# Patient Record
Sex: MALE | Race: ASIAN | NOT HISPANIC OR LATINO | Employment: OTHER | URBAN - METROPOLITAN AREA
[De-identification: names, ages, dates, MRNs, and addresses within clinical notes are randomized per-mention and may not be internally consistent; named-entity substitution may affect disease eponyms.]

---

## 2018-05-07 ENCOUNTER — OFFICE VISIT - HEALTHEAST (OUTPATIENT)
Dept: FAMILY MEDICINE | Facility: CLINIC | Age: 63
End: 2018-05-07

## 2018-05-07 DIAGNOSIS — H53.8 BLURRY VISION: ICD-10-CM

## 2018-05-07 DIAGNOSIS — I10 HYPERTENSION: ICD-10-CM

## 2018-05-07 LAB
ALBUMIN SERPL-MCNC: 4.3 G/DL (ref 3.5–5)
ALP SERPL-CCNC: 106 U/L (ref 45–120)
ALT SERPL W P-5'-P-CCNC: 21 U/L (ref 0–45)
ANION GAP SERPL CALCULATED.3IONS-SCNC: 10 MMOL/L (ref 5–18)
AST SERPL W P-5'-P-CCNC: 26 U/L (ref 0–40)
BILIRUB SERPL-MCNC: 1.3 MG/DL (ref 0–1)
BUN SERPL-MCNC: 19 MG/DL (ref 8–22)
CALCIUM SERPL-MCNC: 9.9 MG/DL (ref 8.5–10.5)
CHLORIDE BLD-SCNC: 105 MMOL/L (ref 98–107)
CO2 SERPL-SCNC: 24 MMOL/L (ref 22–31)
CREAT SERPL-MCNC: 1.6 MG/DL (ref 0.7–1.3)
GFR SERPL CREATININE-BSD FRML MDRD: 44 ML/MIN/1.73M2
GLUCOSE BLD-MCNC: 146 MG/DL (ref 70–125)
LDLC SERPL CALC-MCNC: 176 MG/DL
POTASSIUM BLD-SCNC: 4.7 MMOL/L (ref 3.5–5)
PROT SERPL-MCNC: 7.4 G/DL (ref 6–8)
SODIUM SERPL-SCNC: 139 MMOL/L (ref 136–145)

## 2018-05-07 ASSESSMENT — MIFFLIN-ST. JEOR: SCORE: 1388.61

## 2018-05-14 ENCOUNTER — OFFICE VISIT - HEALTHEAST (OUTPATIENT)
Dept: FAMILY MEDICINE | Facility: CLINIC | Age: 63
End: 2018-05-14

## 2018-05-14 DIAGNOSIS — E78.00 HYPERCHOLESTEREMIA: ICD-10-CM

## 2018-05-14 DIAGNOSIS — I10 HYPERTENSION: ICD-10-CM

## 2018-05-14 DIAGNOSIS — R73.9 HYPERGLYCEMIA: ICD-10-CM

## 2018-05-14 ASSESSMENT — MIFFLIN-ST. JEOR: SCORE: 1388.61

## 2018-06-14 ENCOUNTER — RECORDS - HEALTHEAST (OUTPATIENT)
Dept: ADMINISTRATIVE | Facility: OTHER | Age: 63
End: 2018-06-14

## 2018-06-18 ENCOUNTER — OFFICE VISIT - HEALTHEAST (OUTPATIENT)
Dept: FAMILY MEDICINE | Facility: CLINIC | Age: 63
End: 2018-06-18

## 2018-06-18 DIAGNOSIS — R73.9 HYPERGLYCEMIA: ICD-10-CM

## 2018-06-18 DIAGNOSIS — H26.9 CATARACT: ICD-10-CM

## 2018-06-18 DIAGNOSIS — E78.00 HYPERCHOLESTEREMIA: ICD-10-CM

## 2018-06-18 DIAGNOSIS — I10 HYPERTENSION: ICD-10-CM

## 2018-06-18 ASSESSMENT — MIFFLIN-ST. JEOR: SCORE: 1382.94

## 2018-08-07 ENCOUNTER — OFFICE VISIT - HEALTHEAST (OUTPATIENT)
Dept: FAMILY MEDICINE | Facility: CLINIC | Age: 63
End: 2018-08-07

## 2018-08-07 DIAGNOSIS — H26.9 CATARACT: ICD-10-CM

## 2018-08-07 DIAGNOSIS — R73.9 HYPERGLYCEMIA: ICD-10-CM

## 2018-08-07 DIAGNOSIS — E78.00 HYPERCHOLESTEREMIA: ICD-10-CM

## 2018-08-07 DIAGNOSIS — I10 HYPERTENSION: ICD-10-CM

## 2018-08-07 DIAGNOSIS — Z28.39 IMMUNIZATION DEFICIENCY: ICD-10-CM

## 2018-08-07 LAB
ALT SERPL W P-5'-P-CCNC: 48 U/L (ref 0–45)
HBA1C MFR BLD: 5.8 % (ref 3.5–6)
LDLC SERPL CALC-MCNC: 73 MG/DL

## 2018-08-07 ASSESSMENT — MIFFLIN-ST. JEOR: SCORE: 1382.94

## 2018-08-08 ENCOUNTER — COMMUNICATION - HEALTHEAST (OUTPATIENT)
Dept: FAMILY MEDICINE | Facility: CLINIC | Age: 63
End: 2018-08-08

## 2019-02-11 ENCOUNTER — OFFICE VISIT - HEALTHEAST (OUTPATIENT)
Dept: FAMILY MEDICINE | Facility: CLINIC | Age: 64
End: 2019-02-11

## 2019-02-11 DIAGNOSIS — H25.9 SENILE CATARACT OF LEFT EYE, UNSPECIFIED AGE-RELATED CATARACT TYPE: ICD-10-CM

## 2019-02-11 DIAGNOSIS — I10 ESSENTIAL HYPERTENSION: ICD-10-CM

## 2019-02-11 DIAGNOSIS — G89.29 CHRONIC PAIN OF BOTH KNEES: ICD-10-CM

## 2019-02-11 DIAGNOSIS — M25.562 CHRONIC PAIN OF BOTH KNEES: ICD-10-CM

## 2019-02-11 DIAGNOSIS — Z23 NEED FOR IMMUNIZATION AGAINST INFLUENZA: ICD-10-CM

## 2019-02-11 DIAGNOSIS — M25.561 CHRONIC PAIN OF BOTH KNEES: ICD-10-CM

## 2019-02-11 DIAGNOSIS — E78.00 HYPERCHOLESTEREMIA: ICD-10-CM

## 2019-02-11 ASSESSMENT — MIFFLIN-ST. JEOR: SCORE: 1415.83

## 2019-05-13 ENCOUNTER — OFFICE VISIT - HEALTHEAST (OUTPATIENT)
Dept: FAMILY MEDICINE | Facility: CLINIC | Age: 64
End: 2019-05-13

## 2019-05-13 DIAGNOSIS — N18.30 CKD (CHRONIC KIDNEY DISEASE) STAGE 3, GFR 30-59 ML/MIN (H): ICD-10-CM

## 2019-05-13 DIAGNOSIS — H10.13 ALLERGIC CONJUNCTIVITIS, BILATERAL: ICD-10-CM

## 2019-05-13 DIAGNOSIS — E78.00 HYPERCHOLESTEREMIA: ICD-10-CM

## 2019-05-13 DIAGNOSIS — Z28.39 IMMUNIZATION DEFICIENCY: ICD-10-CM

## 2019-05-13 DIAGNOSIS — I10 ESSENTIAL HYPERTENSION: ICD-10-CM

## 2019-05-13 LAB
ALBUMIN SERPL-MCNC: 4.4 G/DL (ref 3.5–5)
ALP SERPL-CCNC: 90 U/L (ref 45–120)
ALT SERPL W P-5'-P-CCNC: 28 U/L (ref 0–45)
ANION GAP SERPL CALCULATED.3IONS-SCNC: 13 MMOL/L (ref 5–18)
AST SERPL W P-5'-P-CCNC: 27 U/L (ref 0–40)
BILIRUB SERPL-MCNC: 0.9 MG/DL (ref 0–1)
BUN SERPL-MCNC: 20 MG/DL (ref 8–22)
CALCIUM SERPL-MCNC: 9.8 MG/DL (ref 8.5–10.5)
CHLORIDE BLD-SCNC: 102 MMOL/L (ref 98–107)
CO2 SERPL-SCNC: 25 MMOL/L (ref 22–31)
CREAT SERPL-MCNC: 1.59 MG/DL (ref 0.7–1.3)
GFR SERPL CREATININE-BSD FRML MDRD: 44 ML/MIN/1.73M2
GLUCOSE BLD-MCNC: 118 MG/DL (ref 70–125)
POTASSIUM BLD-SCNC: 4.2 MMOL/L (ref 3.5–5)
PROT SERPL-MCNC: 7.2 G/DL (ref 6–8)
SODIUM SERPL-SCNC: 140 MMOL/L (ref 136–145)

## 2019-05-13 ASSESSMENT — MIFFLIN-ST. JEOR: SCORE: 1429.44

## 2019-05-20 ENCOUNTER — COMMUNICATION - HEALTHEAST (OUTPATIENT)
Dept: FAMILY MEDICINE | Facility: CLINIC | Age: 64
End: 2019-05-20

## 2019-06-11 ENCOUNTER — OFFICE VISIT - HEALTHEAST (OUTPATIENT)
Dept: FAMILY MEDICINE | Facility: CLINIC | Age: 64
End: 2019-06-11

## 2019-06-11 DIAGNOSIS — H10.13 ALLERGIC CONJUNCTIVITIS, BILATERAL: ICD-10-CM

## 2019-06-11 DIAGNOSIS — Z12.11 SCREEN FOR COLON CANCER: ICD-10-CM

## 2019-06-11 DIAGNOSIS — N18.30 CKD (CHRONIC KIDNEY DISEASE) STAGE 3, GFR 30-59 ML/MIN (H): ICD-10-CM

## 2019-06-11 ASSESSMENT — MIFFLIN-ST. JEOR: SCORE: 1420.36

## 2019-06-16 ENCOUNTER — RECORDS - HEALTHEAST (OUTPATIENT)
Dept: ADMINISTRATIVE | Facility: OTHER | Age: 64
End: 2019-06-16

## 2019-06-16 LAB — COLOGUARD-ABSTRACT: NEGATIVE

## 2019-07-01 ENCOUNTER — RECORDS - HEALTHEAST (OUTPATIENT)
Dept: HEALTH INFORMATION MANAGEMENT | Facility: CLINIC | Age: 64
End: 2019-07-01

## 2019-07-15 ENCOUNTER — COMMUNICATION - HEALTHEAST (OUTPATIENT)
Dept: FAMILY MEDICINE | Facility: CLINIC | Age: 64
End: 2019-07-15

## 2019-07-15 DIAGNOSIS — I10 ESSENTIAL HYPERTENSION: ICD-10-CM

## 2019-09-19 ENCOUNTER — COMMUNICATION - HEALTHEAST (OUTPATIENT)
Dept: GERIATRIC MEDICINE | Facility: CLINIC | Age: 64
End: 2019-09-19

## 2019-11-13 ENCOUNTER — OFFICE VISIT - HEALTHEAST (OUTPATIENT)
Dept: FAMILY MEDICINE | Facility: CLINIC | Age: 64
End: 2019-11-13

## 2019-11-13 DIAGNOSIS — Z59.71 UNINSURED: ICD-10-CM

## 2019-11-13 DIAGNOSIS — E78.00 HYPERCHOLESTEREMIA: ICD-10-CM

## 2019-11-13 DIAGNOSIS — N18.30 CKD (CHRONIC KIDNEY DISEASE) STAGE 3, GFR 30-59 ML/MIN (H): ICD-10-CM

## 2019-11-13 DIAGNOSIS — Z23 NEED FOR IMMUNIZATION AGAINST INFLUENZA: ICD-10-CM

## 2019-11-13 DIAGNOSIS — I10 ESSENTIAL HYPERTENSION: ICD-10-CM

## 2019-11-13 DIAGNOSIS — I16.0 HYPERTENSIVE URGENCY: ICD-10-CM

## 2019-11-13 ASSESSMENT — MIFFLIN-ST. JEOR: SCORE: 1427.17

## 2019-11-14 ENCOUNTER — COMMUNICATION - HEALTHEAST (OUTPATIENT)
Dept: NURSING | Facility: CLINIC | Age: 64
End: 2019-11-14

## 2019-11-14 ENCOUNTER — COMMUNICATION - HEALTHEAST (OUTPATIENT)
Dept: CARE COORDINATION | Facility: CLINIC | Age: 64
End: 2019-11-14

## 2019-11-14 LAB
ALBUMIN SERPL-MCNC: 4.3 G/DL (ref 3.5–5)
ALP SERPL-CCNC: 78 U/L (ref 45–120)
ALT SERPL W P-5'-P-CCNC: 27 U/L (ref 0–45)
ANION GAP SERPL CALCULATED.3IONS-SCNC: 10 MMOL/L (ref 5–18)
AST SERPL W P-5'-P-CCNC: 27 U/L (ref 0–40)
BILIRUB SERPL-MCNC: 0.9 MG/DL (ref 0–1)
BUN SERPL-MCNC: 21 MG/DL (ref 8–22)
CALCIUM SERPL-MCNC: 9.5 MG/DL (ref 8.5–10.5)
CHLORIDE BLD-SCNC: 102 MMOL/L (ref 98–107)
CO2 SERPL-SCNC: 28 MMOL/L (ref 22–31)
CREAT SERPL-MCNC: 1.46 MG/DL (ref 0.7–1.3)
GFR SERPL CREATININE-BSD FRML MDRD: 48 ML/MIN/1.73M2
GLUCOSE BLD-MCNC: 117 MG/DL (ref 70–125)
POTASSIUM BLD-SCNC: 4.7 MMOL/L (ref 3.5–5)
PROT SERPL-MCNC: 7.3 G/DL (ref 6–8)
SODIUM SERPL-SCNC: 140 MMOL/L (ref 136–145)

## 2019-11-15 ENCOUNTER — COMMUNICATION - HEALTHEAST (OUTPATIENT)
Dept: NURSING | Facility: CLINIC | Age: 64
End: 2019-11-15

## 2019-11-20 ENCOUNTER — AMBULATORY - HEALTHEAST (OUTPATIENT)
Dept: NURSING | Facility: CLINIC | Age: 64
End: 2019-11-20

## 2019-11-20 ENCOUNTER — OFFICE VISIT - HEALTHEAST (OUTPATIENT)
Dept: FAMILY MEDICINE | Facility: CLINIC | Age: 64
End: 2019-11-20

## 2019-11-20 DIAGNOSIS — I10 ESSENTIAL HYPERTENSION: ICD-10-CM

## 2019-11-20 DIAGNOSIS — E78.00 HYPERCHOLESTEREMIA: ICD-10-CM

## 2019-11-20 DIAGNOSIS — N18.30 CKD (CHRONIC KIDNEY DISEASE) STAGE 3, GFR 30-59 ML/MIN (H): ICD-10-CM

## 2019-11-20 ASSESSMENT — MIFFLIN-ST. JEOR: SCORE: 1415.83

## 2019-11-20 ASSESSMENT — ACTIVITIES OF DAILY LIVING (ADL): DEPENDENT_IADLS:: INDEPENDENT

## 2019-12-10 ENCOUNTER — COMMUNICATION - HEALTHEAST (OUTPATIENT)
Dept: NURSING | Facility: CLINIC | Age: 64
End: 2019-12-10

## 2020-01-06 ENCOUNTER — COMMUNICATION - HEALTHEAST (OUTPATIENT)
Dept: GERIATRIC MEDICINE | Facility: CLINIC | Age: 65
End: 2020-01-06

## 2020-01-06 ENCOUNTER — COMMUNICATION - HEALTHEAST (OUTPATIENT)
Dept: NURSING | Facility: CLINIC | Age: 65
End: 2020-01-06

## 2020-01-07 ENCOUNTER — OFFICE VISIT - HEALTHEAST (OUTPATIENT)
Dept: FAMILY MEDICINE | Facility: CLINIC | Age: 65
End: 2020-01-07

## 2020-01-07 ENCOUNTER — AMBULATORY - HEALTHEAST (OUTPATIENT)
Dept: NURSING | Facility: CLINIC | Age: 65
End: 2020-01-07

## 2020-01-07 DIAGNOSIS — K08.89 PAIN, DENTAL: ICD-10-CM

## 2020-01-07 DIAGNOSIS — I10 ESSENTIAL HYPERTENSION: ICD-10-CM

## 2020-01-07 DIAGNOSIS — M25.562 CHRONIC PAIN OF BOTH KNEES: ICD-10-CM

## 2020-01-07 DIAGNOSIS — R41.3 MEMORY CHANGES: ICD-10-CM

## 2020-01-07 DIAGNOSIS — G89.29 CHRONIC PAIN OF BOTH KNEES: ICD-10-CM

## 2020-01-07 DIAGNOSIS — E78.00 HYPERCHOLESTEREMIA: ICD-10-CM

## 2020-01-07 DIAGNOSIS — M25.561 CHRONIC PAIN OF BOTH KNEES: ICD-10-CM

## 2020-01-07 ASSESSMENT — MIFFLIN-ST. JEOR: SCORE: 1431.17

## 2020-01-22 ENCOUNTER — COMMUNICATION - HEALTHEAST (OUTPATIENT)
Dept: GERIATRIC MEDICINE | Facility: CLINIC | Age: 65
End: 2020-01-22

## 2020-01-28 ENCOUNTER — COMMUNICATION - HEALTHEAST (OUTPATIENT)
Dept: NURSING | Facility: CLINIC | Age: 65
End: 2020-01-28

## 2020-02-14 ENCOUNTER — COMMUNICATION - HEALTHEAST (OUTPATIENT)
Dept: NURSING | Facility: CLINIC | Age: 65
End: 2020-02-14

## 2020-02-26 ENCOUNTER — AMBULATORY - HEALTHEAST (OUTPATIENT)
Dept: NURSING | Facility: CLINIC | Age: 65
End: 2020-02-26

## 2020-03-02 ENCOUNTER — AMBULATORY - HEALTHEAST (OUTPATIENT)
Dept: NURSING | Facility: CLINIC | Age: 65
End: 2020-03-02

## 2020-03-26 ENCOUNTER — COMMUNICATION - HEALTHEAST (OUTPATIENT)
Dept: NURSING | Facility: CLINIC | Age: 65
End: 2020-03-26

## 2020-04-07 ENCOUNTER — OFFICE VISIT - HEALTHEAST (OUTPATIENT)
Dept: FAMILY MEDICINE | Facility: CLINIC | Age: 65
End: 2020-04-07

## 2020-04-07 DIAGNOSIS — G89.29 CHRONIC PAIN OF BOTH KNEES: ICD-10-CM

## 2020-04-07 DIAGNOSIS — I10 ESSENTIAL HYPERTENSION: ICD-10-CM

## 2020-04-07 DIAGNOSIS — M25.562 CHRONIC PAIN OF BOTH KNEES: ICD-10-CM

## 2020-04-07 DIAGNOSIS — M25.561 CHRONIC PAIN OF BOTH KNEES: ICD-10-CM

## 2020-04-28 ENCOUNTER — COMMUNICATION - HEALTHEAST (OUTPATIENT)
Dept: NURSING | Facility: CLINIC | Age: 65
End: 2020-04-28

## 2020-04-29 ENCOUNTER — COMMUNICATION - HEALTHEAST (OUTPATIENT)
Dept: EMERGENCY MEDICINE | Facility: CLINIC | Age: 65
End: 2020-04-29

## 2020-04-30 ENCOUNTER — COMMUNICATION - HEALTHEAST (OUTPATIENT)
Dept: EMERGENCY MEDICINE | Facility: CLINIC | Age: 65
End: 2020-04-30

## 2020-04-30 ENCOUNTER — COMMUNICATION - HEALTHEAST (OUTPATIENT)
Dept: NURSING | Facility: CLINIC | Age: 65
End: 2020-04-30

## 2020-05-07 ENCOUNTER — OFFICE VISIT - HEALTHEAST (OUTPATIENT)
Dept: FAMILY MEDICINE | Facility: CLINIC | Age: 65
End: 2020-05-07

## 2020-05-07 DIAGNOSIS — U07.1 COVID-19: ICD-10-CM

## 2020-05-13 ENCOUNTER — COMMUNICATION - HEALTHEAST (OUTPATIENT)
Dept: GERIATRIC MEDICINE | Facility: CLINIC | Age: 65
End: 2020-05-13

## 2020-05-13 ENCOUNTER — COMMUNICATION - HEALTHEAST (OUTPATIENT)
Dept: NURSING | Facility: CLINIC | Age: 65
End: 2020-05-13

## 2020-06-08 ENCOUNTER — COMMUNICATION - HEALTHEAST (OUTPATIENT)
Dept: FAMILY MEDICINE | Facility: CLINIC | Age: 65
End: 2020-06-08

## 2020-06-08 DIAGNOSIS — H10.13 ALLERGIC CONJUNCTIVITIS, BILATERAL: ICD-10-CM

## 2020-07-21 ENCOUNTER — OFFICE VISIT - HEALTHEAST (OUTPATIENT)
Dept: FAMILY MEDICINE | Facility: CLINIC | Age: 65
End: 2020-07-21

## 2020-07-21 DIAGNOSIS — E78.00 HYPERCHOLESTEREMIA: ICD-10-CM

## 2020-07-21 DIAGNOSIS — I10 ESSENTIAL HYPERTENSION: ICD-10-CM

## 2020-07-21 ASSESSMENT — MIFFLIN-ST. JEOR: SCORE: 1409.7

## 2020-08-04 ENCOUNTER — COMMUNICATION - HEALTHEAST (OUTPATIENT)
Dept: GERIATRIC MEDICINE | Facility: CLINIC | Age: 65
End: 2020-08-04

## 2020-10-22 ENCOUNTER — COMMUNICATION - HEALTHEAST (OUTPATIENT)
Dept: GERIATRIC MEDICINE | Facility: CLINIC | Age: 65
End: 2020-10-22

## 2020-10-24 ENCOUNTER — COMMUNICATION - HEALTHEAST (OUTPATIENT)
Dept: SCHEDULING | Facility: CLINIC | Age: 65
End: 2020-10-24

## 2020-10-27 ENCOUNTER — OFFICE VISIT - HEALTHEAST (OUTPATIENT)
Dept: FAMILY MEDICINE | Facility: CLINIC | Age: 65
End: 2020-10-27

## 2020-10-27 DIAGNOSIS — I10 ESSENTIAL HYPERTENSION: ICD-10-CM

## 2020-10-27 DIAGNOSIS — N18.30 STAGE 3 CHRONIC KIDNEY DISEASE, UNSPECIFIED WHETHER STAGE 3A OR 3B CKD (H): ICD-10-CM

## 2020-11-03 ENCOUNTER — OFFICE VISIT - HEALTHEAST (OUTPATIENT)
Dept: FAMILY MEDICINE | Facility: CLINIC | Age: 65
End: 2020-11-03

## 2020-11-03 DIAGNOSIS — E78.00 HYPERCHOLESTEREMIA: ICD-10-CM

## 2020-11-03 DIAGNOSIS — Z23 NEED FOR IMMUNIZATION AGAINST INFLUENZA: ICD-10-CM

## 2020-11-03 DIAGNOSIS — I10 ESSENTIAL HYPERTENSION: ICD-10-CM

## 2020-11-03 DIAGNOSIS — J02.9 PHARYNGITIS, UNSPECIFIED ETIOLOGY: ICD-10-CM

## 2020-11-03 ASSESSMENT — MIFFLIN-ST. JEOR: SCORE: 1388.15

## 2020-12-11 ENCOUNTER — COMMUNICATION - HEALTHEAST (OUTPATIENT)
Dept: GERIATRIC MEDICINE | Facility: CLINIC | Age: 65
End: 2020-12-11

## 2020-12-11 ASSESSMENT — ACTIVITIES OF DAILY LIVING (ADL): DEPENDENT_IADLS:: COOKING;LAUNDRY;SHOPPING;MEAL PREPARATION;TRANSPORTATION

## 2020-12-13 ENCOUNTER — COMMUNICATION - HEALTHEAST (OUTPATIENT)
Dept: GERIATRIC MEDICINE | Facility: CLINIC | Age: 65
End: 2020-12-13

## 2020-12-14 ENCOUNTER — COMMUNICATION - HEALTHEAST (OUTPATIENT)
Dept: SCHEDULING | Facility: CLINIC | Age: 65
End: 2020-12-14

## 2020-12-14 ENCOUNTER — COMMUNICATION - HEALTHEAST (OUTPATIENT)
Dept: GERIATRIC MEDICINE | Facility: CLINIC | Age: 65
End: 2020-12-14

## 2020-12-16 ENCOUNTER — COMMUNICATION - HEALTHEAST (OUTPATIENT)
Dept: GERIATRIC MEDICINE | Facility: CLINIC | Age: 65
End: 2020-12-16

## 2020-12-23 ENCOUNTER — OFFICE VISIT - HEALTHEAST (OUTPATIENT)
Dept: FAMILY MEDICINE | Facility: CLINIC | Age: 65
End: 2020-12-23

## 2020-12-23 DIAGNOSIS — Z11.59 ENCOUNTER FOR HEPATITIS C VIRUS SCREENING TEST FOR HIGH RISK PATIENT: ICD-10-CM

## 2020-12-23 DIAGNOSIS — I10 ESSENTIAL HYPERTENSION: ICD-10-CM

## 2020-12-23 DIAGNOSIS — I10 ACCELERATED HYPERTENSION: ICD-10-CM

## 2020-12-23 DIAGNOSIS — Z91.89 ENCOUNTER FOR HEPATITIS C VIRUS SCREENING TEST FOR HIGH RISK PATIENT: ICD-10-CM

## 2020-12-23 DIAGNOSIS — N13.2 HYDRONEPHROSIS WITH URINARY OBSTRUCTION DUE TO URETERAL CALCULUS: ICD-10-CM

## 2020-12-23 DIAGNOSIS — N18.30 STAGE 3 CHRONIC KIDNEY DISEASE, UNSPECIFIED WHETHER STAGE 3A OR 3B CKD (H): ICD-10-CM

## 2020-12-23 LAB
ANION GAP SERPL CALCULATED.3IONS-SCNC: 10 MMOL/L (ref 5–18)
BUN SERPL-MCNC: 17 MG/DL (ref 8–22)
CALCIUM SERPL-MCNC: 9.6 MG/DL (ref 8.5–10.5)
CHLORIDE BLD-SCNC: 98 MMOL/L (ref 98–107)
CO2 SERPL-SCNC: 30 MMOL/L (ref 22–31)
CREAT SERPL-MCNC: 1.34 MG/DL (ref 0.7–1.3)
GFR SERPL CREATININE-BSD FRML MDRD: 53 ML/MIN/1.73M2
GLUCOSE BLD-MCNC: 267 MG/DL (ref 70–125)
HCV AB SERPL QL IA: NEGATIVE
POTASSIUM BLD-SCNC: 3.2 MMOL/L (ref 3.5–5)
SODIUM SERPL-SCNC: 138 MMOL/L (ref 136–145)

## 2020-12-23 ASSESSMENT — MIFFLIN-ST. JEOR: SCORE: 1371.37

## 2021-01-26 ENCOUNTER — OFFICE VISIT - HEALTHEAST (OUTPATIENT)
Dept: FAMILY MEDICINE | Facility: CLINIC | Age: 66
End: 2021-01-26

## 2021-01-26 DIAGNOSIS — H54.7 DECREASED VISUAL ACUITY: ICD-10-CM

## 2021-01-26 DIAGNOSIS — I10 ACCELERATED HYPERTENSION: ICD-10-CM

## 2021-01-26 DIAGNOSIS — Z00.00 ROUTINE GENERAL MEDICAL EXAMINATION AT A HEALTH CARE FACILITY: ICD-10-CM

## 2021-01-26 DIAGNOSIS — E78.00 HYPERCHOLESTEREMIA: ICD-10-CM

## 2021-01-26 DIAGNOSIS — K08.9 POOR DENTITION: ICD-10-CM

## 2021-01-26 DIAGNOSIS — H17.9 CORNEAL SCARRING: ICD-10-CM

## 2021-01-26 RX ORDER — CARVEDILOL 25 MG/1
25 TABLET ORAL 2 TIMES DAILY
Qty: 180 TABLET | Refills: 11 | Status: SHIPPED | OUTPATIENT
Start: 2021-01-26 | End: 2021-08-17

## 2021-01-26 RX ORDER — ATORVASTATIN CALCIUM 20 MG/1
20 TABLET, FILM COATED ORAL DAILY
Qty: 90 TABLET | Refills: 11 | Status: SHIPPED | OUTPATIENT
Start: 2021-01-26 | End: 2022-07-21

## 2021-01-26 RX ORDER — AMLODIPINE BESYLATE 10 MG/1
10 TABLET ORAL DAILY
Qty: 90 TABLET | Refills: 11 | Status: SHIPPED | OUTPATIENT
Start: 2021-01-26 | End: 2022-07-26

## 2021-01-26 ASSESSMENT — MIFFLIN-ST. JEOR: SCORE: 1388.15

## 2021-01-26 ASSESSMENT — PATIENT HEALTH QUESTIONNAIRE - PHQ9: SUM OF ALL RESPONSES TO PHQ QUESTIONS 1-9: 5

## 2021-02-01 ENCOUNTER — COMMUNICATION - HEALTHEAST (OUTPATIENT)
Dept: FAMILY MEDICINE | Facility: CLINIC | Age: 66
End: 2021-02-01

## 2021-02-01 DIAGNOSIS — I10 ESSENTIAL HYPERTENSION: ICD-10-CM

## 2021-02-04 ENCOUNTER — RECORDS - HEALTHEAST (OUTPATIENT)
Dept: ADMINISTRATIVE | Facility: OTHER | Age: 66
End: 2021-02-04

## 2021-02-16 ENCOUNTER — RECORDS - HEALTHEAST (OUTPATIENT)
Dept: ADMINISTRATIVE | Facility: OTHER | Age: 66
End: 2021-02-16

## 2021-02-23 ENCOUNTER — COMMUNICATION - HEALTHEAST (OUTPATIENT)
Dept: FAMILY MEDICINE | Facility: CLINIC | Age: 66
End: 2021-02-23

## 2021-03-09 ENCOUNTER — OFFICE VISIT - HEALTHEAST (OUTPATIENT)
Dept: FAMILY MEDICINE | Facility: CLINIC | Age: 66
End: 2021-03-09

## 2021-03-09 DIAGNOSIS — I1A.0 RESISTANT HYPERTENSION: ICD-10-CM

## 2021-03-09 DIAGNOSIS — N20.0 NEPHROLITHIASIS: ICD-10-CM

## 2021-03-09 DIAGNOSIS — N13.8 OBSTRUCTIVE NEPHROPATHY: ICD-10-CM

## 2021-03-09 ASSESSMENT — MIFFLIN-ST. JEOR: SCORE: 1410.83

## 2021-03-12 ENCOUNTER — HOSPITAL ENCOUNTER (OUTPATIENT)
Dept: ULTRASOUND IMAGING | Facility: HOSPITAL | Age: 66
Discharge: HOME | End: 2021-03-12
Attending: FAMILY MEDICINE

## 2021-03-12 DIAGNOSIS — I1A.0 RESISTANT HYPERTENSION: ICD-10-CM

## 2021-03-18 ENCOUNTER — COMMUNICATION - HEALTHEAST (OUTPATIENT)
Dept: UROLOGY | Facility: CLINIC | Age: 66
End: 2021-03-18

## 2021-03-25 ENCOUNTER — OFFICE VISIT - HEALTHEAST (OUTPATIENT)
Dept: UROLOGY | Facility: CLINIC | Age: 66
End: 2021-03-25

## 2021-03-25 DIAGNOSIS — N26.1 KIDNEY ATROPHY: ICD-10-CM

## 2021-03-25 DIAGNOSIS — I15.8 OTHER SECONDARY HYPERTENSION: ICD-10-CM

## 2021-03-25 DIAGNOSIS — N20.1 CALCULUS OF URETER: ICD-10-CM

## 2021-03-25 RX ORDER — PREDNISOLONE ACETATE 10 MG/ML
SUSPENSION/ DROPS OPHTHALMIC 2 TIMES DAILY
Status: SHIPPED | COMMUNITY
Start: 2021-03-24 | End: 2022-12-05

## 2021-03-29 ENCOUNTER — AMBULATORY - HEALTHEAST (OUTPATIENT)
Dept: UROLOGY | Facility: CLINIC | Age: 66
End: 2021-03-29

## 2021-03-29 DIAGNOSIS — N13.2 HYDRONEPHROSIS WITH URINARY OBSTRUCTION DUE TO URETERAL CALCULUS: ICD-10-CM

## 2021-03-31 ASSESSMENT — MIFFLIN-ST. JEOR: SCORE: 1392.68

## 2021-03-31 NOTE — TELEPHONE ENCOUNTER
MEDICAL RECORDS REQUEST   [facility]  Urology Clinic  [address] [place] [address]  PHONE: [phone]  Fax: [phone]        FUTURE VISIT INFORMATION                                                   Pwjohn Hinojosa, : 1955 scheduled for future visit at [facility]    APPOINTMENT INFORMATION:    Date: 2021    Provider:  Adriana EDOUARD    Reason for Visit/Diagnosis: Hydronephrosis     REFERRAL INFORMATION:    Referring provider:  N/A    Specialty: N/A    Referring providers clinic:      Clinic contact number:  N/A    RECORDS REQUESTED FOR VISIT                                                     NOTES  STATUS/DETAILS   OFFICE NOTE from referring provider  yes   OFFICE NOTE from other specialist  no   DISCHARGE SUMMARY from hospital  no   DISCHARGE REPORT from the ER  no   OPERATIVE REPORT  no   MEDICATION LIST  yes   KIDNEY STONE     CT STONE  yes - 20   US RENAL  yes- 3/12/21     PRE-VISIT CHECKLIST      Record collection complete Yes   Appointment appropriately scheduled           (right time/right provider) Yes   MyChart activation If no, please explain: In process   Questionnaire complete If no, please explain: In Process      Completed by: Mary Campos

## 2021-04-01 ENCOUNTER — OFFICE VISIT - HEALTHEAST (OUTPATIENT)
Dept: FAMILY MEDICINE | Facility: CLINIC | Age: 66
End: 2021-04-01

## 2021-04-01 ENCOUNTER — COMMUNICATION - HEALTHEAST (OUTPATIENT)
Dept: FAMILY MEDICINE | Facility: CLINIC | Age: 66
End: 2021-04-01

## 2021-04-01 DIAGNOSIS — H25.9 SENILE CATARACT OF LEFT EYE, UNSPECIFIED AGE-RELATED CATARACT TYPE: ICD-10-CM

## 2021-04-01 DIAGNOSIS — N18.30 STAGE 3 CHRONIC KIDNEY DISEASE, UNSPECIFIED WHETHER STAGE 3A OR 3B CKD (H): ICD-10-CM

## 2021-04-01 DIAGNOSIS — I10 HYPERTENSION, UNSPECIFIED TYPE: ICD-10-CM

## 2021-04-01 DIAGNOSIS — Z01.818 PREOPERATIVE EXAMINATION: ICD-10-CM

## 2021-04-19 ENCOUNTER — PRE VISIT (OUTPATIENT)
Dept: UROLOGY | Facility: CLINIC | Age: 66
End: 2021-04-19

## 2021-04-19 NOTE — TELEPHONE ENCOUNTER
Chief Complaint : Consult    Hx/Sx: Hydronephrosis 2/2 ureteral calculus    Records/Orders: ANTOINETTE in PACS    Pt Contacted: N/a    At Rooming: Nadya Emery, EMT

## 2021-04-21 ENCOUNTER — PRE VISIT (OUTPATIENT)
Dept: UROLOGY | Facility: CLINIC | Age: 66
End: 2021-04-21

## 2021-04-27 ENCOUNTER — COMMUNICATION - HEALTHEAST (OUTPATIENT)
Dept: NURSING | Facility: CLINIC | Age: 66
End: 2021-04-27

## 2021-04-27 ENCOUNTER — OFFICE VISIT - HEALTHEAST (OUTPATIENT)
Dept: FAMILY MEDICINE | Facility: CLINIC | Age: 66
End: 2021-04-27

## 2021-04-27 DIAGNOSIS — I1A.0 RESISTANT HYPERTENSION: ICD-10-CM

## 2021-04-27 DIAGNOSIS — H17.9 CORNEAL SCARRING: ICD-10-CM

## 2021-04-27 DIAGNOSIS — N13.2 HYDRONEPHROSIS WITH URINARY OBSTRUCTION DUE TO URETERAL CALCULUS: ICD-10-CM

## 2021-04-27 DIAGNOSIS — H25.9 AGE-RELATED CATARACT OF BOTH EYES, UNSPECIFIED AGE-RELATED CATARACT TYPE: ICD-10-CM

## 2021-04-27 RX ORDER — HYDROCHLOROTHIAZIDE 25 MG/1
25 TABLET ORAL DAILY
Qty: 90 TABLET | Refills: 11 | Status: SHIPPED | OUTPATIENT
Start: 2021-04-27 | End: 2021-08-03

## 2021-04-27 ASSESSMENT — MIFFLIN-ST. JEOR: SCORE: 1410.83

## 2021-04-30 ENCOUNTER — COMMUNICATION - HEALTHEAST (OUTPATIENT)
Dept: NURSING | Facility: CLINIC | Age: 66
End: 2021-04-30

## 2021-05-13 ENCOUNTER — RECORDS - HEALTHEAST (OUTPATIENT)
Dept: ADMINISTRATIVE | Facility: OTHER | Age: 66
End: 2021-05-13

## 2021-05-17 ENCOUNTER — RECORDS - HEALTHEAST (OUTPATIENT)
Dept: ADMINISTRATIVE | Facility: OTHER | Age: 66
End: 2021-05-17

## 2021-05-17 ENCOUNTER — COMMUNICATION - HEALTHEAST (OUTPATIENT)
Dept: GERIATRIC MEDICINE | Facility: CLINIC | Age: 66
End: 2021-05-17

## 2021-05-17 ENCOUNTER — COMMUNICATION - HEALTHEAST (OUTPATIENT)
Dept: NURSING | Facility: CLINIC | Age: 66
End: 2021-05-17

## 2021-05-17 ASSESSMENT — ACTIVITIES OF DAILY LIVING (ADL)
DEPENDENT_IADLS:: CLEANING;COOKING;LAUNDRY;SHOPPING;MEAL PREPARATION;MEDICATION MANAGEMENT;MONEY MANAGEMENT;TRANSPORTATION;INCONTINENCE
DEPENDENT_IADLS:: CLEANING;COOKING;LAUNDRY;SHOPPING;MEAL PREPARATION;MEDICATION MANAGEMENT;MONEY MANAGEMENT;TRANSPORTATION;INCONTINENCE

## 2021-05-28 NOTE — PROGRESS NOTES
ASSESMENT AND PLAN:  Diagnoses and all orders for this visit:    Essential hypertension  -     metoprolol succinate (TOPROL-XL) 100 MG 24 hr tablet; Take 1 tablet (100 mg total) by mouth daily.  Dispense: 30 tablet; Refill: 11  -     amLODIPine (NORVASC) 10 MG tablet; Take 1 tablet (10 mg total) by mouth daily.  Dispense: 30 tablet; Refill: 11  -     Comprehensive Metabolic Panel    Hypercholesteremia  -     atorvastatin (LIPITOR) 20 MG tablet; Take 1 tablet (20 mg total) by mouth daily.  Dispense: 30 tablet; Refill: 11  -     Comprehensive Metabolic Panel    Allergic conjunctivitis, bilateral  Discussed treatment options with the patient, will use drops as prescribed below.  -     naphazoline-pheniramine (NAPHCON-A) 0.025-0.3 % ophthalmic solution; Administer 1 drop to both eyes 4 (four) times a day as needed (itching).  Dispense: 15 mL; Refill: 3    Chronic kidney disease-stage III   Reviewed his GFR and lab results from a year ago with the patient with the help of a professional , will repeat as detailed above.    immunization deficiency  Immunization review and counseling done with the patient with the help of a professional .  -     Tdap vaccine greater than or equal to 6yo IM  -     Pneumococcal polysaccharide vaccine 23-valent 3 yo or older, subq/IM            SUBJECTIVE: 64-year-old male here for follow-up on his high blood pressure and high cholesterol.  Blood pressure has been well controlled.  He is not having chest pain or shortness of breath or edema or headaches.  He is taking his medications regularly.  Last year his lab work did show a elevated creatinine in the range of stage III chronic kidney disease.  Overall, he has been feeling well.  His main symptomatic concern today is bilateral mild redness and itchiness of the eyes that has been going on for the last several weeks.  He has not had any acute visual changes.  No spreading redness around the eyes, no fevers.    No past  "medical history on file.  Patient Active Problem List   Diagnosis     Hypertension     Cataract     Hypercholesteremia     Chronic pain of both knees     Current Outpatient Medications   Medication Sig Dispense Refill     amLODIPine (NORVASC) 10 MG tablet Take 1 tablet (10 mg total) by mouth daily. 30 tablet 11     atorvastatin (LIPITOR) 20 MG tablet Take 1 tablet (20 mg total) by mouth daily. 30 tablet 11     metoprolol succinate (TOPROL-XL) 100 MG 24 hr tablet Take 1 tablet (100 mg total) by mouth daily. 30 tablet 11     naphazoline-pheniramine (NAPHCON-A) 0.025-0.3 % ophthalmic solution Administer 1 drop to both eyes 4 (four) times a day as needed (itching). 15 mL 3     No current facility-administered medications for this visit.      Social History     Tobacco Use   Smoking Status Former Smoker   Smokeless Tobacco Never Used       OBJECTICE: /80 (Patient Site: Left Arm, Patient Position: Sitting, Cuff Size: Adult Regular)   Pulse 79   Temp 97.8  F (36.6  C) (Oral)   Resp 18   Ht 5' 4\" (1.626 m)   Wt 165 lb (74.8 kg)   SpO2 99%   BMI 28.32 kg/m       No results found for this or any previous visit (from the past 24 hour(s)).     GEN-alert, appropriate, in no apparent distress   Eyes-mild puffiness and redness of the conjunctiva bilaterally   CV-regular rate and rhythm with no murmur   RESP-lungs clear to auscultation   ABDOMINAL-soft and nontender to palpation with no mass   EXTREM-warm with no ankle edema   SKIN-no ulcers or vesicles      Yassine Larsen"

## 2021-05-29 NOTE — TELEPHONE ENCOUNTER
Patient scheduled 06/11/2019 at 2:20 PM. Patient notified that appointment has $3 co-pay. Patient also due for fall risk assessment and Zoster (1 of 2). Appointment notes updated.

## 2021-05-29 NOTE — PROGRESS NOTES
ASSESMENT AND PLAN:  Diagnoses and all orders for this visit:    CKD (chronic kidney disease) stage 3, GFR 30-59 ml/min (H)  Reviewed this diagnosis and creatinine trend with the patient with the help of a professional .  We will plan to recheck metabolic panel again in 4 to 6 months.  Medication review and counseling done with the patient to make sure that he is taking both the amlodipine and metoprolol every day.    Allergic conjunctivitis, bilateral  Patient was counseled on using an over-the-counter product such as Visine-A since the prescribed product that had recommended previously is not covered by insurance.    Screen for colon cancer  Patient watched the [employer] language colonoscopy video.  Counseled on options and the patient would like to proceed with stool DNA testing.  -     Cancel: Ambulatory referral for Colonoscopy  -     Cologuard          SUBJECTIVE: 64-year-old male here to follow-up on his lab results from last visit and to discuss colon cancer screening options.  He also reports that he was unable to get the eyedrops that were prescribed for his allergic conjunctivitis.  He continues to have itchiness and redness of both eyes with clear watery drainage.  History of hypertension, elevated reading today, it looks like he has been taking the amlodipine every day but it is unclear that he is been taking the metoprolol daily.    No past medical history on file.  Patient Active Problem List   Diagnosis     Hypertension     Cataract     Hypercholesteremia     Chronic pain of both knees     CKD (chronic kidney disease) stage 3, GFR 30-59 ml/min (H)     Allergic conjunctivitis, bilateral     Current Outpatient Medications   Medication Sig Dispense Refill     amLODIPine (NORVASC) 10 MG tablet Take 1 tablet (10 mg total) by mouth daily. 30 tablet 11     atorvastatin (LIPITOR) 20 MG tablet Take 1 tablet (20 mg total) by mouth daily. 30 tablet 11     metoprolol succinate (TOPROL-XL) 100 MG 24 hr  "tablet Take 1 tablet (100 mg total) by mouth daily. 30 tablet 11     No current facility-administered medications for this visit.      Social History     Tobacco Use   Smoking Status Former Smoker   Smokeless Tobacco Never Used       OBJECTICE: /86 (Patient Site: Left Arm, Patient Position: Sitting, Cuff Size: Adult Large)   Pulse 64   Temp 97.7  F (36.5  C) (Oral)   Resp 12   Ht 5' 4\" (1.626 m)   Wt 163 lb (73.9 kg)   BMI 27.98 kg/m       No results found for this or any previous visit (from the past 24 hour(s)).     GEN-alert, appropriate, in no apparent distress   Eyes-mild redness of conjunctiva bilaterally, no corneal opacities   CV-regular rate and rhythm with no murmur   RESP-lungs clear to auscultation   Skin-no periorbital erythema or induration.    Yassine Larsen          "

## 2021-05-29 NOTE — TELEPHONE ENCOUNTER
Did patient call about this?  Ok to schedule appt if he did and wants to discuss options.  Thanks.

## 2021-05-29 NOTE — TELEPHONE ENCOUNTER
Pt needs a colonoscopy or cologuard.  Nothing noted in chart.  Thanks.     Next apt with PCP is not until 11/19/19

## 2021-05-30 NOTE — TELEPHONE ENCOUNTER
Refill Approved    Rx renewed per Medication Renewal Policy. Medication was last renewed on 5/13/2019.  Last OV 6/11/2019.    [place] [facility], [facility] Triage/Med Refill 7/16/2019     Requested Prescriptions   Pending Prescriptions Disp Refills     amLODIPine (NORVASC) 10 MG tablet [Pharmacy Med Name: AMLODIPINE BESYLATE 10MG TABLETS] 30 tablet 0     Sig: TAKE 1 TABLET(10 MG) BY MOUTH DAILY       Calcium-Channel Blockers Protocol Passed - 7/15/2019  4:03 PM        Passed - PCP or prescribing provider visit in past 12 months or next 3 months     Last office visit with prescriber/PCP: 6/11/2019 Yassine Larsen MD OR same dept: 6/11/2019 Yassine Larsen MD OR same specialty: 6/11/2019 Yassine Larsen MD  Last physical: Visit date not found Last MTM visit: Visit date not found   Next visit within 3 mo: Visit date not found  Next physical within 3 mo: Visit date not found  Prescriber OR PCP: Yassine Larsen MD  Last diagnosis associated with med order: 1. Essential hypertension  - amLODIPine (NORVASC) 10 MG tablet [Pharmacy Med Name: AMLODIPINE BESYLATE 10MG TABLETS]; TAKE 1 TABLET(10 MG) BY MOUTH DAILY  Dispense: 30 tablet; Refill: 0    If protocol passes may refill for 12 months if within 3 months of last provider visit (or a total of 15 months).             Passed - Blood pressure filed in past 12 months     BP Readings from Last 1 Encounters:   06/11/19 152/86

## 2021-06-01 ENCOUNTER — PATIENT OUTREACH (OUTPATIENT)
Dept: GERIATRIC MEDICINE | Facility: CLINIC | Age: 66
End: 2021-06-01
Payer: COMMERCIAL

## 2021-06-01 VITALS — BODY MASS INDEX: 26.42 KG/M2 | WEIGHT: 154.75 LBS | HEIGHT: 64 IN

## 2021-06-01 VITALS — WEIGHT: 156 LBS | BODY MASS INDEX: 26.63 KG/M2 | HEIGHT: 64 IN

## 2021-06-01 VITALS — WEIGHT: 156 LBS | HEIGHT: 64 IN | BODY MASS INDEX: 26.63 KG/M2

## 2021-06-01 VITALS — BODY MASS INDEX: 26.42 KG/M2 | HEIGHT: 64 IN | WEIGHT: 154.75 LBS

## 2021-06-01 NOTE — PROGRESS NOTES
[facility] Care Coordination Contact      [facility] Six-Month Telephone Assessment    6 month telephone assessment completed on 09/19/19.    ER visits: No  Hospitalizations: No  TCU stays: No  Significant health status changes: no  Falls/Injuries: No  ADL/IADL changes: No  Changes in services: No    Caregiver Assessment follow up:  N/A    Goals: See POC in chart for goal progress documentation.  No changes    Will see member in 6 months for an annual health risk assessment.   Encouraged member to call CC with any questions or concerns in the meantime..    Terra Hale RN  [facility]  [phone]

## 2021-06-02 VITALS — HEIGHT: 64 IN | BODY MASS INDEX: 27.66 KG/M2 | WEIGHT: 162 LBS

## 2021-06-03 VITALS
RESPIRATION RATE: 24 BRPM | DIASTOLIC BLOOD PRESSURE: 80 MMHG | OXYGEN SATURATION: 98 % | BODY MASS INDEX: 27.66 KG/M2 | HEART RATE: 67 BPM | SYSTOLIC BLOOD PRESSURE: 162 MMHG | TEMPERATURE: 98.4 F | HEIGHT: 64 IN | WEIGHT: 162 LBS

## 2021-06-03 VITALS
SYSTOLIC BLOOD PRESSURE: 222 MMHG | HEART RATE: 88 BPM | WEIGHT: 164.5 LBS | HEIGHT: 64 IN | DIASTOLIC BLOOD PRESSURE: 90 MMHG | OXYGEN SATURATION: 99 % | TEMPERATURE: 98.4 F | BODY MASS INDEX: 28.09 KG/M2

## 2021-06-03 VITALS — WEIGHT: 165 LBS | HEIGHT: 64 IN | BODY MASS INDEX: 28.17 KG/M2

## 2021-06-03 VITALS — HEIGHT: 64 IN | BODY MASS INDEX: 27.83 KG/M2 | WEIGHT: 163 LBS

## 2021-06-03 NOTE — PROGRESS NOTES
ASSESMENT AND PLAN:  Diagnoses and all orders for this visit:    Hypertensive urgency  -     Ambulatory referral to Care Management (Primary Care) to assist with making sure the insurance is active and stable and can be continued to ensure an sick adherence to the critical medications as detailed below.  Patient is going to go directly to the pharmacy tonight and get the amlodipine and metoprolol.  He will start both of them tonight.  Counseling done today with the patient with the help of a professional  on indications for emergent follow-up, otherwise follow-up with me next week in the clinic for recheck.  -     Comprehensive Metabolic Panel    Essential hypertension  -     metoprolol succinate (TOPROL-XL) 100 MG 24 hr tablet; Take 1 tablet (100 mg total) by mouth daily.  Dispense: 90 tablet; Refill: 3  -     amLODIPine (NORVASC) 10 MG tablet; Take 1 tablet (10 mg total) by mouth daily.  Dispense: 90 tablet; Refill: 3    Hypercholesteremia  -     atorvastatin (LIPITOR) 20 MG tablet; Take 1 tablet (20 mg total) by mouth daily.  Dispense: 90 tablet; Refill: 3  -     Comprehensive Metabolic Panel    CKD (chronic kidney disease) stage 3, GFR 30-59 ml/min (H)  -     Comprehensive Metabolic Panel    Recently uninsured with insurance and prescription drug coverage and stability  -     Ambulatory referral to Care Management (Primary Care)    Need for immunization against influenza  -     Influenza High Dose, Seasonal 65+ yrs          SUBJECTIVE: 64-year-old male comes in for follow-up on his hypertension.  Unfortunately, he is been off of all of his medications since July.  He had insurance lapse and is not even sure now if he has active insurance.  According to our  staff, his insurance is currently active.  However, the patient reports that he got a letter saying his insurance would  at the end of this month.  In the past, he was on amlodipine and metoprolol.  His blood pressure had been well  "controlled.  Today his blood pressure is extremely high as elevated and detailed below.  On review of systems, patient denies any headaches or chest pain or shortness of breath or ankle edema.    No past medical history on file.  Patient Active Problem List   Diagnosis     Hypertension     Cataract     Hypercholesteremia     Chronic pain of both knees     CKD (chronic kidney disease) stage 3, GFR 30-59 ml/min (H)     Allergic conjunctivitis, bilateral     Current Outpatient Medications   Medication Sig Dispense Refill     amLODIPine (NORVASC) 10 MG tablet Take 1 tablet (10 mg total) by mouth daily. 90 tablet 3     atorvastatin (LIPITOR) 20 MG tablet Take 1 tablet (20 mg total) by mouth daily. 90 tablet 3     metoprolol succinate (TOPROL-XL) 100 MG 24 hr tablet Take 1 tablet (100 mg total) by mouth daily. 90 tablet 3     No current facility-administered medications for this visit.      Social History     Tobacco Use   Smoking Status Former Smoker   Smokeless Tobacco Never Used       OBJECTICE: BP (!) 222/90   Pulse 88   Temp 98.4  F (36.9  C) (Oral)   Ht 5' 4\" (1.626 m)   Wt 164 lb 8 oz (74.6 kg)   SpO2 99%   BMI 28.24 kg/m       No results found for this or any previous visit (from the past 24 hour(s)).     GEN-alert, appropriate, in no apparent distress   Neurologic- cranial nerves II through XII are intact, strength and sensation are intact and symmetric.  Gait is normal.   CV-regular rate and rhythm with no murmur   RESP-lungs clear to auscultation   ABDOMINAL-soft and nontender to palpation   EXTREM-warm, no ankle edema   SKIN-no ulcers or vesicles      Yassine Larsen          "

## 2021-06-03 NOTE — PROGRESS NOTES
The Clinic Community Health Worker spoke with the patient today to discuss possible Clinic Care Coordination enrollment.  The service was described to the patient and immediate needs were discussed.  The patient agreed to enrollment and an assessment was scheduled.  The patient was provided with contact information for the clinic CHW.             Assessment date: 11/20/2019.

## 2021-06-03 NOTE — PROGRESS NOTES
ASSESMENT AND PLAN:  Diagnoses and all orders for this visit:    Essential hypertension  Improving but still not controlled.  Discussed options today with the patient with the help of a professional , will add losartan as prescribed below.  Reviewed risks and benefits of the medication, recheck here in the clinic in 6 weeks.  I also discussed the case with the patient's care coordination HELLEN Redmond who is also following the patient and will continue to work with him on this.  -     losartan (COZAAR) 50 MG tablet; Take 1 tablet (50 mg total) by mouth daily.  Dispense: 90 tablet; Refill: 3    CKD (chronic kidney disease) stage 3, GFR 30-59 ml/min (H)  Stable with slight improvement, reviewed creatinine trend and most recent lab results with the patient.    Hypercholesteremia  Stable.  He is now back on atorvastatin daily and tolerating it well.  We will plan to check LDL with next blood draw.        SUBJECTIVE: 64-year-old male comes in for follow-up on his recent hypertensive urgency.  Since last visit, he has been taking amlodipine and metoprolol every day.  He is tolerating the medications well.  No chest pain, no headaches, no shortness of breath.  He does feel some general fatigue and tiredness.  Patient has a known history of mild chronic kidney disease and his last creatinine results showed a slight improvement compared to the time before.  He has a history of some chronic bilateral knee pain, likely secondary to degenerative arthritis, has been more mild recently with some exacerbation with prolonged standing or walking and he is not taking medication for it.  Patient does have some financial and social stressors that he is dealing with that he is working with his care management team on.    No past medical history on file.  Patient Active Problem List   Diagnosis     Hypertension     Cataract     Hypercholesteremia     Chronic pain of both knees     CKD (chronic kidney disease) stage 3, GFR 30-59  "ml/min (H)     Allergic conjunctivitis, bilateral     Current Outpatient Medications   Medication Sig Dispense Refill     amLODIPine (NORVASC) 10 MG tablet Take 1 tablet (10 mg total) by mouth daily. 90 tablet 3     atorvastatin (LIPITOR) 20 MG tablet Take 1 tablet (20 mg total) by mouth daily. 90 tablet 3     metoprolol succinate (TOPROL-XL) 100 MG 24 hr tablet Take 1 tablet (100 mg total) by mouth daily. 90 tablet 3     losartan (COZAAR) 50 MG tablet Take 1 tablet (50 mg total) by mouth daily. 90 tablet 3     No current facility-administered medications for this visit.      Social History     Tobacco Use   Smoking Status Former Smoker   Smokeless Tobacco Never Used       OBJECTICE: /80   Pulse 67   Temp 98.4  F (36.9  C) (Oral)   Resp 24   Ht 5' 4\" (1.626 m)   Wt 162 lb (73.5 kg)   SpO2 98%   BMI 27.81 kg/m       No results found for this or any previous visit (from the past 24 hour(s)).     GEN-alert, appropriate, no apparent distress   Neurologic-cranial nerves II through XII are intact, strength and sensation are intact and symmetric   CV-regular rate and rhythm with no murmur   RESP-lungs clear to auscultation   EXTREM-warm with no edema   SKIN-no ulcers or vesicles      Yassine Larsen          "

## 2021-06-04 ENCOUNTER — COMMUNICATION - HEALTHEAST (OUTPATIENT)
Dept: NURSING | Facility: CLINIC | Age: 66
End: 2021-06-04

## 2021-06-04 NOTE — PROGRESS NOTES
Interp: Jocelyn [id number]    Reminder provided about tomorrow's PCP and CCC RN appointments.     Reports wanting medication delivered and the CHW Informed him that it is best to speak with the CCC RN about this.    Addendum: No CHW goals at this time. CCC RN to speak with Pwo Micaela on 2/14/20.      Next Outreach: STACIE

## 2021-06-05 NOTE — PROGRESS NOTES
Clinic Care Coordination Contact    Follow Up Progress Note      Assessment: f/u on med compliance    Spoke with patient's spouse via phone today. Spouse states to call back later in the day to speak with daughterKaylen because patient's daughter is not home.           Plan:     1) CCC RN will reach out again on 2/14/2020

## 2021-06-05 NOTE — PROGRESS NOTES
Thanks, I saw him today in the clinic and did increase his losartan to 100 mg/day.  I also gave him a written note for his dentist indicating that I think he is cleared to have a dental extraction/procedure given the improvement that has been made in his blood pressures.  He was 152 systolic today and should improve further with the increased losartan.  Please help him coordinate that dental follow-up.  I talked to him about a cane, we decided not to use one.  Thank you.

## 2021-06-05 NOTE — PROGRESS NOTES
ASSESMENT AND PLAN:  Diagnoses and all orders for this visit:    Essential hypertension, improving control but still not at goal.  Discussed options today with the patient and his family with the help of a professional .  We are going to increase the dose of losartan and continue with all of his other medications.  Recheck here in the clinic in 3 months, sooner if problems.  -     losartan (COZAAR) 100 MG tablet; Take 1 tablet (100 mg total) by mouth daily.  Dispense: 90 tablet; Refill: 3    Pain, dental  Patient needs to have a dental extraction done on his lower incisor.  Counseled the patient today that I think now his blood pressure is under adequate control for him to proceed with the procedure and I gave him a written notice of this to pass along to his dentist.    Hypercholesteremia  Stable.  Continue atorvastatin.  He will be back in 3 months and will plan to check ALT and LDL at that time along with any other necessary labs.    Chronic pain of both knees  Intermittent.  Likely arthritis.  Will use acetaminophen as needed.    Memory changes  Patient having some memory changes as detailed below.  Counseling done with the patient and his daughter to observe these things and other indicators more closely over the next 3 months and we will have a more detailed discussion about this at his follow-up and if he does seem to be having significant memory issues will proceed with work-up at that time.        SUBJECTIVE: 65-year-old male here for follow-up.  He said very high blood pressures, see previous notes for details, has been coming down with increased medication therapy.  Since last visit, he has been taking 50 mg losartan every day and tolerating the medication well.  He has not noticed any side effects or problems.  His blood pressure has improved but is still not under ideal control.  Patient is having ongoing issues with oral pain in the lower incisor, had been severe a few months ago but then  "has improved and is now mild.  Patient did see the dentist but the dentist was reluctant to do the required extraction because his blood pressure was very high at that time, apparently over 200 systolic.  Patient gets some intermittent pain in his knees.  He had asked his care coordination nurse about the possibility of getting a cane.  He does not have falls or unsteadiness, just wondered if this would help with the intermittent pain.  Patient is also noticed that he is become more forgetful over the past year.  His daughter has also noticed the same thing.  They are wondering if this is normal for his age.    No past medical history on file.  Patient Active Problem List   Diagnosis     Hypertension     Cataract     Hypercholesteremia     Chronic pain of both knees     CKD (chronic kidney disease) stage 3, GFR 30-59 ml/min (H)     Allergic conjunctivitis, bilateral     Current Outpatient Medications   Medication Sig Dispense Refill     amLODIPine (NORVASC) 10 MG tablet Take 1 tablet (10 mg total) by mouth daily. 90 tablet 3     atorvastatin (LIPITOR) 20 MG tablet Take 1 tablet (20 mg total) by mouth daily. 90 tablet 3     losartan (COZAAR) 100 MG tablet Take 1 tablet (100 mg total) by mouth daily. 90 tablet 3     metoprolol succinate (TOPROL-XL) 100 MG 24 hr tablet Take 1 tablet (100 mg total) by mouth daily. 90 tablet 3     No current facility-administered medications for this visit.      Social History     Tobacco Use   Smoking Status Former Smoker   Smokeless Tobacco Never Used   Tobacco Comment    no passive exposure       OBJECTICE: /82 (Patient Site: Left Arm, Patient Position: Sitting, Cuff Size: Adult Regular)   Pulse 73   Temp 98.4  F (36.9  C) (Oral)   Ht 5' 4.57\" (1.64 m)   Wt 164 lb 8 oz (74.6 kg)   SpO2 98%   BMI 27.74 kg/m       No results found for this or any previous visit (from the past 24 hour(s)).     GEN-alert, appropriate, in no apparent distress   ENT-severe recession of gumline " and loose lower incisor.     Musculoskeletal-no knee effusions.  Some crepitus on flexion and extension bilaterally.   CV-regular rate and rhythm with no murmur   RESP-lungs clear to auscultation   EXTREM-warm, no ankle edema   SKIN-no ulcers or vesicles      Yassine Larsen

## 2021-06-05 NOTE — PROGRESS NOTES
[facility] Care Coordination Contact  [facility] Home Visit Assessment     Home visit for Health Risk Assessment with Pwo Micaela completed on 1/6/2020    Type of residence:: Private home - no stairs  Current living arrangement:: I live in a private home with family     Assessment completed with: Patient, Children, Spouse.    Current Care Plan  Member currently receiving the following home care services:  none  Member currently receiving the following community resources: None      Medication Review  Medication reconciliation completed in Epic: YES  Medication set-up & administration: Family/informal caregiver sets up every two weeks  Self-administers medications  Medication Risk Assessment Medication (1 or more, place referral to MTM):  N/A: No risk factors identified  MTM Referral Placed: No: No risk factors idenified    Mental/Behavioral Health   Depression Screening: See PHQ assessment flowsheet.   Mental health DX:: No   Mental health DX how managed:: None  Mental Health Diagnosis: No  Mental Health Services: None: No further intervention needed at this time.    Falls Assessment:   Fallen 2 or more times in the past year?: No   Any fall with injury in the past year?: No    ADL/IADL Dependencies:   Dependent ADLs:: Independent  Dependent IADLs:: Cleaning, Laundry, Shopping, Meal Preparation, Transportation, Money Management, Medication Management    [facility] sponsored benefits: Shared information re: Silver Sneakers/gym memberships, ASA, Calcium +D.    PCA Assessment completed at visit: N/A    Elderly Waiver Eligibility: No - does not meet criteria    Care Plan & Recommendations:     See LTCC for detailed assessment information.    Follow-Up Plan: Member informed of future contact, plan to f/u with member with a 6 month telephone assessment.  Contact information shared with member and family, encouraged member to call with any questions or concerns at any time.    Terra Hale RN  [place]  [facility]  [phone]

## 2021-06-05 NOTE — PROGRESS NOTES
Clinic Care Coordination Contact    Clinic Care Coordination Medication Education FOLLOW UP Visit    Patient presents for:  Medication education, Pill box teaching, Compliance monitoring and Medication reconciliation    Language: Rochelle    Communication: Literate in other languages    Accompanied by: daughter    Patient Living Situation:  Dependent with family    Primary Care Provider:  Yassine Larsen MD    Barriers:  Financial, Language, Cultural, Poor insight into disease process, Inadequate support at home and Multiple uncontrolled disease states    Medication List (see cited below): Patient presents with all ordered medications    Current Outpatient Medications   Medication Sig     amLODIPine (NORVASC) 10 MG tablet Take 1 tablet (10 mg total) by mouth daily.     atorvastatin (LIPITOR) 20 MG tablet Take 1 tablet (20 mg total) by mouth daily.     losartan (COZAAR) 100 MG tablet Take 1 tablet (100 mg total) by mouth daily.     metoprolol succinate (TOPROL-XL) 100 MG 24 hr tablet Take 1 tablet (100 mg total) by mouth daily.           Future Appointments   Date Time Provider Department Center   4/7/2020  1:40 PM Yassine Larsen MD [facility]       Action Plan  RN Will:      Care Guide Will:  Care Guide Delegation    Nursing Notes:    Patient came to see CCC RN for MEV accompanied by daughter.   States he would like medication to be delivered.   Daughter manages medications  Writer [facility] requesting medications to be delivered and auto refill.  Instructed patient to call pharmacy with any meds issues.     [facility] RN will f/u with patient and daughter on 1/28/2020

## 2021-06-05 NOTE — PROGRESS NOTES
[facility] Care Coordination Contact    Received after visit chart from care coordinator.  Completed following tasks: Mailed copy of care plan to client    Brett Cisneros  Care Management Specialist  [facility]  [phone]

## 2021-06-06 NOTE — PROGRESS NOTES
Clinic Care Coordination Contact    Follow Up Progress Note      Assessment: f/u med compliance and HTN  Chart review completed today  Spoke daughter via phone today.   Daughter, Micaela Micaela would like to learn how to use auto blood pressure machine   Agrees to come in on 2/26/2020 to meet with CCC RN   States patient is taking his meds daily and daughter assist with refills on time.   Not sure how his HTN is doing because they don't know how to check it at home.   Agrees to buy blood pressure cuff at [facility] and will come to see CCC RN to teach them how to use it.     Goals addressed this encounter:   Goals        Patient Stated      RN goal: CCC RN will f/u on HTN the next 3 months.  (pt-stated)      Action steps to achieve this goal  1.  Daughter will speak with CCC RN at outreach calls  2. Daughter will  meds on a timely manner  3. Patient will take HTN meds prescribed by PCP  4. Daughter will call CCC RN with any concerns or questions.  5. Patient will attend his appt with PCP as schedule to f/u on HTN.  6. Daughter will meet with CCC RN on 2/26/2020 to learn how to use a blood pressure cuff    Goal discussed: 2/12/2020  Date goal set:  11/20/19                 Plan:   1) Patient and daughter will attend appt with CCC RN on 2/26/2020       No answer both numbers; LM for pt to call office

## 2021-06-06 NOTE — PROGRESS NOTES
Clinic Care Coordination Contact    Progress Note:     Present for today's visit is pt, CCC RN, and BAILEY PEÑA.    Pwo Micaela is a 64yo male who is here for follow up SW. He is currently enrolled with [facility] and has seen RN for multiple visits regarding management of his hypertension.    He is here today with various fo mack/paperwork pertaining to Medicare enrollment. It appears his Medicare Part D plan will be through [facility] [employer] and becomes effective 4/1/20. His prescription coverage is currently through a low income interim Medicare plan.     MARIANA advised that pt contact CCC RN or return to clinic if he has issues with prescription drug coverage after 4/1/20. No additional questions or concerns at this time.    Plan:   1.) See goal.  2.) Continue scheduled outreach.     Goals        Patient Stated      RN goal: CCC RN will f/u on HTN the next 3 months.  (pt-stated)      Action steps to achieve this goal  1. Daughter will speak with CCC RN at outreach calls.  2. Daughter will  meds from pharmacy on time.  3. Patient will take HTN meds as prescribed by PCP.  4. Patient will attend his appts with PCP as schedule to f/u on HTN.  5. Patient will monitor blood pressure at home with BP cuff.    Date goal set: 11/20/19  Discussed/updated: 2/12/20; 3/2/20

## 2021-06-06 NOTE — PROGRESS NOTES
Clinic Care Coordination Contact    Follow Up Progress Note      Assessment: f/u to teach daughter and patient how to use automatic blood pressure cuff  Patient and daughter came to see CCC RN today to learn how to use automatic blood pressure cuff    Daughter brought in auto blood pressure cuff with them today  Writer set up date, time and demonstrated to patient and daughter how to use blood pressure cuff correctly.     Educated patient to sit down with feet on the floor for at least 15 mins before checking b/p. No talking or moving while checking b/p.     Today b/p - 156/97   Recheck 15 mins later - 156/98  Patient states he likes to eat salty foods. Educated patient on low salt and low fat foods. States he will try.   Educated patient's daughterJody to check b/p daily and keep log for providers to review.   Educated patient and daughter when to call PCP or 911. To keep b/p goal at <150/80.     Goals addressed this encounter:   Goals        Patient Stated      RN goal: CCC RN will f/u on HTN the next 3 months.  (pt-stated)      Action steps to achieve this goal  1.  Daughter will speak with CCC RN at outreach calls  2. Daughter will  meds on a timely manner  3. Patient will take HTN meds prescribed by PCP  4. Daughter will call CCC RN with any concerns or questions.  5. Patient will attend his appt with PCP as schedule to f/u on HTN.  6. Daughter will meet with CCC RN on 2/26/2020 to learn how to use a blood pressure cuff    Goal discussed: 2/12/2020  Date goal set:  11/20/19                    Plan:   1) CCC RN will f/u in 1 month - scheduled on 3/26/2020 via phone.

## 2021-06-07 NOTE — PROGRESS NOTES
Clinic Care Coordination Contact    Follow Up Progress Note      Assessment: f/u post ED visit testing positive for covid-19  Chart review completed today.      Patient went to ED on 4/29/2020 for fever.  Writer received a call from patient's daughter post ED visit yesterday. stating that 4 out of 6 people in the household tested positive for covid-19.   States patient's spouse has been hospitalization for covid-19 since Monday, 4/27/2020.    Called and spoke with patient, daughter and son via phone today.  States 4 people in the household tested positive for covid-19.   7 people total in the household.   People in the household tested positive are patient, spouse- Cherelle Tolbert (being hospitalized), and his daughter's in-laws - Victor Hugo Corcoran and Wyatt Dove ( hospitalized)  2 adults children and son's girlfriend in the house have no symptoms, not being tested yet.     Patient states someone has called him yesterday and educated him on covid-19 and what to do and when go back to the ED.     Patient asked writer to talk to his adult children who live with him about covid-19  Writer educated son on covid-19 such as symptoms, when to go to ED, prevention, 14 days self-isolation, no visitors.     Patient state no more fever and he feels a bit better.     Goals addressed this encounter:   Goals        Patient Stated      Medical (pt-stated)      Goal Statement: CCC RN will do outreach call to patient for covid-19 symptoms mgmt/teaching the next 30 days.   Date Goal set: 4/30/2020  Barriers: language barrier  Strengths: motivate to learn  Date to Achieve By: 5/30/2020  Patient expressed understanding of goal: Yes    Action steps to achieve this goal:  1. I will take tylenol for fever  2. I will do deep breathing exercises 10 times hourly  3. I will go to ED if my symptoms are getting worse such as unable to keep the fever down, and worsening shortness of breath  4. I will call [facility] with any concerns or questions.                      Plan:   1) [facility] RN will f/u on 5/13/2020

## 2021-06-07 NOTE — PROGRESS NOTES
Clinic Care Coordination Contact    Follow Up Progress Note      Assessment: f/u on B/P reading results  Chart review completed today  Unable to reach patient or daughter via phone x2.     Goals addressed this encounter:      n/a       Plan:   1) Rescheduled on 4/28/2020

## 2021-06-07 NOTE — PROGRESS NOTES
Clinic Care Coordination Contact    Assessment: Care Coordinator contacted patient for 2 month follow up.  Patient has continued to follow the plan of care and assessment is negative for any new needs or concerns.    Enrollment status: Graduated.      Plan: No further outreaches at this time.  Patient will continue to follow the plan of care.  If new needs arise a new Care Coordination referral may be placed.  FYI to PCP      Follow Up Progress Note      Assessment: f/u on HTN mgmt/teaching.   Chart review completed today.   Spoke with patient and daughter via phone today.  Daughter states HTN is under control with current meds - Amlodipine 10mg daily  Losartan 20mg daily  Metoprolol 100mg daily  Daughter and patient denies s/s of hypertension. Educated patient and daughter on low salt diet and when to call PCP or 911.   Patient had virtual visit with PCP on 4/7/2020 and no major concerns reported per chart review. Has f/u appt scheduled with PCP on 7/21/2020    No further assist needed from [facility] RN.   Patient will continue to work with  partner care coordinator - Terra Hale RN. Writer gave daughter Terra's number to call with concerns or questions.     Goals addressed this encounter:   Goals Addressed                 This Visit's Progress       Patient Stated      COMPLETED: RN goal: CCC RN will f/u on HTN the next 3 months.  (pt-stated)        Action steps to achieve this goal  1. Daughter will speak with CCC RN at outreach calls.  2. Daughter will  meds from pharmacy on time.  3. Patient will take HTN meds as prescribed by PCP.  4. Patient will attend his appts with PCP as schedule to f/u on HTN.  5. Patient will monitor blood pressure at home with BP cuff.    Daughter states b/p reading WNL the past 2 weeks  Date goal set: 11/20/19  Discussed/updated: 2/12/20; 3/2/20                    Plan:   1) okay to graduate from [facility] - Will continue to enroll with  [facility].

## 2021-06-07 NOTE — TELEPHONE ENCOUNTER
Coronavirus (COVID-19) Notification    Patient  Pwo Micaela    Reason for call  Notify of Positive Coronavirus (COVID-19) lab results, assess symptoms,  review [facility] recommendations    Lab Result    Lab test:  2019-nCoV rRt-PCR or SARS-CoV-2 PCR    Oropharyngeal AND/OR nasopharyngeal swabs is POSITIVE for 2019-nCoV RNA/SARS-COV-2 PCR (COVID-19 virus)    RN Recommendations/Instructions per [facility] Coronavirus COVID-19 recommendations    Brief introduction script  Hi, My name is Tarah and I am calling on behalf of [facility].  We were notified that your Coronavirus test (COVID-19) for was POSITIVE for the virus.  I have some information to relay to you but first I wanted to mention that the [place] Dept of Health will be contacting you shortly [it's possible MD already called Patient] to talk to you more about how you are feeling and other people you have had contact with who might now also have the virus.  Also, [facility] is Partnering with the [facility] for Covid-19 research, you may be contacted directly by research staff.    Assessment (Inquire about Patient's current symptoms)  Dtr (only contact number) notified of result. Dtr does not live with Pwo.   Remains febrile, body aches present.  Is treating with OTC Tylenol.          If at time of call, Patients symptoms hare worsened, the Patient should contact 911 or have someone drive them to Emergency Dept promptly:      If Patient calling 911, inform 911 personal that you have tested positive for the Coronavirus (COVID-19).  Place mask on and await 911 to arrive.    If Emergency Dept, If possible, please have another adult drive you to the Emergency Dept but you need to wear mask when in contact with other people.      Review information with Patient    Since you tested POSITIVE for the COVID-19 virus, it is important that you protect others from being exposed and infected with this virus.    [For safety, it's very important to  follow these rules.]    First, stay home and away from others (self-isolate) until:     You've had no fever--and no medicine that reduces fever--for 3 full days (72 hours). And      Your other symptoms have gotten better. For example, your cough or breathing has improved. And     At least 7 days have passed since your symptoms started.  During this time:    Don't go to work, school or anywhere else.     Stay away from others in your home. No hugging, kissing or shaking hands.   Stay in a specific room away from other people in your home as much as possible.  Use separate bathroom, if possible.  Wear a facemask if you need to be around other people in your home.     Don't let anyone visit.    Cover your mouth and nose with a mask, tissue or wash cloth to avoid spreading germs.       Wash your hands and face with soap and water.    You should restrict contact with pets and other animals while you are sick with COVID-19. Avoid contact with your pet, including petting, snuggling, being kissed or licked, and sharing food. When possible, have another member of your household care for your animals while you are sick.    You should not go back to work until you meet the guidelines above for ending your home isolation. You should meet these along with any other guidelines that your employer has.  We will be sending you a letter that includes much of this information and will serve as formal notice for your employer's medical guidelines for going back to work. You must meet the above guidelines before going back to work in person.  Letter sent (Yes/No): Yes    How can I take care of myself?    Take Tylenol (acetaminophen) for fever or pain. If you have liver or kidney problems, ask your family doctor if it's okay to take Tylenol.   Take either:   - 650 mg (two 325 mg pills) every 4 to 6 hours, or   - 1,000 mg (two 500 mg pills) every 8 hours as needed.   - Note: Don't take more than 3,000 mg in one day.    If you have other  health problems (like cancer, heart failure, an organ transplant or severe kidney disease): Call your specialty clinic if you don't feel better in the next 2 days.      Know when to call 911: If your breathing is so bad that it keeps you from doing normal activities, call 911 or go to the emergency room. Tell them that you've been staying home and may have COVID-19.      Sign up for [employer]. We know it's scary to hear that you have COVID-19. We want to track your symptoms to make sure you're okay over the next 2 weeks. Please look for an email from [employer]--this is a free, online program that we'll use to keep in touch. To sign up, follow the link in the email. Learn more at [email or web address].    Where can I get more information?    To learn the [place]'s guidelines for staying home, please visit the [facility] website at [email or web address].    To learn more about COVID-19 and how to care for yourself at home, please visit the CDC website at https://www.cdc.gov/coronavirus/2019-ncov/about/steps-when-sick.html.    For more options for care at [facility], please visit our website at [email or web address]      [place] Dept of Health ([facility]) COVID-19 Hotline:   [phone]    Tarah Marquez RN

## 2021-06-07 NOTE — PROGRESS NOTES
ASSESMENT AND PLAN:  Diagnoses and all orders for this visit:    Essential hypertension  Well controlled on 3 drug therapy, continue meds, recheck in July - labs due at that time.    Chronic pain of both knees  Likely osteoarthritis, well controlled with acetaminophen.         Reviewed the risks and benefits of the treatment plan with the patient and/or caregiver and we discussed indications for routine and emergent follow-up.    Telephone visit done in lieu of a clinic visit because of ongoing concerns and restrictions with the COVID-19 pandemic.  Total telephone minutes with patient - 10.          SUBJECTIVE:  Called with professional .  Taking all 3 BP meds daily, gets BP checked periodically at [facility], has been 110s to 120s systolic.  No med problems.  Bilateral knee pain improving, good response to acetaminophen.  No new concerns.    No past medical history on file.  Patient Active Problem List   Diagnosis     Hypertension     Cataract     Hypercholesteremia     Chronic pain of both knees     CKD (chronic kidney disease) stage 3, GFR 30-59 ml/min (H)     Allergic conjunctivitis, bilateral     Current Outpatient Medications   Medication Sig Dispense Refill     amLODIPine (NORVASC) 10 MG tablet Take 1 tablet (10 mg total) by mouth daily. 90 tablet 3     atorvastatin (LIPITOR) 20 MG tablet Take 1 tablet (20 mg total) by mouth daily. 90 tablet 3     losartan (COZAAR) 100 MG tablet Take 1 tablet (100 mg total) by mouth daily. 90 tablet 3     metoprolol succinate (TOPROL-XL) 100 MG 24 hr tablet Take 1 tablet (100 mg total) by mouth daily. 90 tablet 3     No current facility-administered medications for this visit.      Social History     Tobacco Use   Smoking Status Former Smoker   Smokeless Tobacco Never Used   Tobacco Comment    no passive exposure       OBJECTICE: There were no vitals taken for this visit.     No results found for this or any previous visit (from the past 24 hour(s)).        Yassine DAY  [place]

## 2021-06-08 NOTE — PROGRESS NOTES
[facility] Care Coordination Contact    Member changed health plan products from [employer] to [employer] effective 5/1/20.  Mailed new Welcome Letter to member.    Brett Cisneros  Care Management Specialist  [facility]  [phone]

## 2021-06-08 NOTE — TELEPHONE ENCOUNTER
naphazoline-pheniramine (NAPHCON-A) 0.025-0.3 % ophthalmic solution [[id number]]     Electronically signed by: Yassine Larsen MD on 05/13/19 1651  Status: Discontinued    Ordering user: Yassine Larsen MD 05/13/19 1651  Authorized by: Yassine Larsen MD    PRN Comment: itching    Frequency: QID PRN 05/13/19 - 06/11/19  Discontinued by: Yassine Larsen MD 06/11/19 1504

## 2021-06-08 NOTE — PROGRESS NOTES
"ASSESMENT AND PLAN:  Diagnoses and all orders for this visit:    COVID-19  Patient remains on quarantine and continues to slowly improve symptomatically.  Reinforced the recommendations around lifting of quarantine and discussed indications for follow-up.      Reviewed the risks and benefits of the treatment plan with the patient and/or caregiver and we discussed indications for routine and emergent follow-up.    Telephone visit done in lieu of a clinic visit because of ongoing concerns and restrictions with the COVID-19 pandemic.  Total telephone minutes with patient/family - 10.      Patient is being evaluated via a billable telephone visit.      The patient has been notified of following:     \"This telephone visit will be conducted via a call between you and your physician/provider. We have found that certain health care needs can be provided without the need for a physical exam.  This service lets us provide the care you need with a short phone conversation.  If a prescription is necessary we can send it directly to your pharmacy.  If lab work is needed we can place an order for that and you can then stop by our lab to have the test done at a later time.    Telephone visits are billed at different rates depending on your insurance coverage. During this emergency period, for some insurers they may be billed the same as an in-person visit.  Please reach out to your insurance provider with any questions.    If during the course of the call the physician/provider feels a telephone visit is not appropriate, you will not be charged for this service.\"    Patient has given verbal consent to a Telephone visit? Yes          SUBJECTIVE: 65-year-old male tested positive at the ER for COVID-19.  Since then he has been on quarantine at home.  His daughter has been checking in on him and his wife is currently hospitalized with COVID-19.  The patient has been slowly improving his strength.  He is drinking fluids well but his " appetite is poor.  No vomiting.  His shortness of breath has been improving.  He is able to get up and move around and do the basic daily tasks he needs to do at home.  No new symptoms or concerns since his evaluation at the hospital.    No past medical history on file.  Patient Active Problem List   Diagnosis     Hypertension     Cataract     Hypercholesteremia     Chronic pain of both knees     CKD (chronic kidney disease) stage 3, GFR 30-59 ml/min (H)     Allergic conjunctivitis, bilateral     COVID-19     Current Outpatient Medications   Medication Sig Dispense Refill     amLODIPine (NORVASC) 10 MG tablet Take 1 tablet (10 mg total) by mouth daily. 90 tablet 3     atorvastatin (LIPITOR) 20 MG tablet Take 1 tablet (20 mg total) by mouth daily. 90 tablet 3     losartan (COZAAR) 100 MG tablet Take 1 tablet (100 mg total) by mouth daily. 90 tablet 3     metoprolol succinate (TOPROL-XL) 100 MG 24 hr tablet Take 1 tablet (100 mg total) by mouth daily. 90 tablet 3     No current facility-administered medications for this visit.      Social History     Tobacco Use   Smoking Status Former Smoker   Smokeless Tobacco Never Used   Tobacco Comment    no passive exposure       OBJECTICE: There were no vitals taken for this visit.     No results found for this or any previous visit (from the past 24 hour(s)).          Yassine Larsen

## 2021-06-08 NOTE — PROGRESS NOTES
Clinic Care Coordination Contact  Clinic Care Coordination Contact    Assessment: Care Coordinator contacted patient for 2 month follow up.  Patient has continued to follow the plan of care and assessment is negative for any new needs or concerns.    Enrollment status: Graduated.      Plan: No further outreaches at this time.  Patient will continue to follow the plan of care.  If new needs arise a new Care Coordination referral may be placed.  FYI to PCP    Follow Up Progress Note      Assessment: f/u post covid-19 +, med compliance  Chart review completed today.  Spoke with patient and daughter via phone.     Patient states he's doing much better the past few days.   Denies fever, cough or shortness of breath.   States other family members are also recovering from covid-19 symptoms  States spouse was discharged from the hospital on 5/8/202 and she's gradually recovering.   Daughter states she was able to  patient's meds and has no further issues.   States insurance is working now.   Daughter and patient denies any new concerns or goals to work on.   Spouse is currently enrolled with [facility].   Agreed with graduation.   Will notify PCP with any concerns by daughter.   Instructed patient and daughter to reach out to Terra Hale RN -  partners care coordinator with concerns. Writer provided lead care coordinator #.     Goals addressed this encounter:   Goals Addressed                 This Visit's Progress       Patient Stated      COMPLETED: Medical (pt-stated)   On track     Goal Statement: CCC RN will do outreach call to patient for covid-19 symptoms mgmt/teaching the next 30 days.   Date Goal set: 4/30/2020  Barriers: language barrier  Strengths: motivate to learn  Date to Achieve By: 5/30/2020  Patient expressed understanding of goal: Yes    Action steps to achieve this goal:  1. I will take tylenol for fever  2. I will do deep breathing exercises 10 times hourly  3. I will go to ED if my symptoms are  getting worse such as unable to keep the fever down, and worsening shortness of breath  4. I will call [facility] with any concerns or questions.     [For safety, it's very important to follow these rules.]     First, stay home and away from others (self-isolate) until:     You've had no fever--and no medicine that reduces fever--for 3 full days (72 hours). And      Your other symptoms have gotten better. For example, your cough or breathing has improved. And     At least 7 days have passed since your symptoms started.  During this time:    Don't go to work, school or anywhere else.     Stay away from others in your home. No hugging, kissing or shaking hands.   Stay in a specific room away from other people in your home as much as possible.  Use separate bathroom, if possible.  Wear a facemask if you need to be around other people in your home.     Don't let anyone visit.    Cover your mouth and nose with a mask, tissue or wash cloth to avoid spreading germs.       Wash your hands and face with soap and water.    You should restrict contact with pets and other animals while you are sick with COVID-19. Avoid contact with your pet, including petting, snuggling, being kissed or licked, and sharing food. When possible, have another member of your household care for your animals while you are sick.                  Plan:   1) okay to graduate today.   2) Enrolled with [employer] - Terra Hale RN - lead care coordinator

## 2021-06-09 ENCOUNTER — OFFICE VISIT - HEALTHEAST (OUTPATIENT)
Dept: FAMILY MEDICINE | Facility: CLINIC | Age: 66
End: 2021-06-09

## 2021-06-09 ENCOUNTER — AMBULATORY - HEALTHEAST (OUTPATIENT)
Dept: NURSING | Facility: CLINIC | Age: 66
End: 2021-06-09

## 2021-06-09 DIAGNOSIS — H26.9 CATARACT OF RIGHT EYE, UNSPECIFIED CATARACT TYPE: ICD-10-CM

## 2021-06-09 DIAGNOSIS — Z01.818 PREOPERATIVE EXAMINATION: ICD-10-CM

## 2021-06-09 DIAGNOSIS — M25.562 CHRONIC PAIN OF BOTH KNEES: ICD-10-CM

## 2021-06-09 DIAGNOSIS — I1A.0 RESISTANT HYPERTENSION: ICD-10-CM

## 2021-06-09 DIAGNOSIS — M25.561 CHRONIC PAIN OF BOTH KNEES: ICD-10-CM

## 2021-06-09 DIAGNOSIS — G89.29 CHRONIC PAIN OF BOTH KNEES: ICD-10-CM

## 2021-06-09 ASSESSMENT — MIFFLIN-ST. JEOR: SCORE: 1415.36

## 2021-06-09 NOTE — PROGRESS NOTES
ASSESMENT AND PLAN:  Diagnoses and all orders for this visit:    Essential hypertension  Today's reading is not under ideal control but his previous reading was good.  Counseling done with the patient on options, he would prefer to continue with his current 3 drug therapy and focusing on medication adherence, regular exercise, healthy eating, and will recheck here in the clinic.  -     amLODIPine (NORVASC) 10 MG tablet; Take 1 tablet (10 mg total) by mouth daily.  Dispense: 90 tablet; Refill: 3  -     losartan (COZAAR) 100 MG tablet; Take 1 tablet (100 mg total) by mouth daily.  Dispense: 90 tablet; Refill: 3  -     metoprolol succinate (TOPROL-XL) 100 MG 24 hr tablet; Take 1 tablet (100 mg total) by mouth daily.  Dispense: 90 tablet; Refill: 3    Hypercholesteremia  -     atorvastatin (LIPITOR) 20 MG tablet; Take 1 tablet (20 mg total) by mouth daily.  Dispense: 90 tablet; Refill: 3        Reviewed the risks and benefits of the treatment plan with the patient and/or caregiver and we discussed indications for routine and emergent follow-up.        SUBJECTIVE: 65-year-old male here for follow-up on his high blood pressure and high cholesterol.  He takes his medications most of the time.  He has not had any side effects or problems with the medications.  On hypertension review of systems he has not been having any chest pain or shortness of breath or out of the ordinary headaches or ankle edema.  Overall, he has been feeling well.  He does not use tobacco or alcohol.    No past medical history on file.  Patient Active Problem List   Diagnosis     Hypertension     Cataract     Hypercholesteremia     Chronic pain of both knees     CKD (chronic kidney disease) stage 3, GFR 30-59 ml/min (H)     Allergic conjunctivitis, bilateral     COVID-19     Current Outpatient Medications   Medication Sig Dispense Refill     amLODIPine (NORVASC) 10 MG tablet Take 1 tablet (10 mg total) by mouth daily. 90 tablet 3     atorvastatin  "(LIPITOR) 20 MG tablet Take 1 tablet (20 mg total) by mouth daily. 90 tablet 3     losartan (COZAAR) 100 MG tablet Take 1 tablet (100 mg total) by mouth daily. 90 tablet 3     metoprolol succinate (TOPROL-XL) 100 MG 24 hr tablet Take 1 tablet (100 mg total) by mouth daily. 90 tablet 3     No current facility-administered medications for this visit.      Social History     Tobacco Use   Smoking Status Former Smoker   Smokeless Tobacco Never Used   Tobacco Comment    no passive exposure       OBJECTICE: /82   Pulse 79   Temp 98.8  F (37.1  C) (Oral)   Resp 20   Ht 5' 4.5\" (1.638 m)   Wt 160 lb (72.6 kg)   SpO2 98%   BMI 27.04 kg/m       No results found for this or any previous visit (from the past 24 hour(s)).     GEN-alert, appropriate, in no apparent distress   Neurologic-cranial nerves II through XII are intact, strength and sensation are symmetric.   CV-regular rate and rhythm   RESP-lungs clear to auscultation   EXTREM-warm with no pitting edema       Yassine Larsen          "

## 2021-06-10 NOTE — PROGRESS NOTES
[facility] Care Coordination Contact      [facility] Six-Month Telephone Assessment    6 month telephone assessment completed on 08/04/2020.    ER visits: yes, due to SOB  Hospitalizations: No  TCU stays: No  Significant health status changes: no  Falls/Injuries: No  ADL/IADL changes: No  Changes in services: No    Caregiver Assessment follow up:  N/A    Goals: See POC in chart for goal progress documentation.  No changes, continues with no falls.    Will see member in 6 months for an annual health risk assessment.   Encouraged member to call CC with any questions or concerns in the meantime..    Terra Hale RN  [facility]  [phone]

## 2021-06-12 NOTE — PROGRESS NOTES
"ASSESMENT AND PLAN:  Diagnoses and all orders for this visit:    Stage 3 chronic kidney disease, unspecified whether stage 3a or 3b CKD  It has been almost a year since his last creatinine, will come into clinic next week for recheck and plan for labs at that time.    Essential hypertension  Poorly controlled at the ER but he was at the hospital in no acute pain so we will plan to recheck the blood pressure at his follow-up visit with me in the clinic next week, bring all medication bottles to the appointment.      Reviewed the risks and benefits of the treatment plan with the patient and/or caregiver and we discussed indications for routine and emergent follow-up.    Telephone visit done in lieu of a clinic visit because of ongoing concerns and restrictions with the COVID-19 pandemic.  Total telephone minutes with patient - 9.      Patient is being evaluated via a billable telephone visit.      The patient has been notified of following:     \"This telephone visit will be conducted via a call between you and your physician/provider. We have found that certain health care needs can be provided without the need for a physical exam.  This service lets us provide the care you need with a short phone conversation.  If a prescription is necessary we can send it directly to your pharmacy.  If lab work is needed we can place an order for that and you can then stop by our lab to have the test done at a later time.    Telephone visits are billed at different rates depending on your insurance coverage. During this emergency period, for some insurers they may be billed the same as an in-person visit.  Please reach out to your insurance provider with any questions.    If during the course of the call the physician/provider feels a telephone visit is not appropriate, you will not be charged for this service.\"    Patient has given verbal consent to a Telephone visit? Yes          SUBJECTIVE: Sitting and was not yet as of what that " was severe.  He got steroid and had negative Covid and negative strep testing.  His sore throat has persisted but been improving slowly.  He is able to drink liquids well but is still having pain with swallowing solid foods.  His blood pressure was elevated at the ER.  His caregiver reports that he has been taking his blood pressure medication regularly.  Since he has been home from the hospital there has been no fever or shortness of breath.    No past medical history on file.  Patient Active Problem List   Diagnosis     Hypertension     Cataract     Hypercholesteremia     Chronic pain of both knees     CKD (chronic kidney disease) stage 3, GFR 30-59 ml/min     Allergic conjunctivitis, bilateral     COVID-19     Current Outpatient Medications   Medication Sig Dispense Refill     amLODIPine (NORVASC) 10 MG tablet Take 1 tablet (10 mg total) by mouth daily. 90 tablet 3     atorvastatin (LIPITOR) 20 MG tablet Take 1 tablet (20 mg total) by mouth daily. 90 tablet 3     losartan (COZAAR) 100 MG tablet Take 1 tablet (100 mg total) by mouth daily. 90 tablet 3     metoprolol succinate (TOPROL-XL) 100 MG 24 hr tablet Take 1 tablet (100 mg total) by mouth daily. 90 tablet 3     No current facility-administered medications for this visit.      Social History     Tobacco Use   Smoking Status Former Smoker   Smokeless Tobacco Never Used   Tobacco Comment    no passive exposure       OBJECTICE: There were no vitals taken for this visit.     No results found for this or any previous visit (from the past 24 hour(s)).          Yassine Larsen

## 2021-06-12 NOTE — PROGRESS NOTES
[facility] Care Coordination Contact    CC received notification of Emergency Room visit.  ER visit occurred on 10/22/20 at [facility] with Dx of sore throat.    CC contacted member and left a message requesting a return call.  Member has a follow-up appointment with PCP: No: Offered Assistance with setting up a follow up appointment  Member has had a change in condition: No  New referrals placed: No  Home Visit Needed: No  Care plan reviewed and updated.  PCP notified of ED visit via EMR.    Terra Hale RN  [facility]  [phone]

## 2021-06-12 NOTE — PROGRESS NOTES
ASSESMENT AND PLAN:  Diagnoses and all orders for this visit:    Essential hypertension  Poor control.  Discussed options with the patient and his family with the help of a professional  and we are going to discontinue plain losartan and change it to losartan hydrochlorothiazide combination as prescribed below.  Follow-up in 1 month for recheck with BMP at that time.  -     amLODIPine (NORVASC) 10 MG tablet; Take 1 tablet (10 mg total) by mouth daily.  Dispense: 90 tablet; Refill: 3  -     metoprolol succinate (TOPROL-XL) 100 MG 24 hr tablet; Take 1 tablet (100 mg total) by mouth daily.  Dispense: 90 tablet; Refill: 3   -     losartan-hydrochlorothiazide (HYZAAR) 100-25 mg per tablet; Take 1 tablet by mouth daily.  Dispense: 90 tablet; Refill: 3      Hypercholesteremia  Stable, refill-     atorvastatin (LIPITOR) 20 MG tablet; Take 1 tablet (20 mg total) by mouth daily.  Dispense: 90 tablet; Refill: 3      Pharyngitis, unspecified etiology  Improving.  Reviewed the ER evaluation.  Observation.    Need for immunization against influenza  -     Influenza,Quad,High Dose,PF 65 YR+          Reviewed the risks and benefits of the treatment plan with the patient and/or caregiver and we discussed indications for routine and emergent follow-up.        SUBJECTIVE: 65-year-old male comes in for follow-up on his ER visit where he had very high blood pressure.  Patient reports that this was despite him taking his blood pressure medications daily and his family confirms this.  Blood pressure today is elevated but improved compared to in the ER.  He was in the ER with acute throat pain.  Please see that note for details of that evaluation, patient reports that the throat pain has now resolved almost completely.  Patient reports that he has been getting some intermittent mild headaches.  They last 2 to 3 hours and are generalized.  These have been going on for a few months.  No chest pain or shortness of breath.    No  "past medical history on file.  Patient Active Problem List   Diagnosis     Hypertension     Cataract     Hypercholesteremia     Chronic pain of both knees     CKD (chronic kidney disease) stage 3, GFR 30-59 ml/min     Allergic conjunctivitis, bilateral     COVID-19     Current Outpatient Medications   Medication Sig Dispense Refill     amLODIPine (NORVASC) 10 MG tablet Take 1 tablet (10 mg total) by mouth daily. 90 tablet 3     atorvastatin (LIPITOR) 20 MG tablet Take 1 tablet (20 mg total) by mouth daily. 90 tablet 3     metoprolol succinate (TOPROL-XL) 100 MG 24 hr tablet Take 1 tablet (100 mg total) by mouth daily. 90 tablet 3     losartan-hydrochlorothiazide (HYZAAR) 100-25 mg per tablet Take 1 tablet by mouth daily. 90 tablet 3     No current facility-administered medications for this visit.      Social History     Tobacco Use   Smoking Status Former Smoker   Smokeless Tobacco Never Used   Tobacco Comment    no passive exposure       OBJECTICE: /88   Pulse 88   Temp 98.2  F (36.8  C) (Oral)   Resp 20   Ht 5' 5\" (1.651 m)   Wt 153 lb 8 oz (69.6 kg)   BMI 25.54 kg/m       No results found for this or any previous visit (from the past 24 hour(s)).     GEN-alert, appropriate, in no apparent distress   Neurologic-cranial nerves II through XII are intact, strength and sensation are symmetric, gait is normal   CV-regular rate and rhythm with no murmur   RESP-lungs clear to auscultation   ENT-oropharynx is clear, neck is supple.   EXTREM-warm with no ankle edema     Yassine Larsen          "

## 2021-06-13 NOTE — PROGRESS NOTES
Thanks.  Scheduling team, please get him in for an appointment here at the clinic for eye pain.  See below.  Terra, I will talk with him at that appointment about whether he needs to have skilled nursing at home.  Thanks.

## 2021-06-13 NOTE — PROGRESS NOTES
[facility] Care Coordination Contact    Received after visit chart from care coordinator.  Completed following tasks: Mailed copy of care plan to client      Mailed POC signature page to member to sign and return asap.    Brett Cisneros  Care Management Specialist  [facility]  [phone]

## 2021-06-13 NOTE — PROGRESS NOTES
[facility] Care Coordination Contact    Ambulatory Care Coordination to Inpatient Care Management   Hand-In Communication    Date:  December 14, 2020  Name: Moriah Hinojosa is enrolled in Ambulatory Care Coordination program and I am the Lead Care Coordinator.  CC Contact Information: Epic InBasket + phone  Payor Source: Payor: MEDICARE / Plan: MEDICARE A AND B / Product Type: Medicare /   Current services in place:     Please see the CC Snaphot and Care Management Flowsheets for specific details of this Moriah Hinojosa care plan.   Additional details/specific concerns r/t this admission:    Discharge planning; no services in place at this time. Lives with family.    I will follow this admission in Epic. Please feel free to contact me with questions or for further collaboration in discharge planning.    Terra Hale RN  [facility]  [phone]     TRANSITIONS OF CARE (ALLEGRA) LOG   ALLEGRA tasks should be completed by the CC within one (1) business day of notification of each transition. Follow up contact with member is required after return to their usual care setting.  Note:  If CC finds out about the transitions fifteen (15) days or more after the member has returned to their usual care setting, no ALLEGRA log is needed. However, the CC should check in with the member to discuss the transition process, any changes needed to the care plan and document it in a case note.    Member Name:  Moriah Hinojosa [facility] Member ID#: [id number]   Product: [facility] Care Coordinator Contact:  Terra Hale RN Agency[facility]   Transition Communication Actions from Care Management Contact   Transition #1   Notification Date: 12/14/20 Transition Date:   12/13/20 Transition From: Home     Is this the member s usual care setting?               yes Transition To: Hospital, [facility]   Transition Type:  Unplanned  Reason for Admission/Comments: shortness of breath and abdominal pain     Shared CC  contact info, care plan/services with receiving setting--Date completed: 12/14/20    Notified PCP of transition--Date completed:  12/14/20    via  EMR   Transition #2   Transition #3  (if applicable)   Notification Date: 12/16/20         Transition To:  Home  Transition Date: 12/16/20     Transition Type:    Planned  Notified PCP -- Date completed: 12/16/20              Shared CC contact info, care plan/services with receiving setting or, if applicable, home care agency--Date completed:  12/16/20  *Complete additional tasks below, if this transition is a return to usual care setting.      Comments:  Family will make f/u appt with PCP      *Complete tasks below when the member is discharging TO their usual care setting within one (1) business day of notification.  For situations where the Care Coordinator is notified of the discharge prior to the date of discharge, the Care Coordinator must follow up with the member or designated representative to confirm that discharge actually occurred and discuss required ALLEGRA tasks as outlined in the ALLEGRA Instructions.  (This includes situations where it may be a  new  usual care setting for the member. (i.e., a community member who decides upon permanent nursing home placement following hospitalization and rehab).    Date completed: 12/16/20  Communicated with member or their designated representative about the following:  care transition process; about changes to the member s health status; plan of care updates; education about transitions and how to prevent unplanned transitions/readmissions  Four Pillars for Optimal Transition:    Check  Yes  - if the member, family member and/or SNF/facility staff manages the following:    If  No  provide explanation in the comments section.          [x]  Yes     []  No     Does the member have a follow-up appointment scheduled with primary care or specialist? (Mental health hospitalizations--the appt. should be w/in 7 days)   [x]  Yes     []   No     Can the member manage their medications or is there a system in place to manage medications (e.g. home care set-up)?         [x]  Yes     []  No     Can the member verbalize warning signs and symptoms to watch for and how to respond?         []  Yes     []  No     Does the member use a Personal Health Care Record?  Check  Yes  if visit summary, discharge summary, and/or healthcare summary are being used as a PHR.                                                                                                                                                                                    [x] Yes      [] No      Have you updated the member s care plan?  If  No  provide explanation in comments.   Comments:  No concerns at this time.  Will f/u with PCP       Terra Hale RN  [facility]  [phone]

## 2021-06-13 NOTE — PROGRESS NOTES
[facility] Care Coordination Contact    CC received notification of Hospital admission.  Hospital admission occurred on 12/13/20 at [facility] with Dx of sinus tachycardia.  CC contacted Hospital /discharge planner via Hand-in Communication.   CC reached out to adult daughter Kaylen Lai regarding transition and offered support as needed.  Reviewed and update care plan as needed.  Notified community service providers and placed services PCA on hold as needed.  Transition log initiated.   PCP notified of hospitalization via EMR.    TRANSITIONS OF CARE (ALLEGRA) LOG   ALLEGRA tasks should be completed by the CC within one (1) business day of notification of each transition. Follow up contact with member is required after return to their usual care setting.  Note:  If CC finds out about the transitions fifteen (15) days or more after the member has returned to their usual care setting, no ALLEGRA log is needed. However, the CC should check in with the member to discuss the transition process, any changes needed to the care plan and document it in a case note.    Member Name:  o Micaela [facility] [id number]   Product: [facility] Care Coordinator Contact:  Terra Hale RN Agency/[facility]/Care System: [facility]   Transition Communication Actions from Care Management Contact   Transition #1   Notification Date: 12/13/20 Transition Date:   12/13/20 Transition From: Home     Is this the member s usual care setting?               yes Transition To: [facility], [facility]   Transition Type:  Unplanned  Reason for Admission/Comments:  Went to ED due to abd. Pain.  Admitted with sinus tachycardia.       Shared CC contact info, care plan/services with receiving setting--Date completed: 12/13/20   Notified PCP of transition--Date completed:  12/13/20     via  EMR   Transition #2   Transition #3  (if applicable)   Notification Date: 12/16/20         Transition To:  Home  Transition  Date: 12/16/20     Transition Type:    Planned  Notified PCP -- Date completed: 12/16/20              Shared CC contact info, care plan/services with receiving setting or, if applicable, home care agency--Date completed:  12/16/20  *Complete additional tasks below, if this transition is a return to usual care setting.      Comments:  Will f/u with PCP on 12/23/20         *Complete tasks below when the member is discharging TO their usual care setting within one (1) business day of notification.  For situations where the Care Coordinator is notified of the discharge prior to the date of discharge, the Care Coordinator must follow up with the member or designated representative to confirm that discharge actually occurred and discuss required ALLEGRA tasks as outlined in the ALLEGRA Instructions.  (This includes situations where it may be a  new  usual care setting for the member. (i.e., a community member who decides upon permanent nursing home placement following hospitalization and rehab).    Date completed: 12/16/20  Communicated with member or their designated representative about the following:  care transition process; about changes to the member s health status; plan of care updates; education about transitions and how to prevent unplanned transitions/readmissions  Four Pillars for Optimal Transition:    Check  Yes  - if the member, family member and/or SNF/facility staff manages the following:    If  No  provide explanation in the comments section.          [x]  Yes     []  No     Does the member have a follow-up appointment scheduled with primary care or specialist? (Mental health hospitalizations--the appt. should be w/in 7 days)   [x]  Yes     []  No     Can the member manage their medications or is there a system in place to manage medications (e.g. home care set-up)?         [x]  Yes     []  No     Can the member verbalize warning signs and symptoms to watch for and how to respond?         [x]  Yes     []  No      Does the member use a Personal Health Care Record?  Check  Yes  if visit summary, discharge summary, and/or healthcare summary are being used as a PHR.                                                                                                                                                                                    [x] Yes      [] No      Have you updated the member s care plan?  If  No  provide explanation in comments.   Comments:  Has f/u appt. With Dr. Larsen on 12/23/20.

## 2021-06-13 NOTE — PROGRESS NOTES
[facility] Care Coordination Contact  [facility] Home Visit Assessment     Health Risk Assessment with Pwo Micaela completed on 12/11/2020 via phone due to covid-19.    Type of residence:: Private home - stairs  Current living arrangement:: I live in a private home with family     Assessment completed with: Patient, Children    Current Care Plan  Member currently receiving the following home care services:     Member currently receiving the following community resources: None    Medication Review  Medication reconciliation completed in Epic: IF NO, PLEASE EXPLAIN due to phone assessment  Medication set-up & administration: Independent-does not set up  Self-administers medications  Medication Risk Assessment Medication (1 or more, place referral to MTM):  N/A: No risk factors identified  MTM Referral Placed: No: No risk factors idenified    Mental/Behavioral Health   Depression Screening:    PHQ-2 Total Score: 0       Mental health DX:: No        Falls Assessment:   Fallen 2 or more times in the past year?: No   Any fall with injury in the past year?: No    ADL/IADL Dependencies:   Dependent ADLs:: Independent  Dependent IADLs:: Cooking, Laundry, Shopping, Meal Preparation, Transportation    [facility] Health Plan sponsored benefits: Shared information re: Silver Sneakers/gym memberships, ASA, Calcium +D.    PCA Assessment completed at visit: Not Applicable     Elderly Waiver Eligibility: No - does not meet criteria    Care Plan & Recommendations: Blood pressure management.    See LTCC for detailed assessment information.    Follow-Up Plan: Member informed of future contact, plan to f/u with member with a 6 month telephone assessment.  Contact information shared with member and family, encouraged member to call with any questions or concerns at any time.    Terra Hale RN  [facility]  [phone]

## 2021-06-14 NOTE — PROGRESS NOTES
ASSESMENT AND PLAN:  Diagnoses and all orders for this visit:    Accelerated hypertension  Excellent response to medication changes made in the hospital.  These will be continued long-term on an outpatient basis as refilled below.  Reviewed hospital data and discharge summary with the patient and family with the help of a professional .  Medication reconciliation and review and counseling done.  Post Discharge Medication Reconciliation Status: discharge medications reconciled, continue medications without change    -     hydroCHLOROthiazide (HYDRODIURIL) 12.5 MG tablet; Take 1 tablet (12.5 mg total) by mouth daily.  Dispense: 90 tablet; Refill: 3  -     carvediloL (COREG) 25 MG tablet; Take 1 tablet (25 mg total) by mouth 2 (two) times a day.  Dispense: 180 tablet; Refill: 3  -     Basic Metabolic Panel    Stage 3 chronic kidney disease, unspecified whether stage 3a or 3b CKD  -     Basic Metabolic Panel    Encounter for hepatitis C virus screening test for high risk patient  Patient does have tattooing and does not have any past record of hep C testing.  -     Hepatitis C Antibody (Anti-HCV)    Hydronephrosis with urinary obstruction due to ureteral calculus  Reviewed hospital notes and consultation from Dr. Reyes.  I am sending him a message to see if any definitive care is required for the long-term plan.      Reviewed the risks and benefits of the treatment plan with the patient and/or caregiver and we discussed indications for routine and emergent follow-up.        SUBJECTIVE: 65-year-old male here for follow-up on his recent hospitalization.  He was admitted with possible sepsis and accelerated hypertension/tachycardia.  Patient saw urology in the hospital but he was found to have a large stone in the left side with severe hydronephrosis that was chronic.  No intervention was done.  Patient got antibiotic therapy in the hospital.  His blood cultures ended up coming back negative.  He had good  "response to changes in his blood pressure medications.  Metoprolol was discontinued and Coreg was started.  Because of elevated creatinine losartan HCTZ was discontinued and replaced with low-dose HCTZ.    Since discharge from the hospital the patient has had some intermittent feelings of fever and chills but no documented fever.  His temperature is normal today in clinic.  He feels like he is slowly improving but his strength is slow to return.  Since discharge she has not had any chest pain or shortness of breath or vomiting.  Appetite is slow to improve.  He is drinking well.    Past Medical History:   Diagnosis Date     Chronic pain of both knees 2/11/2019     COVID-19 5/7/2020     Hypercholesteremia 6/18/2018     Hypertension 5/7/2018     Patient Active Problem List   Diagnosis     Hypertension     Cataract     Hypercholesteremia     Chronic pain of both knees     CKD (chronic kidney disease) stage 3, GFR 30-59 ml/min     Allergic conjunctivitis, bilateral     COVID-19     Tachycardia     Accelerated hypertension     Nephrolithiasis     Hydronephrosis with urinary obstruction due to ureteral calculus     SOB (shortness of breath)     Current Outpatient Medications   Medication Sig Dispense Refill     amLODIPine (NORVASC) 10 MG tablet Take 1 tablet (10 mg total) by mouth daily. 90 tablet 3     atorvastatin (LIPITOR) 20 MG tablet Take 1 tablet (20 mg total) by mouth daily. 90 tablet 3     carvediloL (COREG) 25 MG tablet Take 1 tablet (25 mg total) by mouth 2 (two) times a day. 180 tablet 3     hydroCHLOROthiazide (HYDRODIURIL) 12.5 MG tablet Take 1 tablet (12.5 mg total) by mouth daily. 90 tablet 3     No current facility-administered medications for this visit.      Social History     Tobacco Use   Smoking Status Former Smoker   Smokeless Tobacco Never Used   Tobacco Comment    no passive exposure       OBJECTICE: /74   Pulse 82   Temp 98.3  F (36.8  C) (Oral)   Resp 14   Ht 5' 5\" (1.651 m)   Wt 149 " lb 12.8 oz (67.9 kg)   SpO2 98%   BMI 24.93 kg/m       No results found for this or any previous visit (from the past 24 hour(s)).     GEN-alert, appropriate, in no apparent distress   HEENT-mucous membranes are moist, neck is supple   CV-regular rate and rhythm with no murmur   RESP-lungs clear to auscultation   ABDOMINAL-soft, nontender, he does not have any CVA tenderness to percussion   EXTREM-warm, no pitting edema of the ankles   SKIN-no ulcers or vesicles      Yassine Larsen

## 2021-06-14 NOTE — PROGRESS NOTES
Assessment and Plan:     1. Routine general medical examination at a health care facility  Age-appropriate counseling and anticipatory guidance done with the patient with the help of a professional .  He is up-to-date on colon cancer screening having had negative Cologuard in 2019.    2. Accelerated hypertension  Off medication.  Extensive counseling with the patient on the importance of never stopping medication, contacting us right away if there are problems with pharmacy or insurance.  Restart triple drug therapy as ordered below and recheck with me here in the clinic in 6 weeks.  - amLODIPine (NORVASC) 10 MG tablet; Take 1 tablet (10 mg total) by mouth daily.  Dispense: 90 tablet; Refill: 11  - carvediloL (COREG) 25 MG tablet; Take 1 tablet (25 mg total) by mouth 2 (two) times a day.  Dispense: 180 tablet; Refill: 11  - hydroCHLOROthiazide (HYDRODIURIL) 12.5 MG tablet; Take 1 tablet (12.5 mg total) by mouth daily.  Dispense: 90 tablet; Refill: 11    3. Hypercholesteremia  Restart atorvastatin  - atorvastatin (LIPITOR) 20 MG tablet; Take 1 tablet (20 mg total) by mouth daily.  Dispense: 90 tablet; Refill: 11    4. Decreased visual acuity  - Ambulatory referral to Ophthalmology    5. Corneal scarring  - Ambulatory referral to Ophthalmology    6. Poor dentition  - Ambulatory referral to Dentistry     The patient's current medical problems were reviewed.    I have had an Advance Directives discussion with the patient.  The following health maintenance schedule was reviewed with the patient and provided in printed form in the after visit summary:   Health Maintenance   Topic Date Due     LIPID  01/01/1988     ZOSTER VACCINES (1 of 2) 05/23/2008     MEDICARE ANNUAL WELLNESS VISIT  01/26/2022     FALL RISK ASSESSMENT  01/26/2022     COLORECTAL CANCER SCREENING  06/24/2022     ADVANCE CARE PLANNING  05/07/2023     TD 18+ HE  05/13/2029     HEPATITIS C SCREENING  Completed     Pneumococcal Vaccine: 65+ Years   Completed     INFLUENZA VACCINE RULE BASED  Completed     Pneumococcal Vaccine: Pediatrics (0 to 5 Years) and At-Risk Patients (6 to 64 Years)  Aged Out     HEPATITIS B VACCINES  Aged Out        Subjective:   Chief Complaint: Pwo Micaela is an 66 y.o. male here for a Welcome to Medicare visit.   HPI: 58-year-old male here for his welcome to Medicare exam.  Patient had recently been restarted on his antihypertensive regiment and had good results with normalization of blood pressure.  Unfortunately, since then, he ran out of all of his medication and is now has been off of all of the medications for a few weeks.  He has not noticed any out of the ordinary headaches and the remainder of the review of systems looks good as detailed below.  Patient has a known history of decreased visual acuity and corneal scarring but was lost to follow-up with his ophthalmology clinic.    Review of Systems:  No fever, no chest pain, no shortness of breath, no blood in the urine, no blood in the stool, no skin lesions that have been changing, please see above.  The rest of the review of systems are negative for all systems.    Patient Care Team:  Yassine Larsen MD as PCP - General (Family Medicine)  Terra Hale RN as Lead Care Coordinator (Primary Care - CC)  Yassine Larsen MD as Assigned PCP     Patient Active Problem List   Diagnosis     Hypertension     Cataract     Hypercholesteremia     Chronic pain of both knees     CKD (chronic kidney disease) stage 3, GFR 30-59 ml/min     Allergic conjunctivitis, bilateral     COVID-19     Tachycardia     Accelerated hypertension     Nephrolithiasis     Hydronephrosis with urinary obstruction due to ureteral calculus     SOB (shortness of breath)     Decreased visual acuity     Corneal scarring     Poor dentition     Past Medical History:   Diagnosis Date     Chronic pain of both knees 2/11/2019     COVID-19 5/7/2020     Hypercholesteremia 6/18/2018     Hypertension 5/7/2018      No past  surgical history on file.   No family history on file.   Social History     Socioeconomic History     Marital status:      Spouse name: Not on file     Number of children: Not on file     Years of education: Not on file     Highest education level: Not on file   Occupational History     Not on file   Social Needs     Financial resource strain: Not on file     Food insecurity     Worry: Not on file     Inability: Not on file     Transportation needs     Medical: Not on file     Non-medical: Not on file   Tobacco Use     Smoking status: Former Smoker     Smokeless tobacco: Never Used     Tobacco comment: no passive exposure   Substance and Sexual Activity     Alcohol use: No     Drug use: Not on file     Sexual activity: Not on file   Lifestyle     Physical activity     Days per week: Not on file     Minutes per session: Not on file     Stress: Not on file   Relationships     Social connections     Talks on phone: Not on file     Gets together: Not on file     Attends Jew service: Not on file     Active member of club or organization: Not on file     Attends meetings of clubs or organizations: Not on file     Relationship status: Not on file     Intimate partner violence     Fear of current or ex partner: Not on file     Emotionally abused: Not on file     Physically abused: Not on file     Forced sexual activity: Not on file   Other Topics Concern     Not on file   Social History Narrative     Not on file       Current Outpatient Medications   Medication Sig Dispense Refill     amLODIPine (NORVASC) 10 MG tablet Take 1 tablet (10 mg total) by mouth daily. 90 tablet 11     atorvastatin (LIPITOR) 20 MG tablet Take 1 tablet (20 mg total) by mouth daily. 90 tablet 11     carvediloL (COREG) 25 MG tablet Take 1 tablet (25 mg total) by mouth 2 (two) times a day. 180 tablet 11     hydroCHLOROthiazide (HYDRODIURIL) 12.5 MG tablet Take 1 tablet (12.5 mg total) by mouth daily. 90 tablet 11     No current  "facility-administered medications for this visit.       Objective:   Vital Signs:   Visit Vitals  BP (!) 176/100   Pulse (!) 104   Temp 99.3  F (37.4  C) (Oral)   Resp 16   Ht 5' 4\" (1.626 m)   Wt 157 lb (71.2 kg)   SpO2 99%   BMI 26.95 kg/m         EKG: Not indicated.    VisionScreening:   Hearing Screening    125Hz 250Hz 500Hz 1000Hz 2000Hz 3000Hz 4000Hz 6000Hz 8000Hz   Right ear:            Left ear:               Visual Acuity Screening    Right eye Left eye Both eyes   Without correction: 20/63 20/-- 20/40   With correction:           PHYSICAL EXAM  Gen - alert, orientated, NAD  Eyes - fundascopic exam limited by the undialated pupil but looks symmetric  ENT - oropharynx clear, TMs clear  Neck - supple, no palpable mass or lymphadenopathy  CV - RRR, no murmur  Resp - lungs CTA  Ab - soft, nontender, no palpable mass or organomegaly   - normal appearance to the external genetalia, normal testicular exam bilaterally, no hernia  Extrem - warm, no edema  Neuro - CN II-XII intact, strength, sensation, reflexes intact and symmetric  Skin - no rash, no atypical appearing lesions seen.       Assessment Results 1/26/2021   Activities of Daily Living No help needed   Instrumental Activities of Daily Living 5-6 - Severe functional impairment   Get Up and Go Score Less than 12 seconds   Falls Risk- Not Completed Medical Reasons   Mini Cog Total Score 3   Some recent data might be hidden     A Mini Cog score of 0-2 suggests the possibility of dementia, score of 3-5 suggests no dementia    Identified Health Risks:     The patient reports that he has difficulty with instrumental activities of daily living.  He was provided with a list of local organizations that provide support services and advised to make a follow up appointment in 6 weeks to address this further.   The patient's PHQ-9 score is consistent with mild depression. He was provided with information regarding depression and was advised to schedule a follow up " appointment in 6 weeks to further address this issue.  He is at risk for falling and has been provided with information to reduce the risk of falling at home.  Information regarding advance directives (living kim), including where he can download the appropriate form, was provided to the patient via the AVS.

## 2021-06-14 NOTE — TELEPHONE ENCOUNTER
RN cannot approve Refill Request    RN can NOT refill this medication medication not on med list. Last office visit: 12/23/2020 Yassine Larsen MD Last Physical: 1/26/2021 Last MTM visit: Visit date not found Last visit same specialty: 12/23/2020 Yassine Larsen MD.  Next visit within 3 mo: Visit date not found  Next physical within 3 mo: Visit date not found      Thania Flynn, [facility] Connection Triage/Med Refill 2/1/2021    Requested Prescriptions   Pending Prescriptions Disp Refills     losartan (COZAAR) 100 MG tablet [Pharmacy Med Name: LOSARTAN 100MG TABLETS] 90 tablet 3     Sig: TAKE 1 TABLET(100 MG) BY MOUTH DAILY       Angiotensin Receptor Blocker Protocol Passed - 2/1/2021  6:03 AM        Passed - PCP or prescribing provider visit in past 12 months       Last office visit with prescriber/PCP: 12/23/2020 Yassine Larsen MD OR same dept: 12/23/2020 Yassine Larsen MD OR same specialty: 12/23/2020 Yassine Larsen MD  Last physical: 1/26/2021 Last MTM visit: Visit date not found   Next visit within 3 mo: Visit date not found  Next physical within 3 mo: Visit date not found  Prescriber OR PCP: Yassine Larsen MD  Last diagnosis associated with med order: 1. Essential hypertension  - losartan (COZAAR) 100 MG tablet [Pharmacy Med Name: LOSARTAN 100MG TABLETS]; TAKE 1 TABLET(100 MG) BY MOUTH DAILY  Dispense: 90 tablet; Refill: 3    If protocol passes may refill for 12 months if within 3 months of last provider visit (or a total of 15 months).             Passed - Serum potassium within the past 12 months     Lab Results   Component Value Date    Potassium 3.2 (L) 12/23/2020             Passed - Blood pressure filed in past 12 months     BP Readings from Last 1 Encounters:   01/26/21 (!) 176/100             Passed - Serum creatinine within the past 12 months     Creatinine   Date Value Ref Range Status   12/23/2020 1.34 (H) 0.70 - 1.30 mg/dL Final

## 2021-06-15 NOTE — PROGRESS NOTES
ASSESMENT AND PLAN:  Diagnoses and all orders for this visit:    Resistant hypertension  -     US Renal Artery With Kidney Bilateral; Future; Expected date: 03/10/2021  Currently on 4 drug therapy and per patient report he is compliant with all 4 medications.  Will get imaging of the renal arteries as detailed above and have him return in 1 month for recheck at which time he will come in for a morning appointment and if his blood pressure has not improved significantly then we will plan to do the morning labs recommended for resistant hypertension per up-to-date.    ADDM: Ultrasound results-    [facility]     EXAM: US RENAL ARTERY WITH KIDNEY BILATERAL      DATE: 3/12/2021 5:52 PM     INDICATION: Essential primary hypertension     COMPARISON: CT 12/13/2020      TECHNIQUE: Real-time gray-scale sonographic imaging of the kidneys and bladder was performed including duplex spectral and color Doppler evaluation of the renal arteries.     RENAL ULTRASOUND FINDINGS:    RIGHT KIDNEY: The kidney is normal in size, measuring approximately 11.5 x 7.2 x 5.1 cm. There is no solid border deforming renal mass, shadowing calculus, or collecting system dilatation.     LEFT KIDNEY: The kidney is normal in size, measuring approximately 13.3 x 5.1 x 6.6 cm. There is severe hydronephrosis with near complete absence of renal parenchyma. This was also seen on CT 12/13/2020 an obstructive calculus is noted within the   proximal ureter.     The bladder is moderately distended and has a normal wall thickness.     DUPLEX ARTERIAL ULTRASOUND FINDINGS:  Systolic Velocities (cm/s):     AORTA: 107     RIGHT RENAL ARTERY:  Proximal: 221                Mid: 217                 Distal: 157                    LEFT RENAL ARTERY:  Proximal: Not visualized                Mid: Not visualized                 Distal:  67           Arcuate arterial resistive indices range from 0.65-0.68 on the right and 0.74 on the left.     IMPRESSION:    1.  Marked left-sided hydronephrosis with proximal ureteral obstructing calculus. There is near complete atrophy of the renal parenchyma.  2.  Normal flow velocities within the main right renal artery, without evidence of a hemodynamically significant stenosis.      I discussed the results with the patient over the telephone.  He may need a nephrectomy or other intervention given the findings above and the resistant hypertension.  Our staff is helping him schedule follow-up with Dr. Reyes.          Reviewed the risks and benefits of the treatment plan with the patient and/or caregiver and we discussed indications for routine and emergent follow-up.        SUBJECTIVE: 66-year-old male in for follow-up on his high blood pressure.  He brings his medications with him today and reports that he has been taking all of them every day.  He has not noticed any side effects or problems.  Blood pressure continues to be significantly elevated.  No headaches, no chest pain, no shortness of breath, no ankle edema.    Past Medical History:   Diagnosis Date     Chronic pain of both knees 2/11/2019     COVID-19 5/7/2020     Hypercholesteremia 6/18/2018     Hypertension 5/7/2018     Patient Active Problem List   Diagnosis     Hypertension     Cataract     Hypercholesteremia     Chronic pain of both knees     CKD (chronic kidney disease) stage 3, GFR 30-59 ml/min     Allergic conjunctivitis, bilateral     COVID-19     Tachycardia     Accelerated hypertension     Nephrolithiasis     Hydronephrosis with urinary obstruction due to ureteral calculus     SOB (shortness of breath)     Decreased visual acuity     Corneal scarring     Poor dentition     Current Outpatient Medications   Medication Sig Dispense Refill     amLODIPine (NORVASC) 10 MG tablet Take 1 tablet (10 mg total) by mouth daily. 90 tablet 11     atorvastatin (LIPITOR) 20 MG tablet Take 1 tablet (20 mg total) by mouth daily. 90 tablet 11     carvediloL (COREG) 25 MG tablet  "Take 1 tablet (25 mg total) by mouth 2 (two) times a day. 180 tablet 11     hydroCHLOROthiazide (HYDRODIURIL) 12.5 MG tablet Take 1 tablet (12.5 mg total) by mouth daily. 90 tablet 11     No current facility-administered medications for this visit.      Social History     Tobacco Use   Smoking Status Former Smoker   Smokeless Tobacco Never Used   Tobacco Comment    no passive exposure       OBJECTICE: BP (!) 170/100   Pulse 71   Temp 99  F (37.2  C) (Oral)   Resp 16   Ht 5' 4\" (1.626 m)   Wt 162 lb (73.5 kg)   SpO2 96%   BMI 27.81 kg/m       No results found for this or any previous visit (from the past 24 hour(s)).     CV-regular rate and rhythm with no murmur   RESP-lungs clear to auscultation   Neurologic-cranial nerves II through XII are intact, strength and sensation are intact and symmetric.    Yassine Larsen          "

## 2021-06-16 PROBLEM — M25.561 CHRONIC PAIN OF BOTH KNEES: Status: ACTIVE | Noted: 2019-02-11

## 2021-06-16 PROBLEM — U07.1 COVID-19: Status: ACTIVE | Noted: 2020-05-07

## 2021-06-16 PROBLEM — G89.29 CHRONIC PAIN OF BOTH KNEES: Status: ACTIVE | Noted: 2019-02-11

## 2021-06-16 PROBLEM — N18.30 CKD (CHRONIC KIDNEY DISEASE) STAGE 3, GFR 30-59 ML/MIN (H): Status: ACTIVE | Noted: 2019-05-17

## 2021-06-16 PROBLEM — H26.9 CATARACT: Status: ACTIVE | Noted: 2018-06-18

## 2021-06-16 PROBLEM — R06.02 SOB (SHORTNESS OF BREATH): Status: ACTIVE | Noted: 2020-12-13

## 2021-06-16 PROBLEM — H54.7 DECREASED VISUAL ACUITY: Status: ACTIVE | Noted: 2021-01-26

## 2021-06-16 PROBLEM — M25.562 CHRONIC PAIN OF BOTH KNEES: Status: ACTIVE | Noted: 2019-02-11

## 2021-06-16 PROBLEM — I1A.0 RESISTANT HYPERTENSION: Status: ACTIVE | Noted: 2018-05-07

## 2021-06-16 PROBLEM — N20.0 NEPHROLITHIASIS: Status: ACTIVE | Noted: 2020-12-13

## 2021-06-16 PROBLEM — H17.9 CORNEAL SCARRING: Status: ACTIVE | Noted: 2021-01-26

## 2021-06-16 PROBLEM — K08.9 POOR DENTITION: Status: ACTIVE | Noted: 2021-01-26

## 2021-06-16 PROBLEM — I10 ACCELERATED HYPERTENSION: Status: ACTIVE | Noted: 2020-12-13

## 2021-06-16 PROBLEM — N13.2 HYDRONEPHROSIS WITH URINARY OBSTRUCTION DUE TO URETERAL CALCULUS: Status: ACTIVE | Noted: 2020-12-13

## 2021-06-16 PROBLEM — R00.0 TACHYCARDIA: Status: ACTIVE | Noted: 2020-12-13

## 2021-06-16 PROBLEM — H10.13 ALLERGIC CONJUNCTIVITIS, BILATERAL: Status: ACTIVE | Noted: 2019-06-11

## 2021-06-16 PROBLEM — E78.00 HYPERCHOLESTEREMIA: Status: ACTIVE | Noted: 2018-06-18

## 2021-06-16 NOTE — TELEPHONE ENCOUNTER
Attempted to reach patient for [facility] f/u. No answer and no vm for patients number.  Message left on emergency contact vm with language line.  Yue Nair RN

## 2021-06-16 NOTE — TELEPHONE ENCOUNTER
Per Provider, successful fax of signed pre op exam notes with original given to patient.  Patient's 4/20 appointment rescheduled to 4/27 at 1:20 PM.

## 2021-06-16 NOTE — PROGRESS NOTES
[address]  [facility] [address]  Dept: [phone]  Dept Fax: [phone]  Primary Provider: Yassine Larsen MD        PREOPERATIVE EVALUATION:  Today's date: 4/1/2021    Moriah Hinojosa is a 66 y.o. male who presents for a preoperative evaluation.    Surgical Information:  Surgery/Procedure: Cataract removal    Surgery Location: [facility]   Surgeon:   Surgery Date: 4/5/21, 4/19/21  Time of Surgery: TBD  Where patient plans to recover: At home with family  Fax number for surgical facility: [phone]      Assessment & Plan      The proposed surgical procedure is considered LOW risk.    Preoperative examination-cleared to proceed with both eye cataract surgeries with any appropriate anesthesia.    Senile cataract of both eyes, unspecified age-related cataract type  Plan for surgical intervention as detailed above.    Hypertension, resistant  Continue current 3 drug therapy.  Has upcoming urology follow-up to decide if he is undergoing left nephrectomy because of his nonfunctioning left kidney contribution to his resistant hypertension.  I do not think this is a contraindication to him completing his cataract surgeries.    Stage 3 chronic kidney disease, unspecified whether stage 3a or 3b CKD  Last creatinine improved to 1.34.           Medication Instructions:  Patient is to take all scheduled medications on the day of surgery    RECOMMENDATION:  APPROVAL GIVEN to proceed with proposed procedure, without further diagnostic evaluation.            Subjective     HPI related to upcoming procedure: 66-year-old male with bilateral cataracts.  He reports slowly progressively worsening blurry vision, left eye worse than right.  He has met with his eye surgeon and is now proceeding with cataract surgery.  He has a history of resistant hypertension.  Recent renal ultrasound evaluation showed normal kidney and normal blood flow in the renal artery on the right side but a  likely nonfunctioning kidney on the left side secondary to obstructive nephropathy from a large stone.  He has had initial follow-up with the kidney stone clinic and has been referred to a different urologist for consideration of left nephrectomy.    Preop Questions 3/31/2021   Have you ever had a heart attack or stroke? No   Have you ever had surgery on your heart or blood vessels, such as a stent placement, a coronary artery bypass, or surgery on an artery in your head, neck, heart, or legs? No   Do you have chest pain with activity? No   Do you have a history of  heart failure? No   Do you currently have a cold, bronchitis or symptoms of other infection? No   Do you have a cough, shortness of breath, or wheezing? No   Do you or anyone in your family have previous history of blood clots? No   Do you or does anyone in your family have a serious bleeding problem such as prolonged bleeding following surgeries or cuts? No   Have you ever had problems with anemia or been told to take iron pills? No   Have you had any abnormal blood loss such as black, tarry or bloody stools? No   Have you ever had a blood transfusion? No       Have you or any of your relatives ever had problems with anesthesia? No   Do you have sleep apnea, excessive snoring or daytime drowsiness? No   Do you have any artifical heart valves or other implanted medical devices like a pacemaker, defibrillator, or continuous glucose monitor? No   Do you have artificial joints? No   Are you allergic to latex? No     Health Care Directive:  Patient does not have a Health Care Directive or Living Will:         Review of Systems  No chest pain, no shortness of breath, no blood in the urine, no blood in the stool, no skin lesions that have been changing, remainder of review of systems is as above or negative.    Patient Active Problem List    Diagnosis Date Noted     Decreased visual acuity 01/26/2021     Corneal scarring 01/26/2021     Poor dentition  "01/26/2021     Tachycardia 12/13/2020     Accelerated hypertension 12/13/2020     Nephrolithiasis 12/13/2020     Hydronephrosis with urinary obstruction due to ureteral calculus 12/13/2020     SOB (shortness of breath) 12/13/2020     COVID-19 05/07/2020     Allergic conjunctivitis, bilateral 06/11/2019     CKD (chronic kidney disease) stage 3, GFR 30-59 ml/min 05/17/2019     Chronic pain of both knees 02/11/2019     Cataract 06/18/2018     Hypercholesteremia 06/18/2018     Hypertension 05/07/2018     Past Medical History:   Diagnosis Date     Chronic pain of both knees 2/11/2019     COVID-19 5/7/2020     Hypercholesteremia 6/18/2018     Hypertension 5/7/2018     No past surgical history on file.  Current Outpatient Medications   Medication Sig Dispense Refill     amLODIPine (NORVASC) 10 MG tablet Take 1 tablet (10 mg total) by mouth daily. 90 tablet 11     atorvastatin (LIPITOR) 20 MG tablet Take 1 tablet (20 mg total) by mouth daily. 90 tablet 11     carvediloL (COREG) 25 MG tablet Take 1 tablet (25 mg total) by mouth 2 (two) times a day. 180 tablet 11     hydroCHLOROthiazide (HYDRODIURIL) 12.5 MG tablet Take 1 tablet (12.5 mg total) by mouth daily. 90 tablet 11     metoprolol succinate (TOPROL-XL) 100 MG 24 hr tablet        prednisoLONE acetate (PRED-FORTE) 1 % ophthalmic suspension        No current facility-administered medications for this visit.        No Known Allergies    Social History     Tobacco Use     Smoking status: Former Smoker     Smokeless tobacco: Never Used     Tobacco comment: no passive exposure   Substance Use Topics     Alcohol use: No        Social History     Substance and Sexual Activity   Drug Use Not on file        Objective     Ht 5' 4\" (1.626 m)   BMI 27.81 kg/m    Physical Exam  Vitals:    04/01/21 1103   BP: 158/80, 140/80 on recheck   Pulse: 69   Resp: 24   Temp: 98.2   SpO2: 96% room air     Gen - alert, orientated, NAD  Eyes -bilateral cataracts.  ENT - oropharynx clear, TMs " clear  Neck - supple, no palpable mass or lymphadenopathy  CV - RRR, no murmur  Resp - lungs CTA  Ab - soft, nontender, no palpable mass or organomegaly  Extrem - warm, no edema  Neuro - CN II-XII intact, strength, sensation, reflexes intact and symmetric  Skin - no rash, no atypical appearing lesions seen.             Signed Electronically by: Yassine Larsen MD    Copy of this evaluation report is provided to requesting physician.

## 2021-06-16 NOTE — PROGRESS NOTES
Assessment/Plan:    Assessment & Plan   Pwjohn was seen today for new problem - pcp referred.    Diagnoses and all orders for this visit:    Other secondary hypertension    Kidney atrophy    Calculus of ureter                PLAN    No follow-ups on file.    Phone call duration: 10 minutes  20 minutes spent on the date of the encounter doing chart review, history and exam, documentation and further activities as noted above    Marc Reyes MD  [facility]    Subjective:      HPI  Mr. Moriah Hinojosa is a 66 y.o. [place] male who is being evaluated via a billable telephone visit by [facility] [place] for follow up of his stone disease and obstructed left kidney.    Mr Micaela returns for further evaluation requested by Dr Larsen. He has hypertension refractory to attempts to control with 4 medications. He is asymptomatic from his previously documented end stage hydronephrotic left kidney secondary to very large ureteral stone.    Should have a nephrectomy. I will make arrangements for appropriate referral.     Patient seems to understand our interpreted conversation. Has no questions at this time.        ROS   Review of systems is negative except for HPI.    Past Medical History:   Diagnosis Date     Chronic pain of both knees 2/11/2019     COVID-19 5/7/2020     Hypercholesteremia 6/18/2018     Hypertension 5/7/2018       No past surgical history on file.    Current Outpatient Medications   Medication Sig Dispense Refill     amLODIPine (NORVASC) 10 MG tablet Take 1 tablet (10 mg total) by mouth daily. 90 tablet 11     atorvastatin (LIPITOR) 20 MG tablet Take 1 tablet (20 mg total) by mouth daily. 90 tablet 11     carvediloL (COREG) 25 MG tablet Take 1 tablet (25 mg total) by mouth 2 (two) times a day. 180 tablet 11     hydroCHLOROthiazide (HYDRODIURIL) 12.5 MG tablet Take 1 tablet (12.5 mg total) by mouth daily. 90 tablet 11     metoprolol succinate (TOPROL-XL) 100 MG  24 hr tablet        prednisoLONE acetate (PRED-FORTE) 1 % ophthalmic suspension        No current facility-administered medications for this visit.        No Known Allergies    Social History     Socioeconomic History     Marital status:      Spouse name: Not on file     Number of children: Not on file     Years of education: Not on file     Highest education level: Not on file   Occupational History     Not on file   Social Needs     Financial resource strain: Not on file     Food insecurity     Worry: Not on file     Inability: Not on file     Transportation needs     Medical: Not on file     Non-medical: Not on file   Tobacco Use     Smoking status: Former Smoker     Smokeless tobacco: Never Used     Tobacco comment: no passive exposure   Substance and Sexual Activity     Alcohol use: No     Drug use: Not on file     Sexual activity: Not on file   Lifestyle     Physical activity     Days per week: Not on file     Minutes per session: Not on file     Stress: Not on file   Relationships     Social connections     Talks on phone: Not on file     Gets together: Not on file     Attends [employer] service: Not on file     Active member of club or organization: Not on file     Attends meetings of clubs or organizations: Not on file     Relationship status: Not on file     Intimate partner violence     Fear of current or ex partner: Not on file     Emotionally abused: Not on file     Physically abused: Not on file     Forced sexual activity: Not on file   Other Topics Concern     Not on file   Social History Narrative     Not on file       No family history on file.    Objective:      No vitals or physical exam obtained due to virtual visit  Labs   Urinalysis POC (Office):  Nitrite, UA   Date Value Ref Range Status   12/13/2020 Negative Negative Final       Lab Urinalysis:  Blood, UA   Date Value Ref Range Status   12/13/2020 Negative Negative Final     Nitrite, UA   Date Value Ref Range Status   12/13/2020  Negative Negative Final     Leukocytes, UA   Date Value Ref Range Status   12/13/2020 Negative Negative Final     pH, UA   Date Value Ref Range Status   12/13/2020 6.5 4.5 - 8.0 Final    and Acute Labs   Renal Panel  [facility]  Creatinine   Date Value Ref Range Status   12/23/2020 1.34 (H) 0.70 - 1.30 mg/dL Final   12/16/2020 1.80 (H) 0.70 - 1.30 mg/dL Final   12/15/2020 1.29 0.70 - 1.30 mg/dL Final     Potassium   Date Value Ref Range Status   12/23/2020 3.2 (L) 3.5 - 5.0 mmol/L Final   12/16/2020 3.7 3.5 - 5.0 mmol/L Final   12/15/2020 3.7 3.5 - 5.0 mmol/L Final     Calcium   Date Value Ref Range Status   12/23/2020 9.6 8.5 - 10.5 mg/dL Final   12/16/2020 9.2 8.5 - 10.5 mg/dL Final   12/15/2020 8.9 8.5 - 10.5 mg/dL Final    and Urine Culture  No results found for: CULTURE

## 2021-06-17 NOTE — PROGRESS NOTES
Patient is scheduled to follow up with urologist on 7/22/2021 at 2PM and [facility] will help with transportation.

## 2021-06-17 NOTE — PROGRESS NOTES
ASSESMENT AND PLAN:  Diagnoses and all orders for this visit:    Resistant hypertension  We will increase his hydrochlorothiazide as ordered below.  I would like him to have care management to provide care coordination and ensure follow-up with the following:    He will have ongoing follow-up with urology/kidney surgeon for the possibility of needing a nephrectomy for the nonfunctioning kidney that may be contributing to his resistant hypertension.  He was referred to another urology specialist by Dr. Reyes but I have not seen a report and the patient is unclear as to whether he has had the appointment.  He also will need to have ongoing care management to make sure that he does not stop any of his blood pressure medications and continues to get refills regularly, recent medication change with the increased dose of hydrochlorothiazide as detailed below.  He also needs care management to complete his follow-up with the eye surgeon.  Patient will follow up with me in 6 weeks here in the clinic, sooner if problems.    -     Ambulatory referral to Care Management (Primary Care)  -     hydroCHLOROthiazide (HYDRODIURIL) 25 MG tablet; Take 1 tablet (25 mg total) by mouth daily.  Dispense: 90 tablet; Refill: 11    Hydronephrosis with urinary obstruction due to ureteral calculus  -     Ambulatory referral to Care Management (Primary Care)    Age-related cataract of both eyes, unspecified age-related cataract type  -     Ambulatory referral to Care Management (Primary Care)    Corneal scarring  -     Ambulatory referral to Care Management (Primary Care)        Reviewed the risks and benefits of the treatment plan with the patient and/or caregiver and we discussed indications for routine and emergent follow-up.        SUBJECTIVE: 66-year-old male history of resistant hypertension, blood pressure has improved on his recheck but is still not under ideal control.  Patient reports that he is taking all of his daily medications as  "prescribed.  Patient has completed one of his eye surgeries but the other side is still not been operated on, patient reports this is because the first site has been slow to heal and then apparently he missed follow-up appointment.  He reports that he is seeing better out of the side that the surgery was completed on and has noticed significant improvement in his vision.    Past Medical History:   Diagnosis Date     Chronic pain of both knees 2/11/2019     COVID-19 5/7/2020     Hypercholesteremia 6/18/2018     Hypertension 5/7/2018     Patient Active Problem List   Diagnosis     Resistant hypertension     Cataract     Hypercholesteremia     Chronic pain of both knees     CKD (chronic kidney disease) stage 3, GFR 30-59 ml/min     Allergic conjunctivitis, bilateral     COVID-19     Tachycardia     Accelerated hypertension     Nephrolithiasis     Hydronephrosis with urinary obstruction due to ureteral calculus     SOB (shortness of breath)     Decreased visual acuity     Corneal scarring     Poor dentition     Current Outpatient Medications   Medication Sig Dispense Refill     amLODIPine (NORVASC) 10 MG tablet Take 1 tablet (10 mg total) by mouth daily. 90 tablet 11     atorvastatin (LIPITOR) 20 MG tablet Take 1 tablet (20 mg total) by mouth daily. 90 tablet 11     carvediloL (COREG) 25 MG tablet Take 1 tablet (25 mg total) by mouth 2 (two) times a day. 180 tablet 11     hydroCHLOROthiazide (HYDRODIURIL) 25 MG tablet Take 1 tablet (25 mg total) by mouth daily. 90 tablet 11     prednisoLONE acetate (PRED-FORTE) 1 % ophthalmic suspension        No current facility-administered medications for this visit.      Social History     Tobacco Use   Smoking Status Former Smoker   Smokeless Tobacco Never Used   Tobacco Comment    no passive exposure       OBJECTICE: /80   Pulse (!) 108   Temp 98.1  F (36.7  C) (Oral)   Resp 20   Ht 5' 4\" (1.626 m)   Wt 162 lb (73.5 kg)   SpO2 98%   BMI 27.81 kg/m       No results " found for this or any previous visit (from the past 24 hour(s)).     GEN-alert, appropriate, in no apparent distress   HEENT-eye exam with office equipment does not show any complications of his recent surgery   CV-regular rate and rhythm   RESP-lungs clear to auscultation   EXTREM-no pitting edema of the ankles     Yassine Larsen

## 2021-06-17 NOTE — PROGRESS NOTES
ASSESMENT AND PLAN:  Diagnoses and all orders for this visit:    Hypertension, severely out of control  Discussed treatment options today with the patient, we are going to start with amlodipine as ordered below.  We reviewed the risks and benefits of medication.  Follow-up in clinic with me again next week, if not significantly improve improve blood pressure will add another agent at that time.  Patient was also counseled on indications for emergency room evaluation.  -     Comprehensive Metabolic Panel  -     LDL Cholesterol, Direct  -     amLODIPine (NORVASC) 10 MG tablet; Take 1 tablet (10 mg total) by mouth daily.  Dispense: 30 tablet; Refill: 11    Blurry vision  -     Ambulatory referral to Ophthalmology          SUBJECTIVE: 63-year-old male new to the clinic.  Previously had gone to [facility] but it has been it sounds like over a year since his last appointment there.  In the past, he had been diagnosed with high blood pressure and been on a medication for high blood pressure, he does not remember the name.  However, he reports that he only took this medication for the 1 month supply and did not get refills.  Patient reports that the medication made him feel some mild lightheadedness and dizziness.  He has been off of any medication for the last year or more.  He has been getting some occasional headaches.  He reports mild to moderate generalized headache, on-and-off, usually a couple of headaches per week.  Usually the headaches will last for several hours.  He has not had associated slurring of speech or acute visual changes or sudden weakness or numbness.  Patient has had some blurry vision in his left eye along with some chronic itching and watering of his left eye.  He reports that this is been going on for many years.  The last time he was at the eye doctor was several years ago.    Review of systems: No chest pain, no shortness of breath, no ankle edema, no vomiting, no fever, remainder review of  "systems is as above or negative.    No past medical history on file.  Patient Active Problem List   Diagnosis     Hypertension     Current Outpatient Prescriptions   Medication Sig Dispense Refill     amLODIPine (NORVASC) 10 MG tablet Take 1 tablet (10 mg total) by mouth daily. 30 tablet 11     No current facility-administered medications for this visit.      History   Smoking Status     Former Smoker   Smokeless Tobacco     Never Used       OBJECTICE: BP (!) 196/92  Pulse 100  Temp 98.2  F (36.8  C) (Oral)   Resp 24  Ht 5' 4\" (1.626 m)  Wt 156 lb (70.8 kg)  SpO2 98%  BMI 26.78 kg/m2     No results found for this or any previous visit (from the past 24 hour(s)).     GEN-alert, appropriate, in no apparent distress   ENT-neck is supple with no palpable mass or lymphadenopathy   Eyes- funduscopic exam limited by office equipment and undilated pupils.  Sclera and conjunctiva appear normal.  Possible left worse than right cataracts.   CV-regular rate and rhythm with no murmur   RESP-lungs clear to auscultation   ABDOMINAL-soft and nontender to palpation   EXTREM-warm, no ankle edema   SKIN-no ulcers or vesicles   Neurologic-cranial nerves II through XII are intact, strength and sensation are intact and symmetric.      Yassine Larsen          "

## 2021-06-17 NOTE — PROGRESS NOTES
Clinic Care Coordination Contact  Due Date: Within 2 weeks from today's date, 4/30/2021  Delegation: No Show for RN appointment. Please follow unsuccessful outreach, no show standard work.    - Unable to reach patient or daughter x3 today.   - Appt rescheduled on 5/17/2021

## 2021-06-17 NOTE — PROGRESS NOTES
Clinic Care Coordination Contact  Community Health Worker Initial Outreach    CHW Initial Information Gathering:  Referral Source: PCP  Preferred Hospital: [facility]  [phone]  Preferred Urgent Care: [facility], [phone]  Current living arrangement:: I live in a private home with family  Type of residence:: Private home - stairs  Community Resources: Other (see comment), PCA(Enrolled with [facility] Care Coordination)  Supplies Currently Used at Home: None  Equipment Currently Used at Home: none  Informal Support system:: Family  No PCP office visit in Past Year: No  Transportation means:: Medical transport  CHW Additional Questions  MyChart active?: No  Patient agreeable to assistance with activating MyChart?: No    Patient accepts CC: Yes. Patient scheduled for assessment with CCC RN on Friday 4/30/2021 at 1pm. Patient noted desire to discuss [facility] enrollment goals.      - Patient is enrolled in [facility].      - Possible Nephrectomy - Dr. Reyes referred the patient to Dr. Catracho Wright, and per chart review patient looks to have had pre-visit planning completed on 4/19 and 4/21. No f/u appts are listed as of yet.    [facility]   [phone]  [address] [place] [address], 4th [place] [address]       - Needs to possibly follow-up with [facility] post cataract surgery. Had cataract surgery completed at the [facility] this month.     - PCP also mentions concerns about medication management and adherence.      - Patient seems well supported and following up on appointments without barriers, but he is open to speaking with the CCC RN if there is anything she thinks CCC can assist with.

## 2021-06-17 NOTE — PROGRESS NOTES
[facility] Care Coordination    Writer received request from clinic care coordinator for PCA assessment that is being requested from member and daughter, Jody.  Attempted to contact at [phone] and [phone](number given by family) via [employer].  Messages left at both numbers that writer is trying to contact them.  Awaiting return call.    Terra Hale RN  [facility]  [phone]

## 2021-06-18 NOTE — PROGRESS NOTES
ASSESMENT AND PLAN:  Diagnoses and all orders for this visit:    Hypertension  Improving, but not under ideal control.  Continue metoprolol and amlodipine.  Because we are adding a new medication as detailed below, we are not going to add a third antihypertensive agent at this time but will do so at his follow-up visit in 6 weeks if blood pressure has not improved to goal.    Hypercholesteremia  -     atorvastatin (LIPITOR) 20 MG tablet; Take 1 tablet (20 mg total) by mouth daily.  Dispense: 90 tablet; Refill: 3  Reviewed the risks and benefits of the medication and will follow-up in 6 weeks for ALT and LDL.  Follow-up sooner if problems.    Hyperglycemia  We will plan for A1c at the blood draw in 6 weeks.    Cataract  I gave my medical card to the patient to give to his eye doctor at the follow-up visit later this week so that the consult notes can be sent over here.        SUBJECTIVE: 63-year-old male here for follow-up on his blood pressure.  Since last visit he has been taking both amlodipine and metoprolol every day and is tolerating the medications well.  He has not noticed any side effects or problems.  No chest pain or shortness of breath.  No ankle edema.  He has been having slowly worsening bilateral blurry vision, left side worse than right.  Patient reports that he did see the eye doctor recently and was told he had cataracts and may need cataract surgery.  We do not yet have any consultation notes.  Patient is also here to follow-up on his lab results, his most recent labs showed a mild hyperglycemia as well as a mild elevation in creatinine and a significant elevation in his LDL cholesterol.  Patient reports that he already eats a diet that is high in fiber and vegetable based and low in fat.    No past medical history on file.  Patient Active Problem List   Diagnosis     Hypertension     Cataract     Hypercholesteremia     Current Outpatient Prescriptions   Medication Sig Dispense Refill      "amLODIPine (NORVASC) 10 MG tablet Take 1 tablet (10 mg total) by mouth daily. 30 tablet 11     metoprolol succinate (TOPROL XL) 50 MG 24 hr tablet Take 1 tablet (50 mg total) by mouth daily. 30 tablet 11     atorvastatin (LIPITOR) 20 MG tablet Take 1 tablet (20 mg total) by mouth daily. 90 tablet 3     No current facility-administered medications for this visit.      History   Smoking Status     Former Smoker   Smokeless Tobacco     Never Used       OBJECTICE: /88  Pulse 100  Temp 98.5  F (36.9  C) (Oral)   Resp 20  Ht 5' 4\" (1.626 m)  Wt 154 lb 12 oz (70.2 kg)  SpO2 99%  BMI 26.56 kg/m2     No results found for this or any previous visit (from the past 24 hour(s)).     GEN-alert, appropriate, in no apparent distress   HEENT-bilateral cataracts   CV-regular rate and rhythm with no murmur   RESP-lungs clear to auscultation   ABDOMINAL-soft and nontender with no palpable masses organomegaly   EXTREM-warm with no ankle edema   SKIN-tattoos, no ulcers or vesicles.      Yassine Larsen          "

## 2021-06-18 NOTE — PROGRESS NOTES
ASSESMENT AND PLAN:  Diagnoses and all orders for this visit:    Hypertension  Continue amlodipine.  The risks and benefits of metoprolol as prescribed below.  Close follow-up here in the clinic for blood pressure recheck and labs as detailed below.  And his elevated creatinine, will need to follow his creatinine trend closely.  Counseled on the extreme importance of getting good blood pressure control to prevent any worsening of his likely chronic kidney disease.  -     metoprolol succinate (TOPROL XL) 50 MG 24 hr tablet; Take 1 tablet (50 mg total) by mouth daily.  Dispense: 30 tablet; Refill: 11    Hypercholesteremia  Counseling done on diet and exercise and reviewed lab results.  We will plan to check LDL again at next blood draw as detailed below.    Hyperglycemia  Counseling done on diet and exercise and reviewed lab results.  Will plan to check A1c at next blood draw at his follow-up visit here in the clinic.          SUBJECTIVE: 63-year-old male comes in today for follow-up.  Since last visit, he has been taking amlodipine daily and his blood pressure is improved significantly but still is not at goal.  He is tolerating the amlodipine well, has not noticed any side effects or problems.  He continues to have headaches, about 2 per week, lasting several hours, generalized, he thinks the headaches have been slightly less intense since he started on the blood pressure medication.  Along with the headaches he is not getting any focal numbness or weakness or sudden changes in vision or speech.  Last visit patient had blood work done and would like to review those results today.    No past medical history on file.  Patient Active Problem List   Diagnosis     Hypertension     Current Outpatient Prescriptions   Medication Sig Dispense Refill     amLODIPine (NORVASC) 10 MG tablet Take 1 tablet (10 mg total) by mouth daily. 30 tablet 11     metoprolol succinate (TOPROL XL) 50 MG 24 hr tablet Take 1 tablet (50 mg total)  "by mouth daily. 30 tablet 11     No current facility-administered medications for this visit.      History   Smoking Status     Former Smoker   Smokeless Tobacco     Never Used       OBJECTICE: /90  Pulse 68  Temp 98.2  F (36.8  C) (Oral)   Resp 20  Ht 5' 4\" (1.626 m)  Wt 156 lb (70.8 kg)  BMI 26.78 kg/m2     No results found for this or any previous visit (from the past 24 hour(s)).     GEN-alert, appropriate, in no apparent distress   Neurologic-cranial nerves II through XII are intact, strength and sensation are intact and symmetric.   CV-regular rate and rhythm with no murmur   RESP-lungs clear to auscultation   ABDOMINAL-soft and nontender   EXTREM-warm with no ankle edema     Yassine Larsen          "

## 2021-06-18 NOTE — PATIENT INSTRUCTIONS - HE
Patient Instructions by Yassine Larsen MD at 1/26/2021 12:00 PM     Author: Yassine Larsen MD Service: -- Author Type: Physician    Filed: 1/26/2021  5:13 PM Encounter Date: 1/26/2021 Status: Signed    : Yassine Larsen MD (Physician)         Patient Education   Instrumental Activities of Daily Living  Your Health Risk Assessment indicates you have difficulties with instrumental activities of daily living which include laundry, housekeeping, banking, shopping, using the telephone, food preparation, transportation, or taking your own medications. Please make a follow up appointment for us to address this issue in more detail.    [employer] has resources available on the following website: [email or web address]     Also, here is a local agency that provides help with meals and other assistance:   [facility] Line: [phone]     Patient Education   Depression and Suicide in Older Adults  Nearly 2 million older Americans have some type of depression. Sadly, some of them even take their own lives. Yet depression among older adults is often ignored. Learn the warning signs. You may help spare a loved one needless pain. You may also save a life.       What Is Depression?  Depression is a mood disorder that affects the way you think and feel. The most common symptom is a feeling of deep sadness. People who are depressed also may seem tired and listless. And nothing seems to give them pleasure. Its normal to grieve or be sad sometimes. But sadness lessens or passes with time. Depression rarely goes away or improves on its own. Other symptoms of depression are:    Sleeping more or less than normal    Eating more or less than normal    Having headaches, stomachaches, or other pains that dont go away    Feeling nervous, empty, or worthless    Crying a great deal    Thinking or talking about suicide or death    Feeling confused or forgetful  What Causes It?  The causes of depression arent fully  known. Certain chemicals in the brain play a role. Depression does run in families. And life stresses can also trigger depression in some people. That may be the case with older adults. They often face great burdens, such as the death of friends or a spouse. They may have failing health. And they are more likely to be alone, lonely, or poor.  How You Can Help  Often, depressed people may not want to ask for help. When they do, they may be ignored. Or, they may receive the wrong treatment. You can help by showing parents and older friends love and support. If they seem depressed, help them find the right treatment. Talk to your doctor. Or contact a local mental health center, social service agency, or hospital. With modern treatment, no one has to suffer from depression.  Resources:    National [place] of Mental Health  698.843.9092  www.nimh.nih.gov    National [place] on Mental Illness  799.872.3595  www.marquise.org    Mental Health [place]  619.229.6481  www.[facility].org    National Suicide Hotline  109.856.8818 (800-SUICIDE)      9974-8594 [employer]. [address] [place] [address]. All rights reserved. This information is not intended as a substitute for professional medical care. Always follow your healthcare professional's instructions.         Patient Education   Preventing Falls in the Home  As you get older, falls are more likely. Thats because your reaction time slows. Your muscles and joints may also get stiffer, making them less flexible. Illness, medications, and vision changes can also affect your balance. A fall could leave you unable to live on your own. To make your home safer, follow these tips:    Floors    Put nonskid pads under area rugs.    Remove throw rugs.    Replace worn floor coverings.    Tack carpets firmly to each step on carpeted stairs. Put nonskid strips on the edges of uncarpeted stairs.    Keep floors and stairs free of clutter and cords.    Arrange furniture  so there are clear pathways.    Clean up any spills right away.    Bathrooms    Install grab bars in the tub or shower.    Apply nonskid strips or put a nonskid rubber mat in the tub or shower.    Sit on a bath chair to bathe.    Use bathmats with nonskid backing.    Lighting    Keep a flashlight in each room.    Put a nightlight along the pathway between the bedroom and the bathroom.    1849-6235 [employer]. [address] [place] [address]. All rights reserved. This information is not intended as a substitute for professional medical care. Always follow your healthcare professional's instructions.         Patient Education   Understanding Advance Care Planning  Advance care planning is the process of deciding ones own future medical care. It helps ensure that if you cant speak for yourself, your wishes can still be carried out. The plan is a series of legal documents that note a persons wishes. The documents vary by state. Advance care planning may be done when a person has a serious illness that is expected to get worse. It may be done before major surgery. And it can help you and your family be prepared in case of a major illness or injury. Advance care planning helps with making decisions at these times.       A health care proxy is a person who acts as the voice of a patient when the patient cant speak for himself or herself. The name of this role varies by state. It may be called a Durable Medical Power of  or Durable Power of  for Healthcare. It may be called an agent, surrogate, or advocate. Or it may be called a representative or decision maker. It is an official duty that is identified by a legal document. The document also varies by state.    Why Is Advance Care Planning Important?  If a person communicates their healthcare wishes:    They will be given medical care that matches their values and goals.    Their family members will not be forced to make decisions in  a crisis with no guidance.  Creating a Plan  Making an advance care plan is often done in 3 steps:    Thinking about ones wishes. To create an advance care plan, you should think about what kind of medical treatment you would want if you lose the ability to communicate. Are there any situations in which you would refuse or stop treatment? Are there therapies you would want or not want? And whom do you want to make decisions for you? There are many places to learn more about how to plan for your care. Ask your doctor or  for resources.    Picking a health care proxy. This means choosing a trusted person to speak for you only when you cant speak for yourself. When you cannot make medical decisions, your proxy makes sure the instructions in your advance care plan are followed. A proxy does not make decisions based on his or her own opinions. They must put aside those opinions and values if needed, and carry out your wishes.    Filling out the legal documents. There are several kinds of legal documents for advance care planning. Each one tells health care providers your wishes. The documents may vary by state. They must be signed and may need to be witnessed or notarized. You can cancel or change them whenever you wish. Depending on your state, the documents may include a Healthcare Proxy form, Living Will, Durable Medical Power of , Advance Directive, or others.  The Familys Role  The best help a family can give is to support their loved ones wishes. Open and honest communication is vital. Family should express any concerns they have about the patients choices while the patient can still make decisions.    0996-0754 [employer]. [address] [place] [address], [place] [address]. All rights reserved. This information is not intended as a substitute for professional medical care. Always follow your healthcare professional's instructions.         Also, [employer] [place] offers a free,  downloadable health care directive that allows you to share your treatment choices and personal preferences if you cannot communicate your wishes. It also allows you to appoint another person (called a health care agent) to make health care decisions if you are unable to do so. You can download an advance directive by going here: http://[email or web address]     Patient Education   Personalized Prevention Plan  You are due for the preventive services outlined below.  Your care team is available to assist you in scheduling these services.  If you have already completed any of these items, please share that information with your care team to update in your medical record.  Health Maintenance   Topic Date Due   ? LIPID  01/01/1988   ? ZOSTER VACCINES (1 of 2) 05/23/2008   ? MEDICARE ANNUAL WELLNESS VISIT  01/26/2022   ? FALL RISK ASSESSMENT  01/26/2022   ? COLORECTAL CANCER SCREENING  06/24/2022   ? ADVANCE CARE PLANNING  05/07/2023   ? TD 18+ HE  05/13/2029   ? HEPATITIS C SCREENING  Completed   ? Pneumococcal Vaccine: 65+ Years  Completed   ? INFLUENZA VACCINE RULE BASED  Completed   ? Pneumococcal Vaccine: Pediatrics (0 to 5 Years) and At-Risk Patients (6 to 64 Years)  Aged Out   ? HEPATITIS B VACCINES  Aged Out

## 2021-06-19 NOTE — PROGRESS NOTES
ASSESMENT AND PLAN:  Diagnoses and all orders for this visit:    Hypertension  Well-controlled.  Continue metoprolol and amlodipine.    Hypercholesteremia  He is tolerating the atorvastatin well.  Labs checked today as below.  Routine follow-up in 6 months, sooner if problems.  -     ALT (SGPT)  -     LDL Cholesterol, Direct    Cataract  Reviewed the most recent consultation from [facility].  Counseled the patient and his family that the current recommendation is to recheck the eye again in 1 year and at that time decide whether surgical intervention is needed.  He will continue to wear his eyeglasses in the meantime.    History of hyperglycemia  -     Glycosylated Hemoglobin A1c    Immunization deficiency  -     Pneumococcal conjugate vaccine 13-valent 6wks-17yrs; >50yrs          SUBJECTIVE: 63-year-old male comes in today for follow-up.  Since last visit, he has been taking both blood pressure pills and his cholesterol medication every day.  He reports that he is tolerating the medications well.  No chest pain or shortness of breath.  He did get in with the eye doctor.  He does have glasses and his vision has improved.  He has questions about whether surgery was recommended.  His vision is much better in the right eye than the left eye.  He has had many years of diminished vision and blurry vision in the left eye.    No past medical history on file.  Patient Active Problem List   Diagnosis     Hypertension     Cataract     Hypercholesteremia     Current Outpatient Prescriptions   Medication Sig Dispense Refill     amLODIPine (NORVASC) 10 MG tablet Take 1 tablet (10 mg total) by mouth daily. 30 tablet 11     atorvastatin (LIPITOR) 20 MG tablet Take 1 tablet (20 mg total) by mouth daily. 90 tablet 3     metoprolol succinate (TOPROL XL) 50 MG 24 hr tablet Take 1 tablet (50 mg total) by mouth daily. 30 tablet 11     No current facility-administered medications for this visit.      History   Smoking Status      "Former Smoker   Smokeless Tobacco     Never Used       OBJECTICE: /78  Pulse 87  Temp 98.4  F (36.9  C) (Oral)   Resp 18  Ht 5' 4\" (1.626 m)  Wt 154 lb 12 oz (70.2 kg)  SpO2 98%  BMI 26.56 kg/m2     No results found for this or any previous visit (from the past 24 hour(s)).     GEN-alert, appropriate, no apparent distress   HEENT-left eye cataract   CV-regular rate and rhythm with no murmur   RESP-lungs clear to auscultation   EXTREM-warm with no edema   SKIN-no ulcers or vesicles      Yassine Larsen          "

## 2021-06-20 NOTE — LETTER
Letter by Terra Hale RN at      Author: Terra Hale RN Service: -- Author Type: --    Filed:  Encounter Date: 1/22/2020 Status: (Other)       January 22, 2020    PWO NOAH  [address] [place] [address]        Dear Pwo:    At [facility], we are dedicated to improving your health and well-being. Enclosed is the Comprehensive Care Plan that we developed with you on 1/6/20. Please review the Care Plan carefully.    As a reminder, some of the things we discussed at your visit include:    Your physical and mental health    Ways to reduce falls    Health care needs you may have    Dont forget to contact your care coordinator if you:    Have been hospitalized or plan to be hospitalized     Have had a fall     Have experienced a change in physical health    Are experiencing emotional problems     If you do not agree with your Care Plan, have questions about it, or have experienced a change in your needs, please call me at [phone]. If you are hearing impaired, please call the [place] Relay at 622 or [phone] (wupzua-xg-ckoljc relay service).    Sincerely,          Terra Hale RN    E-mail: [email or web address]  Phone: [phone]    Care Manager  [facility]+[id number] [place] [id number]     [id number] (11/18)

## 2021-06-20 NOTE — LETTER
Letter by Terra Hale RN at      Author: Terra Hale RN Service: -- Author Type: --    Filed:  Encounter Date: 5/13/2020 Status: (Other)       May 13, 2020    PWO NOAH  [address] [place] [address]      Dear Moriah,    Welcome to [facility] ([facility]) health program. My name is Terra Hale RN. I am your [facility] care coordinator.     I will call you soon to see how you are doing and determine what needs you may have. My job is to help connect you to services, complete an assessment, and develop a care plan with you. There is no charge to you for the care coordination and assessment services. Our goal is to keep you as healthy and independent as possible.     [facility] combines the benefits you may already receive from Medical Assistance, Medicare and the Prescription Drug Coverage Program.    Soon you will receive a new [facility] member identification (ID) card from [facility]. When you receive it, please use this card where you get your health services.]    If you have questions, please call me at [phone]. If you reach my voice mail, leave a message and your phone number. If you are hearing impaired, please call the [place] Relay at 525 or [phone] (rvkilz-om-trhjeg relay service).    Sincerely,      Terra Hale RN    E-mail: [email or web address]  Phone: [phone]    Care Manager  [facility]) is a health plan that contracts with both Medicare and the [place] Medical Assistance (Medicaid) program to provide benefits of both programs to enrollees. Enrollment in [facility] depends on contract renewal.    MSC+ [id number] DHS Approved ([id number])  [id number] (11/18)

## 2021-06-20 NOTE — LETTER
Letter by Ghassan Benoit RN at      Author: Ghassan Benoit RN Service: -- Author Type: --    Filed:  Encounter Date: 4/30/2020 Status: (Other)       4/30/2020        Pwo Micaela  [address] [place] [address]    This letter provides a written record that you were tested for COVID-19 on 4/29/20.     Your result was positive.     This means that we found the virus that causes COVID-19 in your sample.    How can I protect others?    For safety, its very important to follow these rules.    First, stay home and away from others (self-isolate) until:      Youve had no fever--and no medicine that reduces fever--for 3 full days (72 hours). And?     Your other symptoms have gotten better. For example, your cough or breathing has improved. And?    At least 7 days have passed since your symptoms started.    During this time:    Dont go to work, school or anywhere else.     Stay away from others in your home. No hugging, kissing or shaking hands.    Dont let anyone visit.    Cover your mouth and nose with a mask, tissue or wash cloth to avoid spreading germs.    Wash your hands and face often with soap and water.    You should not go back to work until you meet the guidelines above for ending your home isolation. You should meet these along with any other guidelines that your employer has.    Employers: This document serves as formal notice of your employees medical guidelines for going back to work. They must meet the above guidelines before going back to work in person.    How can I take care of myself?    1. Take Tylenol (acetaminophen) for fever or pain. If you have liver or kidney problems, ask your family doctor if its okay to take Tylenol.     Take either:     650 mg (two 325 mg pills) every 4 to 6 hours, or?    1,000 mg (two 500 mg pills) every 8 hours as needed.     Note: Dont take more than 3,000 mg in one day.    2. If you have other health problems (like cancer, heart failure, an organ transplant or severe  kidney disease): Call your specialty clinic if you dont feel better in the next 2 days.    3. Know when to call 911: If your breathing is so bad that it keeps you from doing normal activities, call 911 or go to the emergency room. Tell them that youve been staying home and may have COVID-19.    4. Sign up for [employer]. We know its scary to hear that you have COVID-19. We want to track your symptoms to make sure youre okay over the next 2 weeks. Please look for an email from [employer]--this is a free, online program that well use to keep in touch. To sign up, follow the link in the email. Learn more at [email or web address]://www."[employer]"/[email or web address].    5. Interested is participating in research? Visit the link below to view current clinical trials that apply to your situation:  [email or web address]      Where can I get more information?    To learn the [facility] guidelines for staying home, please visit the [facility] website at [email or web address]/[email or web address]/[email or web address].html.    To learn more about COVID-19 and how to care for yourself at home, please visit the CDC website at https://www.cdc.gov/coronavirus/2019-ncov/about/steps-when-sick.html.    For more options for care at [facility], please visit our website at [email or web address]

## 2021-06-20 NOTE — LETTER
Letter by Nyla Bacon RN at      Author: Dah, Nyla, RN Service: -- Author Type: --    Filed:  Encounter Date: 4/28/2020 Status: (Other)       CARE COORDINATION  [facility]  [address]  [place] [address]      April 29, 2020    Pwo Micaela  [address] [place] [address]      Dear Pwo,  Your Care Team congratulates you on your journey to maintain wellness. This document will help guide you on your journey to maintain a healthy lifestyle.  You can use this to help you overcome any barriers you may encounter.  If you should have any questions or concerns, you can contact the members of your Care Team or contact your Primary Care Clinic for assistance.    Health Maintenance  Health Maintenance Reviewed:      My Access Plan  Medical Emergency 911   Primary Clinic Line Yassine Larsen MD - [phone]   24 Hour Appointment Line [phone] or  [phone]) (toll-free)   24 Hour Nurse Line [phone]   Preferred Urgent Care     Preferred Hospital     Preferred Pharmacy [id number] - [place], [place] - [address]     Behavioral Health Crisis Line The National Suicide Prevention Lifeline at 1-720.147.4419 or 911     My Care Team Members  Patient Care Team       Relationship Specialty Notifications Start End    Yassine Larsen MD PCP - General Family Medicine  6/18/18     Phone: [phone] Fax: [phone]         [address] [place] [address]    Terra Hale RN Lead Care Coordinator Primary Care - CC Admissions 12/27/18     Phone: [phone] Fax: [phone]        Yassine Larsen MD Assigned PCP   7/28/19     Phone: [phone] Fax: [phone]         [address] [place] [address] [address] [place] [address]              Goals        Patient Stated    ? COMPLETED: I need assist to reapply for health insurance.  (pt-stated)      Personal Plan  1. I have active health insurance through [place] [employer] ([employer]  delivered through [facility] and my [facility] ID is in my chart. I had no lapse in coverage.    Date goal set:  11/20/19    Updated: 12/9/2019      ? COMPLETED: Medical (pt-stated)      I want to attend my follow up appointment with my PCP and the CCC RN in January 2020.    Personal Plan  1.  I did  attend my 1/7/2020 PCP appointment at 1:40pm as scheduled.  2.  I will speak the CCC RN on the same as my PCP appointment.    Date goal set:  12/9/19       Discussed/Updated: 1/6/20; 2/3/2020      ? COMPLETED: RN goal: CCC RN will f/u on HTN the next 3 months.  (pt-stated)      Action steps to achieve this goal  1. Daughter will speak with CCC RN at outreach calls.  2. Daughter will  meds from pharmacy on time.  3. Patient will take HTN meds as prescribed by PCP.  4. Patient will attend his appts with PCP as schedule to f/u on HTN.  5. Patient will monitor blood pressure at home with BP cuff.    Daughter states b/p reading WNL the past 2 weeks  Date goal set: 11/20/19  Discussed/updated: 2/12/20; 3/2/20             Advance Care Plans/Directives Type:        It has been your Clinic Care Team's pleasure to work with you on your goals.    Regards,  Your Clinic Care Team

## 2021-06-21 NOTE — LETTER
Letter by Nyla Bacon RN at      Author: Dah, Nyla, RN Service: -- Author Type: --    Filed:  Encounter Date: 5/17/2021 Status: (Other)       Care Plan  About Me:    Patient Name:  Moriah Hinojosa    YOB: 1955  Age:         66 y.o.   [facility] MRN:    [id number] Telephone Information:  Home Phone [phone]   Mobile [phone]       Address:  [address] [place] [address] Email address:  No e-mail address on record      Emergency Contact(s)  Extended Emergency Contact Information      Name: Kaylen Lai    Home Phone Number: [phone]  Relation: Child      Name: MILTON NEAL    Home Phone Number: [phone]  Relation: Nephew      Name: Jody Hinojosa    Home Phone Number: [phone]  Relation: Child          Primary language:  Rochelle     needed? Yes   [facility]:  [phone] op. 1  Other communication barriers: Language barrier, Physical impairment, Lack of coping  Preferred Method of Communication:     Current living arrangement: I live in a private home with family  Mobility Status/ Medical Equipment: Independent w/Device    Health Maintenance  Health Maintenance Reviewed:      My Access Plan  Medical Emergency 911   Primary Clinic Line Yassine Larsen MD - [phone]   24 Hour Appointment Line [phone] or  [phone]) (toll-free)   24 Hour Nurse Line [phone] (toll-free)   Preferred Urgent Care [facility], [phone]   [facility]  [phone]   Preferred Pharmacy [facility] - [place], [place] - [address]     Behavioral Health Crisis Line The National Suicide Prevention Lifeline at 1-789.779.5695 or 911             My Care Team Members  Patient Care Team       Relationship Specialty Notifications Start End    Yassine Larsen MD PCP - General Family Medicine  6/18/18     Phone: [phone] Fax: [phone]         [address] [place] [address]     Emerson, Terra A, RN Lead Care Coordinator Primary Care - CC Admissions 12/27/18     Phone: [phone] Fax: [phone]        Yassine Larsen MD Assigned PCP   7/28/19     Phone: [phone] Fax: [phone]         [address] [place] [address]    Marc Reyes MD Assigned Surgical Provider   3/28/21     Phone: [phone] Fax: [phone]         [address] [facility] [address]    Nyla Bacon RN Lead Care Coordinator Primary Care - CC Admissions 5/17/21     Fax: [phone]                 My Care Plans  Self Management and Treatment Plan  Goals and (Comments)         Advance Care Plans/Directives Type:        My Medical and Care Information  Problem List   Patient Active Problem List   Diagnosis   ? Resistant hypertension   ? Cataract   ? Hypercholesteremia   ? Chronic pain of both knees   ? CKD (chronic kidney disease) stage 3, GFR 30-59 ml/min   ? Allergic conjunctivitis, bilateral   ? COVID-19   ? Tachycardia   ? Accelerated hypertension   ? Nephrolithiasis   ? Hydronephrosis with urinary obstruction due to ureteral calculus   ? SOB (shortness of breath)   ? Decreased visual acuity   ? Corneal scarring   ? Poor dentition      Current Medications and Allergies:  See printed Medication Report.    Care Coordination Start Date: 5/17/2021   Frequency of Care Coordination: 6 weeks   Form Last Updated: 05/17/2021

## 2021-06-21 NOTE — LETTER
Letter by Nyla Bacon RN at      Author: Dah, Nyla, RN Service: -- Author Type: --    Filed:  Encounter Date: 5/17/2021 Status: (Other)       CARE COORDINATION  [address], [facility] [address]  [place] [address]      May 17, 2021    Pwo Micaela  [address] [place] [address]      Dear Moriah,    I am a clinic care coordinator who works with Yassine Larsen MD at [address] [place] [address]    . I wanted to thank you for spending the time to talk with me.  Below is a description of clinic care coordination and how I can further assist you.      The clinic care coordination team is made up of a registered nurse,  and community health worker who understand the health care system. The goal of clinic care coordination is to help you manage your health and improve access to the health care system in the most efficient manner. The team can assist you in meeting your health care goals by providing education, coordinating services, strengthening the communication among your providers and supporting you with any resource needs.    Please feel free to contact me and the Community Health Worker at [phone] or [phone] or [phone] with any questions or concerns. We are focused on providing you with the highest-quality healthcare experience possible and that all starts with you.     Sincerely,     Nyla Bacon RN    Enclosed: I have enclosed a copy of the Care Plan. This has helpful information and goals that we have talked about. Please keep this in an easy to access place to use as needed.

## 2021-06-23 NOTE — PROGRESS NOTES
ASSESMENT AND PLAN:  Diagnoses and all orders for this visit:    Essential hypertension  Not under ideal control.  Counseling done today with the patient and his family with the help of a professional , we discussed options and decided to increase the metoprolol to 100 mg daily.  Reviewed the risks and benefits of the medication change and he will follow-up in 3 months for recheck, sooner if problems.  -     metoprolol succinate (TOPROL-XL) 100 MG 24 hr tablet  Dispense: 90 tablet; Refill: 3    Hypercholesteremia  Excellent response to atorvastatin, reviewed his LDL trend with him today, continue atorvastatin is refilled below.    Refill-     atorvastatin (LIPITOR) 20 MG tablet  Dispense: 90 tablet; Refill: 3      Senile cataract of left eye, unspecified age-related cataract type  Reviewed the most recent [place] eye [facility] ophthalmology consultation.  At that time they were recommending glasses only, no surgical intervention.  Recheck is recommended for this coming June.  At his follow-up appointment with me in 3 months were going to help arrange this follow-up at the eye clinic.    Chronic bilateral knee pain, mild, likely osteoarthritis.    Acetaminophen when needed for now, follow-up if worsening.    Need for immunization against influenza  -     Influenza High Dose, Seasonal 65+ yrs          SUBJECTIVE: 64-year-old male here for follow-up on his high cholesterol and high blood pressure.  He is been taking his medications regularly and has not been having any side effects or problems.  On review of systems, no headaches, no chest pain, no shortness of breath, no ankle edema.  His only concern is some bilateral achy knee pain that comes and goes, has been present for years but has been slowly getting worse.  He does not currently take any medication for it.  He also has a history of a left eye cataract that has been getting slowly progressively worse with blurry vision.  He did see the eye clinic for  "this and has 20/100 vision in the left eye.  At that time, as detailed above, surgery was not recommended.  Patient would like to consider cataract surgery if this would be recommended and helpful for increasing the clarity of his vision.    No past medical history on file.  Patient Active Problem List   Diagnosis     Hypertension     Cataract     Hypercholesteremia     Current Outpatient Medications   Medication Sig Dispense Refill     amLODIPine (NORVASC) 10 MG tablet Take 1 tablet (10 mg total) by mouth daily. 30 tablet 11     atorvastatin (LIPITOR) 20 MG tablet Take 1 tablet (20 mg total) by mouth daily. 90 tablet 3     metoprolol succinate (TOPROL-XL) 100 MG 24 hr tablet Take 1 tablet (100 mg total) by mouth daily. 90 tablet 3     No current facility-administered medications for this visit.      Social History     Tobacco Use   Smoking Status Former Smoker   Smokeless Tobacco Never Used       OBJECTICE: /86   Pulse 83   Temp 98.1  F (36.7  C) (Oral)   Resp 16   Ht 5' 4\" (1.626 m)   Wt 162 lb (73.5 kg)   SpO2 99%   BMI 27.81 kg/m       No results found for this or any previous visit (from the past 24 hour(s)).     GEN-alert, appropriate, in no apparent distress   Eyes-asymmetric corneal opacity with left eye cataract   CV-regular rate and rhythm with no murmur   RESP-lungs clear to auscultation   Musculoskeletal-good range of motion of both knees with mild crepitus.  No effusions.  No bony point tenderness.   EXTREM-warm, no ankle edema   SKIN-no ulcers or vesicles      Yassine Larsen          "

## 2021-06-26 NOTE — PROGRESS NOTES
[address]  [facility] [address]  Dept: [phone]  Dept Fax: [phone]  Primary Provider: Darron Larsen MD  Pre-op Performing Provider: DARRON LARSEN    ASSESMENT AND PLAN:  Diagnoses and all orders for this visit:    Preoperative examination-cleared to proceed with any appropriate anesthesia.    Cataract of right eye, unspecified cataract type  Surgical intervention as detailed above.    Resistant hypertension  Continue current 3 drug therapy.  I expect his blood pressure will be high until he has his nonfunctional kidney removed but I do not think this is a contraindication to proceeding with cataract surgery.  Patient counseled on the importance of taking his 3 blood pressure medications with a sip of water the morning of his scheduled surgery.  Patient has his urology consultation scheduled for July 22 and care management team is assisting the patient with coordination of follow-up.  He will see me in clinic thereafter.    Chronic pain of both knees  Likely patellofemoral.  I demonstrated for the patient static knee quadricep strengthening exercises that I would like him to do daily.  Follow-up if not improving.    Immunizations-COVID-19 vaccine dose 1 given today.       Today's date: 6/9/2021    Moriah Hinojosa is a 66 y.o. male who presents for a preoperative evaluation.    Surgical Information:  Surgery/Procedure: Right eye Cataract surgery  Surgery Location: [facility]  Surgeon: Dr. Kerns  Surgery Date: 6/14/2021  Time of Surgery: unknown  Where patient plans to recover: At home with family  Fax number for surgical facility: [phone]    Type of Anesthesia Anticipated: Local        Subjective     HPI related to upcoming procedure: 58-year-old male with slowly worsening blurry vision.  Was seen by ophthalmology and diagnosed with cataract.  Has right eye cataract surgery upcoming.  Patient has a history of resistant hypertension that has  been worked up previously, see previous notes for details.  The current thinking is that his nonfunctioning kidney is a contributor to the resistant hypertension and he has an upcoming appointment with a urologist in July to discuss possible nephrectomy.  He is also enrolled in care management to help him and his family with medication management and adherence to his 3 drug therapy for hypertension.  Despite good adherence, he continues to have significantly elevated blood pressures.    Patient reports some bilateral knee pain that bothers him when going upstairs.  Mild severity, not bothering him at other times.    Preop Questions 6/9/2021   Have you ever had a heart attack or stroke? No   Have you ever had surgery on your heart or blood vessels, such as a stent placement, a coronary artery bypass, or surgery on an artery in your head, neck, heart, or legs? No   Do you have chest pain with activity? No   Do you have a history of  heart failure? No   Do you currently have a cold, bronchitis or symptoms of other infection? No   Do you have a cough, shortness of breath, or wheezing? No   Do you or anyone in your family have previous history of blood clots? No   Do you or does anyone in your family have a serious bleeding problem such as prolonged bleeding following surgeries or cuts? No   Have you ever had problems with anemia or been told to take iron pills? No   Have you had any abnormal blood loss such as black, tarry or bloody stools? No   Have you ever had a blood transfusion? No   Are you willing to have a blood transfusion if it is medically needed before, during, or after your surgery? Unsure, not  relevant for cataract surgery.   Have you or any of your relatives ever had problems with anesthesia? No   Do you have sleep apnea, excessive snoring or daytime drowsiness? No   Do you have any artifical heart valves or other implanted medical devices like a pacemaker, defibrillator, or continuous glucose monitor?  No   Do you have artificial joints? No   Are you allergic to latex? No     Health Care Directive:  Patient does not have a Health Care Directive or Living Will      Review of Systems  No chest pain, no shortness of breath, no blood in the urine, no blood in the stool, remainder of review of systems is as above or negative.    Patient Active Problem List    Diagnosis Date Noted     Decreased visual acuity 01/26/2021     Corneal scarring 01/26/2021     Poor dentition 01/26/2021     Tachycardia 12/13/2020     Accelerated hypertension 12/13/2020     Nephrolithiasis 12/13/2020     Hydronephrosis with urinary obstruction due to ureteral calculus 12/13/2020     SOB (shortness of breath) 12/13/2020     COVID-19 05/07/2020     Allergic conjunctivitis, bilateral 06/11/2019     CKD (chronic kidney disease) stage 3, GFR 30-59 ml/min 05/17/2019     Chronic pain of both knees 02/11/2019     Cataract 06/18/2018     Hypercholesteremia 06/18/2018     Resistant hypertension 05/07/2018     Past Medical History:   Diagnosis Date     Chronic pain of both knees 2/11/2019     COVID-19 5/7/2020     Hypercholesteremia 6/18/2018     Hypertension 5/7/2018     No past surgical history on file.  Current Outpatient Medications   Medication Sig Dispense Refill     amLODIPine (NORVASC) 10 MG tablet Take 1 tablet (10 mg total) by mouth daily. 90 tablet 11     atorvastatin (LIPITOR) 20 MG tablet Take 1 tablet (20 mg total) by mouth daily. 90 tablet 11     carvediloL (COREG) 25 MG tablet Take 1 tablet (25 mg total) by mouth 2 (two) times a day. 180 tablet 11     hydroCHLOROthiazide (HYDRODIURIL) 25 MG tablet Take 1 tablet (25 mg total) by mouth daily. 90 tablet 11     prednisoLONE acetate (PRED-FORTE) 1 % ophthalmic suspension        No current facility-administered medications for this visit.        No Known Allergies    Social History     Tobacco Use     Smoking status: Former Smoker     Smokeless tobacco: Never Used     Tobacco comment: no passive  exposure   Substance Use Topics     Alcohol use: No        Social History     Substance and Sexual Activity   Drug Use Never        Objective     There were no vitals taken for this visit.  Physical Exam  Gen - alert, orientated, NAD  Eyes -cataract present, otherwise negative.  ENT -poor dentition, oropharynx otherwise clear, TMs clear  Neck - supple, no palpable mass or lymphadenopathy  CV - RRR, no murmur  Resp - lungs CTA  Ab - soft, nontender, no palpable mass or organomegaly  Extrem - warm, no edema  Neuro - CN II-XII intact, strength, sensation, reflexes intact and symmetric  Skin - no rash, no atypical appearing lesions seen.        PRE-OP Diagnostics:   None indicated for cataract surgery      Signed Electronically by: Yassine Larsen MD    Copy of this evaluation report is provided to requesting physician.

## 2021-06-26 NOTE — PROGRESS NOTES
Clinic Care Coordination Contact    Follow Up Progress Note      Assessment: follow up on goals  - Chart reviewed completed.  - spoke with daughter - Jody Hinojosa today.    1) Med compliance  - Grand daughter manages meds and speaks English.   - Daughter states no refills concerns and daughter was able to verbalize correctly patient has been taking his meds.   - No additional assist needed.     2) Urology  - scheduled on 7/22/2021 at 2pm.     Goals addressed this encounter:   Goals Addressed                 This Visit's Progress       Patient Stated      Medical (pt-stated)   30%     Goal Statement: I will attend my urology/surgery appt then next 90 days:  Goal: 5/17/2021  Barriers: language barrier, lack of knowledge  Strengths: agrees to work on goal  Date to Achieve By: 8/17/2021  Patient expressed understanding of goal: Yes    Action steps to achieve this goal:  1. I will attend my appt with [facility] clinic as scheduled on 7/22/2021 at 2:00pm.   2. I will call [facility] or CCC RN with concerns or questions.     [facility]   [phone]  [address] [place] [address], [address] [place] [address]    Appointment: Date/Time: 7/22/2021 at 2:00 PM.             Outreach Frequency: 6 weeks    Plan:   1) Patient will attend his urology appt on 7/22/2021 at 2pm.   2) CCC RN will follow up in 6 weeks or sooner if needed.

## 2021-06-28 ENCOUNTER — COMMUNICATION - HEALTHEAST (OUTPATIENT)
Dept: NURSING | Facility: CLINIC | Age: 66
End: 2021-06-28

## 2021-06-28 NOTE — PROGRESS NOTES
Progress Notes by Ceci Irving at 11/15/2019 11:15 AM     Author: Ceci Irving Service: -- Author Type: Financail Resource Worker    Filed: 12/6/2019  2:09 PM Encounter Date: 11/15/2019 Status: Addendum    : Ceci Irving (Financail Resource Worker)    Related Notes: Original Note by Ceci Irving (Financail Resource Worker) filed at 11/26/2019  9:11 AM       Programs Applying For: Health Insurance Renewal   County:  Case #: [id number]  [facility] Worker: Phoebe # [phone]  Mnsure #:   PMI #:   Tracking:   Date Applied:     Outreach:   11/22/2019: FRW called [facility] and spoke with James. Health insurance renewal has been received with AVS form. Case is with [facility] worker, Phoebe. FRW will make outreach to [facility] worker in 3 weeks for processing time.   11/15/2019: FRW called patient on referral. Patient has not received health insurance renewal from [facility]. FRW and patient called [facility] but was on hold for over a half hour. FRW will call patient next week to get renewal sent to him. Patient stated he was not able to go to [facility] to complete health insurance renewal.       Health Insurance Information:       Referral:   Nyla Bacon RN  P Clinic Financial Resource Guide Pool             Need assist with insurance renewal - will end as of 11/30/19   Dougie Manuel CHW Libbesmeier, Kenzie K ; Nyla Bacon RN           Please do a chart review for this patient, PCP requested for FRW ONLY Referral due to insurance will term on 11/30/2019, see MNITS below. FYI: referral is already in the WQ.     [place] Department of Human Services: [facility] Care Programs   Request processed: 11/14/2019 07:15   SUBSCRIBER INFORMATION   Date of Service Subscriber ID Subscriber Name Birthdate Age Gender   11/14/2019 [id number] PWO NOAH 1955 64 MALE   Address   [address] , , [place] [address]   PROVIDER INFORMATION   Provider ID Submitter Transaction ID Provider Name Taxonomy Code Qualifier Taxonomy  Code   [id number] [facility] Programs         This subscriber has eligibility for MA: Medical Assistance.       Elig Type EX: Over age 65       Eligibility Begin Date: 01/01/2018       Eligibility End Date: 11/30/2019       This subscriber is eligible for the following service types: Medical Care ,  Chiropractic ,  Dental Care ,  Hospital ,  Hospital - Inpatient ,  Hospital - Outpatient ,  Emergency Services ,  Pharmacy ,  Professional (Physician) Visit - Office ,  Vision (Optometry) ,  Mental Health ,  Urgent Care     Prepaid Health Plan         This subscriber receives MA30 - [place] Senior Care Plus (MSC+) delivered through [facility]. The phone numbers are: [phone] (metro) or [phone] (toll free).     Other Eligibility Information         No Special Transportation.       This subscriber's eligibility is not determined for Long term Care and waiver services.       No Hospice.       Refer to Health Care Programs and Services Overview of the [facility] Provider Manual for a list of covered services.     Waivers   None     Subscriber Responsibility Information         An office visit copay of 3 dollars may exist for this subscriber.       A copay of 3.50 dollars for Non-Emergency ER visits may exist for this subscriber.     Restricted Recipient Program   None       Medicare   None

## 2021-06-28 NOTE — PROGRESS NOTES
Progress Notes by Nyla Bacon RN at 11/14/2019  2:45 PM     Author: Nyla Bacon RN Service: -- Author Type: Registered Nurse    Filed: 11/14/2019  2:59 PM Encounter Date: 11/14/2019 Status: Signed    : Nyla Bacon RN (Registered Nurse)        Clinic Care Coordination Contact    Situation: Patient chart reviewed by care coordinator.    Background:   Dougie Manuel CHW Libbesmeier, Kenzie K, MARIANA; Nyla Bacon RN             Hi,     Please do a chart review for this patient, PCP requested for FRW ONLY Referral due to insurance will term on 11/30/2019, see MNITS below. FYI: referral is already in the WQ.     [place] Department of Human Services: [facility]   Request processed: 11/14/2019 07:15   SUBSCRIBER INFORMATION   Date of Service Subscriber ID Subscriber Name Birthdate Age Gender   11/14/2019 [id number] PWO NOAH 1955 64 MALE   Address   [address] , , [place] [address]   PROVIDER INFORMATION   Provider ID Submitter Transaction ID Provider Name Taxonomy Code Qualifier Taxonomy Code   [id number] [facility] Programs         This subscriber has eligibility for MA: Medical Assistance.       Elig Type EX: Over age 65       Eligibility Begin Date: 01/01/2018       Eligibility End Date: 11/30/2019       This subscriber is eligible for the following service types: Medical Care ,  Chiropractic ,  Dental Care ,  Hospital ,  Hospital - Inpatient ,  Hospital - Outpatient ,  Emergency Services ,  Pharmacy ,  Professional (Physician) Visit - Office ,  Vision (Optometry) ,  Mental Health ,  Urgent Care     Prepaid Health Plan         This subscriber receives MA30 - [place] Care Plus (MSC+) delivered through [facility]. The phone numbers are: [phone] (metro) or [phone] (toll free).     Other Eligibility Information         No Special Transportation.       This subscriber's eligibility is not determined for Long term Care and waiver services.  "      No Hospice.       Refer to Health Care Programs and Services Overview of the [facility] Provider Manual for a list of covered services.     Waivers   None     Subscriber Responsibility Information         An office visit copay of 3 dollars may exist for this subscriber.       A copay of 3.50 dollars for Non-Emergency ER visits may exist for this subscriber.     Restricted Recipient Program   None       Medicare   None            Assessment:   Chart review completed today.   Was seen by PCP yesterday and b/p was 222/90  Per chart review, patient hasn't been taking his HTN meds since July, 2019.   Writer called and spoke with daughter today.   Per daughter, they haven't  medications from the pharmacy yet.   Instructed daughter to go and  all 3 medications as soon as possible today- Daughter verbalized understanding.   Has f/u appt with PCP on 11/20/19  Instructed daughter to call CCC RN directly if she has concerns with medications.     Plan/Recommendations:   1) FRG referral - message sent to FRW to assist with insurance renewal today via \" remind me\".     2) Daughter will  meds from the [facility] today    3) Patient will attend his appt with PCP on 11/20/19 f/u on HTN    4) Need CCC RN assessment for medication teaching, HTN mgmt and possible danya application if lapse in insurance                                                        "

## 2021-06-28 NOTE — PROGRESS NOTES
Progress Notes by Nyla Bacon RN at 11/20/2019  2:00 PM     Author: Nyla Bacon RN Service: -- Author Type: Registered Nurse    Filed: 11/29/2019  8:46 AM Encounter Date: 11/20/2019 Status: Signed    : Nyla Bacon RN (Registered Nurse)       Clinic Care Coordination Contact    Clinic Care Coordination Contact  OUTREACH    Referral Information:  Referral Source: PCP      Chief Complaint   Patient presents with   ? Clinic Care Coordination - Initial   ? Clinic Care Coodination - Face To Face     Clinic Utilization  Difficulty keeping appointments:: No  Compliance Concerns: No  No-Show Concerns: No  No PCP office visit in Past Year: No  Utilization    Last refreshed: 11/20/2019 12:59 AM:  Hospital Admissions 0           Last refreshed: 11/20/2019 12:59 AM:  ED Visits 0           Last refreshed: 11/20/2019 12:59 AM:  No Show Count (past year) 0              Current as of: 11/20/2019 12:59 AM              Clinical Concerns:  Inactive insurance -   Doesn't work   SSI - $350 - not sure why only $350 - would like to know why not $771  Spouse doesn't work - doesn't received SSI     Current Medical Concerns:    1) HTN - 186/98 - consulted with PCP and was told to continue the  HTN meds  Elevated B/p noted during PCP visit on 11/13/19  - 222/90  Today b/p - 186/92  Has f/u appt with PCP post visit with CCC RN    Pain  Pain (GOAL):: No  Health Maintenance Reviewed: Not assessed  Clinical Pathway: HTN     Medication Management:  Daughter manages meds.  Insurance will stop as of 11/30/19  Patient was out of his meds for a few months because no one refill for her.   Educated patient and daughter the important of taking meds as directed by PCP.     Lapse in insurance   Functional Status:  Dependent ADLs:: Independent  Dependent IADLs:: Independent  Bed or wheelchair confined:: No  Mobility Status: Independent  Fallen 2 or more times in the past year?: No  Any fall with injury in the past year?: No    Living  Situation:  Current living arrangement:: I live in a private home with family  Type of residence:: Private home - stairs    Diet/Exercise/Sleep:  Diet:: Regular  Inadequate nutrition (GOAL):: No  Food Insecurity: No  Tube Feeding: No  Exercise:: Currently not exercising  Inadequate activity/exercise (GOAL):: No  Significant changes in sleep pattern (GOAL): No    Transportation:  Transportation concerns (GOAL):: No  Transportation means:: Medical transport     Psychosocial:  Yazidism or spiritual beliefs that impact treatment:: No  Mental health DX:: No  Mental health management concern (GOAL):: No  Informal Support system:: Children, Estefania based, Family     Financial/Insurance:   Financial/Insurance concerns (GOAL):: No       Resources and Interventions:  Current Resources:    ;   Community Resources: None  Supplies used at home:: None  Equipment Currently Used at Home: none    Advance Care Plan/Directive  Advanced Care Plans/Directives on file:: No    Referrals Placed: None     Goals:      Goals        Patient Stated    ? I need assist to reapply for health insurance.  (pt-stated)      Action steps to achieve this goal  1. I will speak with CHW at outreach calls  2. I will provide necessary documents as needed  3. I will call CHW or FRW with any concerns or questions.    Date goal set:  11/20/19      ? RN goal: CCC RN will f/u on HTN the next 3 months.  (pt-stated)      Action steps to achieve this goal  1.  Daughter will speak with CCC RN at outreach calls  2. Daughter will  meds on a timely manner  3. Patient will take HTN meds prescribed by PCP  4. Daughter will call CCC RN with any concerns or questions.  5. Patient will attend his appt with PCP as schedule to f/u on HTN.    Date goal set:  11/20/19                 Future Appointments              Today Yassine Larsen MD [facility] , [facility]          Plan:   1) FRW referral placed today  2) CCC RN will f/u with patient on  1/7/2020

## 2021-06-28 NOTE — PROGRESS NOTES
Progress Notes by Ceic Irving at 12/10/2019  9:18 AM     Author: Ceci Irving Service: -- Author Type: Financail Resource Worker    Filed: 12/10/2019  9:30 AM Encounter Date: 12/10/2019 Status: Signed    : Ceci Irving (Financail Resource Worker)       Programs Applying For: Health Insurance Renewal   County:  Case #: [id number]  [facility] Worker: Phoebe # [phone]  Mnsure #:   PMI #:   Trackin2020 for 2021  Date Applied:     Outreach:   12/10/2019: CHW sent FRW a staff message that patient has active health insurance. FRW called patient and left VM that insurance is active. FRW sent staff message to RN/CHW for standard of work. FRW will track patient while in [facility] for health insurance renewals.  This subscriber has eligibility for MA: Medical Assistance.  Elig Type EX: Over age 65  Eligibility Begin Date: 2018  Eligibility End Date: --/--/----  This subscriber is eligible for the following service types: Medical Care , Chiropractic , Dental Care , Hospital , Hospital - Inpatient , Hospital - Outpatient , Emergency Services , Pharmacy , Professional (Physician) Visit - Office , Vision (Optometry) , Mental Health , Urgent Care   Prepaid Health Plan   This subscriber receives MA30 - [place] Senior Care Plus (MSC+) delivered through [facility]. The phone numbers are: [phone] (metro) or [phone] (toll free).    CLOSED ENCOUNTER:  2019: FRW called [facility] and spoke with James. Health insurance renewal has been received with AVS form. [facility] worker[facility]hoebe. FRW will make outreach to [facility] worker in 3 weeks for processing time.   11/15/2019: FRW called patient on referral. Patient has not received health insurance renewal from [facility]. FRW and patient called [facility] but was on hold for over a half hour. FRW will call patient next week to get renewal sent to him. Patient stated he was not able to go to [facility] to complete health insurance renewal.        Health Insurance Information:       Referral:   Nyla Bacon, HELLEN  P Clinic Financial Resource Guide Pool             Need assist with insurance renewal - will end as of 11/30/19   Dougie Manuel CHW Libbesmeier, Kenzie K, SW; Nyla Bacon, RN           Please do a chart review for this patient, PCP requested for FRW ONLY Referral due to insurance will term on 11/30/2019, see MNITS below. FYI: referral is already in the WQ.     [place] Department of Human Services: [facility]   Request processed: 11/14/2019 07:15   SUBSCRIBER INFORMATION   Date of Service Subscriber ID Subscriber Name Birthdate Age Gender   11/14/2019 [id number] PWO NOAH 1955 64 MALE   Address   [address] , , [place] [address]   PROVIDER INFORMATION   Provider ID Submitter Transaction ID Provider Name Taxonomy Code Qualifier Taxonomy Code   [id number] xx [facility] Programs         This subscriber has eligibility for MA: Medical Assistance.       Elig Type EX: Over age 65       Eligibility Begin Date: 01/01/2018       Eligibility End Date: 11/30/2019       This subscriber is eligible for the following service types: Medical Care ,  Chiropractic ,  Dental Care ,  Hospital ,  Hospital - Inpatient ,  Hospital - Outpatient ,  Emergency Services ,  Pharmacy ,  Professional (Physician) Visit - Office ,  Vision (Optometry) ,  Mental Health ,  Urgent Care     Prepaid Health Plan         This subscriber receives MA30 - [place] Senior Care Plus (MSC+) delivered through [facility]. The phone numbers are: [phone] (metro) or [phone] (toll free).     Other Eligibility Information         No Special Transportation.       This subscriber's eligibility is not determined for Long term Care and waiver services.       No Hospice.       Refer to Health Care Programs and Services Overview of the [facility] Provider Manual for a list of covered services.     Waivers   None     Subscriber Responsibility  Information         An office visit copay of 3 dollars may exist for this subscriber.       A copay of 3.50 dollars for Non-Emergency ER visits may exist for this subscriber.     Restricted Recipient Program   None       Medicare   None

## 2021-06-30 NOTE — PROGRESS NOTES
Progress Notes by Nyla Bacon RN at 5/17/2021 11:00 AM     Author: Nyla Bacon RN Service: -- Author Type: Registered Nurse    Filed: 5/21/2021 11:25 PM Encounter Date: 5/17/2021 Status: Signed    : Nyla Bacon RN (Registered Nurse)       Clinic Care Coordination Contact    Clinic Care Coordination Contact  OUTREACH    Referral Information:  Referral Source: PCP    Primary Diagnosis: Genitourinary Disorders    Chief Complaint   Patient presents with   ? Clinic Care Coordination - Initial     Clinic Utilization  Difficulty keeping appointments:: No  Compliance Concerns: Yes  No-Show Concerns: No  No PCP office visit in Past Year: No  Utilization    Last refreshed: 5/17/2021  1:01 AM: Hospital Admissions 1           Last refreshed: 5/17/2021  1:01 AM: ED Visits 2           Last refreshed: 5/17/2021  1:01 AM: No Show Count (past year) 1              Current as of: 5/17/2021  1:01 AM              Clinical Concerns:  - CCC RN assessment completed today with patient and daughter - Jody via phone.     - Patient was referral to CCC by PCP to assist with resources and coordinate cares.     - Patient has pertinent medical dx of:   Resistant hypertension  Cataract  Hypercholesteremia  Chronic pain of both knees  CKD (chronic kidney disease) stage 3, GFR 30-59 ml/min  Allergic conjunctivitis, bilateral  COVID-19  Tachycardia  Accelerated hypertension  Nephrolithiasis  Hydronephrosis with urinary obstruction due to ureteral calculus  SOB (shortness of breath)  Decreased visual acuity  Corneal scarring        - SNAP $300  - rent - $1290  - lives in single family home with spouse and 25 yr son. States son recently moved in with them from [facility].   - son doesn't work.    1) Urology/kidney surgeon follow up  - Urology referral was placed by Dr Reyes on 3/29/2021 for   Provider Comments 03/29/2021  8:23 AM Marc Reyes MD Provider Comments -   Note    End stage hydronephrosis and refractory hypertension. Needs  nephrectomy              - Patient was referral to Dr Wright and pre-visit was completed on 4/19 and 4/21.   - Need to follow up visit.     He will have ongoing follow-up with urology/kidney surgeon for the possibility of needing a nephrectomy for the nonfunctioning kidney that may be contributing to his resistant hypertension.  He was referred to another urology specialist by Dr. Reyes but I have not seen a report and the patient is unclear as to whether he has had the appointment.   [facility]   [phone]  [address] [place] [address], [address] [place] [address]       2) PCA service   - applied > 1 yr ago and never heard back  - enrolled with  partners - Terra Hale RN -   - CCC RN sent an in basket message to Sara Hale RN today to follow up on this.     3) SSI concerns  - SSI - spouse - $200 and he gets $400 - not sure why?!  - Will consult with    - Will discuss this during case review on 5/26/2021    4) depression  - daughter reports of feeling irritated, sad, and hopeless  - declines ARM worker and in home     5) Eye appt follow up   - CHW to follow up on post cataract surgery appt    [facility] or [facility].     Pain  Pain (GOAL):: Yes  Type: Chronic (>3mo)  Location of chronic pain:: knees pain, joints pain  Radiating: No  Progression: Unchanged  Description of pain: Aching, Nagging  Chronic pain severity:: 4  Limitation of routine activities due to chronic pain:: Yes  Description: Unable to perform most daily activities (chores, hobbies, social activities, driving)  Alleviating Factors: Rest, Pain Medication  Aggravating Factors: Activity  Health Maintenance Reviewed:    Clinical Pathway: None     Medication Management:      1) Amlodipine 10mg (1) tab daily    2) Atorvastatin 20mg (1) tab HS    3) Carvedilol 25mg (1) tab two times a day     4) Hydrochlorothiazide 25mg (1) tab daily - reports not able to pick it up.     5) Prednisolone 1%     Functional Status:  Dependent  ADLs:: Bathing, Dressing, Grooming, Transfers, Toileting, Incontinence  Dependent IADLs:: Cleaning, Cooking, Laundry, Shopping, Meal Preparation, Medication Management, Money Management, Transportation, Incontinence  Bed or wheelchair confined:: No  Mobility Status: Independent w/Device  Fallen 2 or more times in the past year?: Yes  Any fall with injury in the past year?: No    Living Situation:  Current living arrangement:: I live in a private home with family  Type of residence:: Private home - stairs    Lifestyle & Psychosocial Needs:  Lifestyle   ? Physical activity     Days per week: 0 days     Minutes per session: 0 min   ? Stress: Not on file     Social Needs   ? Financial resource strain: Not on file   ? Food insecurity     Worry: Not on file     Inability: Not on file   ? Transportation needs     Medical: Yes     Non-medical: Yes     Diet:: Regular  Inadequate nutrition (GOAL):: No  Tube Feeding: No  Inadequate activity/exercise (GOAL):: No  Significant changes in sleep pattern (GOAL): No  Transportation means:: Medical transport  Financial/Insurance concerns (GOAL):: No  Protestant or spiritual beliefs that impact treatment:: No  Mental health DX:: No  Mental health management concern (GOAL):: Yes  Chemical Dependency Status: Not Applicable  Informal Support system:: Children, Estefania based, Family, Spouse   Socioeconomic History   ? Marital status:      Spouse name: Not on file   ? Number of children: Not on file   ? Years of education: Not on file   ? Highest education level: Not on file   Relationships   ? Social connections     Talks on phone: Not on file     Gets together: Not on file     Attends Scientologist service: Not on file     Active member of club or organization: Not on file     Attends meetings of clubs or organizations: Not on file     Relationship status: Not on file   ? Intimate partner violence     Fear of current or ex partner: No     Emotionally abused: No     Physically abused: No      Forced sexual activity: No     Tobacco Use   ? Smoking status: Former Smoker   ? Smokeless tobacco: Never Used   ? Tobacco comment: no passive exposure   Substance and Sexual Activity   ? Alcohol use: No   ? Drug use: Never   ? Sexual activity: Not Currently     Partners: Female       Resources and Interventions:  Current Resources:      Community Resources: County Worker, Transportation Services  Supplies Currently Used at Home: None  Equipment Currently Used at Home: cane, straight  Type of Employment: Disability       Advance Care Plan/Directive  Advanced Care Plans/Directives on file:: No  Advanced Care Plan/Directive Status: Considering Options    Referrals Placed: None     Goals:      Goals        Patient Stated    ? Medical (pt-stated)      Goal Statement: I will attend my urology/surgery appt then next 90 days:  Goal: 5/17/2021  Barriers: language barrier, lack of knowledge  Strengths: agrees to work on goal  Date to Achieve By: 8/17/2021  Patient expressed understanding of goal: Yes    Action steps to achieve this goal:  1. I will attend my appt with [facility] as scheduled. TBD  2. I will call CHW or CCC RN with concerns or questions.       [facility]   [phone]  [address] [place] [address], 4th [place] [address]                   Outreach Frequency: 6 weeks  Future Appointments              Today CRISTÓBAL AVALOS RN [facility] [place]    In 3 weeks [facility]    In 3 weeks CRISTÓBAL AVALOS RN [facility]          Plan:  1) CHW to assist patient schedule urology appt as soon as possible.   2) CCC RN will follow up in 6 weeks.

## 2021-06-30 NOTE — PROGRESS NOTES
Progress Notes by Nyla Bacon RN at 5/17/2021 11:00 AM     Author: Nyla Bacon RN Service: -- Author Type: Registered Nurse    Filed: 5/21/2021 11:25 PM Encounter Date: 5/17/2021 Status: Signed    : Nyla Bacon RN (Registered Nurse)       Terra Hale RN Dah, Nadia, RN             I will call them to discuss.  His last assessment on 12/11/20, he reported being independent in all ADL's.  He reported needing some assistance with IADL's.  He could possibly benefit from homemaking services vs PCA if he is still independent in his ADL's.  Thank you,   Terra Hale RN   [facility]   [phone]    Previous Messages    ----- Message -----   From: Nyla Bacon RN   Sent: 5/17/2021  10:06 AM CDT   To: Terra Hale RN, Nyla aBcon RN   Subject: PCA service                                       Ashkan Ellison,   I spoke with patient and daughter today to follow up on urology appt status per PCP request. Patient and daughter would like pca assessment.   Could you please follow up on this.     Thank you,   Nyla

## 2021-07-04 NOTE — ADDENDUM NOTE
Addendum Note by Darron Larsen MD at 3/9/2021  3:40 PM     Author: Darron Larsen MD Service: -- Author Type: Physician    Filed: 3/17/2021  3:44 PM Encounter Date: 3/9/2021 Status: Signed    : Darron Larsen MD (Physician)    Addended by: DARRON LARSEN on: 3/17/2021 03:44 PM        Modules accepted: Orders

## 2021-07-07 ENCOUNTER — AMBULATORY - HEALTHEAST (OUTPATIENT)
Dept: NURSING | Facility: CLINIC | Age: 66
End: 2021-07-07

## 2021-07-07 NOTE — PROGRESS NOTES
Clinic Care Coordination Contact    Community Health Worker Follow Up    Goals:   Goals Addressed                 This Visit's Progress      Medical (pt-stated)   40%     Goal Statement: I will attend my urology/surgery appt then next 90 days:  Goal: 5/17/2021  Barriers: language barrier, lack of knowledge  Strengths: agrees to work on goal  Date to Achieve By: 8/17/2021  Patient expressed understanding of goal: Yes    Action steps to achieve this goal:  1. I will attend my appointment with [facility] clinic as scheduled on 7/22/2021 at 2:00pm.   2. I will call CHW or CCC RN with concerns or questions.   3. My grand daughter will provide transportation for the upcoming appointment.     [facility]   [phone]  [address] [place] [address], [address]th [place] [address]    Appointment: Date/Time: 7/22/2021 at 2:00 PM.    Goal update: 06/28/2021        CHW conversation with patient:   -Patient will follow up with urologist as scheduled on 7/22/2021 and grand-daughter will provide medical transportation.   -Patient denied immediate need for coordination assistance and no additional resource needed.  -Patient's daughter and grand-daughter will contact PCA agency again to check on PCA assessment status.  -Patient was informed to call with questions or concerns.         CHW Next Follow-up: In one month.

## 2021-07-09 ENCOUNTER — PATIENT OUTREACH (OUTPATIENT)
Dept: GERIATRIC MEDICINE | Facility: CLINIC | Age: 66
End: 2021-07-09

## 2021-07-15 ENCOUNTER — PRE VISIT (OUTPATIENT)
Dept: UROLOGY | Facility: CLINIC | Age: 66
End: 2021-07-15

## 2021-07-15 ENCOUNTER — PATIENT OUTREACH (OUTPATIENT)
Dept: NURSING | Facility: CLINIC | Age: 66
End: 2021-07-15

## 2021-07-15 NOTE — LETTER
[facility]  Complex Care Plan  About Me:    Patient Name:  Moriah Zamora    YOB: 1955  Age:         66 year old   [place] MRN:    [id number] Telephone Information:  Home Phone [phone]   Mobile [phone]       Address:  [address] [place] [address] Email address:  No e-mail address on record      Emergency Contact(s)    Name Relationship Lgl Grd Work Phone Home Phone Mobile Phone   1. OTILIO BLAIR Other   [phone]   2. MILTON NEAL Nephew   [phone]    3. LAURA ZAMORA Other   [phone]    4. LAURA ZAMORA Other   [phone]            Primary language:  Rochelle     needed? Yes   [place] [facility]:  [phone] op. 1  Other communication barriers:    Preferred Method of Communication:     Current living arrangement:    Mobility Status/ Medical Equipment:      Health Maintenance  Health Maintenance Reviewed:      My Access Plan  Medical Emergency 911   Primary Clinic Line [facility] - [phone]   24 Hour Appointment Line [phone] or  [phone]) (toll-free)   24 Hour Nurse Line [phone] (toll-free)   Preferred Urgent Care     Preferred Hospital     Preferred Pharmacy No Pharmacies Listed   Behavioral Health Crisis Line The National Suicide Prevention Lifeline at 1-631.706.2309 or 911             My Care Team Members  Patient Care Team       Relationship Specialty Notifications Start End    Yassine Larsen MD PCP - General   6/18/18     Phone: [phone] Fax: [phone]         [address] [place] [address]    Terra Hale RN Lead Care Coordinator Primary Care - CC Admissions 12/27/18     Catracho Wright MD MD Urology  3/31/21     Phone: [phone] Pager: [phone] Fax: [phone]        [address] [place] [id number]    Elza Mcknight, RN Registered Nurse Oncology  3/31/21     Phone: [phone] Pager:  [phone]        Nyla Bacon, RN Lead Care Coordinator Primary Care - CC Admissions 5/17/21     Dougie Manuel Community Health Worker Primary Care - CC Admissions 5/17/21             My Care Plans  Self Management and Treatment Plan  Goals and (Comments)       Action Plans on File:                       Advance Care Plans/Directives Type:        My Medical and Care Information  Problem List   Patient Active Problem List   Diagnosis     Resistant hypertension     Cataract     Hypercholesteremia     Chronic pain of both knees     CKD (chronic kidney disease) stage 3, GFR 30-59 ml/min     Allergic conjunctivitis, bilateral     COVID-19     Tachycardia     Accelerated hypertension     Nephrolithiasis     Hydronephrosis with urinary obstruction due to ureteral calculus     SOB (shortness of breath)     Decreased visual acuity     Corneal scarring     Poor dentition      Current Medications and Allergies:  See printed Medication Report.    Care Coordination Start Date: No linked episodes   Frequency of Care Coordination:     Form Last Updated: 07/15/2021

## 2021-07-15 NOTE — PROGRESS NOTES
Clinic Care Coordination Contact    Follow Up Progress Note      Assessment: follow up on goal  - chart review completed today.   - Spoke with patient and daughter today.     - reminded daughter today of upcoming on 7/22/2021 with urologist at 2pm.   - Provided daughter with clinic address and phone number today.       Goals addressed this encounter:   Goals Addressed                    This Visit's Progress       Medical (pt-stated)   50%      Goal Statement: I will attend my urology/surgery appt then next 90 days:   Goal: 5/17/2021   Barriers: language barrier, lack of knowledge   Strengths: agrees to work on goal   Date to Achieve By: 8/17/2021   Patient expressed understanding of goal: Yes     Action steps to achieve this goal:   1. I will attend my appointment with [facility] Urology clinic as scheduled on 7/22/2021 at 2:00pm.   2. I will call CHW or CCC RN with concerns or questions.   3. My grand daughter will provide transportation for the upcoming appointment.     [facility]   [phone]   [address] [place] [address], [address]th [place] [address]     Appointment: Date/Time: 7/22/2021 at 2:00 PM.     Goal update: 06/28/2021            Outreach Frequency: 6 weeks    Plan:   1) Patient will attend his appt with his urologist on 7/22/2021 at 2pm. Own transportation.   2) CCC RN will follow up in 6 weeks or sooner if needed.

## 2021-07-15 NOTE — TELEPHONE ENCOUNTER
Reason for visit: Follow up     Relevant information: Hydronephrosis w/ urinary obstruction due to ureteral calculus    Records/imaging/labs/orders: All records available    Pt called: N/A    At Rooming: Standard

## 2021-07-22 ENCOUNTER — OFFICE VISIT (OUTPATIENT)
Dept: UROLOGY | Facility: CLINIC | Age: 66
End: 2021-07-22
Payer: COMMERCIAL

## 2021-07-22 ENCOUNTER — DOCUMENTATION ONLY (OUTPATIENT)
Dept: UROLOGY | Facility: CLINIC | Age: 66
End: 2021-07-22

## 2021-07-22 ENCOUNTER — TELEPHONE (OUTPATIENT)
Dept: ONCOLOGY | Facility: CLINIC | Age: 66
End: 2021-07-22

## 2021-07-22 VITALS — HEART RATE: 101 BPM | DIASTOLIC BLOOD PRESSURE: 112 MMHG | SYSTOLIC BLOOD PRESSURE: 220 MMHG

## 2021-07-22 DIAGNOSIS — Z11.59 ENCOUNTER FOR SCREENING FOR OTHER VIRAL DISEASES: ICD-10-CM

## 2021-07-22 DIAGNOSIS — I10 ACCELERATED HYPERTENSION: ICD-10-CM

## 2021-07-22 DIAGNOSIS — N20.0 NEPHROLITHIASIS: ICD-10-CM

## 2021-07-22 DIAGNOSIS — I1A.0 RESISTANT HYPERTENSION: ICD-10-CM

## 2021-07-22 DIAGNOSIS — N26.1 ATROPHIC KIDNEY: Primary | ICD-10-CM

## 2021-07-22 PROCEDURE — 99205 OFFICE O/P NEW HI 60 MIN: CPT | Performed by: UROLOGY

## 2021-07-22 RX ORDER — CEFAZOLIN SODIUM 2 G/50ML
2 SOLUTION INTRAVENOUS
Status: CANCELLED | OUTPATIENT
Start: 2021-07-22

## 2021-07-22 RX ORDER — CEFAZOLIN SODIUM 2 G/50ML
2 SOLUTION INTRAVENOUS SEE ADMIN INSTRUCTIONS
Status: CANCELLED | OUTPATIENT
Start: 2021-07-22

## 2021-07-22 ASSESSMENT — PAIN SCALES - GENERAL: PAINLEVEL: NO PAIN (0)

## 2021-07-22 NOTE — PROGRESS NOTES
"Surgery teaching discontinued due to patients elevated blood pressure 232/121 and pulse 99.  Patient in clinic with Granddaughter (assisting with interpretation due to disconnection of tablet patient consented) blood pressure elevated due to kidney disease when asked patient about symptoms, he complained of prolonged pressure bilateral scapula pain in am,resolved now. patient denies indigestion, however audible belching noted, patient also complains of bilateral elbow/arm pain.  When asked patient if he would be willing to be seen by our team (Rapid response) to just be check out he consented and stated \"no go to emergency\"  His granddaughter also stated he will not go to ED.  Rapid reponse team performed EKG stated normal sinus rhythm, MD concurred, patient denies pain at this time. Surgery date and time will be sent to patient and surgery teaching will be provided to patient a a later date.  Ceci Briggs RN           "

## 2021-07-22 NOTE — LETTER
7/22/2021       RE: Moriah Hinojosa  [address] [place] [address]     Dear Colleague,    Thank you for referring your patient, Moriah Hinojosa, to the [facility] UROLOG[facility]IC [place] at [facility]. Please see a copy of my visit note below.          Chief Complaint:    Atrophic L kidney         Consult or Referral:     Mr. Moriah Hinojosa is a 66 year old male seen at the request of Dr. Reyes.         History of Present Illness:   Moriah Hinojosa is a very pleasant 66 year old male w/ PMH of HLD, CKD stage 3, resistance hypertension, and atrophic L kidney with obstruction proximal L ureteral stone for which he is referred.     Mr. Hinojosa is accompanied by his granddaughter. He requires a  for the language Rochelle. Mr. Hinojosa has been sent for evaluation for removal of an atrophic L kidney. Mr. Hinojosa has been so far asymptomatic from a pain perspective, but has had refractory hypertension.. He denies problems with L flank pain or UTIs. He notes passing renal stones in the past, but he has had no problems to his knowledge on the left side. He has a history of hypertension refractory to medical therapy with suspicions that it is due to his atrophic L kidney. His blood pressure today is 220 systolic. He denies new HAs, vision changes, chest pain, SOB, abdominal pain, LUDIN. He has never had abdominal surgery, and he is not on blood thinners.          Past Medical History:     Past Medical History:   Diagnosis Date     Chronic pain of both knees 2/11/2019     COVID-19 5/7/2020     Hypercholesteremia 6/18/2018     Hypertension 5/7/2018          Past Surgical History:   None         Medications     Current Outpatient Medications   Medication     amLODIPine (NORVASC) 10 MG tablet     atorvastatin (LIPITOR) 20 MG tablet     carvediloL (COREG) 25 MG tablet     hydroCHLOROthiazide (HYDRODIURIL) 25 MG tablet     prednisoLONE acetate (PRED-FORTE) 1 % ophthalmic suspension     No current  facility-administered medications for this visit.          Family History:   No known family history of  disease.         Social History:     Social History     Socioeconomic History     Marital status:      Spouse name: Not on file     Number of children: Not on file     Years of education: Not on file     Highest education level: Not on file   Occupational History     Not on file   Tobacco Use     Smoking status: Former Smoker     Smokeless tobacco: Never Used     Tobacco comment: no passive exposure   Substance and Sexual Activity     Alcohol use: No     Drug use: Never     Sexual activity: Not Currently     Partners: Female   Other Topics Concern     Not on file   Social History Narrative     Not on file     Social Determinants of Health     Financial Resource Strain:      Difficulty of Paying Living Expenses:    Food Insecurity:      Worried About Running Out of Food in the Last Year:      Ran Out of Food in the Last Year:    Transportation Needs:      Lack of Transportation (Medical):      Lack of Transportation (Non-Medical):    Physical Activity:      Days of Exercise per Week:      Minutes of Exercise per Session:    Stress:      Feeling of Stress :    Social Connections:      Frequency of Communication with Friends and Family:      Frequency of Social Gatherings with Friends and Family:      Attends Anabaptist Services:      Active Member of Clubs or Organizations:      Attends Club or Organization Meetings:      Marital Status:    Intimate Partner Violence:      Fear of Current or Ex-Partner:      Emotionally Abused:      Physically Abused:      Sexually Abused:           Allergies:   Patient has no known allergies.         Review of Systems:  From intake questionnaire     Skin: negative  Eyes: negative  Ears/Nose/Throat: negative  Respiratory: No shortness of breath, dyspnea on exertion, cough, or hemoptysis  Cardiovascular: No chest pain or palpitations  Gastrointestinal: negative; no  nausea/vomiting, constipation or diarrhea  Genitourinary: as per HPI  Musculoskeletal: negative  Neurologic: negative  Psychiatric: negative  Hematologic/Lymphatic/Immunologic: negative  Endocrine: negative         Physical Exam:     Patient is a 66 year old  male   Vitals: There were no vitals taken for this visit.  Constitutional: There is no height or weight on file to calculate BMI.  Alert, no acute distress, oriented, conversant  Eyes: no scleral icterus; extraocular muscles intact, moist conjunctivae  Neck: trachea midline, no thyromegaly  Ears/nose/mouth: throat/mouth:normal, good dentition  Respiratory: no respiratory distress, or pursed lip breathing  Skin: no suspicious lesions or rashes  Neuro: Alert, oriented, speech and mentation normal  Psych: affect and mood normal, alert and oriented to person, place and time  Gait: Normal      Labs and Pathology:    I reviewed all applicable laboratory and pathology data and went over findings with patient  Significant for   Lab Results   Component Value Date    CR 1.34 12/23/2020    CR 1.80 12/16/2020    CR 1.29 12/15/2020    CR 1.29 12/14/2020    CR 1.50 12/13/2020    CR 1.46 11/13/2019    CR 1.59 05/13/2019    CR 1.60 05/07/2018       Imaging:    The following imaging exams were independently viewed and interpreted by me and discussed with patient:    CT abd/pelvis 12/13/2020  IMPRESSION:  1.  No pulmonary embolus.  2.  Small bilateral pulmonary nodules with the largest measuring up to 4 mm in greatest dimension. These are likely benign though technically indeterminate.  3.  Chronic severe left-sided hydronephrosis and proximal left hydroureter secondary to a proximal left ureteral stone measuring up to 2.2 cm in greatest dimension.  4.  Left nephrolithiasis.  5.  No acute intra-abdominal pathology identified. No obvious findings identified to explain patient's right abdominal pain.  6.  Mild mediastinal lymphadenopathy, nonspecific though likely reactive.    US  03/2021     IMPRESSION:  1.  Marked left-sided hydronephrosis with proximal ureteral obstructing calculus. There is near complete atrophy of the renal parenchyma.  2.  Normal flow velocities within the main right renal artery, without evidence of a hemodynamically significant stenosis.      Outside and Past Medical records:    Review of prior notes with in [facility]  [facility] from [facility] reviewed  Review of the result(s) of each unique test - CT, US, BMP, CBC, UA         Assessment and Plan:     Assessment: Moriah Hinojosa is a very pleasant 66 year old male w/ PMH of HLD, CKD stage 3, resistance hypertension, and atrophic left kidney with obstruction proximal L ureteral stone.    We discussed that his atrophic left kidney may be the etiology of his hypertension. Although he is asymptomatic otherwise, he could develop pain and/or recurrent infections in the future if this kidney is not removed. I discussed his options including nephrectomy now vs waiting to see if he develops more symptoms in the future. He would like to proceed with nephrectomy per recommendation of his primary care provider managing his hypertension.     We discussed the advantages and disadvantages and roles of open surgery vs. laparoscopic (and Da Mckenzie assisted) surgery. Risks and benefits of robotic nephrectomy were discussed in detail. Risks of surgery including bleeding, infection, MI, stroke, DVT/PE, bowel injury and injury to other surrounding structures were discussed. In addition, we discussed potential for conversion to an open procedure.  Prior to surgery I explained he will need improved blood pressure control. We will have him complete a pre-operative evaluation.     We discussed that he may be rather high risk for this surgery given his baseline hypertension and possibly labile blood pressure cleo-operatively.    He had noted elevation of his blood pressure in clinic today. He was  evaluated via rapid response with no acute symptoms noted. He was counseled to seek evaluation in the ED given his marked hypertension, but he declined.    Plan:  -preoperative evaluation   -patient to see PCP to work on optimizing BP control  -schedule for L robotic nephrectomy     Orders  Orders Placed This Encounter   Procedures     PAC Visit Referral (For [facility] Only)     Luis Daniel Jorgensen  PGY-2  [phone]    Attestation:  This patient was seen and evaluated by me, with the resident taking notes and acting as scribe.  I have reviewed and edited the note above to reflect my findings.  The physical exam and or any procedures were performed by me and the pertinant details are outlined above.    Catracho Wright MD  Urology  [facility]

## 2021-07-22 NOTE — TELEPHONE ENCOUNTER
Patient is scheduled for surgery with Dr. Wright     Spoke with: Patient was in clinic patient that needed to leave for medical emergency. RN Elza will call patient with surgery details.     Date of Surgery: Tuesday 08/24/21    Location: [place]     Informed patient they will need an adult  Yes    Pre op with Provider cheryl    H&P: Scheduled with PAC in person visit Tuesday 08/03/21 @ 12:30pm    Pre-procedure COVID-19 Test: Friday 08/20/21 @ 1:00pm [facility]     Additional imaging/appointments: 2 week post op in person visit with Dr. Wright Tuesday 09/07/21 @ 1:00pm     Surgery packet: mailed to patient 07/23/21 unable to verify if address on file is current and correct.     Additional comments: na

## 2021-07-22 NOTE — NURSING NOTE
Chief Complaint   Patient presents with     Consult     Kidney problems       Blood pressure (!) 220/112, pulse 101. There is no height or weight on file to calculate BMI.    Patient Active Problem List   Diagnosis     Resistant hypertension     Cataract     Hypercholesteremia     Chronic pain of both knees     CKD (chronic kidney disease) stage 3, GFR 30-59 ml/min     Allergic conjunctivitis, bilateral     COVID-19     Tachycardia     Accelerated hypertension     Nephrolithiasis     Hydronephrosis with urinary obstruction due to ureteral calculus     SOB (shortness of breath)     Decreased visual acuity     Corneal scarring     Poor dentition       No Known Allergies    Current Outpatient Medications   Medication Sig Dispense Refill     amLODIPine (NORVASC) 10 MG tablet [AMLODIPINE (NORVASC) 10 MG TABLET] Take 1 tablet (10 mg total) by mouth daily. 90 tablet 11     atorvastatin (LIPITOR) 20 MG tablet [ATORVASTATIN (LIPITOR) 20 MG TABLET] Take 1 tablet (20 mg total) by mouth daily. 90 tablet 11     carvediloL (COREG) 25 MG tablet [CARVEDILOL (COREG) 25 MG TABLET] Take 1 tablet (25 mg total) by mouth 2 (two) times a day. 180 tablet 11     hydroCHLOROthiazide (HYDRODIURIL) 25 MG tablet [HYDROCHLOROTHIAZIDE (HYDRODIURIL) 25 MG TABLET] Take 1 tablet (25 mg total) by mouth daily. 90 tablet 11     prednisoLONE acetate (PRED-FORTE) 1 % ophthalmic suspension [PREDNISOLONE ACETATE (PRED-FORTE) 1 % OPHTHALMIC SUSPENSION]          Social History     Tobacco Use     Smoking status: Former Smoker     Smokeless tobacco: Never Used     Tobacco comment: no passive exposure   Substance Use Topics     Alcohol use: No     Drug use: Never       Diana Salvador CMA  7/22/2021  2:16 PM

## 2021-07-22 NOTE — PROGRESS NOTES
Chief Complaint:    Atrophic L kidney         Consult or Referral:     Mr. Moriah Hinojosa is a 66 year old male seen at the request of Dr. Reyes.         History of Present Illness:   Moriah Hinojosa is a very pleasant 66 year old male w/ PMH of HLD, CKD stage 3, resistance hypertension, and atrophic L kidney with obstruction proximal L ureteral stone for which he is referred.     Mr. Hinojosa is accompanied by his granddaughter. He requires a  for the language [place]. Mr. Hinojosa has been sent for evaluation for removal of an atrophic L kidney. Mr. Hinojosa has been so far asymptomatic from a pain perspective, but has had refractory hypertension.. He denies problems with L flank pain or UTIs. He notes passing renal stones in the past, but he has had no problems to his knowledge on the left side. He has a history of hypertension refractory to medical therapy with suspicions that it is due to his atrophic L kidney. His blood pressure today is 220 systolic. He denies new HAs, vision changes, chest pain, SOB, abdominal pain, LUDIN. He has never had abdominal surgery, and he is not on blood thinners.          Past Medical History:     Past Medical History:   Diagnosis Date     Chronic pain of both knees 2/11/2019     COVID-19 5/7/2020     Hypercholesteremia 6/18/2018     Hypertension 5/7/2018          Past Surgical History:   None         Medications     Current Outpatient Medications   Medication     amLODIPine (NORVASC) 10 MG tablet     atorvastatin (LIPITOR) 20 MG tablet     carvediloL (COREG) 25 MG tablet     hydroCHLOROthiazide (HYDRODIURIL) 25 MG tablet     prednisoLONE acetate (PRED-FORTE) 1 % ophthalmic suspension     No current facility-administered medications for this visit.          Family History:   No known family history of  disease.         Social History:     Social History     Socioeconomic History     Marital status:      Spouse name: Not on file     Number of children: Not on file     Years of  education: Not on file     Highest education level: Not on file   Occupational History     Not on file   Tobacco Use     Smoking status: Former Smoker     Smokeless tobacco: Never Used     Tobacco comment: no passive exposure   Substance and Sexual Activity     Alcohol use: No     Drug use: Never     Sexual activity: Not Currently     Partners: Female   Other Topics Concern     Not on file   Social History Narrative     Not on file     Social Determinants of Health     Financial Resource Strain:      Difficulty of Paying Living Expenses:    Food Insecurity:      Worried About Running Out of Food in the Last Year:      Ran Out of Food in the Last Year:    Transportation Needs:      Lack of Transportation (Medical):      Lack of Transportation (Non-Medical):    Physical Activity:      Days of Exercise per Week:      Minutes of Exercise per Session:    Stress:      Feeling of Stress :    Social Connections:      Frequency of Communication with Friends and Family:      Frequency of Social Gatherings with Friends and Family:      Attends Jewish Services:      Active Member of Clubs or Organizations:      Attends Club or Organization Meetings:      Marital Status:    Intimate Partner Violence:      Fear of Current or Ex-Partner:      Emotionally Abused:      Physically Abused:      Sexually Abused:           Allergies:   Patient has no known allergies.         Review of Systems:  From intake questionnaire     Skin: negative  Eyes: negative  Ears/Nose/Throat: negative  Respiratory: No shortness of breath, dyspnea on exertion, cough, or hemoptysis  Cardiovascular: No chest pain or palpitations  Gastrointestinal: negative; no nausea/vomiting, constipation or diarrhea  Genitourinary: as per HPI  Musculoskeletal: negative  Neurologic: negative  Psychiatric: negative  Hematologic/Lymphatic/Immunologic: negative  Endocrine: negative         Physical Exam:     Patient is a 66 year old  male   Vitals: There were no vitals taken  for this visit.  Constitutional: There is no height or weight on file to calculate BMI.  Alert, no acute distress, oriented, conversant  Eyes: no scleral icterus; extraocular muscles intact, moist conjunctivae  Neck: trachea midline, no thyromegaly  Ears/nose/mouth: throat/mouth:normal, good dentition  Respiratory: no respiratory distress, or pursed lip breathing  Skin: no suspicious lesions or rashes  Neuro: Alert, oriented, speech and mentation normal  Psych: affect and mood normal, alert and oriented to person, place and time  Gait: Normal      Labs and Pathology:    I reviewed all applicable laboratory and pathology data and went over findings with patient  Significant for   Lab Results   Component Value Date    CR 1.34 12/23/2020    CR 1.80 12/16/2020    CR 1.29 12/15/2020    CR 1.29 12/14/2020    CR 1.50 12/13/2020    CR 1.46 11/13/2019    CR 1.59 05/13/2019    CR 1.60 05/07/2018       Imaging:    The following imaging exams were independently viewed and interpreted by me and discussed with patient:    CT abd/pelvis 12/13/2020  IMPRESSION:  1.  No pulmonary embolus.  2.  Small bilateral pulmonary nodules with the largest measuring up to 4 mm in greatest dimension. These are likely benign though technically indeterminate.  3.  Chronic severe left-sided hydronephrosis and proximal left hydroureter secondary to a proximal left ureteral stone measuring up to 2.2 cm in greatest dimension.  4.  Left nephrolithiasis.  5.  No acute intra-abdominal pathology identified. No obvious findings identified to explain patient's right abdominal pain.  6.  Mild mediastinal lymphadenopathy, nonspecific though likely reactive.    US 03/2021     IMPRESSION:  1.  Marked left-sided hydronephrosis with proximal ureteral obstructing calculus. There is near complete atrophy of the renal parenchyma.  2.  Normal flow velocities within the main right renal artery, without evidence of a hemodynamically significant stenosis.      Outside  and Past Medical records:    Review of prior notes with in [facility]  [facility] from [facility] reviewed  Review of the result(s) of each unique test - CT, US, BMP, CBC, UA         Assessment and Plan:     Assessment: Moriah Hinojosa is a very pleasant 66 year old male w/ PMH of HLD, CKD stage 3, resistance hypertension, and atrophic left kidney with obstruction proximal L ureteral stone.    We discussed that his atrophic left kidney may be the etiology of his hypertension. Although he is asymptomatic otherwise, he could develop pain and/or recurrent infections in the future if this kidney is not removed. I discussed his options including nephrectomy now vs waiting to see if he develops more symptoms in the future. He would like to proceed with nephrectomy per recommendation of his primary care provider managing his hypertension.     We discussed the advantages and disadvantages and roles of open surgery vs. laparoscopic (and Da Mckenzie assisted) surgery. Risks and benefits of robotic nephrectomy were discussed in detail. Risks of surgery including bleeding, infection, MI, stroke, DVT/PE, bowel injury and injury to other surrounding structures were discussed. In addition, we discussed potential for conversion to an open procedure.  Prior to surgery I explained he will need improved blood pressure control. We will have him complete a pre-operative evaluation.     We discussed that he may be rather high risk for this surgery given his baseline hypertension and possibly labile blood pressure cleo-operatively.    He had noted elevation of his blood pressure in clinic today. He was evaluated via rapid response with no acute symptoms noted. He was counseled to seek evaluation in the ED given his marked hypertension, but he declined.    Plan:  -preoperative evaluation   -patient to see PCP to work on optimizing BP control  -schedule for L robotic nephrectomy     Orders  Orders  Placed This Encounter   Procedures     PAC Visit Referral (For [facility] Only)     Luis Daniel Macklinda  PGY-2  [phone]    Attestation:  This patient was seen and evaluated by me, with the resident taking notes and acting as scribe.  I have reviewed and edited the note above to reflect my findings.  The physical exam and or any procedures were performed by me and the pertinant details are outlined above.    Catracho Wright MD  Urology  [facility]

## 2021-07-22 NOTE — PROGRESS NOTES
Rapid Response Epic Documentation     Situation:   Male Pt in for pre-op Dr. Visit for a Kidney removal was found to be Hypertensive but Asymptomatic.      Objective:  Con and Alert Ox3, Skin is PWD, Lungs clear, Denies c/p and SOB. Refusing to be transported to the Hospital      Assessment:            BP:  232/135  Pulse:101  Respiration: 16  SPO2: 99%  Glucose: mg/dl  Mental Status: Alert  CMS: Intact  Stroke Scale: Negative  EKG: Performed, NSR      Treatment:          Location:     4th floor     Disposition:      Stable (D/C home)    Protocol Used:     Other hypertension

## 2021-07-23 NOTE — TELEPHONE ENCOUNTER
FUTURE VISIT INFORMATION      SURGERY INFORMATION:    Date: 21    Location: [facility] OR    Surgeon:  Catracho Wright MD    Anesthesia Type:  general    Procedure: NEPHRECTOMY, TOTAL, LAPAROSCOPIC    Consult: ov 21    RECORDS REQUESTED FROM:       Primary Care Provider: Yassine Larsen MD- [facility]    Pertinent Medical History: hypertension, tachycardia    Most recent EKG+ Tracin20

## 2021-08-03 ENCOUNTER — ANESTHESIA EVENT (OUTPATIENT)
Dept: SURGERY | Facility: CLINIC | Age: 66
End: 2021-08-03

## 2021-08-03 ENCOUNTER — TELEPHONE (OUTPATIENT)
Dept: FAMILY MEDICINE | Facility: CLINIC | Age: 66
End: 2021-08-03

## 2021-08-03 ENCOUNTER — OFFICE VISIT (OUTPATIENT)
Dept: SURGERY | Facility: CLINIC | Age: 66
End: 2021-08-03
Payer: COMMERCIAL

## 2021-08-03 ENCOUNTER — LAB (OUTPATIENT)
Dept: LAB | Facility: CLINIC | Age: 66
End: 2021-08-03
Payer: COMMERCIAL

## 2021-08-03 ENCOUNTER — PRE VISIT (OUTPATIENT)
Dept: SURGERY | Facility: CLINIC | Age: 66
End: 2021-08-03

## 2021-08-03 ENCOUNTER — PATIENT OUTREACH (OUTPATIENT)
Dept: CARE COORDINATION | Facility: CLINIC | Age: 66
End: 2021-08-03

## 2021-08-03 VITALS
BODY MASS INDEX: 27.32 KG/M2 | DIASTOLIC BLOOD PRESSURE: 132 MMHG | OXYGEN SATURATION: 98 % | HEIGHT: 65 IN | TEMPERATURE: 98 F | RESPIRATION RATE: 16 BRPM | SYSTOLIC BLOOD PRESSURE: 200 MMHG | WEIGHT: 164 LBS | HEART RATE: 101 BPM

## 2021-08-03 DIAGNOSIS — Z01.818 PRE-OP EVALUATION: Primary | ICD-10-CM

## 2021-08-03 DIAGNOSIS — N20.0 NEPHROLITHIASIS: Primary | ICD-10-CM

## 2021-08-03 DIAGNOSIS — Z01.818 PRE-OP EVALUATION: ICD-10-CM

## 2021-08-03 DIAGNOSIS — I1A.0 RESISTANT HYPERTENSION: ICD-10-CM

## 2021-08-03 LAB
ANION GAP SERPL CALCULATED.3IONS-SCNC: <1 MMOL/L (ref 3–14)
BUN SERPL-MCNC: 17 MG/DL (ref 7–30)
CALCIUM SERPL-MCNC: 9.2 MG/DL (ref 8.5–10.1)
CHLORIDE BLD-SCNC: 108 MMOL/L (ref 94–109)
CO2 SERPL-SCNC: 30 MMOL/L (ref 20–32)
CREAT SERPL-MCNC: 1.34 MG/DL (ref 0.66–1.25)
ERYTHROCYTE [DISTWIDTH] IN BLOOD BY AUTOMATED COUNT: 12.6 % (ref 10–15)
GFR SERPL CREATININE-BSD FRML MDRD: 54 ML/MIN/1.73M2
GLUCOSE BLD-MCNC: 172 MG/DL (ref 70–99)
HBA1C MFR BLD: 6.2 % (ref 0–5.6)
HCT VFR BLD AUTO: 47.1 % (ref 40–53)
HGB BLD-MCNC: 16 G/DL (ref 13.3–17.7)
MCH RBC QN AUTO: 29.5 PG (ref 26.5–33)
MCHC RBC AUTO-ENTMCNC: 34 G/DL (ref 31.5–36.5)
MCV RBC AUTO: 87 FL (ref 78–100)
PLATELET # BLD AUTO: 204 10E3/UL (ref 150–450)
POTASSIUM BLD-SCNC: 4.6 MMOL/L (ref 3.4–5.3)
RBC # BLD AUTO: 5.42 10E6/UL (ref 4.4–5.9)
SODIUM SERPL-SCNC: 138 MMOL/L (ref 133–144)
WBC # BLD AUTO: 7.7 10E3/UL (ref 4–11)

## 2021-08-03 PROCEDURE — 99205 OFFICE O/P NEW HI 60 MIN: CPT | Performed by: PHYSICIAN ASSISTANT

## 2021-08-03 PROCEDURE — 83036 HEMOGLOBIN GLYCOSYLATED A1C: CPT | Performed by: PATHOLOGY

## 2021-08-03 PROCEDURE — 85027 COMPLETE CBC AUTOMATED: CPT | Performed by: PATHOLOGY

## 2021-08-03 PROCEDURE — 80048 BASIC METABOLIC PNL TOTAL CA: CPT | Performed by: PATHOLOGY

## 2021-08-03 PROCEDURE — 36415 COLL VENOUS BLD VENIPUNCTURE: CPT | Performed by: PATHOLOGY

## 2021-08-03 RX ORDER — HYDROCHLOROTHIAZIDE 50 MG/1
50 TABLET ORAL DAILY
Qty: 30 TABLET | Refills: 11 | Status: ON HOLD | OUTPATIENT
Start: 2021-08-03 | End: 2021-08-26

## 2021-08-03 RX ORDER — ACETAMINOPHEN 325 MG/1
325-650 TABLET ORAL EVERY 6 HOURS PRN
Status: ON HOLD | COMMUNITY
End: 2021-08-26

## 2021-08-03 ASSESSMENT — PAIN SCALES - GENERAL: PAINLEVEL: NO PAIN (0)

## 2021-08-03 ASSESSMENT — MIFFLIN-ST. JEOR: SCORE: 1440.78

## 2021-08-03 ASSESSMENT — LIFESTYLE VARIABLES: TOBACCO_USE: 0

## 2021-08-03 NOTE — TELEPHONE ENCOUNTER
Reason for Call: Provider Communication    Return Phone Number: [phone]    Who is calling:  WALDO Jiang    Facility provider is associated with:  [facility]    Reason for call:  Deidre saw pt for pre-op today. Pt is having NEPHRECTOMY, TOTAL, LAPAROSCOPIC surgery on 8/24. Pt's blood pressure today was 212/123 and a few weeks ago 220/110. Pt states he is taking all 3 HTN meds as schedule. No symptoms. Pt's next appt with Dr. Larsen is 8/17 but PA suggest pt seen sooner to get his HTN down prior to surgery. Can Dr. Larsen squeeze pt in for an appt or recommend a care plan? Please advise.    Urgency for return call:  As available    Okay to leave detailed message?:  No    Call taken on 8/3/2021 at 3:28 PM by Chidi Huffman

## 2021-08-03 NOTE — PROGRESS NOTES
Clinic Care Coordination Contact  Community Health Worker Follow Up    Goals:   Goals Addressed as of 8/3/2021 at 12:40 PM                    7/15/21       Medical (pt-stated)   50%    Added 7/15/21 by Nyla Bacon, RN      Goal Statement: I will attend my urology/surgery appt then next 90 days:     Goal: 5/17/2021   Barriers: language barrier, lack of knowledge   Strengths: agrees to work on goal   Date to Achieve By: 8/17/2021   Patient expressed understanding of goal: Yes     Action steps to achieve this goal:   1. I am seeing urologist for surgery consult regarding stone in my kidney.   2. I will call CHW or CCC RN for coordination assistance.   3. I will agree with recommendations and proceed forward with surgery if recommended.    [facility]    [address] [place] [address], 4th [place] [address]   [phone]       Goal update: 08/03/2021        Intervention and Education during outreach:  -Patient is seeing urologist today for surgery consult regarding kidney stone.  -Patient's grand-daughter is attending appointment with patient.   -Patient's PCA assessment is pending, CHW called PCA referral department and left a voice message.  -CHW informed patient and grand-daughter to call with questions or concerns and If additional resources needed.         CHW Next Outreach: In one month.

## 2021-08-03 NOTE — PATIENT INSTRUCTIONS
Preparing for Your Surgery      Name:  Moriah Hinojosa   MRN:  [id number]   :  1955   Today's Date:  8/3/2021       Arriving for surgery:  Surgery date:  21  Arrival time:  10:00 am    Restrictions due to COVID 19:       One visitor is allowed in the Pre Op area. When you go into surgery, one visitor is allowed to wait in the Surgery Waiting Room       (provided there is enough space to social distance).   After surgery- Two visitors are allowed at a time if you have a private room and one visitor is allowed for those in a semi-private room.   Every 4 days the visitor(s) can rotate. During the 4 day period, the visitor(s) must be consistent. No visitors under the age of 18 years old.   Visiting Hours: 8 am - 8:30 pm   No ill visitors.   All visitors must wear face mask.    [facility] parking is available for anyone with mobility limitations or disabilities.  ([place]  24 hours/ 7 days a week; [facility] [place] 3:30 pm, Mon[place] Fri[place]    Please come to:     [facility] [address]  [address] [place] [address]    - ? parking is available in front of the hospital      -    Please proceed to [address] on the 3rd floor. [phone]?     - ?If you are in need of directions, wheelchair or escort please stop at the Information Desk in the lobby.  Inform the information person that you are here for surgery; a wheelchair and escort to [address] will be provided.?     What can I eat or drink?  -  You may eat and drink normally for up to 8 hours before your surgery. (Until 4:00 am)  -  You may have clear liquids until 2 hours before surgery. (Until 10:00 am)    Examples of clear liquids:  Water  Clear broth  Juices (apple, white grape, white cranberry  and cider) without pulp  Noncarbonated, powder based beverages  (lemonade and Todd-Aid)  Sodas (Sprite, 7-Up, ginger ale and seltzer)  Coffee or tea (without milk or cream)  Gatorade    -  No Alcohol for at least  24 hours before surgery     Which medicines can I take?    Hold Aspirin for 7 days before surgery.   Hold Multivitamins for 7 days before surgery.  Hold Supplements for 7 days before surgery.  Hold Ibuprofen (Advil, Motrin) for 1 day before surgery--unless otherwise directed by surgeon.  Hold Naproxen (Aleve) for 4 days before surgery.    -  DO NOT take these medications the day of surgery:  Hydrochlorothiazide (Hydrodiuril)    -  PLEASE TAKE these medications the day of surgery:  Acetaminophen (Tylenol)  Amlodipine (Norvasc)  Carvedilol (Coreg)    How do I prepare myself?  - Please take 2 showers before surgery using Scrubcare or Hibiclens soap.    Use this soap only from the neck to your toes.     Leave the soap on your skin for one minute--then rinse thoroughly.      You may use your own shampoo and conditioner; no other hair products.   - Please remove all jewelry and body piercings.  - No lotions, deodorants or fragrance.  - Bring your ID and insurance card.    -If you have a Deep Brain Stimulator, Spinal Cord Stimulator or any neuro stimulator device---you must bring the remote control to the hospital     - All patients are required to have a Covid-19 test within 4 days of surgery/procedure.      -Patients will be contacted by the [facility] scheduling team within 1 week of surgery to make an appointment.      - Patients may call the Scheduling team at [phone] if they have not been scheduled within 4 days of  surgery.      ALL PATIENTS GOING HOME THE SAME DAY OF SURGERY ARE REQUIRED TO HAVE A RESPONSIBLE ADULT TO DRIVE AND BE IN ATTENDANCE WITH THEM FOR 24 HOURS FOLLOWING SURGERY.      Questions or Concerns:    - For any questions regarding the day of surgery or your hospital stay, please contact the Pre Admission Nursing Office at [phone].       - If you have health changes between today and your surgery please call your surgeon.       For questions after surgery please call your surgeons  office.

## 2021-08-03 NOTE — H&P
Pre-Operative H & P     CC:  Preoperative exam to assess for increased cardiopulmonary risk while undergoing surgery and anesthesia.    Date of Encounter: 8/3/2021  Primary Care Physician:  Yassine Larsen  associated diagnosis: atrophic kidney    HPI  Moriah Hinojosa is a 66 year old male who presents for pre-operative H & P in preparation for NEPHRECTOMY, TOTAL, LAPAROSCOPIC with Dr. Wright on 8/24/21 at [facility]. Patient is being evaluated for comorbid conditions of hypertension, dyslipidemia, CRI, bilateral knee pain      Mr. Hinojosa has a history of an atrophic kidney. He has a history of refractory hypertension and it is thought to possibly be related to his atrophic kidney. He was referred to Dr. Wright for further evaluation. He is now scheduled for the above procedure.      History is obtained from the patient and chart review.      Past Medical History  Past Medical History:   Diagnosis Date     Chronic pain of both knees 2/11/2019     COVID-19 5/7/2020     Hypercholesteremia 6/18/2018     Hypertension 5/7/2018       Past Surgical History  History reviewed. No pertinent surgical history.    Hx of Blood transfusions/reactions: denies     Hx of abnormal bleeding or anti-platelet use: denies    Steroid use in the last year: denies    Personal or FH with difficulty with Anesthesia:  denies previous anesthesia    Prior to Admission Medications  Current Outpatient Medications   Medication Sig Dispense Refill     acetaminophen (TYLENOL) 325 MG tablet Take 325-650 mg by mouth every 6 hours as needed for mild pain       amLODIPine (NORVASC) 10 MG tablet [AMLODIPINE (NORVASC) 10 MG TABLET] Take 1 tablet (10 mg total) by mouth daily. (Patient taking differently: Take 10 mg by mouth every morning ) 90 tablet 11     atorvastatin (LIPITOR) 20 MG tablet [ATORVASTATIN (LIPITOR) 20 MG TABLET] Take 1 tablet (20 mg total) by mouth daily. (Patient taking differently: Take 20 mg by mouth  every evening ) 90 tablet 11     carvediloL (COREG) 25 MG tablet [CARVEDILOL (COREG) 25 MG TABLET] Take 1 tablet (25 mg total) by mouth 2 (two) times a day. 180 tablet 11     hydroCHLOROthiazide (HYDRODIURIL) 25 MG tablet [HYDROCHLOROTHIAZIDE (HYDRODIURIL) 25 MG TABLET] Take 1 tablet (25 mg total) by mouth daily. (Patient taking differently: Take 25 mg by mouth every morning ) 90 tablet 11     prednisoLONE acetate (PRED-FORTE) 1 % ophthalmic suspension 2 times daily          Allergies  No Known Allergies    Social History  Social History     Socioeconomic History     Marital status:      Spouse name: Not on file     Number of children: Not on file     Years of education: Not on file     Highest education level: Not on file   Occupational History     Not on file   Tobacco Use     Smoking status: Former Smoker     Smokeless tobacco: Never Used     Tobacco comment: no passive exposure   Substance and Sexual Activity     Alcohol use: No     Drug use: Never     Sexual activity: Not Currently     Partners: Female   Other Topics Concern     Not on file   Social History Narrative     Not on file     Social Determinants of Health     Financial Resource Strain:      Difficulty of Paying Living Expenses:    Food Insecurity:      Worried About Running Out of Food in the Last Year:      Ran Out of Food in the Last Year:    Transportation Needs:      Lack of Transportation (Medical):      Lack of Transportation (Non-Medical):    Physical Activity:      Days of Exercise per Week:      Minutes of Exercise per Session:    Stress:      Feeling of Stress :    Social Connections:      Frequency of Communication with Friends and Family:      Frequency of Social Gatherings with Friends and Family:      Attends [employer] Services:      Active Member of Clubs or Organizations:      Attends Club or Organization Meetings:      Marital Status:    Intimate Partner Violence:      Fear of Current or Ex-Partner:      Emotionally Abused:   "    Physically Abused:      Sexually Abused:        Family History  Family History   Problem Relation Age of Onset     Anesthesia Reaction No family hx of      Deep Vein Thrombosis (DVT) No family hx of        ROS/MED HX  ENT/Pulmonary:  - neg pulmonary ROS  (-) tobacco use   Neurologic:  - neg neurologic ROS     Cardiovascular:     (+) hypertension-----Previous cardiac testing   Echo: Date: Results:    Stress Test: Date: Results:    ECG Reviewed: Date: 7/22/21 Results:  NSR  Cath: Date: Results:   (-) taking anticoagulants/antiplatelets   METS/Exercise Tolerance:     Hematologic:  - neg hematologic  ROS  (-) history of blood transfusion   Musculoskeletal: Comment: Bilateral knee pain      GI/Hepatic:  - neg GI/hepatic ROS     Renal/Genitourinary: Comment: Atrophic kidney      Endo:  - neg endo ROS     Psychiatric/Substance Use:  - neg psychiatric ROS     Infectious Disease:  - neg infectious disease ROS     Malignancy:  - neg malignancy ROS     Other:            The complete review of systems is negative other than noted in the HPI or here.   Temp: 98  F (36.7  C) Temp src: Oral BP: (!) 200/132 Pulse: 101   Resp: 16 SpO2: 98 %         164 lbs 0 oz  5' 5\"   Body mass index is 27.29 kg/m .       Physical Exam  Constitutional: Awake, alert, cooperative, no apparent distress, and appears stated age.  Eyes: Pupils equal, round and reactive to light, extra ocular muscles intact, sclera clear, conjunctiva normal.  HENT: Normocephalic, oral pharynx with moist mucus membranes, good dentition.   Respiratory: Clear to auscultation bilaterally, no crackles or wheezing.  Cardiovascular: Regular rate and rhythm, normal S1 and S2, and no murmur noted.  Carotids no bruits. No edema. Palpable pulses to radial arteries.   GI: Normal bowel sounds, soft, non-distended, non-tender  Genitourinary:  deferred  Skin: Warm and dry.  No rashes at anticipated surgical site.   Musculoskeletal: Full ROM of neck. There is no redness, warmth, or " swelling of the exposed joints. Gross motor strength is normal.    Neurologic: Awake, alert, oriented to name, place and time. Cranial nerves II-XII are grossly intact. Gait is normal.   Neuropsychiatric: Calm, cooperative. Normal affect.     Labs: (personally reviewed)  Component      Latest Ref Rng & Units 8/3/2021   Sodium      133 - 144 mmol/L 138   Potassium      3.4 - 5.3 mmol/L 4.6   Chloride      94 - 109 mmol/L 108   Carbon Dioxide      20 - 32 mmol/L 30   Anion Gap      3 - 14 mmol/L <1 (L)   Urea Nitrogen      7 - 30 mg/dL 17   Creatinine      0.66 - 1.25 mg/dL 1.34 (H)   Calcium      8.5 - 10.1 mg/dL 9.2   Glucose      70 - 99 mg/dL 172 (H)   GFR Estimate      >60 mL/min/1.73m2 54 (L)   WBC      4.0 - 11.0 10e3/uL 7.7   RBC Count      4.40 - 5.90 10e6/uL 5.42   Hemoglobin      13.3 - 17.7 g/dL 16.0   Hematocrit      40.0 - 53.0 % 47.1   MCV      78 - 100 fL 87   MCH      26.5 - 33.0 pg 29.5   MCHC      31.5 - 36.5 g/dL 34.0   RDW      10.0 - 15.0 % 12.6   Platelet Count      150 - 450 10e3/uL 204       EKG 7/22/21   NSR        ASSESSMENT and PLAN  Pwo Micaela is a 66 year old male scheduled for NEPHRECTOMY, TOTAL, LAPAROSCOPIC on 8/24/21 by Dr. Wright in treatment of atrophic kidney.  PAC referral for risk assessment and optimization for anesthesia with comorbid conditions of hypertension, dyslipidemia, CRI, bilateral knee pain:        Pre-operative considerations:     1. Cardiac:  Functional status- METS 4.  intermediate risk surgery with 0.9% (RCRI #) risk of major adverse cardiac event.     ~hypertension using hctz, Norvasc, coreg. Not well controlled- 212/123 in clinic today, was 220's/110's when seen by urology 7/22/21. Patient denies any CP, dizziness, headaches, MARQUEZ. He has follow up scheduled with his PCP for BP management 8/17/21.  I called his PCP clinic and recommended he be seen sooner than 8/17/21 and discussed with nurse. She will discuss with his PCP provider.   ~dyslipidemia using Lipitor    ~Ekg 7/22/21 showed NSR      2.  Pulm:  Airway feasible.  ARTURO risk: intermediate  ~non smoker        3.  GI:  Risk of PONV score = 2.  If > 2, anti-emetic intervention recommended.        4. : atrophic kidney with the above procedure planned      5. Endo: A1c today 6.2     VTE risk: 1.8%        Patient is optimized and is acceptable candidate for the proposed procedure, pending blood pressure management with PCP     Patient discussed with Dr. Casillas     **For further details of assessment, testing, and physical exam please see H and P completed on same date.        Final plan per anesthesiologist DOS       60 minutes were spent completing chart review, seeing the patient, reviewing labs and test results, discussing patient care with anesthesia and completing documentation       Deidre Marcial PA-C  Preoperative Assessment Center  [facility]  Phone: [phone]  Fax: [phone]

## 2021-08-03 NOTE — TELEPHONE ENCOUNTER
Please inform the collar and the patient that I would like him to increase his hydrochlorothiazide to 50 mg/day-I sent the new prescription to [facility].  He can then follow-up with me on 8/17 for a recheck of the blood pressure and basic metabolic panel prior to his surgery.  I cannot get him in sooner because this week is full and I am off next week on vacation.  Thanks.

## 2021-08-03 NOTE — ANESTHESIA PREPROCEDURE EVALUATION
Anesthesia Pre-Procedure Evaluation    Patient: Moriah Hinojosa   MRN: [id number] : 1955        Preoperative Diagnosis: * No surgery found *   Procedure :      Past Medical History:   Diagnosis Date     Chronic pain of both knees 2019     COVID-19 2020     Hypercholesteremia 2018     Hypertension 2018      No past surgical history on file.   No Known Allergies   Social History     Tobacco Use     Smoking status: Former Smoker     Smokeless tobacco: Never Used     Tobacco comment: no passive exposure   Substance Use Topics     Alcohol use: No      Wt Readings from Last 1 Encounters:   21 73.9 kg (163 lb)        Anesthesia Evaluation   Pt has not had prior anesthetic         ROS/MED HX  ENT/Pulmonary:  - neg pulmonary ROS  (-) tobacco use   Neurologic:  - neg neurologic ROS     Cardiovascular:     (+) hypertension-----Previous cardiac testing   Echo: Date: Results:    Stress Test: Date: Results:    ECG Reviewed: Date: 21 Results:  NSR  Cath: Date: Results:   (-) taking anticoagulants/antiplatelets and murmur   METS/Exercise Tolerance: 4 - Raking leaves, gardening Comment: Gardens and pulls the weeds, can ascend a flight of stairs without MARQUEZ   Hematologic:  - neg hematologic  ROS  (-) history of blood transfusion   Musculoskeletal: Comment: Bilateral knee pain      GI/Hepatic:  - neg GI/hepatic ROS  (-) GERD   Renal/Genitourinary: Comment: Atrophic kidney      Endo:  - neg endo ROS     Psychiatric/Substance Use:  - neg psychiatric ROS     Infectious Disease:  - neg infectious disease ROS     Malignancy:  - neg malignancy ROS     Other:            Physical Exam    Airway        Mallampati: I   TM distance: > 3 FB   Neck ROM: full   Mouth opening: > 3 cm    Respiratory Devices and Support         Dental     Comment: Many missing, poor dentition         Cardiovascular          Rhythm and rate: regular and normal (-) no peripheral edema and no murmur    Pulmonary   pulmonary exam normal         breath sounds clear to auscultation           OUTSIDE LABS:  CBC:   Lab Results   Component Value Date    WBC 9.5 12/16/2020    WBC 13.5 (H) 12/15/2020    HGB 15.4 12/16/2020    HGB 16.3 12/15/2020    HCT 47.4 12/16/2020    HCT 49.2 12/15/2020     12/16/2020     12/15/2020     BMP:   Lab Results   Component Value Date     12/23/2020     12/16/2020    POTASSIUM 3.2 (L) 12/23/2020    POTASSIUM 3.7 12/16/2020    CHLORIDE 98 12/23/2020    CHLORIDE 105 12/16/2020    CO2 30 12/23/2020    CO2 26 12/16/2020    BUN 17 12/23/2020    BUN 38 (H) 12/16/2020    CR 1.34 (H) 12/23/2020    CR 1.80 (H) 12/16/2020     (H) 12/23/2020     12/16/2020     COAGS: No results found for: PTT, INR, FIBR  POC: No results found for: BGM, HCG, HCGS  HEPATIC:   Lab Results   Component Value Date    ALBUMIN 3.7 12/15/2020    PROTTOTAL 7.9 12/13/2020    ALT 16 12/13/2020    AST 19 12/13/2020    ALKPHOS 99 12/13/2020    BILITOTAL 1.0 12/13/2020     OTHER:   Lab Results   Component Value Date    LACT 0.8 12/15/2020    A1C 5.8 08/07/2018    LENNOX 9.6 12/23/2020    PHOS 2.4 (L) 12/15/2020    MAG 2.0 12/16/2020    LIPASE <9 12/13/2020             PAC Discussion and Assessment    ASA Classification: 3  Case is suitable for: [place]  Anesthetic techniques and relevant risks discussed: GA                  PAC Resident/NP Anesthesia Assessment: Pwo Micaela is a 66 year old male scheduled for NEPHRECTOMY, TOTAL, LAPAROSCOPIC on 8/24/21 by Dr. Wright in treatment of atrophic kidney.  PAC referral for risk assessment and optimization for anesthesia with comorbid conditions of hypertension, dyslipidemia, CRI, bilateral knee pain:        Pre-operative considerations:     1. Cardiac:  Functional status- METS 4.  intermediate risk surgery with 0.9% (RCRI #) risk of major adverse cardiac event.     ~hypertension using hctz, Norvasc, coreg. Not well controlled- 212/123 in clinic today, was 220's/110's when seen by urology 7/22/21.  Patient denies any CP, dizziness, headaches, MARQUEZ. He has follow up scheduled with his PCP for BP management 8/17/21.  I called his PCP clinic and recommended he be seen sooner than 8/17/21 and discussed with nurse. She will discuss with his PCP provider.   ~dyslipidemia using Lipitor   ~Ekg 7/22/21 showed NSR      2.  Pulm:  Airway feasible.  ARTURO risk: intermediate  ~non smoker        3.  GI:  Risk of PONV score = 2.  If > 2, anti-emetic intervention recommended.        4. : atrophic kidney with the above procedure planned      5. Endo: A1c today 6.2     VTE risk: 1.8%        Patient is optimized and is acceptable candidate for the proposed procedure, pending blood pressure management with PCP     Patient discussed with Dr. Casillas     **For further details of assessment, testing, and physical exam please see H and P completed on same date.        Final plan per anesthesiologist GARY Lainez PA-C

## 2021-08-05 NOTE — TELEPHONE ENCOUNTER
Granddaughter stopped by the front this afternoon.  I relayed message below to her.  She verbalized understanding.      Yasmeen Shepard  08/05/2021  Done

## 2021-08-17 ENCOUNTER — OFFICE VISIT (OUTPATIENT)
Dept: FAMILY MEDICINE | Facility: CLINIC | Age: 66
End: 2021-08-17
Payer: COMMERCIAL

## 2021-08-17 ENCOUNTER — PATIENT OUTREACH (OUTPATIENT)
Dept: NURSING | Facility: CLINIC | Age: 66
End: 2021-08-17

## 2021-08-17 ENCOUNTER — TELEPHONE (OUTPATIENT)
Dept: CARE COORDINATION | Facility: CLINIC | Age: 66
End: 2021-08-17

## 2021-08-17 VITALS
SYSTOLIC BLOOD PRESSURE: 160 MMHG | HEIGHT: 65 IN | OXYGEN SATURATION: 98 % | WEIGHT: 162 LBS | BODY MASS INDEX: 26.99 KG/M2 | RESPIRATION RATE: 16 BRPM | DIASTOLIC BLOOD PRESSURE: 88 MMHG | TEMPERATURE: 98.7 F | HEART RATE: 101 BPM

## 2021-08-17 DIAGNOSIS — N26.1 ATROPHIC KIDNEY: ICD-10-CM

## 2021-08-17 DIAGNOSIS — I1A.0 RESISTANT HYPERTENSION: Primary | ICD-10-CM

## 2021-08-17 LAB
ANION GAP SERPL CALCULATED.3IONS-SCNC: 12 MMOL/L (ref 5–18)
BUN SERPL-MCNC: 17 MG/DL (ref 8–22)
CALCIUM SERPL-MCNC: 10.3 MG/DL (ref 8.5–10.5)
CHLORIDE BLD-SCNC: 102 MMOL/L (ref 98–107)
CO2 SERPL-SCNC: 29 MMOL/L (ref 22–31)
CREAT SERPL-MCNC: 1.26 MG/DL (ref 0.7–1.3)
GFR SERPL CREATININE-BSD FRML MDRD: 58 ML/MIN/1.73M2
GLUCOSE BLD-MCNC: 107 MG/DL (ref 70–125)
HOLD SPECIMEN: NORMAL
POTASSIUM BLD-SCNC: 3.9 MMOL/L (ref 3.5–5)
SODIUM SERPL-SCNC: 143 MMOL/L (ref 136–145)

## 2021-08-17 PROCEDURE — 36415 COLL VENOUS BLD VENIPUNCTURE: CPT | Performed by: FAMILY MEDICINE

## 2021-08-17 PROCEDURE — 99214 OFFICE O/P EST MOD 30 MIN: CPT | Performed by: FAMILY MEDICINE

## 2021-08-17 PROCEDURE — 80048 BASIC METABOLIC PNL TOTAL CA: CPT | Performed by: FAMILY MEDICINE

## 2021-08-17 RX ORDER — CARVEDILOL 25 MG/1
25 TABLET ORAL 2 TIMES DAILY
Qty: 180 TABLET | Refills: 11 | Status: SHIPPED | OUTPATIENT
Start: 2021-08-17 | End: 2022-07-26

## 2021-08-17 ASSESSMENT — MIFFLIN-ST. JEOR: SCORE: 1431.71

## 2021-08-17 NOTE — TELEPHONE ENCOUNTER
"Hi Dr Larsen,   Please place \"external\" home care referral for skilled nursing for uncontrolled HTN, HTN mgmt/reaching, check B/P weekly and report to PCP if > ( )  due to non-compliance and language barrier.   Please add accelerated hypertension dx on the referral as one of the diagnosis.   Request only skilled nursing please.     CMT:   Please fax over below to [employer] [facility] at [phone].   1) home care referral/order  2) Face sheet  3) recent face to face PCP visit notes completed today.   4) med list.         "

## 2021-08-17 NOTE — LETTER
[facility]  Patient Centered Plan of Care  About Me:        Patient Name:  Moriah Zamora    YOB: 1955  Age:         66 year old   Jeni MRN:    [id number] Telephone Information:  Home Phone [phone]   Mobile [phone]       Address:  [address] [place] [address] Email address:  No e-mail address on record      Emergency Contact(s)    Name Relationship Lgl Grd Work Phone Home Phone Mobile Phone   1. OTILIO BLAIR Other   [phone]   2. MILTON NEAL Nephew   [phone]    3. LAURA ZAMORA Other   [phone]    4. LAURA ZAMORA Other   [phone]            Primary language:  Rochelle     needed? Yes   [facility]:  [phone] op. 1  Other communication barriers: Physical impairment, Language barrier, Lack of coping  Preferred Method of Communication:     Current living arrangement:    Mobility Status/ Medical Equipment:      Health Maintenance  Health Maintenance Reviewed:      My Access Plan  Medical Emergency 911   Primary Clinic Line [facility] - [phone]   24 Hour Appointment Line [phone] or  [phone]) (toll-free)   24 Hour Nurse Line [phone] (toll-free)   Preferred Urgent Care     Preferred Hospital     Preferred Pharmacy [facility] - [place], [place] - [address]     Behavioral Health Crisis Line The National Suicide Prevention Lifeline at 1-109.287.8348 or 911             My Care Team Members  Patient Care Team       Relationship Specialty Notifications Start End    Yassine Larsen MD PCP - General   6/18/18     Phone: [phone] Fax: [phone]         [address] [facility] [address] [place] [address]    Terra Hale RN Lead Care Coordinator Primary Care - CC Admissions 12/27/18     Catracho Wright MD MD Urology  3/31/21     Phone: [phone] Pager: [phone] Fax: [phone]        [address] [place] [address]     Elza Mcknight, RN Registered Nurse Oncology  3/31/21     Phone: [phone] Pager: [phone]        Nyla Bacon, RN Lead Care Coordinator Primary Care - CC Admissions 5/17/21     Kaleb Dougie Community Health Worker Primary Care - CC Admissions 5/17/21     Phone: [phone] Fax: [phone]         [address] [facility] [place] [address] [facility] [address]    Yassine Larsen MD Assigned PCP   6/16/21     Phone: [phone] Fax: [phone]         [address] [place] [address] [address] [address] [place] [address]    Marc Reyes MD Assigned Surgical Provider   7/16/21     Phone: [phone] Fax: [phone]         [address] [place] [address]            My Care Plans  Self Management and Treatment Plan  Goals and (Comments)  Goals        General     Medical (pt-stated)      Notes - Note edited  8/17/2021 11:27 AM by Nyla Bacon, HELLEN     Goal Statement: I will attend my urology/surgery appt then next 90 days:     Goal: 8/17/2021  Barriers: language barrier, lack of knowledge   Strengths: agrees to work on goal   Date to Achieve By: 11/17/2021  Patient expressed understanding of goal: Yes     Action steps to achieve this goal:   1. I already attended my pre-op on 8/3/2021.   2. I will attend my appt with PCP on 8/17/2021 at 1:40pm to follow up on elevated B/P prior to surgery. Appt reminder provided todday.   3. I will attend covid test on 8/20/2021 at 1pm.   4. I will attend my surgery - NEPHRECTOMY, TOTAL, LAPAROSCOPIC as scheduled. TBD - 8/24/2021 ?  5. I will call [facility] or CCC RN for coordination assistance.       [facility]    [address] [facility], [address]th [place] [address]   [phone]       Goal update: 08/03/2021             Action Plans on File:                       Advance Care Plans/Directives Type:        My Medical and Care Information  Problem List   Patient Active Problem List   Diagnosis     Resistant hypertension     Cataract     Hypercholesteremia     Chronic pain of both knees     CKD (chronic kidney  disease) stage 3, GFR 30-59 ml/min     Allergic conjunctivitis, bilateral     COVID-19     Tachycardia     Accelerated hypertension     Nephrolithiasis     Hydronephrosis with urinary obstruction due to ureteral calculus     SOB (shortness of breath)     Decreased visual acuity     Corneal scarring     Poor dentition     Atrophic kidney      Current Medications and Allergies:  See printed Medication Report.    Care Coordination Start Date: 5/17/2021   Frequency of Care Coordination: 6 weeks   Form Last Updated: 08/17/2021

## 2021-08-17 NOTE — PROGRESS NOTES
ASSESMENT AND PLAN:  Diagnoses and all orders for this visit:  Resistant hypertension  -     Basic metabolic panel  (Ca, Cl, CO2, Creat, Gluc, K, Na, BUN); Future  -     Extra Tube; Future  refill-     carvedilol (COREG) 25 MG tablet; Take 1 tablet (25 mg) by mouth 2 times daily  Improving.  Confirmed today with the patient on medication review that he did increase the hydrochlorothiazide to 50 mg/day, which she will continue to take along with the amlodipine 10 mg/day and Coreg as refilled above.  Extensive medication review and counseling with the patient and his family with the help of a professional .  We are setting up home nursing to assist with medication adherence in the home setting, especially given the importance of blood pressure control to the best of our ability with his upcoming surgery.  Discussed the case with his care coordination team.    Atrophic kidney  Likely a contributor to his resistant hypertension.  Reviewed to the surgical consultation and I have recommended that the patient go ahead with removal of the atrophic kidney.  Improved blood pressure control compared to his blood pressure at his consultation with the medication change detailed above.  We will continue on the 3 drug therapy as detailed above.      I reviewed his preoperative consultation from [facility], based on the improved blood pressure today as long as his labs look good he will be cleared to proceed with the surgery for next week.  Extensive counseling with the patient and his family with the help of a professional .        Reviewed the risks and benefits of the treatment plan with the patient and/or caregiver and we discussed indications for routine and emergent follow-up.    32 minutes spent on the date of the encounter doing chart review, patient visit and documentation and face to face counseling on above.     SUBJECTIVE: 66-year-old male here for follow-up, he has a history of very  resistant hypertension, see previous notes for details.  He has a atrophic kidney and is scheduled for nephrectomy next week.  He has not had any new issues or problems.  He brings in his medications today and has been taking the increased dose of hydrochlorothiazide-50 mg/day.  He just ran out of his Coreg and needs a refill of that medication, he has been taking the amlodipine every day.    Past Medical History:   Diagnosis Date     Chronic pain of both knees 2/11/2019     COVID-19 5/7/2020     Hypercholesteremia 6/18/2018     Hypertension 5/7/2018     Patient Active Problem List   Diagnosis     Resistant hypertension     Cataract     Hypercholesteremia     Chronic pain of both knees     CKD (chronic kidney disease) stage 3, GFR 30-59 ml/min     Allergic conjunctivitis, bilateral     COVID-19     Tachycardia     Accelerated hypertension     Nephrolithiasis     Hydronephrosis with urinary obstruction due to ureteral calculus     SOB (shortness of breath)     Decreased visual acuity     Corneal scarring     Poor dentition     Atrophic kidney     Current Outpatient Medications   Medication Sig Dispense Refill     amLODIPine (NORVASC) 10 MG tablet [AMLODIPINE (NORVASC) 10 MG TABLET] Take 1 tablet (10 mg total) by mouth daily. (Patient taking differently: Take 10 mg by mouth every morning ) 90 tablet 11     atorvastatin (LIPITOR) 20 MG tablet [ATORVASTATIN (LIPITOR) 20 MG TABLET] Take 1 tablet (20 mg total) by mouth daily. (Patient taking differently: Take 20 mg by mouth every evening ) 90 tablet 11     carvedilol (COREG) 25 MG tablet Take 1 tablet (25 mg) by mouth 2 times daily 180 tablet 11     hydrochlorothiazide (HYDRODIURIL) 50 MG tablet Take 1 tablet (50 mg) by mouth daily 30 tablet 11     acetaminophen (TYLENOL) 325 MG tablet Take 325-650 mg by mouth every 6 hours as needed for mild pain       prednisoLONE acetate (PRED-FORTE) 1 % ophthalmic suspension 2 times daily        History   Smoking Status     Former  "Smoker   Smokeless Tobacco     Never Used     Comment: no passive exposure       OBJECTICE: BP (!) 160/88   Pulse 101   Temp 98.7  F (37.1  C) (Oral)   Resp 16   Ht 1.651 m (5' 5\")   Wt 73.5 kg (162 lb)   SpO2 98%   BMI 26.96 kg/m       Recent Results (from the past 24 hour(s))   Extra Purple Top Tube    Collection Time: 08/17/21  2:37 PM   Result Value Ref Range    Hold Specimen JIC         GEN-alert, appropriate, in no apparent distress   CV-regular rate and rhythm with no murmur   RESP-lungs clear to auscultation   EXTREM-no pitting edema of the ankles     Yassine Larsen MD   "

## 2021-08-17 NOTE — PROGRESS NOTES
Clinic Care Coordination Contact    Follow Up Progress Note      Assessment: follow up on goals and upcoming surgery   - Chart review completed today. CC RN spoke with patient and daughter - Jody Hinojosa via phone today.   - Per chart review, patient was found to have elevated blood pressure during pre-op. Surgery team team would like patient to follow up with PCP on HTN. Per daughter, now she's not sure if patient can proceed with the surgery. Patient is scheduled with PCP today at 1:40pm to follow up on HTN.   - CC RN plans to reach out to patient and daughter again tomorrow post PCP appt if he can proceed with the surgery which is scheduled on 8/24/2021 ( may already be cancelled). Covid test scheduled on 8/20/2021. Patient needs to get her is B/P under control before he could proceed with the surgery per patient.     - Patient and daughter agree with home care nurse for HTN mgmt/teaching, and med mgmt/teaching due to uncontrolled HTN. Message sent to PCP requesting home care referral today. New goal created. CC RN consulted with [facility] care and they agreed to accept patient.       Care Gaps:    Health Maintenance Due   Topic Date Due     LIPID  Never done     ZOSTER IMMUNIZATION (2 of 2) 03/25/2021       Scheduled 9/17/2021      Goals addressed this encounter:   Goals        Medical (pt-stated)      Goals        Medical (pt-stated)       Goal Statement: I will attend my urology/surgery appt then next 90 days:     Goal: 8/17/2021  Barriers: language barrier, lack of knowledge   Strengths: agrees to work on goal   Date to Achieve By: 11/17/2021  Patient expressed understanding of goal: Yes     Action steps to achieve this goal:   1. I already attended my pre-op on 8/3/2021.   2. I will attend my appt with PCP on 8/17/2021 at 1:40pm to follow up on elevated B/P prior to surgery. Appt reminder provided todday.   3. I will attend covid test on 8/20/2021 at 1pm.   4. I will attend my surgery - NEPHRECTOMY, TOTAL,  LAPAROSCOPIC as scheduled. TBD - 8/24/2021 ?  5. I will call CHW or CCC RN for coordination assistance.       [facility]    [address], [address]th [place] [address]   [phone]       Goal update: 08/03/2021         Medical (pt-stated)       Goal statement: I will establish home care services in the next 60 days.    Date goal set: 8/17/2021  Barriers: language barrier, lack of support, lack of knowledge  Strengths: Willing to accept support and agrees to work on goal  Date to achieve by: 10/17/2021  Patient expressed understanding of goal: Yes    Action steps to achieve this goal:  1.  I will answer my phone when I am contacted by [facility] home care agency.   2.  I will be home when my home care RN comes to visit me to establish services.   3.  I will follow up with CCC in the next month regarding this goal.     Note: Referred to [facility] care agency on 8/17/2021                    Outreach Frequency: 6 weeks    Plan:   1) Patient will attend his appt with PCP today at 1:40pm to follow up on HTN  2) CC RN plans to review chart and reach out to patient tomorrow re: if patient can proceed with the surgery.

## 2021-08-18 ENCOUNTER — PATIENT OUTREACH (OUTPATIENT)
Dept: NURSING | Facility: CLINIC | Age: 66
End: 2021-08-18

## 2021-08-18 NOTE — PROGRESS NOTES
Clinic Care Coordination Contact    Follow Up Progress Note      Assessment: follow up on surgery status  - Chart review completed today. Patient did attend his appt with PCP yesterday to follow up on HTN.   - Per chart review, PCP gave okay to proceed with the surgery and PCP notified patient's surgery team. CC RN spoke with daughter yesterday and she would like CC RN to follow up on surgery status post appt with PCP.     - Patient is scheduled for covid test on 8/20/2021 at 1pm. Reminder provided yesterday to daughter. Unable to reach patient or daughter x2 today.     - PCP placed home care referral yesterday.       Goals addressed this encounter:   Goals Addressed                    This Visit's Progress       Medical (pt-stated)         Goal Statement: I will attend my urology/surgery appt then next 90 days:     Goal: 8/17/2021  Barriers: language barrier, lack of knowledge   Strengths: agrees to work on goal   Date to Achieve By: 11/17/2021  Patient expressed understanding of goal: Yes     Action steps to achieve this goal:   1. I already attended my pre-op on 8/3/2021. COMPLETED  2. I will attend my appt with PCP on 8/17/2021 at 1:40pm to follow up on elevated B/P prior to surgery. Appt reminder provided todday. COMPLETED  3. I will attend covid test on 8/20/2021 at 1pm.   4. I will attend my surgery - NEPHRECTOMY, TOTAL, LAPAROSCOPIC as scheduled on 8/24/2021 at 12:50pm with Dr Wright. Surgery team will call 1-2 days before with arrical time.   5. I will call CHW or CCC RN for coordination assistance.       [facility]    [address] [place] [address], [address]th [place] [address]   [phone]                    Outreach Frequency: 6 weeks    Plan:   1) Patient will attend his covid test appt on 8/20/2021 at 1pm.   2) CC RN will follow up again the day before of the surgery to make sure covid test was completed due to patient wasn't reachable today.

## 2021-08-18 NOTE — TELEPHONE ENCOUNTER
Hi Dr Larsen,   Please addendum home care referral. Please remove PT and HHA from the comment section per home care.   Comment: RN, PT, HHA to begin 24 - 48 hours after discharge.     CMT: Please fax over below documents including addendum home care referral to [facility] care at [phone].     Thank you,  Nyla

## 2021-08-19 ENCOUNTER — TELEPHONE (OUTPATIENT)
Dept: FAMILY MEDICINE | Facility: CLINIC | Age: 66
End: 2021-08-19

## 2021-08-19 ENCOUNTER — PATIENT OUTREACH (OUTPATIENT)
Dept: UROLOGY | Facility: CLINIC | Age: 66
End: 2021-08-19

## 2021-08-19 NOTE — TELEPHONE ENCOUNTER
Pre Op Teaching Flowsheet       Pre and Post op Patient Education  Relevant Diagnosis:  Atrophic Kidney, nephrolithiasis  Surgical procedure:  NEPHRECTOMY, TOTAL, LAPAROSCOPIC  Person Involved in teaching: Pwo Micaela      Patient demonstrates understanding of the following:  Date of surgery:  8/24/21  Location of surgery:  [address] [place] [address]  History and Physical and any other testing necessary prior to surgery: Yes  Required time line for completion of History and Physical and any pre-op testing: Yes      Patient demonstrates understanding of the following:  Pre-op bowel prep:  None  Pre-op showering/scrub information with PCMX Soap: Yes  Blood thinner medications discussed and when to stop (if applicable):  N/A  Diabetic Management teaching:  N/A      Infection Prevention:   Patient demonstrates understanding of the following:  Surgical procedure site care taught: at time of discharge  Signs and symptoms of infection taught:  Yes      Post-op follow-up:  Discussed how to contact the hospital, nurse, and clinic scheduling staff if necessary.    Instructional materials used/given/mailed:  [place] [facility] Booklet, Soap..    Surgical instructions packet mailed to patient.      referral: No     home:  Yes    Care Giver:  Yes    PCP:  Yassine Larsen

## 2021-08-20 ENCOUNTER — LAB (OUTPATIENT)
Dept: FAMILY MEDICINE | Facility: CLINIC | Age: 66
End: 2021-08-20
Attending: UROLOGY
Payer: COMMERCIAL

## 2021-08-20 ENCOUNTER — TRANSFERRED RECORDS (OUTPATIENT)
Dept: HEALTH INFORMATION MANAGEMENT | Facility: CLINIC | Age: 66
End: 2021-08-20

## 2021-08-20 DIAGNOSIS — Z11.59 ENCOUNTER FOR SCREENING FOR OTHER VIRAL DISEASES: ICD-10-CM

## 2021-08-20 PROCEDURE — U0005 INFEC AGEN DETEC AMPLI PROBE: HCPCS

## 2021-08-20 PROCEDURE — U0003 INFECTIOUS AGENT DETECTION BY NUCLEIC ACID (DNA OR RNA); SEVERE ACUTE RESPIRATORY SYNDROME CORONAVIRUS 2 (SARS-COV-2) (CORONAVIRUS DISEASE [COVID-19]), AMPLIFIED PROBE TECHNIQUE, MAKING USE OF HIGH THROUGHPUT TECHNOLOGIES AS DESCRIBED BY CMS-2020-01-R: HCPCS

## 2021-08-21 LAB — SARS-COV-2 RNA RESP QL NAA+PROBE: NEGATIVE

## 2021-08-23 ENCOUNTER — MEDICAL CORRESPONDENCE (OUTPATIENT)
Dept: HEALTH INFORMATION MANAGEMENT | Facility: CLINIC | Age: 66
End: 2021-08-23

## 2021-08-23 ENCOUNTER — PATIENT OUTREACH (OUTPATIENT)
Dept: NURSING | Facility: CLINIC | Age: 66
End: 2021-08-23

## 2021-08-23 NOTE — PROGRESS NOTES
Clinic Care Coordination Contact    Community Health Worker Follow Up    Care Gaps:   Health Maintenance Due   Topic Date Due     LIPID  Never done     ZOSTER IMMUNIZATION (2 of 2) 03/25/2021     Postponed to after surgery.   Goals:   Goals Addressed as of 8/23/2021 at 1:31 PM                    Today    8/17/21       Medical (pt-stated)   30%  20%    Added 8/17/21 by Nyla Bacon, RN      Goal Statement: I will attend my urology/surgery appt then next 90 days:     Goal: 8/17/2021  Barriers: language barrier, lack of knowledge   Strengths: agrees to work on goal   Date to Achieve By: 11/17/2021  Patient expressed understanding of goal: Yes     Action steps to achieve this goal:   1. I already attended my pre-op on 8/3/2021. COMPLETED  2. I will attend my appt with PCP on 8/17/2021 at 1:40pm to follow up on elevated B/P prior to surgery. Appt reminder provided todday. COMPLETED  3. I will attend covid test on 8/20/2021 at 1pm.   4. I will attend my surgery - NEPHRECTOMY, TOTAL, LAPAROSCOPIC as scheduled on 8/24/2021 at 12:50pm with Dr Wright. Surgery team will call 1-2 days before with arrical time.   5. I will call CHW or CCC RN for coordination assistance.       [facility]    [address] [place] [address], [address]th [place] [address]   [phone]     Note: Daughter/caregiver will provide medical transportation for upcoming appointments.      Goal update: 8/23/2021.            Medical (pt-stated)   40%  20%    Added 8/17/21 by Nyla Bacon, RN      Goal statement: I will establish home care services in the next 60 days.    Date goal set: 8/17/2021  Barriers: language barrier, lack of support, lack of knowledge  Strengths: Willing to accept support and agrees to work on goal  Date to achieve by: 10/17/2021  Patient expressed understanding of goal: Yes    Action steps to achieve this goal:  1.  I will ask my daughter to help me answering the phone when [facility] calls for initial visit.   2.  I will  be home when my home care RN comes to visit me to establish services.   3.  I will follow up with CCC in the next month regarding this goal.     Note: [facility] received referral and CHW left a voice message for director of [email or web address] to call back for any paperwork/documents need.      Goal update: 8/23/2021.             Intervention and Education during outreach:  -Patient will have surgery as scheduled on 8/24/2021 and daughter/caregiver will provide medical transportation.  -[facility] received referral and CHW left a voice message for director of [email or web address] for any paperwork/documents need.  -Daughter to call for PCA assessment with contact provided and CHW informed daughter to call if helps needed.  -Patient denied for immediate coordination assistance and if additional resources need.  -CHW informed daughter/caregiver to call CHW sooner if there are questions or concerns.         CHW Next Outreach:

## 2021-08-24 ENCOUNTER — ANESTHESIA EVENT (OUTPATIENT)
Dept: SURGERY | Facility: CLINIC | Age: 66
DRG: 661 | End: 2021-08-24
Payer: COMMERCIAL

## 2021-08-24 ENCOUNTER — HOSPITAL ENCOUNTER (INPATIENT)
Facility: CLINIC | Age: 66
LOS: 2 days | Discharge: HOME | DRG: 661 | End: 2021-08-26
Attending: UROLOGY | Admitting: UROLOGY
Payer: COMMERCIAL

## 2021-08-24 ENCOUNTER — PRE VISIT (OUTPATIENT)
Dept: UROLOGY | Facility: CLINIC | Age: 66
End: 2021-08-24

## 2021-08-24 ENCOUNTER — PATIENT OUTREACH (OUTPATIENT)
Dept: GERIATRIC MEDICINE | Facility: CLINIC | Age: 66
End: 2021-08-24

## 2021-08-24 ENCOUNTER — ANESTHESIA (OUTPATIENT)
Dept: SURGERY | Facility: CLINIC | Age: 66
DRG: 661 | End: 2021-08-24
Payer: COMMERCIAL

## 2021-08-24 DIAGNOSIS — N26.1 ATROPHIC KIDNEY: ICD-10-CM

## 2021-08-24 DIAGNOSIS — N20.0 NEPHROLITHIASIS: ICD-10-CM

## 2021-08-24 DIAGNOSIS — G89.18 POSTOPERATIVE PAIN: Primary | ICD-10-CM

## 2021-08-24 LAB
ABO/RH(D): NORMAL
ANION GAP SERPL CALCULATED.3IONS-SCNC: 8 MMOL/L (ref 3–14)
ANTIBODY SCREEN: NEGATIVE
BUN SERPL-MCNC: 22 MG/DL (ref 7–30)
CALCIUM SERPL-MCNC: 8.8 MG/DL (ref 8.5–10.1)
CHLORIDE BLD-SCNC: 101 MMOL/L (ref 94–109)
CO2 SERPL-SCNC: 29 MMOL/L (ref 20–32)
CREAT SERPL-MCNC: 1.05 MG/DL (ref 0.66–1.25)
GFR SERPL CREATININE-BSD FRML MDRD: 73 ML/MIN/1.73M2
GLUCOSE BLD-MCNC: 192 MG/DL (ref 70–99)
GLUCOSE BLDC GLUCOMTR-MCNC: 166 MG/DL (ref 70–99)
GLUCOSE BLDC GLUCOMTR-MCNC: 185 MG/DL (ref 70–99)
GLUCOSE BLDC GLUCOMTR-MCNC: 192 MG/DL (ref 70–99)
POTASSIUM BLD-SCNC: 3 MMOL/L (ref 3.4–5.3)
POTASSIUM BLD-SCNC: 3.1 MMOL/L (ref 3.4–5.3)
SODIUM SERPL-SCNC: 138 MMOL/L (ref 133–144)
SPECIMEN EXPIRATION DATE: NORMAL

## 2021-08-24 PROCEDURE — 36415 COLL VENOUS BLD VENIPUNCTURE: CPT | Performed by: UROLOGY

## 2021-08-24 PROCEDURE — 258N000003 HC RX IP 258 OP 636: Performed by: STUDENT IN AN ORGANIZED HEALTH CARE EDUCATION/TRAINING PROGRAM

## 2021-08-24 PROCEDURE — 250N000013 HC RX MED GY IP 250 OP 250 PS 637: Performed by: STUDENT IN AN ORGANIZED HEALTH CARE EDUCATION/TRAINING PROGRAM

## 2021-08-24 PROCEDURE — 250N000011 HC RX IP 250 OP 636: Performed by: ANESTHESIOLOGY

## 2021-08-24 PROCEDURE — 0TT14ZZ RESECTION OF LEFT KIDNEY, PERCUTANEOUS ENDOSCOPIC APPROACH: ICD-10-PCS | Performed by: UROLOGY

## 2021-08-24 PROCEDURE — 250N000024 HC ISOFLURANE, PER MIN: Performed by: UROLOGY

## 2021-08-24 PROCEDURE — 272N000001 HC OR GENERAL SUPPLY STERILE: Performed by: UROLOGY

## 2021-08-24 PROCEDURE — 710N000010 HC RECOVERY PHASE 1, LEVEL 2, PER MIN: Performed by: UROLOGY

## 2021-08-24 PROCEDURE — 258N000003 HC RX IP 258 OP 636

## 2021-08-24 PROCEDURE — 50545 LAPARO RADICAL NEPHRECTOMY: CPT | Mod: LT | Performed by: UROLOGY

## 2021-08-24 PROCEDURE — 80048 BASIC METABOLIC PNL TOTAL CA: CPT | Performed by: ANESTHESIOLOGY

## 2021-08-24 PROCEDURE — 250N000011 HC RX IP 250 OP 636: Performed by: STUDENT IN AN ORGANIZED HEALTH CARE EDUCATION/TRAINING PROGRAM

## 2021-08-24 PROCEDURE — 999N000141 HC STATISTIC PRE-PROCEDURE NURSING ASSESSMENT: Performed by: UROLOGY

## 2021-08-24 PROCEDURE — 250N000011 HC RX IP 250 OP 636

## 2021-08-24 PROCEDURE — 360N000077 HC SURGERY LEVEL 4, PER MIN: Performed by: UROLOGY

## 2021-08-24 PROCEDURE — 99207 PR CONSULT E&M CHANGED TO SUBSEQUENT LEVEL: CPT | Performed by: PHYSICIAN ASSISTANT

## 2021-08-24 PROCEDURE — 88307 TISSUE EXAM BY PATHOLOGIST: CPT | Mod: TC | Performed by: UROLOGY

## 2021-08-24 PROCEDURE — 250N000009 HC RX 250: Performed by: NURSE ANESTHETIST, CERTIFIED REGISTERED

## 2021-08-24 PROCEDURE — 250N000009 HC RX 250

## 2021-08-24 PROCEDURE — 250N000011 HC RX IP 250 OP 636: Performed by: UROLOGY

## 2021-08-24 PROCEDURE — 120N000002 HC R&B MED SURG/OB UMMC

## 2021-08-24 PROCEDURE — 370N000017 HC ANESTHESIA TECHNICAL FEE, PER MIN: Performed by: UROLOGY

## 2021-08-24 PROCEDURE — 99232 SBSQ HOSP IP/OBS MODERATE 35: CPT | Performed by: PHYSICIAN ASSISTANT

## 2021-08-24 PROCEDURE — 36415 COLL VENOUS BLD VENIPUNCTURE: CPT | Performed by: PHYSICIAN ASSISTANT

## 2021-08-24 PROCEDURE — 84132 ASSAY OF SERUM POTASSIUM: CPT | Performed by: PHYSICIAN ASSISTANT

## 2021-08-24 PROCEDURE — 86901 BLOOD TYPING SEROLOGIC RH(D): CPT | Performed by: UROLOGY

## 2021-08-24 RX ORDER — OXYCODONE HYDROCHLORIDE 5 MG/1
5 TABLET ORAL EVERY 4 HOURS PRN
Status: DISCONTINUED | OUTPATIENT
Start: 2021-08-24 | End: 2021-08-24 | Stop reason: HOSPADM

## 2021-08-24 RX ORDER — OXYCODONE HYDROCHLORIDE 5 MG/1
5 TABLET ORAL EVERY 4 HOURS PRN
Status: DISCONTINUED | OUTPATIENT
Start: 2021-08-24 | End: 2021-08-26 | Stop reason: HOSPADM

## 2021-08-24 RX ORDER — HYDROCHLOROTHIAZIDE 50 MG/1
50 TABLET ORAL DAILY
Status: CANCELLED | OUTPATIENT
Start: 2021-08-25

## 2021-08-24 RX ORDER — AMOXICILLIN 250 MG
1 CAPSULE ORAL 2 TIMES DAILY
Status: DISCONTINUED | OUTPATIENT
Start: 2021-08-24 | End: 2021-08-26 | Stop reason: HOSPADM

## 2021-08-24 RX ORDER — PROPOFOL 10 MG/ML
INJECTION, EMULSION INTRAVENOUS PRN
Status: DISCONTINUED | OUTPATIENT
Start: 2021-08-24 | End: 2021-08-24

## 2021-08-24 RX ORDER — FENTANYL CITRATE 50 UG/ML
INJECTION, SOLUTION INTRAMUSCULAR; INTRAVENOUS PRN
Status: DISCONTINUED | OUTPATIENT
Start: 2021-08-24 | End: 2021-08-24

## 2021-08-24 RX ORDER — HYDRALAZINE HYDROCHLORIDE 20 MG/ML
10 INJECTION INTRAMUSCULAR; INTRAVENOUS EVERY 6 HOURS PRN
Status: DISCONTINUED | OUTPATIENT
Start: 2021-08-24 | End: 2021-08-25

## 2021-08-24 RX ORDER — SODIUM CHLORIDE, SODIUM LACTATE, POTASSIUM CHLORIDE, CALCIUM CHLORIDE 600; 310; 30; 20 MG/100ML; MG/100ML; MG/100ML; MG/100ML
INJECTION, SOLUTION INTRAVENOUS CONTINUOUS PRN
Status: DISCONTINUED | OUTPATIENT
Start: 2021-08-24 | End: 2021-08-24

## 2021-08-24 RX ORDER — ATORVASTATIN CALCIUM 20 MG/1
20 TABLET, FILM COATED ORAL DAILY
Status: DISCONTINUED | OUTPATIENT
Start: 2021-08-25 | End: 2021-08-26 | Stop reason: HOSPADM

## 2021-08-24 RX ORDER — HYDRALAZINE HYDROCHLORIDE 20 MG/ML
10 INJECTION INTRAMUSCULAR; INTRAVENOUS EVERY 6 HOURS PRN
Status: DISCONTINUED | OUTPATIENT
Start: 2021-08-24 | End: 2021-08-24

## 2021-08-24 RX ORDER — LABETALOL HYDROCHLORIDE 5 MG/ML
10 INJECTION, SOLUTION INTRAVENOUS ONCE
Status: COMPLETED | OUTPATIENT
Start: 2021-08-24 | End: 2021-08-24

## 2021-08-24 RX ORDER — LABETALOL HYDROCHLORIDE 5 MG/ML
10 INJECTION, SOLUTION INTRAVENOUS EVERY 6 HOURS PRN
Status: DISCONTINUED | OUTPATIENT
Start: 2021-08-24 | End: 2021-08-24

## 2021-08-24 RX ORDER — ONDANSETRON 4 MG/1
4 TABLET, ORALLY DISINTEGRATING ORAL EVERY 30 MIN PRN
Status: DISCONTINUED | OUTPATIENT
Start: 2021-08-24 | End: 2021-08-24 | Stop reason: HOSPADM

## 2021-08-24 RX ORDER — LIDOCAINE 40 MG/G
CREAM TOPICAL
Status: DISCONTINUED | OUTPATIENT
Start: 2021-08-24 | End: 2021-08-26 | Stop reason: HOSPADM

## 2021-08-24 RX ORDER — PROCHLORPERAZINE MALEATE 5 MG
5 TABLET ORAL EVERY 6 HOURS PRN
Status: DISCONTINUED | OUTPATIENT
Start: 2021-08-24 | End: 2021-08-26 | Stop reason: HOSPADM

## 2021-08-24 RX ORDER — HYDRALAZINE HYDROCHLORIDE 20 MG/ML
2.5-5 INJECTION INTRAMUSCULAR; INTRAVENOUS EVERY 10 MIN PRN
Status: DISCONTINUED | OUTPATIENT
Start: 2021-08-24 | End: 2021-08-24 | Stop reason: HOSPADM

## 2021-08-24 RX ORDER — NALOXONE HYDROCHLORIDE 0.4 MG/ML
0.2 INJECTION, SOLUTION INTRAMUSCULAR; INTRAVENOUS; SUBCUTANEOUS
Status: DISCONTINUED | OUTPATIENT
Start: 2021-08-24 | End: 2021-08-26 | Stop reason: HOSPADM

## 2021-08-24 RX ORDER — NALOXONE HYDROCHLORIDE 0.4 MG/ML
0.4 INJECTION, SOLUTION INTRAMUSCULAR; INTRAVENOUS; SUBCUTANEOUS
Status: DISCONTINUED | OUTPATIENT
Start: 2021-08-24 | End: 2021-08-26 | Stop reason: HOSPADM

## 2021-08-24 RX ORDER — POLYETHYLENE GLYCOL 3350 17 G/17G
17 POWDER, FOR SOLUTION ORAL DAILY
Status: DISCONTINUED | OUTPATIENT
Start: 2021-08-25 | End: 2021-08-26 | Stop reason: HOSPADM

## 2021-08-24 RX ORDER — NICOTINE POLACRILEX 4 MG
15-30 LOZENGE BUCCAL
Status: DISCONTINUED | OUTPATIENT
Start: 2021-08-24 | End: 2021-08-26 | Stop reason: HOSPADM

## 2021-08-24 RX ORDER — CEFAZOLIN SODIUM 2 G/100ML
2 INJECTION, SOLUTION INTRAVENOUS SEE ADMIN INSTRUCTIONS
Status: DISCONTINUED | OUTPATIENT
Start: 2021-08-24 | End: 2021-08-24 | Stop reason: HOSPADM

## 2021-08-24 RX ORDER — CEFAZOLIN SODIUM 2 G/100ML
2 INJECTION, SOLUTION INTRAVENOUS
Status: COMPLETED | OUTPATIENT
Start: 2021-08-24 | End: 2021-08-24

## 2021-08-24 RX ORDER — HYDROMORPHONE HCL IN WATER/PF 6 MG/30 ML
0.2 PATIENT CONTROLLED ANALGESIA SYRINGE INTRAVENOUS
Status: DISCONTINUED | OUTPATIENT
Start: 2021-08-24 | End: 2021-08-25

## 2021-08-24 RX ORDER — HYDROMORPHONE HCL IN WATER/PF 6 MG/30 ML
0.4 PATIENT CONTROLLED ANALGESIA SYRINGE INTRAVENOUS EVERY 5 MIN PRN
Status: DISCONTINUED | OUTPATIENT
Start: 2021-08-24 | End: 2021-08-24 | Stop reason: HOSPADM

## 2021-08-24 RX ORDER — LABETALOL HYDROCHLORIDE 5 MG/ML
10 INJECTION, SOLUTION INTRAVENOUS EVERY 6 HOURS PRN
Status: DISCONTINUED | OUTPATIENT
Start: 2021-08-24 | End: 2021-08-26

## 2021-08-24 RX ORDER — ONDANSETRON 2 MG/ML
INJECTION INTRAMUSCULAR; INTRAVENOUS PRN
Status: DISCONTINUED | OUTPATIENT
Start: 2021-08-24 | End: 2021-08-24

## 2021-08-24 RX ORDER — HYDROMORPHONE HCL IN WATER/PF 6 MG/30 ML
0.4 PATIENT CONTROLLED ANALGESIA SYRINGE INTRAVENOUS
Status: DISCONTINUED | OUTPATIENT
Start: 2021-08-24 | End: 2021-08-25

## 2021-08-24 RX ORDER — DEXTROSE MONOHYDRATE 25 G/50ML
25-50 INJECTION, SOLUTION INTRAVENOUS
Status: DISCONTINUED | OUTPATIENT
Start: 2021-08-24 | End: 2021-08-26 | Stop reason: HOSPADM

## 2021-08-24 RX ORDER — ONDANSETRON 2 MG/ML
4 INJECTION INTRAMUSCULAR; INTRAVENOUS EVERY 30 MIN PRN
Status: DISCONTINUED | OUTPATIENT
Start: 2021-08-24 | End: 2021-08-24 | Stop reason: HOSPADM

## 2021-08-24 RX ORDER — MEPERIDINE HYDROCHLORIDE 25 MG/ML
12.5 INJECTION INTRAMUSCULAR; INTRAVENOUS; SUBCUTANEOUS
Status: DISCONTINUED | OUTPATIENT
Start: 2021-08-24 | End: 2021-08-24 | Stop reason: HOSPADM

## 2021-08-24 RX ORDER — ALBUTEROL SULFATE 0.83 MG/ML
2.5 SOLUTION RESPIRATORY (INHALATION) EVERY 4 HOURS PRN
Status: DISCONTINUED | OUTPATIENT
Start: 2021-08-24 | End: 2021-08-24 | Stop reason: HOSPADM

## 2021-08-24 RX ORDER — LABETALOL HYDROCHLORIDE 5 MG/ML
5 INJECTION, SOLUTION INTRAVENOUS EVERY 5 MIN PRN
Status: DISCONTINUED | OUTPATIENT
Start: 2021-08-24 | End: 2021-08-24 | Stop reason: HOSPADM

## 2021-08-24 RX ORDER — ACETAMINOPHEN 325 MG/1
650 TABLET ORAL EVERY 6 HOURS
Status: DISCONTINUED | OUTPATIENT
Start: 2021-08-27 | End: 2021-08-25

## 2021-08-24 RX ORDER — LABETALOL HYDROCHLORIDE 5 MG/ML
20 INJECTION, SOLUTION INTRAVENOUS EVERY 6 HOURS
Status: DISCONTINUED | OUTPATIENT
Start: 2021-08-24 | End: 2021-08-24

## 2021-08-24 RX ORDER — ONDANSETRON 2 MG/ML
4 INJECTION INTRAMUSCULAR; INTRAVENOUS EVERY 6 HOURS PRN
Status: DISCONTINUED | OUTPATIENT
Start: 2021-08-24 | End: 2021-08-26 | Stop reason: HOSPADM

## 2021-08-24 RX ORDER — CARVEDILOL 25 MG/1
25 TABLET ORAL 2 TIMES DAILY
Status: DISCONTINUED | OUTPATIENT
Start: 2021-08-24 | End: 2021-08-26 | Stop reason: HOSPADM

## 2021-08-24 RX ORDER — LIDOCAINE HYDROCHLORIDE 20 MG/ML
INJECTION, SOLUTION INFILTRATION; PERINEURAL PRN
Status: DISCONTINUED | OUTPATIENT
Start: 2021-08-24 | End: 2021-08-24

## 2021-08-24 RX ORDER — BUPIVACAINE HYDROCHLORIDE 2.5 MG/ML
INJECTION, SOLUTION EPIDURAL; INFILTRATION; INTRACAUDAL PRN
Status: DISCONTINUED | OUTPATIENT
Start: 2021-08-24 | End: 2021-08-24

## 2021-08-24 RX ORDER — OXYCODONE HYDROCHLORIDE 10 MG/1
10 TABLET ORAL EVERY 4 HOURS PRN
Status: DISCONTINUED | OUTPATIENT
Start: 2021-08-24 | End: 2021-08-25 | Stop reason: DRUGHIGH

## 2021-08-24 RX ORDER — SODIUM CHLORIDE, SODIUM LACTATE, POTASSIUM CHLORIDE, CALCIUM CHLORIDE 600; 310; 30; 20 MG/100ML; MG/100ML; MG/100ML; MG/100ML
INJECTION, SOLUTION INTRAVENOUS CONTINUOUS
Status: DISCONTINUED | OUTPATIENT
Start: 2021-08-24 | End: 2021-08-24 | Stop reason: HOSPADM

## 2021-08-24 RX ORDER — AMLODIPINE BESYLATE 10 MG/1
10 TABLET ORAL EVERY MORNING
Status: DISCONTINUED | OUTPATIENT
Start: 2021-08-25 | End: 2021-08-26 | Stop reason: HOSPADM

## 2021-08-24 RX ORDER — SODIUM CHLORIDE 9 MG/ML
INJECTION, SOLUTION INTRAVENOUS CONTINUOUS
Status: DISCONTINUED | OUTPATIENT
Start: 2021-08-24 | End: 2021-08-26 | Stop reason: HOSPADM

## 2021-08-24 RX ORDER — ONDANSETRON 4 MG/1
4 TABLET, ORALLY DISINTEGRATING ORAL EVERY 6 HOURS PRN
Status: DISCONTINUED | OUTPATIENT
Start: 2021-08-24 | End: 2021-08-26 | Stop reason: HOSPADM

## 2021-08-24 RX ORDER — FENTANYL CITRATE 50 UG/ML
50 INJECTION, SOLUTION INTRAMUSCULAR; INTRAVENOUS EVERY 5 MIN PRN
Status: DISCONTINUED | OUTPATIENT
Start: 2021-08-24 | End: 2021-08-24 | Stop reason: HOSPADM

## 2021-08-24 RX ADMIN — SODIUM CHLORIDE: 9 INJECTION, SOLUTION INTRAVENOUS at 17:38

## 2021-08-24 RX ADMIN — FENTANYL CITRATE 50 MCG: 50 INJECTION, SOLUTION INTRAMUSCULAR; INTRAVENOUS at 16:13

## 2021-08-24 RX ADMIN — HYDRALAZINE HYDROCHLORIDE 5 MG: 20 INJECTION INTRAMUSCULAR; INTRAVENOUS at 15:45

## 2021-08-24 RX ADMIN — FENTANYL CITRATE 100 MCG: 50 INJECTION, SOLUTION INTRAMUSCULAR; INTRAVENOUS at 12:55

## 2021-08-24 RX ADMIN — LIDOCAINE HYDROCHLORIDE 100 MG: 20 INJECTION, SOLUTION INFILTRATION; PERINEURAL at 12:58

## 2021-08-24 RX ADMIN — OXYCODONE HYDROCHLORIDE 10 MG: 10 TABLET ORAL at 21:17

## 2021-08-24 RX ADMIN — SODIUM CHLORIDE, POTASSIUM CHLORIDE, SODIUM LACTATE AND CALCIUM CHLORIDE: 600; 310; 30; 20 INJECTION, SOLUTION INTRAVENOUS at 12:49

## 2021-08-24 RX ADMIN — HYDROMORPHONE HYDROCHLORIDE 0.2 MG: 0.2 INJECTION, SOLUTION INTRAMUSCULAR; INTRAVENOUS; SUBCUTANEOUS at 15:44

## 2021-08-24 RX ADMIN — FENTANYL CITRATE 50 MCG: 50 INJECTION, SOLUTION INTRAMUSCULAR; INTRAVENOUS at 15:42

## 2021-08-24 RX ADMIN — ROCURONIUM BROMIDE 10 MG: 10 INJECTION INTRAVENOUS at 14:54

## 2021-08-24 RX ADMIN — FENTANYL CITRATE 50 MCG: 50 INJECTION, SOLUTION INTRAMUSCULAR; INTRAVENOUS at 15:14

## 2021-08-24 RX ADMIN — DOCUSATE SODIUM 50 MG AND SENNOSIDES 8.6 MG 1 TABLET: 8.6; 5 TABLET, FILM COATED ORAL at 19:16

## 2021-08-24 RX ADMIN — HYDROMORPHONE HYDROCHLORIDE 0.2 MG: 0.2 INJECTION, SOLUTION INTRAMUSCULAR; INTRAVENOUS; SUBCUTANEOUS at 19:12

## 2021-08-24 RX ADMIN — SUGAMMADEX 200 MG: 100 INJECTION, SOLUTION INTRAVENOUS at 15:14

## 2021-08-24 RX ADMIN — LABETALOL HYDROCHLORIDE 10 MG: 5 INJECTION, SOLUTION INTRAVENOUS at 12:13

## 2021-08-24 RX ADMIN — HYDROMORPHONE HYDROCHLORIDE 0.4 MG: 0.2 INJECTION, SOLUTION INTRAMUSCULAR; INTRAVENOUS; SUBCUTANEOUS at 17:16

## 2021-08-24 RX ADMIN — ROCURONIUM BROMIDE 50 MG: 10 INJECTION INTRAVENOUS at 12:58

## 2021-08-24 RX ADMIN — ONDANSETRON 4 MG: 2 INJECTION INTRAMUSCULAR; INTRAVENOUS at 14:50

## 2021-08-24 RX ADMIN — CARVEDILOL 25 MG: 25 TABLET, FILM COATED ORAL at 19:16

## 2021-08-24 RX ADMIN — LABETALOL HYDROCHLORIDE 10 MG: 5 INJECTION, SOLUTION INTRAVENOUS at 12:33

## 2021-08-24 RX ADMIN — ROCURONIUM BROMIDE 20 MG: 10 INJECTION INTRAVENOUS at 14:11

## 2021-08-24 RX ADMIN — HYDRALAZINE HYDROCHLORIDE 5 MG: 20 INJECTION INTRAMUSCULAR; INTRAVENOUS at 16:11

## 2021-08-24 RX ADMIN — Medication 2 G: at 13:18

## 2021-08-24 RX ADMIN — PROPOFOL 150 MG: 10 INJECTION, EMULSION INTRAVENOUS at 12:58

## 2021-08-24 RX ADMIN — HYDROMORPHONE HYDROCHLORIDE 0.4 MG: 0.2 INJECTION, SOLUTION INTRAMUSCULAR; INTRAVENOUS; SUBCUTANEOUS at 22:40

## 2021-08-24 RX ADMIN — FENTANYL CITRATE 100 MCG: 50 INJECTION, SOLUTION INTRAMUSCULAR; INTRAVENOUS at 13:38

## 2021-08-24 ASSESSMENT — MIFFLIN-ST. JEOR: SCORE: 1416.88

## 2021-08-24 ASSESSMENT — ACTIVITIES OF DAILY LIVING (ADL): ADLS_ACUITY_SCORE: 16

## 2021-08-24 NOTE — PROGRESS NOTES
SPIRITUAL HEALTH SERVICES  [facility] 3C   PRE-SURGERY VISIT    Had pre-surgery visit with pt and family, with assistance of Rochelle .  Provided spiritual support, prayer.   John Krishnan) Abdi Ford M.Div., [facility]  Staff   Pager [phone]

## 2021-08-24 NOTE — ANESTHESIA PROCEDURE NOTES
Airway       Patient location during procedure: OR       Procedure Start/Stop Times: 8/24/2021 1:00 PM  Staff -        Anesthesiologist:  Leonardo Melgar MD       Resident/Fellow: Leonardo Campo       Performed By: resident  Consent for Airway        Urgency: elective  Indications and Patient Condition       Indications for airway management: cleo-procedural       Induction type:intravenous       Mask difficulty assessment: 1 - vent by mask    Final Airway Details       Final airway type: endotracheal airway       Successful airway: ETT - single  Endotracheal Airway Details        ETT size (mm): 7.5       Cuffed: yes       Successful intubation technique: direct laryngoscopy       DL Blade Type: MAC 4       Grade View of Cords: 2 (2b)       Adjucts: stylet       Position: Right       Measured from: gums/teeth       Secured at (cm): 23       Bite block used: None    Post intubation assessment        Placement verified by: capnometry, equal breath sounds and chest rise        Number of attempts at approach: 1       Secured with: pink tape       Ease of procedure: easy       Dentition: Intact and Unchanged

## 2021-08-24 NOTE — ANESTHESIA CARE TRANSFER NOTE
Patient: Pwo Micaela    Procedure(s):  NEPHRECTOMY, TOTAL, LAPAROSCOPIC    Diagnosis: Atrophic kidney [N26.1]  Nephrolithiasis [N20.0]  Diagnosis Additional Information: No value filed.    Anesthesia Type:   General     Note:    Oropharynx: oropharynx clear of all foreign objects and spontaneously breathing  Level of Consciousness: awake and drowsy  Oxygen Supplementation: face mask  Level of Supplemental Oxygen (L/min / FiO2): 5L  Independent Airway: airway patency satisfactory and stable  Dentition: dentition unchanged  Vital Signs Stable: post-procedure vital signs reviewed and stable  Report to RN Given: handoff report given  Patient transferred to: PACU    Handoff Report: Identifed the Patient, Identified the Reponsible Provider, Reviewed the pertinent medical history, Discussed the surgical course, Reviewed Intra-OP anesthesia mangement and issues during anesthesia, Set expectations for post-procedure period and Allowed opportunity for questions and acknowledgement of understanding      Vitals:  Vitals Value Taken Time   /105 08/24/21 1525   Temp     Pulse 76 08/24/21 1528   Resp     SpO2 100 % 08/24/21 1528   Vitals shown include unvalidated device data.    Electronically Signed By: DON De Luna CRNA  August 24, 2021  3:29 PM

## 2021-08-24 NOTE — ANESTHESIA PREPROCEDURE EVALUATION
"Anesthesia Pre-Procedure Evaluation    Patient: Moriah Hinojosa   MRN: [id number] : 1955        Preoperative Diagnosis: Atrophic kidney [N26.1]  Nephrolithiasis [N20.0]   Procedure : Procedure(s):  NEPHRECTOMY, TOTAL, LAPAROSCOPIC     Past Medical History:   Diagnosis Date     Chronic pain of both knees 2019     COVID-19 2020     Hypercholesteremia 2018     Hypertension 2018      No past surgical history on file.   No Known Allergies   Social History     Tobacco Use     Smoking status: Former Smoker     Smokeless tobacco: Never Used     Tobacco comment: no passive exposure   Substance Use Topics     Alcohol use: No      Wt Readings from Last 1 Encounters:   21 73.5 kg (162 lb)        Anesthesia Evaluation   Pt has not had prior anesthetic         ROS/MED HX  ENT/Pulmonary:  - neg pulmonary ROS     Neurologic:  - neg neurologic ROS     Cardiovascular:     (+) Dyslipidemia hypertension-range: 180's, doesn\"t check at home/ ----    METS/Exercise Tolerance: 3 - Able to walk 1-2 blocks without stopping    Hematologic:  - neg hematologic  ROS     Musculoskeletal:  - neg musculoskeletal ROS     GI/Hepatic: Comment: Denies nausea - neg GI/hepatic ROS  (-) GERD   Renal/Genitourinary:     (+) renal disease, type: CRI, Pt does not require dialysis, Nephrolithiasis ,     Endo:  - neg endo ROS   (+) type II DM, Last HgA1c: 6.2, Not using insulin, not previously admitted for DM/DKA.     Psychiatric/Substance Use:  - neg psychiatric ROS     Infectious Disease:  - neg infectious disease ROS     Malignancy:  - neg malignancy ROS     Other:            Physical Exam    Airway        Mallampati: II   TM distance: > 3 FB   Neck ROM: full   Mouth opening: > 3 cm    Respiratory Devices and Support         Dental  no notable dental history         Cardiovascular   cardiovascular exam normal       Rhythm and rate: regular and normal     Pulmonary   pulmonary exam normal        breath sounds clear to auscultation "           OUTSIDE LABS:  CBC:   Lab Results   Component Value Date    WBC 7.7 08/03/2021    WBC 9.5 12/16/2020    HGB 16.0 08/03/2021    HGB 15.4 12/16/2020    HCT 47.1 08/03/2021    HCT 47.4 12/16/2020     08/03/2021     12/16/2020     BMP:   Lab Results   Component Value Date     08/17/2021     08/03/2021    POTASSIUM 3.9 08/17/2021    POTASSIUM 4.6 08/03/2021    CHLORIDE 102 08/17/2021    CHLORIDE 108 08/03/2021    CO2 29 08/17/2021    CO2 30 08/03/2021    BUN 17 08/17/2021    BUN 17 08/03/2021    CR 1.26 08/17/2021    CR 1.34 (H) 08/03/2021     08/17/2021     (H) 08/03/2021     COAGS: No results found for: PTT, INR, FIBR  POC: No results found for: BGM, HCG, HCGS  HEPATIC:   Lab Results   Component Value Date    ALBUMIN 3.7 12/15/2020    PROTTOTAL 7.9 12/13/2020    ALT 16 12/13/2020    AST 19 12/13/2020    ALKPHOS 99 12/13/2020    BILITOTAL 1.0 12/13/2020     OTHER:   Lab Results   Component Value Date    LACT 0.8 12/15/2020    A1C 6.2 (H) 08/03/2021    LENNOX 10.3 08/17/2021    PHOS 2.4 (L) 12/15/2020    MAG 2.0 12/16/2020    LIPASE <9 12/13/2020       Anesthesia Plan    ASA Status:  3   NPO Status:  NPO Appropriate    Anesthesia Type: General.     - Airway: ETT   Induction: Intravenous.   Maintenance: Inhalation.   Techniques and Equipment:     - Lines/Monitors: 2nd IV     Consents    Anesthesia Plan(s) and associated risks, benefits, and realistic alternatives discussed. Questions answered and patient/representative(s) expressed understanding.     - Discussed with:  Patient      - Extended Intubation/Ventilatory Support Discussed: No.      - Patient is DNR/DNI Status: No    Use of blood products discussed: No .     Postoperative Care    Pain management: Oral pain medications, IV analgesics.   PONV prophylaxis: Ondansetron (or other 5HT-3)     Comments:    Patient seen and examined.  Risks, benefits and alternatives to GETA discussed.  Questions answered and patient wishes  to proceed              Maximiliano Valencia, DO

## 2021-08-24 NOTE — OP NOTE
OPERATIVE REPORT  8/24/2021     PREOPERATIVE DIAGNOSIS:  Atrophic left kidney; Nephrolithiasis; Refractory Hypertension    POSTOPERATIVE DIAGNOSIS:  Atrophic left kidney; Nephrolithiasis; Refractory Hypertension    PROCEDURES PERFORMED:   1. Left laparoscopic total nephrectomy    STAFF SURGEON: Dr. Catracho Wright MD   ASSISTANT(S): Zayra Rai MD  ANESTHESIA: General    ESTIMATED BLOOD LOSS: 25  cc  DRAINS/TUBES: 16Fr staton catheter    IV FLUIDS: Please see anesthesia record  COMPLICATIONS: None.   SPECIMEN: *  ID Type Source Tests Collected by Time Destination   1 : left nephrectomy Tissue Kidney, Left SURGICAL PATHOLOGY EXAM Catracho Wright MD 8/24/2021  3:04 PM      SIGNIFICANT FINDINGS:   1.  Gross examination of the kidney showed renal mass without significant involvement of surrounding organs.    BRIEF OPERATIVE INDICATIONS: Moriah Hinojosa is a(n) 66 year old male with a history of left obstructing nephrolithiasis leading to atrophic left kidney. More recently, he has developed refractory hypertension, resistant to medical management. As such, he presents for nephrectomy.  After a discussion of all risks, benefits, and alternatives, the patient elected to proceed with the above stated procedure.     OPERATIVE DETAILS: Informed consent was obtained and the patient was brought to the operating room where general anesthesia was induced. He was given appropriate preoperative antibiotics. He was initially positioned modified flank position where she was prepped and draped in the standard sterile fashion.  We were careful to pad all pressure points.  We then performed a timeout, verifying the correct patient's site and procedure to be performed.     The Veress needle was inserted into the abdomen. After confirmatory drop test, the abdomen was insufflated. We then proceeded to place three 12 mm dilating ports to triangulate the kidney. We mobilized the colon from  the lateral aspect of the abdominal wall  and reflect it medially. Gerota's fascia was then opened and the lower pole of the kidney mobilized. The gonadal vein was identified, clipped, and divided. The ureter was also identified and divided with Hem-o-lock clips. This was done distal to the known ureteral stone, which was palpable in the proximal ureter. Dissection was carried cephalad to the renal hilum. The following vessels were identified:  Renal Vein: 1 and Renal Artery: 1. The vessels were notable for being quite atretic, secondary to long-standing renal atrophy. These were carefully dissected and freed from surrounding strctures. The renal hilum was divided with a 45 mm Endo-RONALD stapler. Dissection was then carried towards the upper pole where the remaining attachments were divided using cautery. The adrenal gland was spared. The lateral attachments until the kidney were divided and kidney was completely mobilized was completely free. At this point it was placed into a 15 mm EndoCatch bag. Hemostasis was obtained. The specimen was extracted through a modified, muscle-splitting, Sutton incision and sent to pathology.   The extraction incision was closed with #0 Vicryl for the fascia. The skin incisions were closed with 4-0 Monocryl. The wounds were dressed with Dermabond. The patient was then awakened and taken to the PACU in stable condition.    The patient tolerated the procedure well and there were no apparent complications. The patient  was transported to the postanesthesia care unit in stable condition.      Catracho Wright MD  Urology  [facility]

## 2021-08-24 NOTE — TELEPHONE ENCOUNTER
Reason for visit: Post op follow up     Relevant information: Nephrectomy on 8/24    Records/imaging/labs/orders: All records available    Pt called: N/A    At Rooming: Standard

## 2021-08-24 NOTE — PROGRESS NOTES
[facility] Care Coordination Contact    CC contacted Hospital /discharge planner via the [facility] Hand-In Process, with community care plan included.  CC reached out to adult daughter Kaylen Lai regarding transition and left a message requesting a return call.  Reviewed and update care plan as needed.  Notified community service providers and placed services PCA on hold as needed.  Transition log initiated.   PCP notified of hospitalization via EMR.    TRANSITIONS OF CARE (ALLEGRA) LOG   ALLEGRA tasks should be completed by the CC within one (1) business day of notification of each transition. Follow up contact with member is required after return to their usual care setting.  Note:  If CC finds out about the transitions fifteen (15) days or more after the member has returned to their usual care setting, no ALLEGRA log is needed. However, the CC should check in with the member to discuss the transition process, any changes needed to the care plan and document it in a case note.    Member Name:  Pwo Micaela MCO Name:  [facility] MCO/Health Plan Member ID#:  [id number]   Product: [facility] Care Coordinator Contact:  Terra Hale RN Agency/[facility]/Care System: [facility]   Transition Communication Actions from Care Management Contact   Transition #1   Notification Date: 08/24/21 Transition Date:   08/24/21 Transition From: Home     Is this the member s usual care setting?               yes Transition To: Hospital, [facility]   Transition Type:  Planned  Reason for Admission/Comments: Scheduled surgery for nephrectomy     Shared CC contact info, care plan/services with receiving setting--Date completed: 08/24/21   Notified PCP of transition--Date completed:  08/24/21     via  EMR   Transition #2   Transition #3  (if applicable)   Notification Date: 8/26/21         Transition To:  Home  Transition Date: 08/26/21     Transition Type:    Planned  Notified PCP -- Date completed: 08/26/21               Shared CC contact info, care plan/services with receiving setting or, if applicable, home care agency--Date completed:  08/26/21  *Complete additional tasks below, if this transition is a return to usual care setting.      Comments:  Home with family;       *Complete tasks below when the member is discharging TO their usual care setting within one (1) business day of notification.  For situations where the Care Coordinator is notified of the discharge prior to the date of discharge, the Care Coordinator must follow up with the member or designated representative to confirm that discharge actually occurred and discuss required ALLEGRA tasks as outlined in the ALLEGRA Instructions.  (This includes situations where it may be a  new  usual care setting for the member. (i.e., a community member who decides upon permanent nursing home placement following hospitalization and rehab).    Date completed: 08/26/21  Communicated with member or their designated representative about the following:  care transition process; about changes to the member s health status; plan of care updates; education about transitions and how to prevent unplanned transitions/readmissions  Four Pillars for Optimal Transition:    Check  Yes  - if the member, family member and/or SNF/facility staff manages the following:    If  No  provide explanation in the comments section.          [x]  Yes     []  No     Does the member have a follow-up appointment scheduled with primary care or specialist? (Mental health hospitalizations--the appt. should be w/in 7 days)   [x]  Yes     []  No     Can the member manage their medications or is there a system in place to manage medications (e.g. home care set-up)?         [x]  Yes     []  No     Can the member verbalize warning signs and symptoms to watch for and how to respond?         [x]  Yes     []  No     Does the member use a Personal Health Care Record?  Check  Yes  if visit summary, discharge summary, and/or  healthcare summary are being used as a PHR.                                                                                                                                                                                    [x] Yes      [] No      Have you updated the member s care plan?  If  No  provide explanation in comments.   Comments:  No concerns expressed by member or family at this time.     Terra Hale RN  [facility]  [phone]

## 2021-08-24 NOTE — CONSULTS
[facility]  Consult Note - Hospitalist Service     Date of Admission:  8/24/2021  Consult Requested by: Dr. Arias  Reason for Consult: Hypertension post-operatively    Assessment & Plan   Moriah Hinojosa is a 66 year old male admitted on 8/24/2021. He has a past medical history significant for hypertension, dyslipidemia, CKD stage 3, and history of left obstructing nephrolithiasis leading to left atrophic kidney who underwent laparoscopic left total nephrectomy with Dr. Wright on 8/24/2021. Internal Medicine is consulted due to hypertension post-operatively.    Refractory HTN  Hx of atrophic kidney s/p L laparoscopic total nephrectomy  CKD stage 3  Has history of resistant hypertension on 3 medication regimen including hydrochlorothiazide 50 mg daily, coreg 25 mg BID, and amlodipine 10 mg daily. Suspicion that atrophic kidney contributing to persistent HTN. BP as outpatient has been elevated 220/110-120s and significant hypertensive cleo-operatively. Received labetalol 10 mg x 2, hydralazine 5 mg x 2. BP now improved to 138/79. Denies any symptoms with high BP or decreased BP.  - PRN hydralazine or labetalol with goal SBP <160   - Resume PTA amlodipine and coreg in AM  - Holding PTA hydrochlorothiazide cleo-operatively    Hypokalemia  Potassium 3.0 this AM prior to procedure.   - Repeat BMP now and will order replacement pending results    Elevated Hgb A1c  Hgb A1c 6.2 on 8/3/21. No history of DM. Fasting  this AM prior to procedure.  - BG checks QID and at bedtime  - Low intensity sliding scale insulin  - Hypoglycemia protocol    Dyslipidemia - PTA lipitor 20 mg daily     The patient's care was discussed with the Patient.    Venus Madden PA-C  [facility]  Securely message with the Vocera Web Console (learn more here)  Text page via [employer] Paging/Directory      Clinically Significant Risk Factors Present on Admission                    ______________________________________________________________________    Chief Complaint   High blood pressure    History is obtained from the patient    History of Present Illness   Moriah Hinojosa is a 66 year old male who is admitted after undergoing total left nephrectomy due to atrophic kidney and history of nephrolithiasis. He has a history of refractory hypertension and BP was elevated cleo-operatively. He denies any symptoms with elevated BP such as changes in his vision, chest pain or headache. Did not notice any changes with decrease in his BP either. Currently on 3 medication regimen which he reports compliance with.    Post-operatively reporting some pain at incisional site but otherwise doing. Well.    Review of Systems   The 10 point Review of Systems is negative other than noted in the HPI or here.     Past Medical History    I have reviewed this patient's medical history and updated it with pertinent information if needed.   Past Medical History:   Diagnosis Date     Chronic pain of both knees 2/11/2019     COVID-19 5/7/2020     Hypercholesteremia 6/18/2018     Hypertension 5/7/2018       Past Surgical History   I have reviewed this patient's surgical history and updated it with pertinent information if needed.  History reviewed. No pertinent surgical history.    Social History   I have reviewed this patient's social history and updated it with pertinent information if needed.  Social History     Tobacco Use     Smoking status: Former Smoker     Smokeless tobacco: Never Used     Tobacco comment: no passive exposure   Substance Use Topics     Alcohol use: No     Drug use: Never       Family History     No significant family history.    Medications   I have reviewed this patient's current medications    Allergies   No Known Allergies    Physical Exam   Vital Signs: Temp: 98.8  F (37.1  C) Temp src: Core BP: 134/78 Pulse: 96   Resp: 12 SpO2: 100 % O2 Device: Nasal cannula Oxygen Delivery: 1  LPM  Weight: 158 lbs 11.7 oz    General Appearance: Awake. Alert and oriented x 3. No acute distress.  Eyes: Normal lids. Anicteric sclera. PERRL. EOMs intact. No nystagmus.  HEENT: Normocephalic, atraumatic. Nares patent. Oropharynx clear. Mucous membranes moist.  Respiratory: Normal work of breathing on room air. Lungs CTAB. No wheezes, rhonchi or rales.  Cardiovascular: RRR. S1, S2. No murmurs, rubs or gallops. Pedal pulses 2+ No lower extremity edema.  GI: Abdomen non-distended. Hypoactive bowel sounds. Soft. No guarding.  Lymph/Hematologic: No abnormal or excessive bruising.  Skin: Warm, dry.  Musculoskeletal: No visible joint deformities.  Neurologic: No focal deficits.    Data   Results for orders placed or performed during the hospital encounter of 08/24/21 (from the past 24 hour(s))   Glucose by meter   Result Value Ref Range    GLUCOSE BY METER POCT 185 (H) 70 - 99 mg/dL   ABO/Rh type and screen    Narrative    The following orders were created for panel order ABO/Rh type and screen.  Procedure                               Abnormality         Status                     ---------                               -----------         ------                     Adult Type and Screen[[id number]]                            Final result                 Please view results for these tests on the individual orders.   Potassium   Result Value Ref Range    Potassium 3.0 (L) 3.4 - 5.3 mmol/L   Adult Type and Screen   Result Value Ref Range    ABO/RH(D) O POS     Antibody Screen Negative Negative    SPECIMEN EXPIRATION DATE [id number]

## 2021-08-24 NOTE — PROVIDER NOTIFICATION
Patient /112. [facility] notified and came to bedside. Labetalol ordered and given.

## 2021-08-24 NOTE — PROGRESS NOTES
[facility] Care Coordination Contact    [facility]  Ambulatory Care Coordination to Inpatient Care Management   Hand-In Communication    Date:  August 24, 2021  Name: Moriah Hinojosa is enrolled in [facility] Care Coordination program and I am the Lead Care Coordinator.  CC Contact Information:.   Payor Source: Payor: BLUE PLUS / Plan: BLUE PLUS ADVANTAGE DUAL MSHO / Product Type: Medicare /   Current services in place:     Please see the CC Snaphot and Care Management Flowsheets for specific  details of this Moriah Hinojosa care plan.   Additional details/specific concerns r/t this admission:    Discharge Disposition if going to TCU    I will follow this admission in Epic. Please feel free to contact me with questions or for further collaboration in discharge planning.    Terra Hale RN  [facility]  [phone]

## 2021-08-24 NOTE — BRIEF OP NOTE
[facility]    Brief Operative Note    Pre-operative diagnosis: Atrophic kidney [N26.1]  Nephrolithiasis [N20.0]  Post-operative diagnosis Same as pre-operative diagnosis    Procedure: Procedure(s):  NEPHRECTOMY, TOTAL, LAPAROSCOPIC  Surgeon: Surgeon(s) and Role:     * Catracho Wright MD - Primary     * Zayra Rai MD - Resident - Assisting  Anesthesia: General   Estimated blood loss: 25cc  Drains: None  Specimens:   ID Type Source Tests Collected by Time Destination   1 : left nephrectomy Tissue Kidney, Left SURGICAL PATHOLOGY EXAM Catracho Wright MD 8/24/2021  3:04 PM      Findings:   2cm proximal ureteral stone removed with kidney.  Complications: None.  Implants: * No implants in log *

## 2021-08-25 LAB
ANION GAP SERPL CALCULATED.3IONS-SCNC: 10 MMOL/L (ref 3–14)
BUN SERPL-MCNC: 22 MG/DL (ref 7–30)
CALCIUM SERPL-MCNC: 7.9 MG/DL (ref 8.5–10.1)
CHLORIDE BLD-SCNC: 105 MMOL/L (ref 94–109)
CO2 SERPL-SCNC: 24 MMOL/L (ref 20–32)
CREAT SERPL-MCNC: 1.1 MG/DL (ref 0.66–1.25)
ERYTHROCYTE [DISTWIDTH] IN BLOOD BY AUTOMATED COUNT: 12.6 % (ref 10–15)
GFR SERPL CREATININE-BSD FRML MDRD: 69 ML/MIN/1.73M2
GLUCOSE BLD-MCNC: 173 MG/DL (ref 70–99)
GLUCOSE BLDC GLUCOMTR-MCNC: 148 MG/DL (ref 70–99)
GLUCOSE BLDC GLUCOMTR-MCNC: 153 MG/DL (ref 70–99)
GLUCOSE BLDC GLUCOMTR-MCNC: 164 MG/DL (ref 70–99)
GLUCOSE BLDC GLUCOMTR-MCNC: 170 MG/DL (ref 70–99)
GLUCOSE BLDC GLUCOMTR-MCNC: 171 MG/DL (ref 70–99)
HCT VFR BLD AUTO: 42 % (ref 40–53)
HGB BLD-MCNC: 14.2 G/DL (ref 13.3–17.7)
MCH RBC QN AUTO: 29.8 PG (ref 26.5–33)
MCHC RBC AUTO-ENTMCNC: 33.8 G/DL (ref 31.5–36.5)
MCV RBC AUTO: 88 FL (ref 78–100)
PLATELET # BLD AUTO: 200 10E3/UL (ref 150–450)
POTASSIUM BLD-SCNC: 3.2 MMOL/L (ref 3.4–5.3)
POTASSIUM BLD-SCNC: 3.2 MMOL/L (ref 3.4–5.3)
POTASSIUM BLD-SCNC: 3.6 MMOL/L (ref 3.4–5.3)
RBC # BLD AUTO: 4.77 10E6/UL (ref 4.4–5.9)
SODIUM SERPL-SCNC: 139 MMOL/L (ref 133–144)
WBC # BLD AUTO: 12.5 10E3/UL (ref 4–11)

## 2021-08-25 PROCEDURE — 84132 ASSAY OF SERUM POTASSIUM: CPT | Performed by: UROLOGY

## 2021-08-25 PROCEDURE — 36415 COLL VENOUS BLD VENIPUNCTURE: CPT | Performed by: UROLOGY

## 2021-08-25 PROCEDURE — 250N000011 HC RX IP 250 OP 636: Performed by: STUDENT IN AN ORGANIZED HEALTH CARE EDUCATION/TRAINING PROGRAM

## 2021-08-25 PROCEDURE — 250N000013 HC RX MED GY IP 250 OP 250 PS 637: Performed by: PHYSICIAN ASSISTANT

## 2021-08-25 PROCEDURE — 36415 COLL VENOUS BLD VENIPUNCTURE: CPT | Performed by: STUDENT IN AN ORGANIZED HEALTH CARE EDUCATION/TRAINING PROGRAM

## 2021-08-25 PROCEDURE — 258N000003 HC RX IP 258 OP 636: Performed by: STUDENT IN AN ORGANIZED HEALTH CARE EDUCATION/TRAINING PROGRAM

## 2021-08-25 PROCEDURE — 250N000013 HC RX MED GY IP 250 OP 250 PS 637: Performed by: STUDENT IN AN ORGANIZED HEALTH CARE EDUCATION/TRAINING PROGRAM

## 2021-08-25 PROCEDURE — 99207 PR CDG-MDM COMPONENT: MEETS MODERATE - DOWN CODED: CPT | Performed by: PHYSICIAN ASSISTANT

## 2021-08-25 PROCEDURE — 120N000002 HC R&B MED SURG/OB UMMC

## 2021-08-25 PROCEDURE — 250N000012 HC RX MED GY IP 250 OP 636 PS 637: Performed by: PHYSICIAN ASSISTANT

## 2021-08-25 PROCEDURE — 85027 COMPLETE CBC AUTOMATED: CPT | Performed by: STUDENT IN AN ORGANIZED HEALTH CARE EDUCATION/TRAINING PROGRAM

## 2021-08-25 PROCEDURE — 99232 SBSQ HOSP IP/OBS MODERATE 35: CPT | Performed by: PHYSICIAN ASSISTANT

## 2021-08-25 PROCEDURE — 80048 BASIC METABOLIC PNL TOTAL CA: CPT | Performed by: STUDENT IN AN ORGANIZED HEALTH CARE EDUCATION/TRAINING PROGRAM

## 2021-08-25 RX ORDER — HYDRALAZINE HYDROCHLORIDE 20 MG/ML
10 INJECTION INTRAMUSCULAR; INTRAVENOUS EVERY 6 HOURS PRN
Status: DISCONTINUED | OUTPATIENT
Start: 2021-08-25 | End: 2021-08-26 | Stop reason: HOSPADM

## 2021-08-25 RX ORDER — ACETAMINOPHEN 325 MG/1
975 TABLET ORAL EVERY 8 HOURS
Status: DISCONTINUED | OUTPATIENT
Start: 2021-08-25 | End: 2021-08-25

## 2021-08-25 RX ORDER — POTASSIUM CHLORIDE 750 MG/1
40 TABLET, EXTENDED RELEASE ORAL ONCE
Status: COMPLETED | OUTPATIENT
Start: 2021-08-25 | End: 2021-08-25

## 2021-08-25 RX ORDER — POTASSIUM CHLORIDE 1.5 G/1.58G
40 POWDER, FOR SOLUTION ORAL ONCE
Status: COMPLETED | OUTPATIENT
Start: 2021-08-25 | End: 2021-08-25

## 2021-08-25 RX ORDER — ACETAMINOPHEN 325 MG/1
975 TABLET ORAL EVERY 8 HOURS
Status: DISCONTINUED | OUTPATIENT
Start: 2021-08-25 | End: 2021-08-26 | Stop reason: HOSPADM

## 2021-08-25 RX ORDER — METHOCARBAMOL 500 MG/1
500 TABLET, FILM COATED ORAL 4 TIMES DAILY
Status: DISCONTINUED | OUTPATIENT
Start: 2021-08-25 | End: 2021-08-26 | Stop reason: HOSPADM

## 2021-08-25 RX ADMIN — DOCUSATE SODIUM 50 MG AND SENNOSIDES 8.6 MG 1 TABLET: 8.6; 5 TABLET, FILM COATED ORAL at 19:51

## 2021-08-25 RX ADMIN — AMLODIPINE BESYLATE 10 MG: 10 TABLET ORAL at 08:13

## 2021-08-25 RX ADMIN — CARVEDILOL 25 MG: 25 TABLET, FILM COATED ORAL at 19:51

## 2021-08-25 RX ADMIN — INSULIN ASPART 1 UNITS: 100 INJECTION, SOLUTION INTRAVENOUS; SUBCUTANEOUS at 17:32

## 2021-08-25 RX ADMIN — ACETAMINOPHEN 975 MG: 325 TABLET, FILM COATED ORAL at 11:34

## 2021-08-25 RX ADMIN — SODIUM CHLORIDE: 9 INJECTION, SOLUTION INTRAVENOUS at 12:32

## 2021-08-25 RX ADMIN — POLYETHYLENE GLYCOL 3350 17 G: 17 POWDER, FOR SOLUTION ORAL at 08:15

## 2021-08-25 RX ADMIN — POTASSIUM CHLORIDE 40 MEQ: 1.5 POWDER, FOR SOLUTION ORAL at 02:37

## 2021-08-25 RX ADMIN — CARVEDILOL 25 MG: 25 TABLET, FILM COATED ORAL at 08:13

## 2021-08-25 RX ADMIN — SODIUM CHLORIDE: 9 INJECTION, SOLUTION INTRAVENOUS at 02:37

## 2021-08-25 RX ADMIN — OXYCODONE HYDROCHLORIDE 10 MG: 10 TABLET ORAL at 04:57

## 2021-08-25 RX ADMIN — ATORVASTATIN CALCIUM 20 MG: 20 TABLET, FILM COATED ORAL at 08:13

## 2021-08-25 RX ADMIN — METHOCARBAMOL 500 MG: 500 TABLET ORAL at 19:51

## 2021-08-25 RX ADMIN — ACETAMINOPHEN 975 MG: 325 TABLET, FILM COATED ORAL at 19:51

## 2021-08-25 RX ADMIN — INSULIN ASPART 1 UNITS: 100 INJECTION, SOLUTION INTRAVENOUS; SUBCUTANEOUS at 11:35

## 2021-08-25 RX ADMIN — METHOCARBAMOL 500 MG: 500 TABLET ORAL at 17:34

## 2021-08-25 RX ADMIN — METHOCARBAMOL 500 MG: 500 TABLET ORAL at 11:35

## 2021-08-25 RX ADMIN — POTASSIUM CHLORIDE 40 MEQ: 750 TABLET, EXTENDED RELEASE ORAL at 13:51

## 2021-08-25 RX ADMIN — OXYCODONE HYDROCHLORIDE 5 MG: 5 TABLET ORAL at 19:52

## 2021-08-25 RX ADMIN — HYDROMORPHONE HYDROCHLORIDE 0.4 MG: 0.2 INJECTION, SOLUTION INTRAMUSCULAR; INTRAVENOUS; SUBCUTANEOUS at 01:21

## 2021-08-25 ASSESSMENT — ACTIVITIES OF DAILY LIVING (ADL)
ADLS_ACUITY_SCORE: 18
ADLS_ACUITY_SCORE: 16
ADLS_ACUITY_SCORE: 18
ADLS_ACUITY_SCORE: 18

## 2021-08-25 ASSESSMENT — MIFFLIN-ST. JEOR: SCORE: 1431.71

## 2021-08-25 NOTE — PLAN OF CARE
"Vitals: Temp: 99.1  F (37.3  C) Temp src: Oral BP: (!) 144/73 Pulse: 78   Resp: 20 SpO2: 97 % O2 Device: None (Room air) Oxygen Delivery: 1 LPM      Time: 5728-3532  Reason for admission: POD#1 lap nephrectomy.   Activity: Ax1.  Pain: Minimal pain at lap sites. Controlled with scheduled Tylenol.   Neuro: A&O x4. Rochelle speaking. Calls appropriately. Pt reports some blurred vision and dizziness when trying to get oob. MD aware. Denies numbness and tingling.   Cardiac: WDL.   Respiratory:  LS clear. Sating >95% on RA. Pt reported that his \"breathing could be better\". IS use reinforced and at bedside.   GI/: Voided x1 w/ bedside urinal. +BS. Passing gas. LBM before surgery.   Diet: Regular. BG checks before meals.   Lines: R PIV infusing NS @100 mL.hr. L PIV SL.   Incisions/Drains/Skin: Lap sites x3 MANGO.    Lab: K+ 3.2. BGs 148, 170.   Electrolyte Replacements: K+ replaced at 1400. Re-check scheduled for 1800.       New changes this shift: Pt was experiencing some dizziness when standing. Oxy 10mg is discontinued and scheduled Tylenol added. Pt due to ambulate. Continue plan of care.     "

## 2021-08-25 NOTE — PROGRESS NOTES
"Urology  Progress Note    NAEO  Pain controlled  Tolerating CLD  Has not yet ambulated    Exam  /71 (BP Location: Left arm)   Pulse 82   Temp 99  F (37.2  C) (Oral)   Resp 16   Ht 1.651 m (5' 5\")   Wt 72 kg (158 lb 11.7 oz)   SpO2 99%   BMI 26.41 kg/m    No acute distress  Unlabored breathing  Abdomen soft, nontender, nondistended. Incisions c/d/i with exofin  Staton with clear yellow urine in tubing    /300    Labs  Cr (1.05)    AM labs pending    Assessment/Plan  66 year old y/o male POD#1 s/p left laparoscopic total nephrectomy.     Neuro: acetaminophen, oxycodone, dilaudid for pain control  CV: VSS, PTA carvedilol, atorvastatin, prn labetalol/hydralazine, medicine consulted for hypertension, goal BP <160  Pulm: incentive spirometry while awake  FEN/GI: regular diet, MIVF @ 100/hr  Endo: low dose SSI  : staton catheter to be removed today, K replaced overnight, will replete electrolytes as needed  Heme/ID: WBC and Hg pending, no signs of infection  Activity: OOB AT  PPx: SCDs  Dispo: floor, likely home today pending BP control, toleration of regular diet    Seen and examined with the chief resident. Will discuss with Dr. Wright.    Luis Daniel Jorgensen MD, PGY-2  Urology Resident     Contacting the Urology Team     Please use the following job codes to reach the Urology Team. Note that you must use an in house phone and that job codes cannot receive text pages.     On weekdays, dial [phone] (or star-star-star [phone] on the new i[employer] telephones) then [phone] to reach the Adult Urology resident or PA on call    On weekdays, dial [phone] (or star-star-star 777 on the new [employer] telephones) then [phone] to reach the Pediatric Urology resident    On weeknights and weekends, dial [phone] (or star-star-star 777 on the new iCh[employer] telephones) then [phone] to reach the Urology resident on call (for both Adult and Pediatrics)            "

## 2021-08-25 NOTE — PLAN OF CARE
"/74   Pulse 97   Temp 98.3  F (36.8  C) (Axillary)   Resp 15   Ht 1.651 m (5' 5\")   Wt 72 kg (158 lb 11.7 oz)   SpO2 100%   BMI 26.41 kg/m      19:00-23:00   VSS on 2L NC, on capno. Pt is Rochelle speaking and  required, non english speaking. No c/o of SOB, nausea, or vomiting. Pt reports constant pain in L abdomen r/t surgery today. Oxycodone given x1 and dilaudid 0.4 mg with minor relief, non-verbal indicators of pain decreased. LS clear and dim, IS encouraged with . Briones in place with adequate UOP. BS hypo x4, LBM today prior to surgery per patient.  3 lap sites with dermabond and MANGO, no drainage noted. PCDs  in place, and fluids promoted. L. PIV SL and R. PIV infusing NS at 100 mL/hr.  Pt on clear liquid diet. Not OOB d/t pain this evening.  Oxycodone 5-10 mg q4h and dilaudid 0.2-0.4 mg q2h available for pain.     Skin is CDI.  Admission/Transfer from: PACU  2 RN skin assessment completed. Yes  Significant findings include: None, Skin CDI  WO Nurse Consult Ordered? No      Urology paged at 22:00 regarding labetalol order. \"Question about PRN labetalol order.  BP post op has been less than 160, pulse has been above 90. Order reads as give if BP > 160 and/or if pulse > 90?\"    Orders updated and modified for labetalol and hydralazine PRN.    K lab draw results still pending. Will continue to monitor.  "

## 2021-08-25 NOTE — PROGRESS NOTES
[facility]    Medicine Progress Note - Hospitalist Service, Gold 6       HD #1        ASSESSMENT & PLAN:         Pwo Micaela is a 66 year old male with PMH of HTN, HLD, CKD III, and left obstructing nephrolithiasis with resulting atrophic left kidney who was admitted 8/24/2021 by urology for laparoscopic left total nephrectomy by Dr. Wright. Medicine consulted post-op for hypertension.     # Refractory HTN:  Felt 2/2 renovascular disease in setting of atrophic L kidney. BP elevated cleo-operatively but now well controlled. /60 during my visit.   - Coreg 25mg BID  - Amlodipine 10mg daily  - Continue to hold HCTZ, consider restarting in AM if Cr stable  - Hydralazine IV prn for SBP >180    # Dizziness:  Postural dizziness with symptoms suggestive of pre-syncope, possibly d/t relative hypotension following nephrectomy. No vertiginous symptoms. Other possibilities include orthostatic hypotension and medication side effects. No evidence of volume depletion.   - Check orthostatic vitals  - Monitor closely    # Hx atrophic L kidney s/p laparoscopic total nephrectomy:  No surgical complications noted.   - Management per urology    # CKD stage III:  Baseline Cr 1.2-1.8. Repeat Cr 1.10 today.   - Avoid nephrotoxins  - Continue MIVF for now (stop if hypertensive)  - Repeat BMP in AM    # Hypokalemia:  Replacement protocol ordered.    # Pre-diabetes:  A1c 6.2. Glucose <180 consistently.   - Monitor  - OP follow up with PCP for ongoing management    # HLD:  Continue PTA statin.     # Vision changes, POA:  Follows with ophthalmology. Patient notes chronic blurred vision. Noted vision went dark this morning, suspect pre-syncope symptoms. Now back to baseline.   - Monitor  - Consider ophthalmology consult for worsening/persistent changes             The patient's care was discussed with the Attending Physician, Dr. Carbajal and Primary team.    Santana Deleon PA-C  Hospitalist  Service, [address]  Securely message with the [employer] Web Console (learn more here)  Text page via [facility] Paging/Directory  Please see sign in/sign out for up to date coverage information    Clinically Significant Risk Factors Present on Admission               ______________________________________________________________________    Interval History   No acute events overnight. History obtained via  ipad.     Pt reports feeling dizzy and lightheaded upon sitting. He first noted symptoms this morning on waking. He also reported that his vision went dark and he felt sweaty. He did not pass out. He denies any room spinning. No associated chest pain, palpitations, or dyspnea. He notes chronic blurred vision, which is currently at baseline. He notes mild LLQ abdominal pain, worse with ambulation. Denies n/v/d. No other complaints.    ROS:  Pulm, CV, GI and  negative unless otherwise noted above.     Data reviewed today: I reviewed all medications, new labs and imaging results over the last 24 hours.    Physical Exam   Vital Signs: Temp: 98.8  F (37.1  C) Temp src: Oral BP: 120/60 Pulse: 79   Resp: 18 SpO2: 96 % O2 Device: None (Room air) Oxygen Delivery: 1 LPM  Weight: 162 lbs 0 oz  GENERAL:  Awake. Alert. NAD.    HEENT:  Moist mucous membranes.   CV:  RRR. S1, S2. No murmur. Pedal pulses 2+ bilaterally.  PULM:  Normal effort. CTAB.   GI:  Abdomen soft, non-distended. Active bowel sounds. Mildly tender in LLQ.  NEURO:  Grossly non-focal. Moves all extremities.    EXTREMITIES:  No peripheral edema. No calf tenderness.   SKIN:  Dry. No visible rash.     Data   Recent Labs   Lab 08/25/21  0712 08/24/21  2051 08/24/21  1852 08/24/21  1109     --  138  --    POTASSIUM 3.2* 3.2* 3.1* 3.0*   CHLORIDE 105  --  101  --    CO2 24  --  29  --    ANIONGAP 10  --  8  --    BUN 22  --  22  --    CR 1.10  --  1.05  --    LENNOX 7.9*  --  8.8  --      Recent Labs    Lab 08/25/21  0712   WBC 12.5*   RBC 4.77   HGB 14.2   HCT 42.0   MCV 88   MCH 29.8   MCHC 33.8   RDW 12.6        No lab results found in last 7 days.     Glucose Values Latest Ref Rng & Units 8/3/2021 8/17/2021 8/24/2021 8/25/2021   Bedside Glucose (mg/dl )  - -- -- -- --   GLUCOSE 70 - 99 mg/dL 172(H) 107 192(H) 173(H)   Some recent data might be hidden        All labs personally reviewed in [employer].  See A&P for additional results.     Unresulted Labs Ordered in the Past 30 Days of this Admission     Date and Time Order Name Status Description    8/24/2021  3:13 PM Surgical Pathology Exam In process

## 2021-08-25 NOTE — PLAN OF CARE
"/71 (BP Location: Left arm)   Pulse 82   Temp 99  F (37.2  C) (Oral)   Resp 16   Ht 1.651 m (5' 5\")   Wt 72 kg (158 lb 11.7 oz)   SpO2 99%   BMI 26.41 kg/m      HX: hypertension, dyslipidemia, CKD stage 3, and history of left obstructing nephrolithiasis leading to left atrophic kidney     [id number]  Reason for admission: POD#1- lap total nephrectomy  Neuro: A&Ox4, calls appropriately, Rochelle speaking- needs  (at bedside), glasses on  Cardiac: WDL, no chest pain  Respiratory: weaned to RA, no SOB, capno on  GI/: voiding via catheter for most of the shift- order to discontinue sttaon this am, LBM: 8/24  Pain: pt stated pain was 8/10, later upon reassessment pt stated pain level was 1/10 but that his pain did not move- difficult w/  at times, given IV dilaudid x1, Oxycodone x1 and applied heating pad, no n/v  Lines: R PIV-NS @ 100/hr, L PIV-SL  Drains: Catheter removed this am  Incisions: L side 3 lap sites- MANGO/dermabonded  Activity: not OOB, got pt to edge of bed only- felt dizzy and nauseous  Diet: Changed to regular diet this am- ordered food  Labs: K- 3.2, paged MD to add replacement protocol- added one-time dose of KCl, bg- 164  Changes/Plan: removed staton, ordered breakfast, continue POC    "

## 2021-08-26 ENCOUNTER — APPOINTMENT (OUTPATIENT)
Dept: PHYSICAL THERAPY | Facility: CLINIC | Age: 66
DRG: 661 | End: 2021-08-26
Attending: PHYSICIAN ASSISTANT
Payer: COMMERCIAL

## 2021-08-26 VITALS
TEMPERATURE: 98.2 F | OXYGEN SATURATION: 95 % | HEIGHT: 65 IN | HEART RATE: 82 BPM | SYSTOLIC BLOOD PRESSURE: 161 MMHG | BODY MASS INDEX: 26.86 KG/M2 | WEIGHT: 161.2 LBS | DIASTOLIC BLOOD PRESSURE: 83 MMHG | RESPIRATION RATE: 16 BRPM

## 2021-08-26 LAB
ANION GAP SERPL CALCULATED.3IONS-SCNC: 3 MMOL/L (ref 3–14)
BUN SERPL-MCNC: 16 MG/DL (ref 7–30)
CALCIUM SERPL-MCNC: 8 MG/DL (ref 8.5–10.1)
CHLORIDE BLD-SCNC: 108 MMOL/L (ref 94–109)
CO2 SERPL-SCNC: 28 MMOL/L (ref 20–32)
CREAT SERPL-MCNC: 1.06 MG/DL (ref 0.66–1.25)
ERYTHROCYTE [DISTWIDTH] IN BLOOD BY AUTOMATED COUNT: 12.5 % (ref 10–15)
GFR SERPL CREATININE-BSD FRML MDRD: 72 ML/MIN/1.73M2
GLUCOSE BLD-MCNC: 215 MG/DL (ref 70–99)
GLUCOSE BLDC GLUCOMTR-MCNC: 184 MG/DL (ref 70–99)
GLUCOSE BLDC GLUCOMTR-MCNC: 218 MG/DL (ref 70–99)
HCT VFR BLD AUTO: 40.1 % (ref 40–53)
HGB BLD-MCNC: 13.4 G/DL (ref 13.3–17.7)
MCH RBC QN AUTO: 29.8 PG (ref 26.5–33)
MCHC RBC AUTO-ENTMCNC: 33.4 G/DL (ref 31.5–36.5)
MCV RBC AUTO: 89 FL (ref 78–100)
PATH REPORT.COMMENTS IMP SPEC: NORMAL
PATH REPORT.COMMENTS IMP SPEC: NORMAL
PATH REPORT.FINAL DX SPEC: NORMAL
PATH REPORT.GROSS SPEC: NORMAL
PHOTO IMAGE: NORMAL
PLATELET # BLD AUTO: 173 10E3/UL (ref 150–450)
POTASSIUM BLD-SCNC: 3.2 MMOL/L (ref 3.4–5.3)
POTASSIUM BLD-SCNC: 3.9 MMOL/L (ref 3.4–5.3)
RBC # BLD AUTO: 4.49 10E6/UL (ref 4.4–5.9)
SODIUM SERPL-SCNC: 139 MMOL/L (ref 133–144)
WBC # BLD AUTO: 10 10E3/UL (ref 4–11)

## 2021-08-26 PROCEDURE — 250N000011 HC RX IP 250 OP 636: Performed by: STUDENT IN AN ORGANIZED HEALTH CARE EDUCATION/TRAINING PROGRAM

## 2021-08-26 PROCEDURE — 36415 COLL VENOUS BLD VENIPUNCTURE: CPT | Performed by: STUDENT IN AN ORGANIZED HEALTH CARE EDUCATION/TRAINING PROGRAM

## 2021-08-26 PROCEDURE — 99232 SBSQ HOSP IP/OBS MODERATE 35: CPT | Performed by: PHYSICIAN ASSISTANT

## 2021-08-26 PROCEDURE — 97116 GAIT TRAINING THERAPY: CPT | Mod: GP | Performed by: PHYSICAL THERAPIST

## 2021-08-26 PROCEDURE — 36415 COLL VENOUS BLD VENIPUNCTURE: CPT | Performed by: UROLOGY

## 2021-08-26 PROCEDURE — 97530 THERAPEUTIC ACTIVITIES: CPT | Mod: GP | Performed by: PHYSICAL THERAPIST

## 2021-08-26 PROCEDURE — 97161 PT EVAL LOW COMPLEX 20 MIN: CPT | Mod: GP | Performed by: PHYSICAL THERAPIST

## 2021-08-26 PROCEDURE — 84132 ASSAY OF SERUM POTASSIUM: CPT | Performed by: UROLOGY

## 2021-08-26 PROCEDURE — 82374 ASSAY BLOOD CARBON DIOXIDE: CPT | Performed by: STUDENT IN AN ORGANIZED HEALTH CARE EDUCATION/TRAINING PROGRAM

## 2021-08-26 PROCEDURE — 250N000013 HC RX MED GY IP 250 OP 250 PS 637: Performed by: STUDENT IN AN ORGANIZED HEALTH CARE EDUCATION/TRAINING PROGRAM

## 2021-08-26 PROCEDURE — 99207 PR CDG-MDM COMPONENT: MEETS MODERATE - DOWN CODED: CPT | Performed by: PHYSICIAN ASSISTANT

## 2021-08-26 PROCEDURE — 258N000003 HC RX IP 258 OP 636: Performed by: STUDENT IN AN ORGANIZED HEALTH CARE EDUCATION/TRAINING PROGRAM

## 2021-08-26 PROCEDURE — 250N000013 HC RX MED GY IP 250 OP 250 PS 637: Performed by: PHYSICIAN ASSISTANT

## 2021-08-26 PROCEDURE — 85027 COMPLETE CBC AUTOMATED: CPT | Performed by: STUDENT IN AN ORGANIZED HEALTH CARE EDUCATION/TRAINING PROGRAM

## 2021-08-26 PROCEDURE — 88307 TISSUE EXAM BY PATHOLOGIST: CPT | Mod: 26 | Performed by: PATHOLOGY

## 2021-08-26 PROCEDURE — 250N000013 HC RX MED GY IP 250 OP 250 PS 637: Performed by: UROLOGY

## 2021-08-26 RX ORDER — OXYCODONE HYDROCHLORIDE 5 MG/1
2.5-5 TABLET ORAL EVERY 4 HOURS PRN
Qty: 8 TABLET | Refills: 0 | Status: SHIPPED | OUTPATIENT
Start: 2021-08-26 | End: 2022-02-15

## 2021-08-26 RX ORDER — LABETALOL HYDROCHLORIDE 5 MG/ML
10 INJECTION, SOLUTION INTRAVENOUS EVERY 6 HOURS PRN
Status: DISCONTINUED | OUTPATIENT
Start: 2021-08-26 | End: 2021-08-26 | Stop reason: HOSPADM

## 2021-08-26 RX ORDER — METHOCARBAMOL 500 MG/1
500 TABLET, FILM COATED ORAL 4 TIMES DAILY PRN
Qty: 40 TABLET | Refills: 0 | Status: SHIPPED | OUTPATIENT
Start: 2021-08-26 | End: 2022-02-15

## 2021-08-26 RX ORDER — AMOXICILLIN 250 MG
1 CAPSULE ORAL 2 TIMES DAILY
Qty: 45 TABLET | Refills: 0 | Status: SHIPPED | OUTPATIENT
Start: 2021-08-26 | End: 2022-12-05

## 2021-08-26 RX ORDER — POLYETHYLENE GLYCOL 3350 17 G/17G
17 POWDER, FOR SOLUTION ORAL DAILY PRN
Qty: 510 G | Refills: 0 | Status: SHIPPED | OUTPATIENT
Start: 2021-08-26 | End: 2022-12-05

## 2021-08-26 RX ORDER — POTASSIUM CHLORIDE 750 MG/1
40 TABLET, EXTENDED RELEASE ORAL ONCE
Status: COMPLETED | OUTPATIENT
Start: 2021-08-26 | End: 2021-08-26

## 2021-08-26 RX ORDER — ACETAMINOPHEN 325 MG/1
650 TABLET ORAL EVERY 6 HOURS PRN
Qty: 120 TABLET | Refills: 0 | Status: SHIPPED | OUTPATIENT
Start: 2021-08-26 | End: 2024-07-09

## 2021-08-26 RX ADMIN — SODIUM CHLORIDE: 9 INJECTION, SOLUTION INTRAVENOUS at 08:23

## 2021-08-26 RX ADMIN — AMLODIPINE BESYLATE 10 MG: 10 TABLET ORAL at 07:44

## 2021-08-26 RX ADMIN — CARVEDILOL 25 MG: 25 TABLET, FILM COATED ORAL at 07:45

## 2021-08-26 RX ADMIN — ACETAMINOPHEN 975 MG: 325 TABLET, FILM COATED ORAL at 11:02

## 2021-08-26 RX ADMIN — POTASSIUM CHLORIDE 40 MEQ: 750 TABLET, EXTENDED RELEASE ORAL at 09:10

## 2021-08-26 RX ADMIN — METHOCARBAMOL 500 MG: 500 TABLET ORAL at 11:02

## 2021-08-26 RX ADMIN — POLYETHYLENE GLYCOL 3350 17 G: 17 POWDER, FOR SOLUTION ORAL at 07:45

## 2021-08-26 RX ADMIN — OXYCODONE HYDROCHLORIDE 5 MG: 5 TABLET ORAL at 00:23

## 2021-08-26 RX ADMIN — INSULIN ASPART 1 UNITS: 100 INJECTION, SOLUTION INTRAVENOUS; SUBCUTANEOUS at 11:02

## 2021-08-26 RX ADMIN — INSULIN ASPART 1 UNITS: 100 INJECTION, SOLUTION INTRAVENOUS; SUBCUTANEOUS at 07:56

## 2021-08-26 RX ADMIN — LABETALOL HYDROCHLORIDE 10 MG: 5 INJECTION, SOLUTION INTRAVENOUS at 04:17

## 2021-08-26 RX ADMIN — ACETAMINOPHEN 975 MG: 325 TABLET, FILM COATED ORAL at 04:07

## 2021-08-26 RX ADMIN — DOCUSATE SODIUM 50 MG AND SENNOSIDES 8.6 MG 1 TABLET: 8.6; 5 TABLET, FILM COATED ORAL at 07:45

## 2021-08-26 RX ADMIN — ATORVASTATIN CALCIUM 20 MG: 20 TABLET, FILM COATED ORAL at 07:44

## 2021-08-26 RX ADMIN — METHOCARBAMOL 500 MG: 500 TABLET ORAL at 15:37

## 2021-08-26 RX ADMIN — METHOCARBAMOL 500 MG: 500 TABLET ORAL at 07:45

## 2021-08-26 ASSESSMENT — ACTIVITIES OF DAILY LIVING (ADL)
ADLS_ACUITY_SCORE: 18

## 2021-08-26 ASSESSMENT — MIFFLIN-ST. JEOR: SCORE: 1428.08

## 2021-08-26 NOTE — PROGRESS NOTES
[facility]    Medicine Progress Note - Hospitalist Service, Gold 6       HD #2        ASSESSMENT & PLAN:         Pwo Micaela is a 66 year old male with PMH of HTN, HLD, CKD III, and left obstructing nephrolithiasis with resulting atrophic left kidney who was admitted 8/24/2021 by urology for laparoscopic left total nephrectomy by Dr. Wright. Medicine consulted post-op for hypertension.     # Refractory HTN:  Felt 2/2 renovascular disease in setting of atrophic L kidney. BP elevated cleo-operatively but now adequately controlled.   - Coreg 25mg BID  - Amlodipine 10mg daily  - Continue to hold HCTZ, consider restarting in AM if Cr stable  - Hydralazine IV prn for SBP >180    # Dizziness:  Postural dizziness with symptoms suggestive of pre-syncope, possibly d/t relative hypotension and visceral response to recent surgery/pain/opiates. No vertiginous symptoms. Orthostatic vitals negative, which is reassuring.   - No further evaluation or treatment  - Monitor closely    # Hx atrophic L kidney s/p laparoscopic total nephrectomy:  No surgical complications noted.   - Management per urology    # CKD stage III:  Baseline Cr 1.2-1.8. Repeat Cr 1.06 today.   - Avoid nephrotoxins  - Continue MIVF for now (stop if hypertensive)  - Repeat BMP in AM    # Hypokalemia:  Replacement protocol ordered.    # Pre-diabetes:  A1c 6.2. Occasional BG >200. Discussed with patient today and instructed to decrease amount of carbohydrates in diet. Encouraged exercise (walking)  - Monitor BID  - OP follow up with PCP for ongoing management  - Repeat A1c in 3 months    # HLD:  Continue PTA statin.     # Vision changes, POA:  Follows with ophthalmology. Patient notes chronic blurred vision. Noted vision went dark this morning, suspect pre-syncope symptoms. Now back to baseline. No further episodes.   - Monitor             The patient's care was discussed with the Attending Physician, Dr. Carbajal and Primary  team.    Santana Deleon PA-C  Hospitalist Service, [address]  Securely message with the [employer] Web Console (learn more here)  Text page via [facility] Paging/Directory  Please see sign in/sign out for up to date coverage information    Clinically Significant Risk Factors Present on Admission               ______________________________________________________________________    Interval History   No acute events overnight. History obtained via  ipad.     Continued dizziness with sitting and standing. Orthostatics negative. Able to ambulate with therapy today. Remained dizzy throughout, however symptoms did not worsening. No near-syncope. Notes similar symptoms when dealing with post-op pain.     ROS:  Pulm, CV, GI and  negative unless otherwise noted above.     Data reviewed today: I reviewed all medications, new labs and imaging results over the last 24 hours.    Physical Exam   Vital Signs: Temp: 98.5  F (36.9  C) Temp src: Oral BP: (Abnormal) 141/80 Pulse: 85   Resp: 16 SpO2: 93 % O2 Device: None (Room air)    Weight: 161 lbs 3.2 oz  GENERAL:  Awake. Alert. NAD.    HEENT:  Moist mucous membranes.   CV:  RRR. S1, S2. No murmur. Pedal pulses 2+ bilaterally.  PULM:  Normal effort. CTAB.   GI:  Abdomen soft, non-distended. Active bowel sounds. Mildly tender in LLQ.  NEURO:  Grossly non-focal. Moves all extremities.    EXTREMITIES:  No peripheral edema. No calf tenderness.   SKIN:  Dry. No visible rash.     Data   Recent Labs   Lab 08/26/21  1256 08/26/21  0801 08/25/21  1730 08/25/21  0712 08/24/21  1852   NA  --  139  --  139 138   POTASSIUM 3.9 3.2* 3.6 3.2* 3.1*   CHLORIDE  --  108  --  105 101   CO2  --  28  --  24 29   ANIONGAP  --  3  --  10 8   BUN  --  16  --  22 22   CR  --  1.06  --  1.10 1.05   LENNOX  --  8.0*  --  7.9* 8.8     Recent Labs   Lab 08/26/21  0801 08/25/21  0712   WBC 10.0 12.5*   RBC 4.49 4.77   HGB 13.4 14.2   HCT 40.1 42.0    MCV 89 88   MCH 29.8 29.8   MCHC 33.4 33.8   RDW 12.5 12.6    200     No lab results found in last 7 days.     Glucose Values Latest Ref Rng & Units 8/3/2021 8/17/2021 8/24/2021 8/25/2021 8/26/2021   Bedside Glucose (mg/dl )  - -- -- -- -- --   GLUCOSE 70 - 99 mg/dL 172(H) 107 192(H) 173(H) 215(H)   Some recent data might be hidden        All labs personally reviewed in [employer].  See A&P for additional results.     Unresulted Labs Ordered in the Past 30 Days of this Admission     Date and Time Order Name Status Description    8/24/2021  3:13 PM Surgical Pathology Exam In process

## 2021-08-26 NOTE — PROGRESS NOTES
"Urology  Progress Note    NAEO  x1 dose of labetalol overnight for 160 BP although medicine last evening were only for BP >180  Still feels light headed/dizzy when standing up/sitting up  Pain is controlled  Denies CP, SOB, N/V    Exam  BP (!) 163/86 (BP Location: Right arm)   Pulse 89   Temp 99.6  F (37.6  C) (Oral)   Resp 20   Ht 1.651 m (5' 5\")   Wt 73.5 kg (162 lb)   SpO2 92%   BMI 26.96 kg/m    No acute distress  Unlabored breathing  Abdomen soft, nontender, nondistended. Incisions c/d/i with exofin    UOP 1650/250    Labs  Cr (1.10)    AM labs pending    Assessment/Plan  66 year old y/o male POD#2 s/p left laparoscopic total nephrectomy.     Neuro: acetaminophen, oxycodone, dilaudid for pain control  CV: VSS, PTA carvedilol, atorvastatin, prn labetalol/hydralazine, medicine consulted, will work with them to determine plan for continued orthostasis.    Pulm: incentive spirometry while awake  FEN/GI: regular diet, MIVF @ 100/hr  Endo: low dose SSI  : staton catheter removed will replete electrolytes as needed  Heme/ID: WBC and Hg pending, no signs of infection  Activity: OOB AT  PPx: SCDs  Dispo: floor, likely home today pending BP control, toleration of regular diet    Seen and examined with the chief resident. Will discuss with Dr. Wright.    Luis Daniel Jorgensen MD, PGY-2  Urology Resident     Contacting the Urology Team     Please use the following job codes to reach the Urology Team. Note that you must use an in house phone and that job codes cannot receive text pages.     On weekdays, dial [phone] (or star-star-star [phone] on the new [employer] telephones) then [phone] to reach the Adult Urology resident or PA on call    On weekdays, dial [phone] (or star-star-star [phone] on the new [employer] telephones) then [phone] to reach the Pediatric Urology resident    On weeknights and weekends, dial [phone] (or star-star-star [phone] on the new [employer] telephones) then [phone] to reach the Urology resident on call (for both Adult and Pediatrics)    "

## 2021-08-26 NOTE — PLAN OF CARE
Discharge Summary    Reason for therapy discharge:    Discharged to home.    Progress towards therapy goal(s). See goals on Care Plan in [facility] electronic health record for goal details.  Goals partially met.  Barriers to achieving goals:   discharge from facility.    Therapy recommendation(s):    Continue home exercise program.

## 2021-08-26 NOTE — ANESTHESIA POSTPROCEDURE EVALUATION
Patient: Pwo Micaela    Procedure(s):  NEPHRECTOMY, TOTAL, LAPAROSCOPIC    Diagnosis:Atrophic kidney [N26.1]  Nephrolithiasis [N20.0]  Diagnosis Additional Information: No value filed.    Anesthesia Type:  General    Note:  Disposition: Admission   Postop Pain Control: Uneventful            Sign Out: Well controlled pain   PONV: No   Neuro/Psych: Uneventful            Sign Out: Acceptable/Baseline neuro status   Airway/Respiratory: Uneventful            Sign Out: Acceptable/Baseline resp. status   CV/Hemodynamics: Uneventful            Sign Out: Acceptable CV status; No obvious hypovolemia; No obvious fluid overload   Other NRE: NONE   DID A NON-ROUTINE EVENT OCCUR? No           Last vitals:  Vitals Value Taken Time   /78 08/24/21 1750   Temp 37.1  C (98.8  F) 08/24/21 1645   Pulse 101 08/24/21 1807   Resp 12 08/24/21 1806   SpO2 100 % 08/24/21 1806   Vitals shown include unvalidated device data.    Electronically Signed By: Leonardo Melgar MD  August 26, 2021  10:14 AM

## 2021-08-26 NOTE — PLAN OF CARE
Vitals: Temp: 98.5  F (36.9  C) Temp src: Oral BP: (!) 141/80 Pulse: 85   Resp: 16 SpO2: 93 % O2 Device: None (Room air)        Time: 8485-5471  Reason for admission: POD#2 left lap nephrectomy.   Activity: Ax1 with gait belt and IV pole. Ambulated in shields with physical therapy today.   Pain: Minimal pain at lap sites. Controlled with scheduled Tylenol.   Neuro: A&O x4. Rochelle speaking. Calls appropriately. Pt reports some dizziness w/ movement. Orthostatics taken - see flowsheets.   Cardiac: WDL.   Respiratory:  LS clear. Sating >90% on RA. IS at bedside.   GI/: Voiding adequately w/ bedside urinal. +BS. Passing gas. LBM before surgery.   Diet: Regular. BG checks before meals.   Lines: R PIV infusing NS @100 mL.hr. L PIV SL.   Incisions/Drains/Skin: Lap sites x3 MANGO.    Lab: K+ 3.2. BGs 218, 184.  Electrolyte Replacements: K+ replaced at 0900. Re-check @1300 = 3.9.     Plan: discharge today.

## 2021-08-26 NOTE — DISCHARGE SUMMARY
[facility] UroDischarge Summary    Patient: Moriah Hinojosa    MRN: [id number]   : 1955         Date of Admission:  2021   Date of Discharge::  2021  Admitting Physician:  Catracho Wright MD  Discharge Physician:  Dianna Colon PA-C             Admission Diagnoses:   Atrophic left kidney with hydronephrosis  Nephrolithiasis [N20.0]  Refractory hypertension    Past Medical History:   Diagnosis Date     Chronic pain of both knees 2019     COVID-19 2020     Hypercholesteremia 2018     Hypertension 2018             Discharge Diagnosis:     Atrophic left kidney with hydronephrosis   Nephrolithiasis [N20.0]  Refractory hypertension  Postural dizziness  Prediabetes (A1C 6), glucose <180mg/dL consistently     Past Medical History:   Diagnosis Date     Chronic pain of both knees 2019     COVID-19 2020     Hypercholesteremia 2018     Hypertension 2018            Procedures:     Procedure(s): 21 -   Left laparoscopic total nephrectomy  Dr. Catracho Wright (Urology)            Medications Prior to Admission:     Medications Prior to Admission   Medication Sig Dispense Refill Last Dose     amLODIPine (NORVASC) 10 MG tablet [AMLODIPINE (NORVASC) 10 MG TABLET] Take 1 tablet (10 mg total) by mouth daily. (Patient taking differently: Take 10 mg by mouth every morning ) 90 tablet 11 2021 at 0600     atorvastatin (LIPITOR) 20 MG tablet [ATORVASTATIN (LIPITOR) 20 MG TABLET] Take 1 tablet (20 mg total) by mouth daily. (Patient taking differently: Take 20 mg by mouth every evening ) 90 tablet 11 2021 at Unknown time     carvedilol (COREG) 25 MG tablet Take 1 tablet (25 mg) by mouth 2 times daily 180 tablet 11 2021 at 0600     prednisoLONE acetate (PRED-FORTE) 1 % ophthalmic suspension 2 times daily    2021 at 0600     [DISCONTINUED] acetaminophen (TYLENOL) 325 MG tablet Take 325-650 mg by mouth every 6 hours as needed for mild pain   Past Month  at Unknown time     [DISCONTINUED] hydrochlorothiazide (HYDRODIURIL) 50 MG tablet Take 1 tablet (50 mg) by mouth daily 30 tablet 11 8/23/2021 at Unknown time             Discharge Medications:     Current Discharge Medication List      START taking these medications    Details   methocarbamol (ROBAXIN) 500 MG tablet Take 1 tablet (500 mg) by mouth 4 times daily as needed for muscle spasms (abdominal pain)  Qty: 40 tablet, Refills: 0    Associated Diagnoses: Postoperative pain      oxyCODONE (ROXICODONE) 5 MG tablet Take 0.5-1 tablets (2.5-5 mg) by mouth every 4 hours as needed for moderate to severe pain  Qty: 8 tablet, Refills: 0    Associated Diagnoses: Postoperative pain      polyethylene glycol (MIRALAX) 17 GM/Dose powder Take 17 g by mouth daily as needed for constipation  Qty: 510 g, Refills: 0    Associated Diagnoses: Postoperative pain      senna-docusate (SENOKOT-S/PERICOLACE) 8.6-50 MG tablet Take 1 tablet by mouth 2 times daily To prevent constipation  Qty: 45 tablet, Refills: 0    Associated Diagnoses: Postoperative pain         CONTINUE these medications which have CHANGED    Details   acetaminophen (TYLENOL) 325 MG tablet Take 2 tablets (650 mg) by mouth every 6 hours as needed for pain  Qty: 120 tablet, Refills: 0    Associated Diagnoses: Postoperative pain         CONTINUE these medications which have NOT CHANGED    Details   amLODIPine (NORVASC) 10 MG tablet [AMLODIPINE (NORVASC) 10 MG TABLET] Take 1 tablet (10 mg total) by mouth daily.  Qty: 90 tablet, Refills: 11    Associated Diagnoses: Accelerated hypertension      atorvastatin (LIPITOR) 20 MG tablet [ATORVASTATIN (LIPITOR) 20 MG TABLET] Take 1 tablet (20 mg total) by mouth daily.  Qty: 90 tablet, Refills: 11    Associated Diagnoses: Hypercholesteremia      carvedilol (COREG) 25 MG tablet Take 1 tablet (25 mg) by mouth 2 times daily  Qty: 180 tablet, Refills: 11      prednisoLONE acetate (PRED-FORTE) 1 % ophthalmic suspension 2 times daily           STOP taking these medications       hydrochlorothiazide (HYDRODIURIL) 50 MG tablet Comments:   Reason for Stopping:                     Consultations:   Consultation during this admission received:   INTERNAL MEDICINE ADULT IP CONSULT FOR [place]  PHYSICAL THERAPY ADULT IP CONSULT          Brief History of Illness:   Reason for admission requiring a surgical or invasive procedure:   Atrophic kidney [N26.1]  Nephrolithiasis [N20.0]   The patient underwent the following procedure(s):   See above     Pwo Micaela is a(n) 66 year old male with a history of left obstructing nephrolithiasis leading to atrophic left kidney. More recently, he has developed refractory hypertension, resistant to medical management. As such, he presented for nephrectomy on 8/24/21.  There were no immediate complications during this procedure but please refer to the full operative summary for details.           Hospital Course:   The patient's hospital course was remarkable for uncontrolled HTN on POD#0, prompting an Internal Medicine consultation.  Their team followed throughout the hospital stay, and see their notes for more detailed assessment.  Issues addressed included:    # refractory HTN - which was felt to be secondary to renovascular Dx.  Prior to discharge BP was adequately controlled on coreg 25mg bid and amlodipine 10mg daily.  HCTZ was held.    In the 24 hours prior to discharge blood pressures ranged 109/54 to 163/86.  HR was 85bpm.      # dizziness - which was postural and potentially due to relative hypotension (given preop BPs of ~200/100mmHg) vs visceral response to recent surgery/ pain/opiates.  No vertiginous symptoms.  He was able to walk without worsening dizziness.   Orthostatics were reassuring and showed:  - Lying 149/85, 78bpm  - Sitting 124/73, 91bpm  - Standing 146/82, 89bpm    # Pre-diabetes - A1C 6.2%.  Glucose <180mg/dL consistently.  For this he was counseled on dietary modifications and will need to follow up  with PCP for further monitoring.      Pwo Micaela otherwise recovered as anticipated and experienced no post-operative complications.    On POD #2 he was ambulating without assitance, tolerating the discharge diet, had pain controlled with PO medications to go home with, and was requiring no IV medications or fluids. His Briones had been removed and he was voiding without difficulty. He was discharged to home with family support.  He was given appropriate contact information, follow-up and instructions as seen below in the discharge paperwork.         Discharge Labs:     No results found for: PSA  Recent Labs   Lab 08/26/21  0801 08/25/21  0712   WBC 10.0 12.5*   HGB 13.4 14.2    200     Recent Labs   Lab 08/26/21  1052 08/26/21  0801 08/25/21  1730 08/25/21  0712   NA  --  139  --  139   POTASSIUM  --  3.2* 3.6 3.2*   CHLORIDE  --  108  --  105   CO2  --  28  --  24   BUN  --  16  --  22   CR  --  1.06  --  1.10   * 215*  --  173*   LENNOX  --  8.0*  --  7.9*     No lab results found in last 7 days.    Invalid input(s): URINEBLOOD  No results found for this or any previous visit.         Discharge Instructions and Follow-Up:    Diet:  - Regular/ home diet    Activity:   - Standing slowly if you are feeling dizzy.  Consider having your family walk with you when you first get home.    - No strenuous exercise for 6 weeks.   - No lifting, pushing, pulling more than 10 pounds for 6 weeks. Take care when pushing with your arms to stand up.  - Do not strain your belly area.  When you bend, sit up or twice, you could strain the area around your incision.    - Do not strain with bowel movements.    - Do not drive until you can press the brake pedal quickly and fully without pain.   - Do not operate a motor vehicle while taking narcotic pain medications.     Medications:   1) PAIN: Oxycodone is a narcotic medication that has been prescribed for pain.  Narcotics will cause sleepiness and constipation and can become  addictive, therefore it is best to stop or reduce them as soon as you can.  Any left over narcotics should be disposed of with an Authorized  for unneeded medications.  Contact your [facility]'s or [facility] government's household trash and recycling service to learn about medication disposal options and guidelines for your area.  If you decide to store this medication at home it should be kept in a locked cabinet to prevent access to children or visitors. Never drive, operate machinery or drink alcoholic beverages while you are taking narcotic pain medications.  To reduce your narcotic use, take Tylenol (acetaminophen 625mg) as directed.  This has been prescribed for you.  Do not take more than 4,000mg of Tylenol (acetaminophen/ APAP) from all sources in any 24 hour period since this can cause liver damage.    - Methocarbamol (robaxen) is a muscle spasm medication that also can be taken four times daily as needed, IF you are having abdominal pain    2) CONSTIPATION: Pericolace (senna/docusate sodium) can be taken twice daily for prevention of constipation since surgery, pain medications and bladder spasm medications can all make you constipated.  Please reduce or stop pericolace if you develop loose stools. Other over the counter solutions such as prune juice, miralax, fiber products, senna, and dulcolax can also be used. If you are taking the pericolace but still have not had a bowel movement in 3 days, start over-the-counter Milk of Magnesia taken twice daily until you have a good result.  Call the office with any concerns.     3) BLOOD PRESSURE MEDICATIONS:  - STOP your hydrochlorothiazide  - CONTINUE your amlodipine and carvedilol medications    Incisions:   - Daily showering is important, and you may get incisions wet.  Take care in the shower so you do not become dizzy.   - Do not scrub incisions or soak in a bath, pool, hot tub, etc. until incisions have fully healed, tubes have been removed, and authorized  "by your surgeon, typically 4 weeks after surgery.   - The wound dressing should be removed 48 hours after surgery.   - The purple skin glue on your incisions will flake off with time and should not be scrubbed.  Also avoid applying lotions or ointments to these areas.  - It is preferable for the incision to be left uncovered, but you may cover with gauze if needed for comfort or to protect clothing from drainage.     Follow-Up:   - Schedule an appointment to be seen by your primary care provider within 7 days of discharge for a postoperative checkup. Please check LABS (BMP and CBC) at this appointment.  Please discuss your blood pressure management and any dizziness you are having (if you are still dizzy).  Also discuss your blood sugars, which have been mildly elevated but do not meet criteria for diabetes.    - Follow up with Dr. Wright as scheduled on 9/7/21  - Call or return sooner than your regularly scheduled visit if you develop any of the following:  Fever (greater than 101.3F), uncontrolled pain, uncontrolled nausea or vomiting, concerns about bowel function, as well as increased redness, swelling, drainage from your wound or any concerns about urinating or urinary catheter drainage.      Here are some phone numbers where you can reach us:  - Monday through Friday, 8am to 4:30pm - as long as it is not a holiday, IT IS BEST to call the Urology Clinic Triage Line at: [phone] with any non-emergent urology concerns.    - If it is a night, weekend, or holiday call the [facility] number at [phone] and ask the  to page the \"urology resident on call\".  Typically, the on-call provider should return your call within 30 minutes.  Please page the on-call provider again if you haven't been contacted as expected.  Rarely, the on-call provider will be unable to promptly return a call due to a hospital emergency.  If you have paged twice and are still not contacted, ask the hospital  to page " "the \"urology CHIEF-RESIDENT on call\".  - FOR EMERGENCIES, always call 911 or go to the Emergency Department         Discharge Disposition:     Discharged to home      Attestation: I have reviewed today's vital signs, notes, medications, labs and imaging.    Hafsa Colon PA-C  Urology Physician Assistant  Personal Pager: [phone]    Please call Job Code:   [phone] to reach the Urology resident or PA on call - Weekdays  [phone] to reach the Urology resident or PA on call - Weeknights and weekends    August 26, 2021         "

## 2021-08-26 NOTE — PLAN OF CARE
"Neuro: A/O x4 able to make need known with .  Respiratory: on room air LS clear, denies SOB.  Cardiac: WDL denies chest pain  Diet: regular diet with poor appetite.  GI/: Voiding with AUOP. Denies flatus and BM last reported BM 8/22/21.  Incision/Drains: lap site x2 to abdomen dermabonded.  IV Access: L/R PIV. R infusing NS at 100ml/hr and L SL.  Lab: K recheck 3.6, no replacement need AM recheck  Pain: reports pain to incision site with movement rated pain at 4/10 without movement pain is rated at 2/10. Patient's pain is managed with PRN oxycodone and scheduled robaxin and tylenol  VS: /54 (BP Location: Right arm)   Pulse 76   Temp 98.7  F (37.1  C) (Oral)   Resp 16   Ht 1.651 m (5' 5\")   Wt 73.5 kg (162 lb)   SpO2 95%   BMI 26.96 kg/m     Activity: up with assist x1 at bedside to use urinal.    New Changes this shift: patient denies dizziness this shift.  Plan: encourage activity out of bed, manage pain. Continue POC.       "

## 2021-08-26 NOTE — PLAN OF CARE
Vitals:    08/26/21 0408 08/26/21 0738 08/26/21 0900 08/26/21 1513   BP: (!) 163/86 (!) 141/80  (!) 161/83   BP Location: Right arm Right arm  Right arm   Pulse: 89 85  82   Resp: 20 16  16   Temp: 99.6  F (37.6  C) 98.5  F (36.9  C)  98.2  F (36.8  C)   TempSrc: Oral Oral  Oral   SpO2: 92% 93%  95%   Weight:   73.1 kg (161 lb 3.2 oz)    Height:       AVSS.  Discussed discharge instructions with pt via .  Print out of discharge instructions given to pt.   Rx to be picked up from discharge pharmacy.   PIV x 2 removed.   Pt assisted off unit via WC.   Discharged home @ 1600.

## 2021-08-26 NOTE — PLAN OF CARE
2694-5116 shift:     Pt A/O x 4.  Rochelle speaking; utilizes  via IPAD in room.  Able to make needs known.  Remains on RA with sats > 90%.  Afebrile.  Hemodynamically stable.  SBP > 160 once this shift.  PRN labetalol 10 mg IV given x 1; per order to given if > .  Voiding spontaneously via urinal.   R PIV infusing NS at 100 ml/hr.  Pt on scheduled tylenol and PRN oxycodone for pain control.  PRN oxycodone given x 1 for this shift for pain to incision site; rates 5/10.   Pt did not get OOB this shift; able to shift weight independently.      POC:  Pain manage pain and encourage mobility today as pt has refused PT/OT thus far.  Notify primary with any changes.

## 2021-08-26 NOTE — PROGRESS NOTES
08/26/21 0855   Quick Adds   Type of Visit Initial PT Evaluation       Present yes   Language Rochelle- via ipad   Living Environment   People in home child(jena), adult;spouse   Current Living Arrangements house   Home Accessibility stairs to enter home   Number of Stairs, Main Entrance 3   Number of Stairs, Within Home, Primary greater than 10 stairs   Living Environment Comments Lives with family members, not working currently.    Self-Care   Usual Activity Tolerance moderate   Current Activity Tolerance fair   Equipment Currently Used at Home none   Disability/Function   Patient's preferred means of communication    Difficulty Communicating no   Difficulty Eating/Swallowing no   Walking or Climbing Stairs Difficulty no   Dressing/Bathing Difficulty no   Toileting issues no   Doing Errands Independently Difficulty (such as shopping) no   Fall history within last six months no   Change in Functional Status Since Onset of Current Illness/Injury yes   General Information   Onset of Illness/Injury or Date of Surgery 08/24/21   Referring Physician Dianna Colon, PT   Patient/Family Therapy Goals Statement (PT) Return home, improve dizziness   Pertinent History of Current Problem (include personal factors and/or comorbidities that impact the POC) Pwo Micaela is a 66 year old male with PMH of HTN, HLD, CKD III, and left obstructing nephrolithiasis with resulting atrophic left kidney who was admitted 8/24/2021 by urology for laparoscopic left total nephrectomy by Dr. Wright. Medicine consulted post-op for hypertension.    Existing Precautions/Restrictions abdominal   General Observations RA for session   Cognition   Orientation Status (Cognition) oriented x 3   Strength   Strength Comments Not formally assessed, demos antigravity strength in UE/LE's.    Bed Mobility   Comment (Bed Mobility) SBA but does require verbal cueing for log roll, is lightheaded upon sitting   Transfers   Transfer  Safety Comments CGA to SBA sit<>Stands   Gait/Stairs (Locomotion)   Comment (Gait/Stairs) Ambulates 20 ft with no AD, CGA x1, slow and shuffled/cautious gait, no LOB   Balance   Balance Comments Fair, no LOB with static/dynamic balance.    Clinical Impression   Criteria for Skilled Therapeutic Intervention yes, treatment indicated   PT Diagnosis (PT) Impaired functional mobility   Influenced by the following impairments pain, generalized weakness, dizziness   Functional limitations due to impairments gait, home mobility   Clinical Presentation Stable/Uncomplicated   Clinical Presentation Rationale clinical judgement   Clinical Decision Making (Complexity) low complexity   Therapy Frequency (PT) 5x/week   Predicted Duration of Therapy Intervention (days/wks) 1 week   Planned Therapy Interventions (PT) balance training;bed mobility training;gait training;home exercise program;transfer training;strengthening   Risk & Benefits of therapy have been explained evaluation/treatment results reviewed;care plan/treatment goals reviewed;risks/benefits reviewed;current/potential barriers reviewed;participants voiced agreement with care plan;participants included;patient   PT Discharge Planning    PT Discharge Recommendation (DC Rec) home with assist   PT Rationale for DC Rec Patient is below baseline mobility but is moving slow and steady, has support from family at home. Should be ok to DC home with family assist once medically stable.    PT Brief overview of current status  SBA for transfers/gait   Total Evaluation Time   Total Evaluation Time (Minutes) 5

## 2021-08-30 ENCOUNTER — PATIENT OUTREACH (OUTPATIENT)
Dept: CARE COORDINATION | Facility: CLINIC | Age: 66
End: 2021-08-30

## 2021-08-30 NOTE — PROGRESS NOTES
Clinic Care Coordination Contact    Follow Up Progress Note      Assessment: follow up post hospitalization.   CC RN tried calling patient but not able to reach patient via phone. Patient has FVP as his lead care coordinator. CC RN plans to do a warm hand-off with next outreach call.    Goals addressed this encounter:   Goals Addressed                    This Visit's Progress       COMPLETED: Medical (pt-stated)   80%      Goal Statement: I will attend my urology/surgery appt then next 90 days:     Goal: 8/17/2021  Barriers: language barrier, lack of knowledge   Strengths: agrees to work on goal   Date to Achieve By: 11/17/2021  Patient expressed understanding of goal: Yes     Action steps to achieve this goal:   1. I already attended my pre-op on 8/3/2021. COMPLETED  2. I will attend my appt with PCP on 8/17/2021 at 1:40pm to follow up on elevated B/P prior to surgery. Appt reminder provided todday. COMPLETED  3. I will attend covid test on 8/20/2021 at 1pm.   4. I will attend my surgery - NEPHRECTOMY, TOTAL, LAPAROSCOPIC as scheduled on 8/24/2021 at 12:50pm with Dr Wright. Surgery team will call 1-2 days before with arrical time. ( COMPLETED)  5. I will attend my post-op appt with Dr Wright on 9/7/2021 at 1:15pm.   6. I will call CHW or CCC RN for coordination assistance.       [facility]    [address] [place] [address], [address] [place] [address]   [phone]     Note: Patient to follow up as needed.       Goal completed: 9/22/2021                  Outreach Frequency: 6 weeks    Plan:   CC RN plans to do a warm hand-off to FVP with next outreach call.

## 2021-09-07 ENCOUNTER — OFFICE VISIT (OUTPATIENT)
Dept: UROLOGY | Facility: CLINIC | Age: 66
End: 2021-09-07
Payer: COMMERCIAL

## 2021-09-07 VITALS
SYSTOLIC BLOOD PRESSURE: 164 MMHG | HEIGHT: 65 IN | WEIGHT: 160 LBS | HEART RATE: 106 BPM | DIASTOLIC BLOOD PRESSURE: 105 MMHG | BODY MASS INDEX: 26.66 KG/M2

## 2021-09-07 DIAGNOSIS — N13.2 HYDRONEPHROSIS WITH URINARY OBSTRUCTION DUE TO URETERAL CALCULUS: ICD-10-CM

## 2021-09-07 DIAGNOSIS — N20.0 NEPHROLITHIASIS: ICD-10-CM

## 2021-09-07 DIAGNOSIS — N26.1 ATROPHIC KIDNEY: Primary | ICD-10-CM

## 2021-09-07 PROCEDURE — 99024 POSTOP FOLLOW-UP VISIT: CPT | Performed by: UROLOGY

## 2021-09-07 ASSESSMENT — MIFFLIN-ST. JEOR: SCORE: 1422.64

## 2021-09-07 ASSESSMENT — PAIN SCALES - GENERAL: PAINLEVEL: MILD PAIN (2)

## 2021-09-07 NOTE — NURSING NOTE
"Chief Complaint   Patient presents with     Follow Up       Blood pressure (!) 164/105, pulse 106, height 1.651 m (5' 5\"), weight 72.6 kg (160 lb). Body mass index is 26.63 kg/m .    Patient Active Problem List   Diagnosis     Resistant hypertension     Cataract     Hypercholesteremia     Chronic pain of both knees     CKD (chronic kidney disease) stage 3, GFR 30-59 ml/min     Allergic conjunctivitis, bilateral     COVID-19     Tachycardia     Accelerated hypertension     Nephrolithiasis     Hydronephrosis with urinary obstruction due to ureteral calculus     SOB (shortness of breath)     Decreased visual acuity     Corneal scarring     Poor dentition     Atrophic kidney       No Known Allergies    Current Outpatient Medications   Medication Sig Dispense Refill     acetaminophen (TYLENOL) 325 MG tablet Take 2 tablets (650 mg) by mouth every 6 hours as needed for pain 120 tablet 0     amLODIPine (NORVASC) 10 MG tablet [AMLODIPINE (NORVASC) 10 MG TABLET] Take 1 tablet (10 mg total) by mouth daily. (Patient taking differently: Take 10 mg by mouth every morning ) 90 tablet 11     atorvastatin (LIPITOR) 20 MG tablet [ATORVASTATIN (LIPITOR) 20 MG TABLET] Take 1 tablet (20 mg total) by mouth daily. (Patient taking differently: Take 20 mg by mouth every evening ) 90 tablet 11     carvedilol (COREG) 25 MG tablet Take 1 tablet (25 mg) by mouth 2 times daily 180 tablet 11     methocarbamol (ROBAXIN) 500 MG tablet Take 1 tablet (500 mg) by mouth 4 times daily as needed for muscle spasms (abdominal pain) 40 tablet 0     oxyCODONE (ROXICODONE) 5 MG tablet Take 0.5-1 tablets (2.5-5 mg) by mouth every 4 hours as needed for moderate to severe pain 8 tablet 0     polyethylene glycol (MIRALAX) 17 GM/Dose powder Take 17 g by mouth daily as needed for constipation 510 g 0     prednisoLONE acetate (PRED-FORTE) 1 % ophthalmic suspension 2 times daily        senna-docusate (SENOKOT-S/PERICOLACE) 8.6-50 MG tablet Take 1 tablet by mouth 2 " times daily To prevent constipation 45 tablet 0       Social History     Tobacco Use     Smoking status: Former Smoker     Smokeless tobacco: Never Used     Tobacco comment: no passive exposure   Substance Use Topics     Alcohol use: No     Drug use: Never       Nathan Clayton  9/7/2021  1:30 PM

## 2021-09-07 NOTE — LETTER
"9/7/2021      RE: Moriah Hinojosa  [address] [place] [address]         CHIEF COMPLAINT   It was my pleasure to see Moriah Hinojosa who is a 66 year old male for follow-up of atrophic kidney.      HPI  Moriah Hinojosa is a very pleasant 66 year old male w/ PMH of HLD, CKD stage 3, resistance hypertension, and atrophic L kidney with obstruction proximal L ureteral stone. He had atrophic kidney and has now undergone nephrectomy. He has baseline severe hypertension, and hope was nephrectomy would assist with this.  No complications since surgery. Mild pain with ambulation, but improving.    Left Nephrectomy 8/24/2021  Final Diagnosis   Left kidney, nephrectomy:  - Atrophic kidney with end-stage renal disease and severe hydronephrosis  -Negative for malignancy     PHYSICAL EXAM  Patient is a 66 year old  male   Vitals: Blood pressure (!) 164/105, pulse 106, height 1.651 m (5' 5\"), weight 72.6 kg (160 lb).  General Appearance Adult: Body mass index is 26.63 kg/m .  Alert, no acute distress, oriented  Lungs: no respiratory distress, or pursed lip breathing  Abdomen: soft, nontender, no organomegaly or masses  Incisions healing well  Back: no CVAT  Neuro: Alert, oriented, speech and mentation normal  Psych: affect and mood normal    ASSESSMENT and PLAN  66 year old male with history of atrophic left kidney secondary to previous stone disease. Now s/p nephrectomy done for refractory hypertension. Doing well today. Still with hypertension, but improved from previous. Discussed ongoing perioperative activity restrictions. Will plan follow-up with PCP in next week or two for ongoing BP management. Will follow-up with me as needed.    Catracho Wright MD  Urology  [facility]      "

## 2021-09-07 NOTE — PROGRESS NOTES
"  CHIEF COMPLAINT   It was my pleasure to see Pwjohn Hinojosa who is a 66 year old male for follow-up of atrophic kidney.      HPI  Pwo Micaela is a very pleasant 66 year old male w/ PMH of HLD, CKD stage 3, resistance hypertension, and atrophic L kidney with obstruction proximal L ureteral stone. He had atrophic kidney and has now undergone nephrectomy. He has baseline severe hypertension, and hope was nephrectomy would assist with this.  No complications since surgery. Mild pain with ambulation, but improving.    Left Nephrectomy 8/24/2021  Final Diagnosis   Left kidney, nephrectomy:  - Atrophic kidney with end-stage renal disease and severe hydronephrosis  -Negative for malignancy     PHYSICAL EXAM  Patient is a 66 year old  male   Vitals: Blood pressure (!) 164/105, pulse 106, height 1.651 m (5' 5\"), weight 72.6 kg (160 lb).  General Appearance Adult: Body mass index is 26.63 kg/m .  Alert, no acute distress, oriented  Lungs: no respiratory distress, or pursed lip breathing  Abdomen: soft, nontender, no organomegaly or masses  Incisions healing well  Back: no CVAT  Neuro: Alert, oriented, speech and mentation normal  Psych: affect and mood normal    ASSESSMENT and PLAN  66 year old male with history of atrophic left kidney secondary to previous stone disease. Now s/p nephrectomy done for refractory hypertension. Doing well today. Still with hypertension, but improved from previous. Discussed ongoing perioperative activity restrictions. Will plan follow-up with PCP in next week or two for ongoing BP management. Will follow-up with me as needed.    Catracho Wright MD  Urology  [facility]    "

## 2021-09-22 ENCOUNTER — PATIENT OUTREACH (OUTPATIENT)
Dept: NURSING | Facility: CLINIC | Age: 66
End: 2021-09-22

## 2021-09-22 NOTE — PROGRESS NOTES
Clinic Care Coordination Contact    Community Health Worker Follow Up    Care Gaps:   Health Maintenance Due   Topic Date Due     URINE DRUG SCREEN  Never done     LIPID  Never done     INFLUENZA VACCINE (1) 09/01/2021     ZOSTER IMMUNIZATION (2 of 2) 03/25/2021     Patient to address care gaps with PCP at the next follow up visit.    Goals:   Goals Addressed as of 9/22/2021 at 4:38 PM                    Today    8/23/21       Medical (pt-stated)   50%  40%    Added 8/17/21 by Nyla Bacon RN      Goal statement: I will establish home care services in the next 60 days.    Date goal set: 8/17/2021  Barriers: language barrier, lack of support, lack of knowledge  Strengths: Willing to accept support and agrees to work on goal  Date to achieve by: 10/17/2021  Patient expressed understanding of goal: Yes    Action steps to achieve this goal:  1.  I will ask my daughter to help me answering the phone when [facility] calls for initial visit.   2.  I will be home when my home care RN comes to visit me to establish services.   3.  I will follow up with CCC in the next month regarding this goal.     Note: [facility] received referral and CHW left a voice message for director of [facility] to call back for any paperwork/documents need.      Goal update: 8/23/2021; 9/22/2021            Intervention and Education during outreach:  -Patient's home care referral still pending and waiting for insurance approval.  -CHW sent urgent message to [facility] care coordinator to contact patient and daughter regarding PCA status.  -Patient will follow up with urologist as needed and all surgery completed.  -CHW called Tushar at [phone] and left a voice message regarding PCA services status.  -Patient does not need additional coordination assistance at this time and patient and daughter were informed to call with questions or concerns.       CHW Next Outreach: In one month.

## 2021-09-26 ENCOUNTER — PATIENT OUTREACH (OUTPATIENT)
Dept: GERIATRIC MEDICINE | Facility: CLINIC | Age: 66
End: 2021-09-26

## 2021-09-26 NOTE — PROGRESS NOTES
[facility] Care Coordination Contact    Call placed to members contact numbers; [phone] and [phone] regarding question about PCA services.  No PCA services are in process.  His last assessment on 12/11/21, he was independent in not in need of services.  Will follow.    Terra Hale RN  [facility]  [phone]

## 2021-10-08 ENCOUNTER — PATIENT OUTREACH (OUTPATIENT)
Dept: NURSING | Facility: CLINIC | Age: 66
End: 2021-10-08

## 2021-10-08 NOTE — LETTER
[facility]  Patient Centered Plan of Care  About Me:        Patient Name:  Moriah Zamora    YOB: 1955  Age:         66 year old   Jeni MRN:    [id number] Telephone Information:  Home Phone [phone]   Mobile [phone]       Address:  [address] [place] [address] Email address:  No e-mail address on record      Emergency Contact(s)    Name Relationship Lgl Grd Work Phone Home Phone Mobile Phone   1. ESTEPHANIA ZAMORA Grandchild   [phone]   2. OTILIO BLAIR Other   [phone]   3. MILTON NEAL Nephew   [phone]    4. LAURA ZAMORA Other   [phone]    5. LAURA ZAMORA Other   [phone]            Primary language:  Rochelle     needed? Yes   [facility]:  [phone] op. 1  Other communication barriers:Physical impairment;Language barrier;Lack of coping    Preferred Method of Communication:     Current living arrangement: I live in a private home with family    Mobility Status/ Medical Equipment: Independent w/Device        Health Maintenance      My Access Plan  Medical Emergency 911   Primary Clinic Line [facility] - [phone]   24 Hour Appointment Line [phone] or  [phone]) (toll-free)   24 Hour Nurse Line [phone] (toll-free)   Preferred Urgent Care [facility] [phone]     [facility]  [phone]     Preferred Pharmacy [facility] - [place], [place] - [address]     Behavioral Health Crisis Line The National Suicide Prevention Lifeline at 1-273.968.7080 or 911             My Care Team Members  Patient Care Team       Relationship Specialty Notifications Start End    Yassine Larsen MD PCP - General   6/18/18     Phone: [phone] Fax: [phone]         [address] [place] [address]    Terra Hale RN Lead Care Coordinator Primary Care - CC  Admissions 12/27/18     Phone: [phone]         Catracho Wright MD MD Urology  3/31/21     Phone: [phone] Pager: [phone] Fax: [phone]        [address] [place] [address]    Elza Mcknight, RN Registered Nurse Oncology  3/31/21     Phone: [phone] Pager: [phone]        Yassine Larsen MD Assigned PCP   6/16/21     Phone: [phone] Fax: [phone]         [address] [place] [address]    Marc Reyes MD Assigned Surgical Provider   9/26/21     Phone: [phone] Fax: [phone]         [address] [place] [address]    Catracho Wright MD Assigned Cancer Care Provider   9/26/21     Phone: [phone] Pager: [phone] Fax: [phone]        [address] [place] [address]            My Care Plans  Self Management and Treatment Plan  Goals and (Comments)  Goals        General     Medical (pt-stated)      Notes - Note edited  10/8/2021  1:17 PM by Nyla Bacon RN     Goal statement: I will establish home care services in the next 60 days.    Date goal set: 8/17/2021  Barriers: language barrier, lack of support, lack of knowledge  Strengths: Willing to accept support and agrees to work on goal  Date to achieve by: 10/17/2021  Patient expressed understanding of goal: Yes    Action steps to achieve this goal:  1.  I will ask my daughter to help me answering the phone when [facility] calls for initial visit.   2.  I will be home when my home care RN comes to visit me to establish services.   3.  I will follow up with CCC in the next month regarding this goal.     Note: [facility] received referral and CHW left a voice message for director of [facility] to call back for any paperwork/documents need.    No longer in      Goal update: 8/23/2021; 9/22/2021                 Action Plans on File:                       Advance Care Plans/Directives Type:   No data recorded    My  Medical and Care Information  Problem List   Patient Active Problem List   Diagnosis     Resistant hypertension     Cataract     Hypercholesteremia     Chronic pain of both knees     CKD (chronic kidney disease) stage 3, GFR 30-59 ml/min     Allergic conjunctivitis, bilateral     COVID-19     Tachycardia     Accelerated hypertension     Nephrolithiasis     SOB (shortness of breath)     Decreased visual acuity     Corneal scarring     Poor dentition      Current Medications and Allergies:  See printed Medication Report.    Care Coordination Start Date: No linked episodes   Frequency of Care Coordination: monthly     Form Last Updated: 10/08/2021

## 2021-10-08 NOTE — PROGRESS NOTES
Clinic Care Coordination Contact    Follow Up Progress Note      Assessment: warm hand-off to [facility]  CC RN spoke with patient today via phone. Patient states he's doing fine and he has no concerns. States he's taking his meds as directed and he no longer interested in home care if insurance won't pay for the visits. CC RN notified patient today that he will be graduated from [facility] because he's enrolled with [facility] and to reach out to Terra Hale RN at [phone] with any concerns or questions. Patient verbalized understanding.     Goals addressed this encounter:   Goals Addressed                    This Visit's Progress       Medical (pt-stated)         Goal statement: I will establish home care services in the next 60 days.    Date goal set: 8/17/2021  Barriers: language barrier, lack of support, lack of knowledge  Strengths: Willing to accept support and agrees to work on goal  Date to achieve by: 10/17/2021  Patient expressed understanding of goal: Yes    Action steps to achieve this goal:  1.  I will ask my daughter to help me answering the phone when [facility] calls for initial visit.   2.  I will be home when my home care RN comes to visit me to establish services.   3.  I will follow up with [facility] in the next month regarding this goal.     Note: [facility] received referral and CHW left a voice message for director of [facility] to call back for any paperwork/documents need.    No longer in      Goal update: 8/23/2021; 9/22/2021                Plan:   Okay to graduate from [facility] [facility]

## 2021-12-15 ENCOUNTER — OFFICE VISIT (OUTPATIENT)
Dept: FAMILY MEDICINE | Facility: CLINIC | Age: 66
End: 2021-12-15
Payer: COMMERCIAL

## 2021-12-15 VITALS
TEMPERATURE: 97.8 F | SYSTOLIC BLOOD PRESSURE: 152 MMHG | WEIGHT: 160.5 LBS | DIASTOLIC BLOOD PRESSURE: 82 MMHG | OXYGEN SATURATION: 96 % | BODY MASS INDEX: 26.71 KG/M2 | HEART RATE: 65 BPM

## 2021-12-15 DIAGNOSIS — R73.02 GLUCOSE INTOLERANCE (IMPAIRED GLUCOSE TOLERANCE): ICD-10-CM

## 2021-12-15 DIAGNOSIS — I1A.0 RESISTANT HYPERTENSION: ICD-10-CM

## 2021-12-15 DIAGNOSIS — Z23 NEED FOR IMMUNIZATION AGAINST INFLUENZA: Primary | ICD-10-CM

## 2021-12-15 DIAGNOSIS — N18.30 STAGE 3 CHRONIC KIDNEY DISEASE, UNSPECIFIED WHETHER STAGE 3A OR 3B CKD (H): ICD-10-CM

## 2021-12-15 DIAGNOSIS — Z23 NEED FOR VACCINATION: ICD-10-CM

## 2021-12-15 LAB
ANION GAP SERPL CALCULATED.3IONS-SCNC: 12 MMOL/L (ref 5–18)
BUN SERPL-MCNC: 19 MG/DL (ref 8–22)
CALCIUM SERPL-MCNC: 9.2 MG/DL (ref 8.5–10.5)
CHLORIDE BLD-SCNC: 105 MMOL/L (ref 98–107)
CO2 SERPL-SCNC: 21 MMOL/L (ref 22–31)
CREAT SERPL-MCNC: 1.18 MG/DL (ref 0.7–1.3)
GFR SERPL CREATININE-BSD FRML MDRD: 63 ML/MIN/1.73M2
GLUCOSE BLD-MCNC: 181 MG/DL (ref 70–125)
HBA1C MFR BLD: 7.8 % (ref 0–5.6)
POTASSIUM BLD-SCNC: 3.6 MMOL/L (ref 3.5–5)
SODIUM SERPL-SCNC: 138 MMOL/L (ref 136–145)

## 2021-12-15 PROCEDURE — 83036 HEMOGLOBIN GLYCOSYLATED A1C: CPT | Performed by: FAMILY MEDICINE

## 2021-12-15 PROCEDURE — 99214 OFFICE O/P EST MOD 30 MIN: CPT | Mod: 25 | Performed by: FAMILY MEDICINE

## 2021-12-15 PROCEDURE — G0008 ADMIN INFLUENZA VIRUS VAC: HCPCS | Performed by: FAMILY MEDICINE

## 2021-12-15 PROCEDURE — 90662 IIV NO PRSV INCREASED AG IM: CPT | Performed by: FAMILY MEDICINE

## 2021-12-15 PROCEDURE — 36415 COLL VENOUS BLD VENIPUNCTURE: CPT | Performed by: FAMILY MEDICINE

## 2021-12-15 PROCEDURE — 80048 BASIC METABOLIC PNL TOTAL CA: CPT | Performed by: FAMILY MEDICINE

## 2021-12-15 RX ORDER — HYDROCHLOROTHIAZIDE 25 MG/1
25 TABLET ORAL DAILY
Qty: 90 TABLET | Refills: 3 | Status: SHIPPED | OUTPATIENT
Start: 2021-12-15 | End: 2022-02-15

## 2021-12-15 RX ORDER — HYDROCHLOROTHIAZIDE 50 MG/1
50 TABLET ORAL DAILY
COMMUNITY
End: 2021-12-15

## 2021-12-15 NOTE — PROGRESS NOTES
ASSESMENT AND PLAN:  Diagnoses and all orders for this visit:  Glucose intolerance (impaired glucose tolerance)  Patient has several elevated glucose readings throughout checks in August. Given persistent elevation, we will check an A1c today to determine degree of glucose intolerance.   -     Hemoglobin A1c; Future  -     Basic metabolic panel  (Ca, Cl, CO2, Creat, Gluc, K, Na, BUN); Future  Resistant hypertension  Status post recent nephrectomy of a nonfunctional atrophic kidney.  Blood pressure is still elevated in clinic, although improved from previously. He previously stopped taking his hydrochlorothiazide.  Currently denies chest pain, shortness of breath and palpitations. We will restart the hydrochlorothiazide today and follow-up in 6 weeks to recheck blood pressure.   -     hydrochlorothiazide (HYDRODIURIL) 25 MG tablet; Take 1 tablet (25 mg) by mouth daily Two tablets by mouth daily  Stage 3 chronic kidney disease, unspecified whether stage 3a or 3b CKD (H)  We will recheck BMP today to continue trending kidney function.   Recent retinal detachment   Patient will complete eyedrop therapy and follow-up with ophthalmology as directed.  need for vaccination  Immunization review and counseling done with the patient.  -     INFLUENZA VACCINE IM >6 MO VALENT IIV4 (ALFURIA/FLUZONE)         Reviewed the risks and benefits of the treatment plan with the patient and/or caregiver and we discussed indications for routine and emergent follow-up.        SUBJECTIVE: 66 year old male with significant past medical history of resistant hypertension, atrophic kidney s/p nephrectomy presenting for follow-up of hypertension. He had nephrectomy performed on 8/24/21 and states he has been doing well since the procedure. He has been urinating his regular amount during the day and denies dysuria and flank pain. He shares he wakes up 3-4 times a night to urinate but he is able to fall back asleep with little issue.He also had  vitrectomy performed on 10/14/21. He notes that he is still recovering from this procedure. He has been using his eye drops as prescribed but ran out about 2 days ago.     Past Medical History:   Diagnosis Date     Chronic pain of both knees 2/11/2019     COVID-19 5/7/2020     Hypercholesteremia 6/18/2018     Hypertension 5/7/2018     Patient Active Problem List   Diagnosis     Resistant hypertension     Cataract     Hypercholesteremia     Chronic pain of both knees     CKD (chronic kidney disease) stage 3, GFR 30-59 ml/min     Allergic conjunctivitis, bilateral     COVID-19     Tachycardia     Accelerated hypertension     Nephrolithiasis     SOB (shortness of breath)     Decreased visual acuity     Corneal scarring     Poor dentition     Current Outpatient Medications   Medication Sig Dispense Refill     acetaminophen (TYLENOL) 325 MG tablet Take 2 tablets (650 mg) by mouth every 6 hours as needed for pain 120 tablet 0     amLODIPine (NORVASC) 10 MG tablet [AMLODIPINE (NORVASC) 10 MG TABLET] Take 1 tablet (10 mg total) by mouth daily. (Patient taking differently: Take 10 mg by mouth every morning ) 90 tablet 11     atorvastatin (LIPITOR) 20 MG tablet [ATORVASTATIN (LIPITOR) 20 MG TABLET] Take 1 tablet (20 mg total) by mouth daily. (Patient taking differently: Take 20 mg by mouth every evening ) 90 tablet 11     carvedilol (COREG) 25 MG tablet Take 1 tablet (25 mg) by mouth 2 times daily 180 tablet 11     hydrochlorothiazide (HYDRODIURIL) 50 MG tablet Take 50 mg by mouth daily Two tablets by mouth daily       methocarbamol (ROBAXIN) 500 MG tablet Take 1 tablet (500 mg) by mouth 4 times daily as needed for muscle spasms (abdominal pain) 40 tablet 0     oxyCODONE (ROXICODONE) 5 MG tablet Take 0.5-1 tablets (2.5-5 mg) by mouth every 4 hours as needed for moderate to severe pain 8 tablet 0     polyethylene glycol (MIRALAX) 17 GM/Dose powder Take 17 g by mouth daily as needed for constipation 510 g 0     prednisoLONE  acetate (PRED-FORTE) 1 % ophthalmic suspension 2 times daily        senna-docusate (SENOKOT-S/PERICOLACE) 8.6-50 MG tablet Take 1 tablet by mouth 2 times daily To prevent constipation 45 tablet 0     History   Smoking Status     Former Smoker   Smokeless Tobacco     Never Used     Comment: no passive exposure       OBJECTICE: BP (!) 152/82   Pulse 65   Temp 97.8  F (36.6  C) (Oral)   Wt 72.8 kg (160 lb 8 oz)   SpO2 96%   BMI 26.71 kg/m       No results found for this or any previous visit (from the past 24 hour(s)).     GEN- Interactive elderly male sitting in exam room in no acute distress   HEENT-Head atraumatic, left conjunctival hemorrhage present, mild left periorbital swelling   CV- Regular rate and rhythm no murmurs rubs or gallops appreciated   RESP-Clear to ascultation bilaterally    ABDOMINAL- Surgical scar present and well healing, Soft, non tender, normal bowel sounds   SKIN-Dry, no lesions appreciate

## 2021-12-17 ENCOUNTER — TELEPHONE (OUTPATIENT)
Dept: FAMILY MEDICINE | Facility: CLINIC | Age: 66
End: 2021-12-17
Payer: COMMERCIAL

## 2021-12-17 NOTE — TELEPHONE ENCOUNTER
Reason for Call:  Other prescription    Detailed comments: [facility] calls to clarify dosage for hydrochlorothiazide (HYDRODIURIL) 25 MG tablet. Is patient taking two tablets daily or one tablet daily? Dr. Larsen is not here today. Can covering provider clarify?        Phone Number Patient can be reached at: Other phone number:  [phone]    Best Time: any    Can we leave a detailed message on this number? Not Applicable    Call taken on 12/17/2021 at 10:09 AM by Chidi Huffman    Is patient taking two tablets daily or one tabler

## 2022-01-18 VITALS
HEART RATE: 82 BPM | RESPIRATION RATE: 14 BRPM | OXYGEN SATURATION: 98 % | TEMPERATURE: 98.3 F | HEIGHT: 65 IN | WEIGHT: 149.8 LBS | BODY MASS INDEX: 24.96 KG/M2 | SYSTOLIC BLOOD PRESSURE: 126 MMHG | DIASTOLIC BLOOD PRESSURE: 74 MMHG

## 2022-01-18 VITALS
HEIGHT: 64 IN | OXYGEN SATURATION: 98 % | HEART RATE: 108 BPM | DIASTOLIC BLOOD PRESSURE: 80 MMHG | BODY MASS INDEX: 27.66 KG/M2 | TEMPERATURE: 98.1 F | RESPIRATION RATE: 20 BRPM | WEIGHT: 162 LBS | SYSTOLIC BLOOD PRESSURE: 154 MMHG

## 2022-01-18 VITALS
OXYGEN SATURATION: 96 % | DIASTOLIC BLOOD PRESSURE: 80 MMHG | WEIGHT: 158 LBS | HEIGHT: 64 IN | HEART RATE: 69 BPM | TEMPERATURE: 98.2 F | BODY MASS INDEX: 26.98 KG/M2 | RESPIRATION RATE: 24 BRPM | SYSTOLIC BLOOD PRESSURE: 140 MMHG

## 2022-01-18 VITALS
OXYGEN SATURATION: 99 % | HEART RATE: 104 BPM | HEIGHT: 64 IN | DIASTOLIC BLOOD PRESSURE: 100 MMHG | RESPIRATION RATE: 16 BRPM | WEIGHT: 157 LBS | SYSTOLIC BLOOD PRESSURE: 176 MMHG | BODY MASS INDEX: 26.8 KG/M2 | TEMPERATURE: 99.3 F

## 2022-01-18 VITALS
BODY MASS INDEX: 25.58 KG/M2 | DIASTOLIC BLOOD PRESSURE: 88 MMHG | TEMPERATURE: 98.2 F | SYSTOLIC BLOOD PRESSURE: 160 MMHG | WEIGHT: 153.5 LBS | RESPIRATION RATE: 20 BRPM | HEART RATE: 88 BPM | HEIGHT: 65 IN

## 2022-01-18 VITALS
HEART RATE: 89 BPM | HEIGHT: 64 IN | SYSTOLIC BLOOD PRESSURE: 168 MMHG | BODY MASS INDEX: 27.83 KG/M2 | RESPIRATION RATE: 16 BRPM | DIASTOLIC BLOOD PRESSURE: 94 MMHG | OXYGEN SATURATION: 99 % | TEMPERATURE: 98.2 F | WEIGHT: 163 LBS

## 2022-01-18 VITALS
TEMPERATURE: 98.8 F | OXYGEN SATURATION: 98 % | HEART RATE: 79 BPM | WEIGHT: 160 LBS | RESPIRATION RATE: 20 BRPM | SYSTOLIC BLOOD PRESSURE: 164 MMHG | BODY MASS INDEX: 26.66 KG/M2 | HEIGHT: 65 IN | DIASTOLIC BLOOD PRESSURE: 82 MMHG

## 2022-01-18 VITALS
TEMPERATURE: 99 F | HEIGHT: 64 IN | SYSTOLIC BLOOD PRESSURE: 170 MMHG | RESPIRATION RATE: 16 BRPM | DIASTOLIC BLOOD PRESSURE: 100 MMHG | HEART RATE: 71 BPM | OXYGEN SATURATION: 96 % | WEIGHT: 162 LBS | BODY MASS INDEX: 27.66 KG/M2

## 2022-01-18 VITALS
OXYGEN SATURATION: 98 % | DIASTOLIC BLOOD PRESSURE: 82 MMHG | BODY MASS INDEX: 27.41 KG/M2 | SYSTOLIC BLOOD PRESSURE: 152 MMHG | WEIGHT: 164.5 LBS | HEIGHT: 65 IN | TEMPERATURE: 98.4 F | HEART RATE: 73 BPM

## 2022-01-18 ASSESSMENT — PATIENT HEALTH QUESTIONNAIRE - PHQ9: SUM OF ALL RESPONSES TO PHQ QUESTIONS 1-9: 5

## 2022-02-15 ENCOUNTER — OFFICE VISIT (OUTPATIENT)
Dept: FAMILY MEDICINE | Facility: CLINIC | Age: 67
End: 2022-02-15
Payer: COMMERCIAL

## 2022-02-15 DIAGNOSIS — E11.29 TYPE 2 DIABETES MELLITUS WITH OTHER DIABETIC KIDNEY COMPLICATION, WITHOUT LONG-TERM CURRENT USE OF INSULIN (H): Primary | ICD-10-CM

## 2022-02-15 DIAGNOSIS — I1A.0 RESISTANT HYPERTENSION: ICD-10-CM

## 2022-02-15 PROCEDURE — 99214 OFFICE O/P EST MOD 30 MIN: CPT | Performed by: FAMILY MEDICINE

## 2022-02-15 RX ORDER — LOSARTAN POTASSIUM 25 MG/1
25 TABLET ORAL DAILY
Qty: 90 TABLET | Refills: 3 | Status: SHIPPED | OUTPATIENT
Start: 2022-02-15 | End: 2022-04-18

## 2022-02-15 RX ORDER — HYDROCHLOROTHIAZIDE 25 MG/1
25 TABLET ORAL DAILY
Qty: 90 TABLET | Refills: 3 | Status: SHIPPED | OUTPATIENT
Start: 2022-02-15 | End: 2022-08-08 | Stop reason: SINTOL

## 2022-02-15 ASSESSMENT — MIFFLIN-ST. JEOR: SCORE: 1422.17

## 2022-02-15 NOTE — PROGRESS NOTES
ASSESMENT AND PLAN:  Diagnoses and all orders for this visit:  Type 2 diabetes mellitus with other diabetic kidney complication, without long-term current use of insulin (H)  This is a new diagnosis for this patient.  Reviewed his lab results with him today.  At this point we are not going to start medication.  He is going to do the best he can to manage this with changes in his diet and in his exercise routine.  Partially we are making this decision to give him a chance to achieve that but also because of his complicated hypertension medication regiment for which he already is having some difficulty with adherence as detailed below.  He is agreeable with meeting with Sophia for help on the lifestyle and diet issues.  We will plan to recheck his A1c in about 2 to 3 months and if it is trending in the wrong direction at that time then likely consider oral medication.  -     AMB Adult Diabetes Educator Referral; Future  Resistant hypertension  Clarified hydrochlorothiazide and Coreg dosing with the patient.  Extensive medication review and counseling done with the patient and with his caregiver with the help of a professional .  For better control of his blood pressure, we are adding losartan as prescribed below which will also provide renal protection given his new diagnosis of type 2 diabetes.  -     hydrochlorothiazide (HYDRODIURIL) 25 MG tablet; Take 1 tablet (25 mg) by mouth daily  -     losartan (COZAAR) 25 MG tablet; Take 1 tablet (25 mg) by mouth daily      Reviewed the risks and benefits of the treatment plan with the patient and/or caregiver and we discussed indications for routine and emergent follow-up.        SUBJECTIVE: 67-year-old male has a history of resistant hypertension.  Blood pressure significantly elevated today in clinic.  Has all of his medication bottles with him and we reviewed them in detail.  He is currently not taking the Coreg correctly.  He is only taking it once per day.  The  instructions on the bottle for his hydrochlorothiazide were confusing.  Clarified that he should be taking 1 pill/day every morning.  Also here to review his recent lab work-up which include a new diagnosis of type 2 diabetes with an A1c of 7.5.  Patient has follow-up next week with his ophthalmologist.  Patient reports no sudden changes in his vision over this past few weeks.    Past Medical History:   Diagnosis Date     Chronic pain of both knees 2/11/2019     COVID-19 5/7/2020     Hypercholesteremia 6/18/2018     Hypertension 5/7/2018     Patient Active Problem List   Diagnosis     Resistant hypertension     Cataract     Hypercholesteremia     Chronic pain of both knees     CKD (chronic kidney disease) stage 3, GFR 30-59 ml/min     Allergic conjunctivitis, bilateral     COVID-19     Tachycardia     Accelerated hypertension     Nephrolithiasis     SOB (shortness of breath)     Decreased visual acuity     Corneal scarring     Poor dentition     Type 2 diabetes mellitus with other diabetic kidney complication, without long-term current use of insulin (H)     Current Outpatient Medications   Medication Sig Dispense Refill     amLODIPine (NORVASC) 10 MG tablet [AMLODIPINE (NORVASC) 10 MG TABLET] Take 1 tablet (10 mg total) by mouth daily. (Patient taking differently: Take 10 mg by mouth every morning ) 90 tablet 11     atorvastatin (LIPITOR) 20 MG tablet [ATORVASTATIN (LIPITOR) 20 MG TABLET] Take 1 tablet (20 mg total) by mouth daily. (Patient taking differently: Take 20 mg by mouth every evening ) 90 tablet 11     carvedilol (COREG) 25 MG tablet Take 1 tablet (25 mg) by mouth 2 times daily 180 tablet 11     hydrochlorothiazide (HYDRODIURIL) 25 MG tablet Take 1 tablet (25 mg) by mouth daily 90 tablet 3     losartan (COZAAR) 25 MG tablet Take 1 tablet (25 mg) by mouth daily 90 tablet 3     acetaminophen (TYLENOL) 325 MG tablet Take 2 tablets (650 mg) by mouth every 6 hours as needed for pain 120 tablet 0      "polyethylene glycol (MIRALAX) 17 GM/Dose powder Take 17 g by mouth daily as needed for constipation 510 g 0     prednisoLONE acetate (PRED-FORTE) 1 % ophthalmic suspension 2 times daily        senna-docusate (SENOKOT-S/PERICOLACE) 8.6-50 MG tablet Take 1 tablet by mouth 2 times daily To prevent constipation 45 tablet 0     History   Smoking Status     Former Smoker   Smokeless Tobacco     Never Used     Comment: no passive exposure       OBJECTICE: BP (!) 160/88 (BP Location: Right arm)   Pulse 85   Temp 98.5  F (36.9  C) (Oral)   Ht 1.651 m (5' 5\")   Wt 73 kg (161 lb)   SpO2 98%   BMI 26.79 kg/m       No results found for this or any previous visit (from the past 24 hour(s)).     CV-Regular rate and rhythm   RESP-lungs clear to auscultation   EXTREM-no pitting edema of the ankles       Yassine Larsen MD   "

## 2022-02-16 VITALS
OXYGEN SATURATION: 98 % | HEIGHT: 65 IN | DIASTOLIC BLOOD PRESSURE: 86 MMHG | WEIGHT: 161 LBS | BODY MASS INDEX: 26.82 KG/M2 | HEART RATE: 85 BPM | SYSTOLIC BLOOD PRESSURE: 152 MMHG | TEMPERATURE: 98.5 F

## 2022-03-01 ENCOUNTER — VIRTUAL VISIT (OUTPATIENT)
Dept: EDUCATION SERVICES | Facility: CLINIC | Age: 67
End: 2022-03-01
Attending: FAMILY MEDICINE
Payer: COMMERCIAL

## 2022-03-01 DIAGNOSIS — E11.22 TYPE 2 DIABETES MELLITUS WITH DIABETIC CHRONIC KIDNEY DISEASE (H): Primary | ICD-10-CM

## 2022-03-01 DIAGNOSIS — E11.29 TYPE 2 DIABETES MELLITUS WITH OTHER DIABETIC KIDNEY COMPLICATION, WITHOUT LONG-TERM CURRENT USE OF INSULIN (H): ICD-10-CM

## 2022-03-01 PROCEDURE — G0108 DIAB MANAGE TRN  PER INDIV: HCPCS | Mod: AE | Performed by: DIETITIAN, REGISTERED

## 2022-03-01 NOTE — PROGRESS NOTES
Diabetes Self-Management Education & Support    Presents for: Initial Assessment for new diagnosis    Type of Visit: Telephone Visit      ASSESSMENT:    Initial visit for diabetes education, conducted with the help of a professional .  New diagnosis  Not started on any meds - pt to focus on lifestyle modification and also partly due to difficulty with adherence with his complex HTN med regimen    Pt is not interested in monitoring BG at home, at this point.    CKD  Nephrolithiasis  HTN  Hypercholesteremia   Cataract  Decreased visual acuity    Reports good support form family. Has 6 kids. Lives with his 2 adult sons and grand kids.   His grandchildren go to school and are fluent in english.  Pt self manages his meds. The daughter helps set up the pill box.     Endorses occasional headaches.   Denies polydipsia/polyuria/polyphagia/headaches/any sx of hyperglycemia     Has an eye appointment on 3/9.    2 meals daily, typically consisting of rice, veg and chicken/pork.  Denies any sweets/sugary beverages.     Is active around the house     No other concerns today.    Education/Discussion: Reviewed diabetes basics, AIC goals, sx and tx of hyperglycemia, complications of uncontrolled diabetes, general activity and diet guidelines, CHO foods, optimizing glucose control to reduce the risk or delay the progression of chronic kidney disease, along with good blood pressure control: pt expressed understanding.      Patient's most recent   Lab Results   Component Value Date    A1C 7.8 12/15/2021    is not meeting goal of <7.0    Diabetes knowledge and skills assessment:   Patient is knowledgeable in diabetes management concepts related to: Needs AADE 7 review    Continue education with the following diabetes management concepts: Healthy Eating, Being Active, Problem Solving and Reducing Risks    Based on learning assessment above, most appropriate setting for further diabetes education would be: Individual  setting.    INTERVENTIONS:    Education provided today on:  AADE Self-Care Behaviors:  Diabetes Pathophysiology  Healthy Eating: portion control  Being Active: relationship to blood glucose  Problem Solving: high blood glucose - causes, signs/symptoms, treatment and prevention and when to call health care provider  Reducing Risks: A1C - goals, relating to blood glucose levels, how often to check  Healthy Coping: benefits of making appropriate lifestyle changes and utilize support systems    Opportunities for ongoing education and support in diabetes-self management were discussed. Pt verbalized understanding of concepts discussed and recommendations provided today.         Goals Addressed as of 3/1/2022 at 1:53 PM        Healthy Eating (pt-stated)     Added 3/1/22 by Sophia Winslow RD      Add a small, healthy snack to his am and pm meal.          PLAN    DM management plan - daily physical activity as able/safe, eating healthy & watching portions of carbs    Topics to cover at upcoming visits: Healthy Eating, Being Active and Reducing Risks  Follow-up: 4 weeks  PCP:4/18, pt will also be due for A1C draw  Future: If next A1C not at goal, recommend starting pt on a low dose of metformin and a GLP-1 or SGLT-2 for renal and CVD benefits.    See Goals Section for co-developed, patient-stated behavior change goals.  AVS provided to patient today.          SUBJECTIVE / OBJECTIVE:  Presents for: Initial Assessment for new diagnosis  Accompanied by: Self,   Diabetes education in the past 24mo: No  Focus of Visit: Assistance w/ making life changes, Healthy Eating, Self-care behavioral goal setting, Reducing Risks, Being Active, Monitoring  Diabetes type: Type 2  Other concerns:: Language barrier, Literacy barrier  Cultural Influences/Ethnic Background:  Not  or       Diabetes Symptoms & Complications:          Patient Problem List and Family Medical History reviewed for relevant medical history,  "current medical status, and diabetes risk factors.    Vitals:  There were no vitals taken for this visit.  Estimated body mass index is 26.79 kg/m  as calculated from the following:    Height as of 2/15/22: 1.651 m (5' 5\").    Weight as of 2/15/22: 73 kg (161 lb).   Last 3 BP:   BP Readings from Last 3 Encounters:   02/16/22 (!) 152/86   12/15/21 (!) 152/82   09/07/21 (!) 164/105       History   Smoking Status     Former Smoker   Smokeless Tobacco     Never Used     Comment: no passive exposure       Labs:  Lab Results   Component Value Date    A1C 7.8 12/15/2021     Lab Results   Component Value Date     12/15/2021     Lab Results   Component Value Date    LDL 73 08/07/2018     No results found for: HDL]  GFR Estimate   Date Value Ref Range Status   12/15/2021 63 >60 mL/min/1.73m2 Final     Comment:     As of July 11, 2021, eGFR is calculated by the CKD-EPI creatinine equation, without race adjustment. eGFR can be influenced by muscle mass, exercise, and diet. The reported eGFR is an estimation only and is only applicable if the renal function is stable.   12/23/2020 53 (L) >60 mL/min/1.73m2 Final     GFR Estimate If Black   Date Value Ref Range Status   12/23/2020 >60 >60 mL/min/1.73m2 Final     Lab Results   Component Value Date    CR 1.18 12/15/2021     No results found for: MICROALBUMIN    Healthy Eating:  Healthy Eating Assessed Today: Yes  Cultural/Sikhism diet restrictions?: No  Meals include: Lunch, Dinner (2 meals daily - rice, veg, chicken/pork)  Beverages: Water    Being Active:  Being Active Assessed Today: Yes (Is active around the house.)    Monitoring:         Taking Medications:      Taking Medication Assessed Today: Yes (Not started on any DM meds: wanted to work on lifestyle changes, adherence issues with other meds)  Current Treatments: Diet  Problems taking diabetes medications regularly?:  (Self manages meds. Daughter helps set up the pill box.)    Problem Solving:             "     Reducing Risks:       Healthy Coping:  Informal Support system:: Family, Children (Lives with his 2 adult children. Has 6 kids.)  Stage of change: PREPARATION (Decided to change - considering how)  Patient Activation Measure Survey Score:  No flowsheet data found.            Time Spent: 60 minutes  Encounter Type: Individual        Any diabetes medication dose changes were made via the Certified Diabetes Care & Education Protocol in collaboration with the patient's referring provider. A copy of this encounter was shared with the provider.

## 2022-03-28 ENCOUNTER — VIRTUAL VISIT (OUTPATIENT)
Dept: EDUCATION SERVICES | Facility: CLINIC | Age: 67
End: 2022-03-28
Payer: COMMERCIAL

## 2022-03-28 DIAGNOSIS — E11.9 DIABETES MELLITUS (H): Primary | ICD-10-CM

## 2022-03-28 PROCEDURE — G0108 DIAB MANAGE TRN  PER INDIV: HCPCS | Mod: AE | Performed by: DIETITIAN, REGISTERED

## 2022-03-28 NOTE — LETTER
3/28/2022         RE: Moriah Hinojosa  [address] [place] [address]        Dear Colleague,    Thank you for referring your patient, Moriah Hinojosa, to the [facility]. Please see a copy of my visit note below.        Diabetes Self-Management Education & Support    Presents for: Follow-up    Type of Visit: Telephone Visit  ++ pt prefers phone appointments     ASSESSMENT:    Follow up visit for diabetes education, conducted with the help of a professional .    Not started on any meds - pt to focus on lifestyle modification and also partly due to difficulty with adherence with his complex HTN med regimen     Home monitoring of BG was discussed on the previous visit - pt not interested     CKD  Nephrolithiasis  HTN  Hypercholesteremia   Cataract  Decreased visual acuity    Good support form family.   Lives with his 2 adult sons and grand kids.   Grandchildren go to school and are fluent in english.  Daughter helps set up the pill box. Pt self manages his meds - denies any missed doses     Endorses blurry vision and occasional headaches.   Last eye appointment was on 3/9. Scheduled for a f/u in June    Denies polydipsia/polyuria/polyphagia/headaches/any other sx of hyperglycemia       1-2 meals daily, consisting of rice, veg and chicken/pork. Snacks on fruit.  Denies any sweets/sugary beverages. Is trying to drink more water.  Reviewed general diet guidelines - encouraged regular meals, even if it's a small something.     Has been trying to be more active - uses his stepper at home daily, sometimes even 2x/d.      No other concerns today.     Education/Discussion: Reviewed diabetes basics, sx and tx of hyperglycemia, general activity and diet guidelines: pt expressed understanding.        Patient's most recent   Lab Results   Component Value Date    A1C 7.8 12/15/2021    is not meeting goal of <7.0    Diabetes knowledge and skills assessment:   Patient is knowledgeable in diabetes management  concepts related to: needs AADE 7 review    Continue education with the following diabetes management concepts: Healthy Eating, Being Active, Problem Solving and Reducing Risks    Based on learning assessment above, most appropriate setting for further diabetes education would be: Individual setting.    INTERVENTIONS:    Education provided today on:  AADE Self-Care Behaviors:  Healthy Eating: consistency in amount, composition, and timing of food intake and portion control  Being Active: relationship to blood glucose  Problem Solving: high blood glucose - causes, signs/symptoms, treatment and prevention  Reducing Risks: A1C - goals, relating to blood glucose levels, how often to check    Opportunities for ongoing education and support in diabetes-self management were discussed. Pt verbalized understanding of concepts discussed and recommendations provided today.         Goals Addressed as of 3/28/2022 at 1:44 PM                    Today       Healthy Eating (pt-stated)   On track    Added 3/1/22 by Sophia Winslow RD      Add a small, healthy snack to his am and pm meal.          PLAN    DM management plan - daily physical activity as able/safe, eating healthy & watching portions of carbs     Topics to cover at upcoming visits: Healthy Eating, Being Active and Reducing Risks  Follow-up: 2 months  PCP:4/18, pt will also be due for A1C draw  Future: If next A1C not at goal, recommend starting pt on a low dose of metformin and a GLP-1 or SGLT-2 for renal and CVD benefits.     See Goals Section for co-developed, patient-stated behavior change goals.  AVS provided to patient today.      SUBJECTIVE / OBJECTIVE:  Presents for: Follow-up  Accompanied by: Self,   Diabetes education in the past 24mo: No  Focus of Visit: Assistance w/ making life changes, Healthy Eating, Self-care behavioral goal setting, Reducing Risks, Being Active  Diabetes type: Type 2  Other concerns:: Language barrier, Literacy barrier  Cultural  "Influences/Ethnic Background:  Not  or       Diabetes Symptoms & Complications:  Polydipsia: No  Polyphagia: No  Polyuria: No  Visual change: Yes (endorses blurry vision and headaches due to that. Last eye exam was on 3/9, scheduled for a f/u in June)  Slow healing wounds: No       Patient Problem List and Family Medical History reviewed for relevant medical history, current medical status, and diabetes risk factors.    Vitals:  There were no vitals taken for this visit.  Estimated body mass index is 26.79 kg/m  as calculated from the following:    Height as of 2/15/22: 1.651 m (5' 5\").    Weight as of 2/15/22: 73 kg (161 lb).   Last 3 BP:   BP Readings from Last 3 Encounters:   02/16/22 (!) 152/86   12/15/21 (!) 152/82   09/07/21 (!) 164/105       History   Smoking Status     Former Smoker   Smokeless Tobacco     Never Used     Comment: no passive exposure       Labs:  Lab Results   Component Value Date    A1C 7.8 12/15/2021     Lab Results   Component Value Date     12/15/2021     Lab Results   Component Value Date    LDL 73 08/07/2018     No results found for: HDL]  GFR Estimate   Date Value Ref Range Status   12/15/2021 63 >60 mL/min/1.73m2 Final     Comment:     As of July 11, 2021, eGFR is calculated by the CKD-EPI creatinine equation, without race adjustment. eGFR can be influenced by muscle mass, exercise, and diet. The reported eGFR is an estimation only and is only applicable if the renal function is stable.   12/23/2020 53 (L) >60 mL/min/1.73m2 Final     GFR Estimate If Black   Date Value Ref Range Status   12/23/2020 >60 >60 mL/min/1.73m2 Final     Lab Results   Component Value Date    CR 1.18 12/15/2021     No results found for: MICROALBUMIN    Healthy Eating:  Cultural/Orthodox diet restrictions?: No  Meals include: Lunch, Dinner (1-2 meals daily - rice, veg, chicken/pork. Snacks on fruit)  Beverages: Water    Being Active:  Being Active Assessed Today: Yes (Uses his stepper at " home daily, sometimes twice daily)    Monitoring:         Taking Medications:      Taking Medication Assessed Today: Yes (Not started on any DM meds: wanted to work on lifestyle changes, adherence issues with other meds)  Current Treatments: Diet (No meds)  Problems taking diabetes medications regularly?:  (Self manages meds. Daughter helps set up the pill box.)    Reducing Risks:  Reducing Risks Assessed Today: Yes    Healthy Coping:  Informal Support system:: Family, Children (Lives with his 2 adult children. Has 6 kids.)  Stage of change: PREPARATION (Decided to change - considering how)  Patient Activation Measure Survey Score:  No flowsheet data found.            Time Spent: 40 minutes  Encounter Type: Individual        Any diabetes medication dose changes were made via the Certified Diabetes Care & Education Protocol in collaboration with the patient's referring provider. A copy of this encounter was shared with the provider.

## 2022-03-28 NOTE — PROGRESS NOTES
Diabetes Self-Management Education & Support    Presents for: Follow-up    Type of Visit: Telephone Visit  ++ pt prefers phone appointments     ASSESSMENT:    Follow up visit for diabetes education, conducted with the help of a professional .    Not started on any meds - pt to focus on lifestyle modification and also partly due to difficulty with adherence with his complex HTN med regimen     Home monitoring of BG was discussed on the previous visit - pt not interested     CKD  Nephrolithiasis  HTN  Hypercholesteremia   Cataract  Decreased visual acuity    Good support form family.   Lives with his 2 adult sons and grand kids.   Grandchildren go to school and are fluent in english.  Daughter helps set up the pill box. Pt self manages his meds - denies any missed doses     Endorses blurry vision and occasional headaches.   Last eye appointment was on 3/9. Scheduled for a f/u in June    Denies polydipsia/polyuria/polyphagia/headaches/any other sx of hyperglycemia       1-2 meals daily, consisting of rice, veg and chicken/pork. Snacks on fruit.  Denies any sweets/sugary beverages. Is trying to drink more water.  Reviewed general diet guidelines - encouraged regular meals, even if it's a small something.     Has been trying to be more active - uses his stepper at home daily, sometimes even 2x/d.      No other concerns today.     Education/Discussion: Reviewed diabetes basics, sx and tx of hyperglycemia, general activity and diet guidelines: pt expressed understanding.        Patient's most recent   Lab Results   Component Value Date    A1C 7.8 12/15/2021    is not meeting goal of <7.0    Diabetes knowledge and skills assessment:   Patient is knowledgeable in diabetes management concepts related to: needs AADE 7 review    Continue education with the following diabetes management concepts: Healthy Eating, Being Active, Problem Solving and Reducing Risks    Based on learning assessment above, most  appropriate setting for further diabetes education would be: Individual setting.    INTERVENTIONS:    Education provided today on:  AADE Self-Care Behaviors:  Healthy Eating: consistency in amount, composition, and timing of food intake and portion control  Being Active: relationship to blood glucose  Problem Solving: high blood glucose - causes, signs/symptoms, treatment and prevention  Reducing Risks: A1C - goals, relating to blood glucose levels, how often to check    Opportunities for ongoing education and support in diabetes-self management were discussed. Pt verbalized understanding of concepts discussed and recommendations provided today.         Goals Addressed as of 3/28/2022 at 1:44 PM                    Today       Healthy Eating (pt-stated)   On track    Added 3/1/22 by Sophia Winslow RD      Add a small, healthy snack to his am and pm meal.          PLAN    DM management plan - daily physical activity as able/safe, eating healthy & watching portions of carbs     Topics to cover at upcoming visits: Healthy Eating, Being Active and Reducing Risks  Follow-up: 2 months  PCP:4/18, pt will also be due for A1C draw  Future: If next A1C not at goal, recommend starting pt on a low dose of metformin and a GLP-1 or SGLT-2 for renal and CVD benefits.     See Goals Section for co-developed, patient-stated behavior change goals.  AVS provided to patient today.      SUBJECTIVE / OBJECTIVE:  Presents for: Follow-up  Accompanied by: Self,   Diabetes education in the past 24mo: No  Focus of Visit: Assistance w/ making life changes, Healthy Eating, Self-care behavioral goal setting, Reducing Risks, Being Active  Diabetes type: Type 2  Other concerns:: Language barrier, Literacy barrier  Cultural Influences/Ethnic Background:  Not  or       Diabetes Symptoms & Complications:  Polydipsia: No  Polyphagia: No  Polyuria: No  Visual change: Yes (endorses blurry vision and headaches due to that. Last eye  "exam was on 3/9, scheduled for a f/u in June)  Slow healing wounds: No       Patient Problem List and Family Medical History reviewed for relevant medical history, current medical status, and diabetes risk factors.    Vitals:  There were no vitals taken for this visit.  Estimated body mass index is 26.79 kg/m  as calculated from the following:    Height as of 2/15/22: 1.651 m (5' 5\").    Weight as of 2/15/22: 73 kg (161 lb).   Last 3 BP:   BP Readings from Last 3 Encounters:   02/16/22 (!) 152/86   12/15/21 (!) 152/82   09/07/21 (!) 164/105       History   Smoking Status     Former Smoker   Smokeless Tobacco     Never Used     Comment: no passive exposure       Labs:  Lab Results   Component Value Date    A1C 7.8 12/15/2021     Lab Results   Component Value Date     12/15/2021     Lab Results   Component Value Date    LDL 73 08/07/2018     No results found for: HDL]  GFR Estimate   Date Value Ref Range Status   12/15/2021 63 >60 mL/min/1.73m2 Final     Comment:     As of July 11, 2021, eGFR is calculated by the CKD-EPI creatinine equation, without race adjustment. eGFR can be influenced by muscle mass, exercise, and diet. The reported eGFR is an estimation only and is only applicable if the renal function is stable.   12/23/2020 53 (L) >60 mL/min/1.73m2 Final     GFR Estimate If Black   Date Value Ref Range Status   12/23/2020 >60 >60 mL/min/1.73m2 Final     Lab Results   Component Value Date    CR 1.18 12/15/2021     No results found for: MICROALBUMIN    Healthy Eating:  Cultural/Muslim diet restrictions?: No  Meals include: Lunch, Dinner (1-2 meals daily - rice, veg, chicken/pork. Snacks on fruit)  Beverages: Water    Being Active:  Being Active Assessed Today: Yes (Uses his stepper at home daily, sometimes twice daily)    Monitoring:         Taking Medications:      Taking Medication Assessed Today: Yes (Not started on any DM meds: wanted to work on lifestyle changes, adherence issues with other " meds)  Current Treatments: Diet (No meds)  Problems taking diabetes medications regularly?:  (Self manages meds. Daughter helps set up the pill box.)    Reducing Risks:  Reducing Risks Assessed Today: Yes    Healthy Coping:  Informal Support system:: Family, Children (Lives with his 2 adult children. Has 6 kids.)  Stage of change: PREPARATION (Decided to change - considering how)  Patient Activation Measure Survey Score:  No flowsheet data found.            Time Spent: 40 minutes  Encounter Type: Individual        Any diabetes medication dose changes were made via the Certified Diabetes Care & Education Protocol in collaboration with the patient's referring provider. A copy of this encounter was shared with the provider.

## 2022-04-18 ENCOUNTER — OFFICE VISIT (OUTPATIENT)
Dept: FAMILY MEDICINE | Facility: CLINIC | Age: 67
End: 2022-04-18
Payer: COMMERCIAL

## 2022-04-18 VITALS
RESPIRATION RATE: 16 BRPM | OXYGEN SATURATION: 98 % | HEART RATE: 71 BPM | BODY MASS INDEX: 26.66 KG/M2 | WEIGHT: 160 LBS | HEIGHT: 65 IN | TEMPERATURE: 98.5 F | DIASTOLIC BLOOD PRESSURE: 90 MMHG | SYSTOLIC BLOOD PRESSURE: 166 MMHG

## 2022-04-18 DIAGNOSIS — I10 ACCELERATED HYPERTENSION: ICD-10-CM

## 2022-04-18 DIAGNOSIS — E11.29 TYPE 2 DIABETES MELLITUS WITH OTHER DIABETIC KIDNEY COMPLICATION, WITHOUT LONG-TERM CURRENT USE OF INSULIN (H): Primary | ICD-10-CM

## 2022-04-18 DIAGNOSIS — I1A.0 RESISTANT HYPERTENSION: ICD-10-CM

## 2022-04-18 DIAGNOSIS — E78.00 HYPERCHOLESTEREMIA: ICD-10-CM

## 2022-04-18 DIAGNOSIS — N18.30 STAGE 3 CHRONIC KIDNEY DISEASE, UNSPECIFIED WHETHER STAGE 3A OR 3B CKD (H): ICD-10-CM

## 2022-04-18 DIAGNOSIS — Z90.5 HISTORY OF NEPHRECTOMY: ICD-10-CM

## 2022-04-18 LAB
HBA1C MFR BLD: 8.9 % (ref 0–5.6)
HOLD SPECIMEN: NORMAL
HOLD SPECIMEN: NORMAL

## 2022-04-18 PROCEDURE — 36415 COLL VENOUS BLD VENIPUNCTURE: CPT | Performed by: FAMILY MEDICINE

## 2022-04-18 PROCEDURE — 83036 HEMOGLOBIN GLYCOSYLATED A1C: CPT | Performed by: FAMILY MEDICINE

## 2022-04-18 PROCEDURE — 99214 OFFICE O/P EST MOD 30 MIN: CPT | Performed by: FAMILY MEDICINE

## 2022-04-18 RX ORDER — LOSARTAN POTASSIUM 100 MG/1
100 TABLET ORAL DAILY
Qty: 90 TABLET | Refills: 3 | Status: SHIPPED | OUTPATIENT
Start: 2022-04-18 | End: 2022-08-08

## 2022-04-18 RX ORDER — METFORMIN HYDROCHLORIDE 750 MG/1
750 TABLET, EXTENDED RELEASE ORAL 2 TIMES DAILY WITH MEALS
Qty: 180 TABLET | Refills: 3 | Status: SHIPPED | OUTPATIENT
Start: 2022-04-18 | End: 2023-03-06

## 2022-04-18 NOTE — PROGRESS NOTES
ASSESMENT AND PLAN:  Diagnoses and all orders for this visit:  Type 2 diabetes mellitus with other diabetic kidney complication, without long-term current use of insulin (H)  This is a recent diagnosis for the patient and he had initially wanted to try to manage this with diet and exercise alone.  A1c done today shows he has been unsuccessful with this.  Reviewed recommendations from Sophia I discussed options with the patient, he is willing to start metformin.  We will hold off on additional medication until we see how he responds to and tolerates the metformin.  -     metFORMIN (GLUCOPHAGE-XR) 750 MG 24 hr tablet; Take 1 tablet (750 mg) by mouth 2 times daily (with meals)  -     losartan (COZAAR) 100 MG tablet; Take 1 tablet (100 mg) by mouth daily  Stage 3 chronic kidney disease, unspecified whether stage 3a or 3b CKD (H)  Reviewed creatinine trend, will tolerate metformin based on his current creatinine.  We will plan to recheck BMP at a follow-up visit.  Accelerated hypertension  Poorly controlled.  Increasing losartan from 25 mg daily up to 100 as prescribed below.  -     losartan (COZAAR) 100 MG tablet; Take 1 tablet (100 mg) by mouth daily  Hypercholesteremia  Medication review and counseling done with the patient with the help of a professional .  Continue atorvastatin.      Reviewed the risks and benefits of the treatment plan with the patient and/or caregiver and we discussed indications for routine and emergent follow-up.        SUBJECTIVE: 67-year-old male with a recent diagnosis of type 2 diabetes.  He has been working on diet and exercise modifications but initially decided not to start medication.  Unfortunately, he has had a significant worsening in his A1c.  Patient also has a history of poorly controlled hypertension.  He brings all of his medication bottles in with him today and it appears as though he is taking all 4 of the blood pressure medications as prescribed.  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  See previous notes for details.    Past Medical History:   Diagnosis Date     Chronic pain of both knees 2/11/2019     COVID-19 5/7/2020     Hypercholesteremia 6/18/2018     Hypertension 5/7/2018     Patient Active Problem List   Diagnosis     Resistant hypertension     Cataract     Hypercholesteremia     Chronic pain of both knees     CKD (chronic kidney disease) stage 3, GFR 30-59 ml/min     Allergic conjunctivitis, bilateral     COVID-19     Tachycardia     Accelerated hypertension     Nephrolithiasis     SOB (shortness of breath)     Decreased visual acuity     Corneal scarring     Poor dentition     Type 2 diabetes mellitus with other diabetic kidney complication, without long-term current use of insulin (H)     Current Outpatient Medications   Medication Sig Dispense Refill     amLODIPine (NORVASC) 10 MG tablet [AMLODIPINE (NORVASC) 10 MG TABLET] Take 1 tablet (10 mg total) by mouth daily. (Patient taking differently: Take 10 mg by mouth every morning) 90 tablet 11     carvedilol (COREG) 25 MG tablet Take 1 tablet (25 mg) by mouth 2 times daily 180 tablet 11     hydrochlorothiazide (HYDRODIURIL) 25 MG tablet Take 1 tablet (25 mg) by mouth daily 90 tablet 3     losartan (COZAAR) 100 MG tablet Take 1 tablet (100 mg) by mouth daily 90 tablet 3     metFORMIN (GLUCOPHAGE-XR) 750 MG 24 hr tablet Take 1 tablet (750 mg) by mouth 2 times daily (with meals) 180 tablet 3     acetaminophen (TYLENOL) 325 MG tablet Take 2 tablets (650 mg) by mouth every 6 hours as needed for pain 120 tablet 0     atorvastatin (LIPITOR) 20 MG tablet [ATORVASTATIN (LIPITOR) 20 MG TABLET] Take 1 tablet (20 mg total) by mouth daily. (Patient taking differently: Take 20 mg by mouth every evening ) 90 tablet 11     polyethylene glycol (MIRALAX) 17 GM/Dose powder Take 17 g by mouth daily as needed for constipation 510 g 0      "prednisoLONE acetate (PRED-FORTE) 1 % ophthalmic suspension 2 times daily        senna-docusate (SENOKOT-S/PERICOLACE) 8.6-50 MG tablet Take 1 tablet by mouth 2 times daily To prevent constipation 45 tablet 0     History   Smoking Status     Former Smoker   Smokeless Tobacco     Never Used     Comment: no passive exposure       OBJECTICE: BP (!) 166/90   Pulse 71   Temp 98.5  F (36.9  C) (Oral)   Resp 16   Ht 1.651 m (5' 5\")   Wt 72.6 kg (160 lb)   SpO2 98%   BMI 26.63 kg/m       Recent Results (from the past 24 hour(s))   Hemoglobin A1c    Collection Time: 04/18/22  1:17 PM   Result Value Ref Range    Hemoglobin A1C 8.9 (H) 0.0 - 5.6 %   Extra Red Top Tube    Collection Time: 04/18/22  1:17 PM   Result Value Ref Range    Hold Specimen JIC    Extra Serum Separator Tube (SST)    Collection Time: 04/18/22  1:17 PM   Result Value Ref Range    Hold Specimen JIC         GEN-alert, appropriate, in no apparent distress   CV-regular rate and rhythm   RESP-lungs clear to auscultation   Foot exam-normal.  Palpable pedal pulses bilaterally.  No ulcers.    Yassine Larsen MD   "

## 2022-06-06 ENCOUNTER — VIRTUAL VISIT (OUTPATIENT)
Dept: EDUCATION SERVICES | Facility: CLINIC | Age: 67
End: 2022-06-06
Payer: COMMERCIAL

## 2022-06-06 DIAGNOSIS — E11.9 DIABETES MELLITUS (H): Primary | ICD-10-CM

## 2022-06-06 PROCEDURE — G0108 DIAB MANAGE TRN  PER INDIV: HCPCS | Mod: AE | Performed by: DIETITIAN, REGISTERED

## 2022-06-06 NOTE — PROGRESS NOTES
Diabetes Self-Management Education & Support    Presents for: Follow-up    Type of Visit: Telephone Visit      ASSESSMENT:    Follow up visit for diabetes education, conducted with the help of a professional .  Also listening in and helping out with the visit today is patient's grand daughter.   She is very actively involved in pt's care, helps set up the pill box.  Pt has good support from family     Ref Range & units 1 mo ago 5 mo ago 3 years   Hemoglobin A1C 0.0 - 5.6 % 8.9 High   (4/18/22)  7.8 High  CM (12/15/21) 6.2 High      DM diagnosis ~ 5 months ago.     Pt wanted to focus on lifestyle modification and was not interested in starting diabetes meds initially.   Most recent A1C came back much higher and pt was started on metformin in April.    Pt appropriately taking 750 mg metformin bid with meals and tolerating it well.      Home monitoring of BG has been discussed previously - pt not interested.     CKD  Nephrolithiasis  HTN  Hypercholesteremia   Cataract  Decreased visual acuity     Grand daughter helps set up the pill box. Pt self manages his meds - denies any missed doses     Denies polydipsia/polyuria/polyphagia/headaches/blurry vision any other sx of hyperglycemia       Consumes 2 meals daily with rice, veg and chicken/pork.   Snacks on fruit. Trying to do less rice now.  Denies any sweets/sugary beverages. Is trying to drink more water.  Reviewed general diet guidelines and CHO foods      Has been trying to be more active.   Uses his stepper at home at times. Has also been busy gardening. Also goes for walks regularly    No other concerns today.     Education/Discussion: Reviewed diabetes basics, sx and tx of hyperglycemia, general activity and diet guidelines: pt expressed understanding.     Patient's most recent   Lab Results   Component Value Date    A1C 8.9 04/18/2022    is not meeting goal of <7.0    Diabetes knowledge and skills assessment:   Patient is knowledgeable in diabetes  management concepts related to: Taking Medication, Problem Solving and Reducing Risks    Continue education with the following diabetes management concepts: Taking Medication, Problem Solving and Reducing Risks    Based on learning assessment above, most appropriate setting for further diabetes education would be: Individual setting.    INTERVENTIONS:    Education provided today on:  AADE Self-Care Behaviors:  Healthy Eating:   Being Active: relationship to blood glucose  Problem Solving: high blood glucose - causes, signs/symptoms, treatment and prevention  Reducing Risks: A1C - goals, relating to blood glucose levels, how often to check       Opportunities for ongoing education and support in diabetes-self management were discussed. Pt verbalized understanding of concepts discussed and recommendations provided today.         PLAN    Continue with the current therapy and DM management plan - daily physical activity as able/safe, eating healthy & watching portions of carbs.  Future: recommend considering a GLP-1 or SGLT-2 for further control and additional renal and CVD benefits, pending next A1C in July. Also recommend a BMP draw for closer renal monitoring at the next clinic visit.    Topics to cover at upcoming visits: Taking Medication, Problem Solving and Reducing Risks    Follow-up: 2 months (or sooner if concerns)  PCP:7/20, pt will also be due for A1C draw. Also recommend a BMP draw for closer renal monitoring at the next clinic visit.    See Goals Section for co-developed, patient-stated behavior change goals.  AVS provided to patient today.      SUBJECTIVE / OBJECTIVE:  Presents for: Follow-up  Accompanied by: Self,   Diabetes education in the past 24mo: No  Focus of Visit: Assistance w/ making life changes, Healthy Eating, Self-care behavioral goal setting, Reducing Risks, Being Active  Diabetes type: Type 2  How confident are you filling out medical forms by yourself:: Not Assessed  Other  "concerns:: Language barrier, Literacy barrier  Cultural Influences/Ethnic Background:  Not  or       Diabetes Symptoms & Complications:  Polydipsia: No  Polyphagia: No  Polyuria: No  Visual change: Yes (endorses blurry vision and headaches due to that. Last eye exam was on 3/9, scheduled for a f/u in June)  Slow healing wounds: No       Patient Problem List and Family Medical History reviewed for relevant medical history, current medical status, and diabetes risk factors.    Vitals:  There were no vitals taken for this visit.  Estimated body mass index is 26.63 kg/m  as calculated from the following:    Height as of 4/18/22: 1.651 m (5' 5\").    Weight as of 4/18/22: 72.6 kg (160 lb).   Last 3 BP:   BP Readings from Last 3 Encounters:   04/18/22 (!) 166/90   02/16/22 (!) 152/86   12/15/21 (!) 152/82       History   Smoking Status     Former Smoker   Smokeless Tobacco     Never Used     Comment: no passive exposure       Labs:  Lab Results   Component Value Date    A1C 8.9 04/18/2022     Lab Results   Component Value Date     12/15/2021     Lab Results   Component Value Date    LDL 73 08/07/2018     No results found for: HDL]  GFR Estimate   Date Value Ref Range Status   12/15/2021 63 >60 mL/min/1.73m2 Final     Comment:     As of July 11, 2021, eGFR is calculated by the CKD-EPI creatinine equation, without race adjustment. eGFR can be influenced by muscle mass, exercise, and diet. The reported eGFR is an estimation only and is only applicable if the renal function is stable.   12/23/2020 53 (L) >60 mL/min/1.73m2 Final     GFR Estimate If Black   Date Value Ref Range Status   12/23/2020 >60 >60 mL/min/1.73m2 Final     Lab Results   Component Value Date    CR 1.18 12/15/2021     No results found for: MICROALBUMIN    Healthy Eating:  Healthy Eating Assessed Today: Yes  Cultural/[employer] diet restrictions?: No  Meals include: Lunch, Dinner (1-2 meals daily - rice, veg, chicken/pork. Snacks on " fruit)  Beverages: Water    Being Active:  Being Active Assessed Today: Yes (Uses his stepper at home daily, sometimes twice daily)  Exercise:: Unable to exercise    Monitoring:       Glucose data:    Taking Medications:  Diabetes Medication(s)     Biguanides       metFORMIN (GLUCOPHAGE-XR) 750 MG 24 hr tablet    Take 1 tablet (750 mg) by mouth 2 times daily (with meals)          Taking Medication Assessed Today: Yes (Not started on any DM meds: wanted to work on lifestyle changes, adherence issues with other meds)  Current Treatments: Diet (No meds)  Problems taking diabetes medications regularly?:  (Self manages meds. Daughter helps set up the pill box.)    Problem Solving:                 Reducing Risks:  Reducing Risks Assessed Today: Yes    Healthy Coping:  Informal Support system:: Family, Children (Lives with his 2 adult children. Has 6 kids.)  Stage of change: PREPARATION (Decided to change - considering how)  Patient Activation Measure Survey Score:  No flowsheet data found.        Time Spent: 50 minutes  Encounter Type: Individual      Any diabetes medication dose changes were made via the Certified Diabetes Care & Education Protocol in collaboration with the patient's referring provider. A copy of this encounter was shared with the provider.

## 2022-06-06 NOTE — LETTER
6/6/2022         RE: Moriah Hinojosa  [address] [place] [address]        Dear Colleague,    Thank you for referring your patient, Moriah Hinojosa, to the [facility]. Please see a copy of my visit note below.    Diabetes Self-Management Education & Support    Presents for: Follow-up    Type of Visit: Telephone Visit      ASSESSMENT:    Follow up visit for diabetes education, conducted with the help of a professional .  Also listening in and helping out with the visit today is patient's grand daughter.   She is very actively involved in pt's care, helps set up the pill box.  Pt has good support from family     Ref Range & units 1 mo ago 5 mo ago 3 years   Hemoglobin A1C 0.0 - 5.6 % 8.9 High   (4/18/22)  7.8 High  CM (12/15/21) 6.2 High      DM diagnosis ~ 5 months ago.     Pt wanted to focus on lifestyle modification and was not interested in starting diabetes meds initially.   Most recent A1C came back much higher and pt was started on metformin in April.    Pt appropriately taking 750 mg metformin bid with meals and tolerating it well.      Home monitoring of BG has been discussed previously - pt not interested.     CKD  Nephrolithiasis  HTN  Hypercholesteremia   Cataract  Decreased visual acuity     Grand daughter helps set up the pill box. Pt self manages his meds - denies any missed doses     Denies polydipsia/polyuria/polyphagia/headaches/blurry vision any other sx of hyperglycemia       Consumes 2 meals daily with rice, veg and chicken/pork.   Snacks on fruit. Trying to do less rice now.  Denies any sweets/sugary beverages. Is trying to drink more water.  Reviewed general diet guidelines and CHO foods      Has been trying to be more active.   Uses his stepper at home at times. Has also been busy gardening. Also goes for walks regularly    No other concerns today.     Education/Discussion: Reviewed diabetes basics, sx and tx of hyperglycemia, general activity and diet  guidelines: pt expressed understanding.     Patient's most recent   Lab Results   Component Value Date    A1C 8.9 04/18/2022    is not meeting goal of <7.0    Diabetes knowledge and skills assessment:   Patient is knowledgeable in diabetes management concepts related to: Taking Medication, Problem Solving and Reducing Risks    Continue education with the following diabetes management concepts: Taking Medication, Problem Solving and Reducing Risks    Based on learning assessment above, most appropriate setting for further diabetes education would be: Individual setting.    INTERVENTIONS:    Education provided today on:  AADE Self-Care Behaviors:  Healthy Eating:   Being Active: relationship to blood glucose  Problem Solving: high blood glucose - causes, signs/symptoms, treatment and prevention  Reducing Risks: A1C - goals, relating to blood glucose levels, how often to check       Opportunities for ongoing education and support in diabetes-self management were discussed. Pt verbalized understanding of concepts discussed and recommendations provided today.         PLAN    Continue with the current therapy and DM management plan - daily physical activity as able/safe, eating healthy & watching portions of carbs.  Future: recommend considering a GLP-1 or SGLT-2 for further control and additional renal and CVD benefits, pending next A1C in July. Also recommend a BMP draw for closer renal monitoring at the next clinic visit.    Topics to cover at upcoming visits: Taking Medication, Problem Solving and Reducing Risks    Follow-up: 2 months (or sooner if concerns)  PCP:7/20, pt will also be due for A1C draw. Also recommend a BMP draw for closer renal monitoring at the next clinic visit.    See Goals Section for co-developed, patient-stated behavior change goals.  AVS provided to patient today.      SUBJECTIVE / OBJECTIVE:  Presents for: Follow-up  Accompanied by: Self,   Diabetes education in the past 24mo:  "No  Focus of Visit: Assistance w/ making life changes, Healthy Eating, Self-care behavioral goal setting, Reducing Risks, Being Active  Diabetes type: Type 2  How confident are you filling out medical forms by yourself:: Not Assessed  Other concerns:: Language barrier, Literacy barrier  Cultural Influences/Ethnic Background:  Not  or       Diabetes Symptoms & Complications:  Polydipsia: No  Polyphagia: No  Polyuria: No  Visual change: Yes (endorses blurry vision and headaches due to that. Last eye exam was on 3/9, scheduled for a f/u in June)  Slow healing wounds: No       Patient Problem List and Family Medical History reviewed for relevant medical history, current medical status, and diabetes risk factors.    Vitals:  There were no vitals taken for this visit.  Estimated body mass index is 26.63 kg/m  as calculated from the following:    Height as of 4/18/22: 1.651 m (5' 5\").    Weight as of 4/18/22: 72.6 kg (160 lb).   Last 3 BP:   BP Readings from Last 3 Encounters:   04/18/22 (!) 166/90   02/16/22 (!) 152/86   12/15/21 (!) 152/82       History   Smoking Status     Former Smoker   Smokeless Tobacco     Never Used     Comment: no passive exposure       Labs:  Lab Results   Component Value Date    A1C 8.9 04/18/2022     Lab Results   Component Value Date     12/15/2021     Lab Results   Component Value Date    LDL 73 08/07/2018     No results found for: HDL]  GFR Estimate   Date Value Ref Range Status   12/15/2021 63 >60 mL/min/1.73m2 Final     Comment:     As of July 11, 2021, eGFR is calculated by the CKD-EPI creatinine equation, without race adjustment. eGFR can be influenced by muscle mass, exercise, and diet. The reported eGFR is an estimation only and is only applicable if the renal function is stable.   12/23/2020 53 (L) >60 mL/min/1.73m2 Final     GFR Estimate If Black   Date Value Ref Range Status   12/23/2020 >60 >60 mL/min/1.73m2 Final     Lab Results   Component Value Date    CR " 1.18 12/15/2021     No results found for: MICROALBUMIN    Healthy Eating:  Healthy Eating Assessed Today: Yes  Cultural/Congregation diet restrictions?: No  Meals include: Lunch, Dinner (1-2 meals daily - rice, veg, chicken/pork. Snacks on fruit)  Beverages: Water    Being Active:  Being Active Assessed Today: Yes (Uses his stepper at home daily, sometimes twice daily)  Exercise:: Unable to exercise    Monitoring:       Glucose data:    Taking Medications:  Diabetes Medication(s)     Biguanides       metFORMIN (GLUCOPHAGE-XR) 750 MG 24 hr tablet    Take 1 tablet (750 mg) by mouth 2 times daily (with meals)          Taking Medication Assessed Today: Yes (Not started on any DM meds: wanted to work on lifestyle changes, adherence issues with other meds)  Current Treatments: Diet (No meds)  Problems taking diabetes medications regularly?:  (Self manages meds. Daughter helps set up the pill box.)    Problem Solving:                 Reducing Risks:  Reducing Risks Assessed Today: Yes    Healthy Coping:  Informal Support system:: Family, Children (Lives with his 2 adult children. Has 6 kids.)  Stage of change: PREPARATION (Decided to change - considering how)  Patient Activation Measure Survey Score:  No flowsheet data found.        Time Spent: 50 minutes  Encounter Type: Individual      Any diabetes medication dose changes were made via the Certified Diabetes Care & Education Protocol in collaboration with the patient's referring provider. A copy of this encounter was shared with the provider.

## 2022-06-24 ENCOUNTER — MEDICAL CORRESPONDENCE (OUTPATIENT)
Dept: HEALTH INFORMATION MANAGEMENT | Facility: CLINIC | Age: 67
End: 2022-06-24

## 2022-07-20 ENCOUNTER — OFFICE VISIT (OUTPATIENT)
Dept: FAMILY MEDICINE | Facility: CLINIC | Age: 67
End: 2022-07-20
Payer: COMMERCIAL

## 2022-07-20 VITALS
HEIGHT: 65 IN | WEIGHT: 154 LBS | RESPIRATION RATE: 16 BRPM | SYSTOLIC BLOOD PRESSURE: 216 MMHG | BODY MASS INDEX: 25.66 KG/M2 | OXYGEN SATURATION: 98 % | DIASTOLIC BLOOD PRESSURE: 118 MMHG | HEART RATE: 80 BPM | TEMPERATURE: 97.7 F

## 2022-07-20 DIAGNOSIS — E11.29 TYPE 2 DIABETES MELLITUS WITH OTHER DIABETIC KIDNEY COMPLICATION, WITHOUT LONG-TERM CURRENT USE OF INSULIN (H): Primary | ICD-10-CM

## 2022-07-20 DIAGNOSIS — E78.00 HYPERCHOLESTEREMIA: ICD-10-CM

## 2022-07-20 DIAGNOSIS — N26.1 ATROPHIC KIDNEY: ICD-10-CM

## 2022-07-20 DIAGNOSIS — Z90.5 HISTORY OF NEPHRECTOMY: ICD-10-CM

## 2022-07-20 DIAGNOSIS — N18.30 STAGE 3 CHRONIC KIDNEY DISEASE, UNSPECIFIED WHETHER STAGE 3A OR 3B CKD (H): ICD-10-CM

## 2022-07-20 DIAGNOSIS — Z12.11 SCREEN FOR COLON CANCER: ICD-10-CM

## 2022-07-20 DIAGNOSIS — I10 ACCELERATED HYPERTENSION: ICD-10-CM

## 2022-07-20 LAB
ALBUMIN SERPL BCG-MCNC: 4.7 G/DL (ref 3.5–5.2)
ALBUMIN UR-MCNC: NEGATIVE MG/DL
ALP SERPL-CCNC: 73 U/L (ref 40–129)
ALT SERPL W P-5'-P-CCNC: 19 U/L (ref 10–50)
AMPHETAMINES UR QL: NOT DETECTED
ANION GAP SERPL CALCULATED.3IONS-SCNC: 11 MMOL/L (ref 7–15)
APPEARANCE UR: CLEAR
AST SERPL W P-5'-P-CCNC: 23 U/L (ref 10–50)
BARBITURATES UR QL SCN: NOT DETECTED
BASOPHILS # BLD AUTO: 0.1 10E3/UL (ref 0–0.2)
BASOPHILS NFR BLD AUTO: 1 %
BENZODIAZ UR QL SCN: NOT DETECTED
BILIRUB SERPL-MCNC: 0.6 MG/DL
BILIRUB UR QL STRIP: NEGATIVE
BUN SERPL-MCNC: 19.3 MG/DL (ref 8–23)
BUPRENORPHINE UR QL: NOT DETECTED
CALCIUM SERPL-MCNC: 9.7 MG/DL (ref 8.8–10.2)
CANNABINOIDS UR QL: NOT DETECTED
CHLORIDE SERPL-SCNC: 100 MMOL/L (ref 98–107)
CHOLEST SERPL-MCNC: 270 MG/DL
COCAINE UR QL SCN: NOT DETECTED
COLOR UR AUTO: YELLOW
CREAT SERPL-MCNC: 1.39 MG/DL (ref 0.67–1.17)
CREAT UR-MCNC: 44 MG/DL
D-METHAMPHET UR QL: NOT DETECTED
DEPRECATED HCO3 PLAS-SCNC: 26 MMOL/L (ref 22–29)
EOSINOPHIL # BLD AUTO: 0.9 10E3/UL (ref 0–0.7)
EOSINOPHIL NFR BLD AUTO: 10 %
ERYTHROCYTE [DISTWIDTH] IN BLOOD BY AUTOMATED COUNT: 12.6 % (ref 10–15)
GFR SERPL CREATININE-BSD FRML MDRD: 55 ML/MIN/1.73M2
GLUCOSE SERPL-MCNC: 117 MG/DL (ref 70–99)
GLUCOSE UR STRIP-MCNC: NEGATIVE MG/DL
HBA1C MFR BLD: 6.1 % (ref 0–5.6)
HCT VFR BLD AUTO: 45.7 % (ref 40–53)
HDLC SERPL-MCNC: 60 MG/DL
HGB BLD-MCNC: 15.5 G/DL (ref 13.3–17.7)
HGB UR QL STRIP: NEGATIVE
IMM GRANULOCYTES # BLD: 0 10E3/UL
IMM GRANULOCYTES NFR BLD: 0 %
KETONES UR STRIP-MCNC: NEGATIVE MG/DL
LDLC SERPL CALC-MCNC: 185 MG/DL
LEUKOCYTE ESTERASE UR QL STRIP: NEGATIVE
LYMPHOCYTES # BLD AUTO: 2.8 10E3/UL (ref 0.8–5.3)
LYMPHOCYTES NFR BLD AUTO: 30 %
MCH RBC QN AUTO: 29.5 PG (ref 26.5–33)
MCHC RBC AUTO-ENTMCNC: 33.9 G/DL (ref 31.5–36.5)
MCV RBC AUTO: 87 FL (ref 78–100)
METHADONE UR QL SCN: NOT DETECTED
MICROALBUMIN UR-MCNC: <12 MG/L
MICROALBUMIN/CREAT UR: NORMAL MG/G{CREAT}
MONOCYTES # BLD AUTO: 0.7 10E3/UL (ref 0–1.3)
MONOCYTES NFR BLD AUTO: 8 %
NEUTROPHILS # BLD AUTO: 4.9 10E3/UL (ref 1.6–8.3)
NEUTROPHILS NFR BLD AUTO: 51 %
NITRATE UR QL: NEGATIVE
NONHDLC SERPL-MCNC: 210 MG/DL
OPIATES UR QL SCN: NOT DETECTED
OXYCODONE UR QL SCN: NOT DETECTED
PCP UR QL SCN: NOT DETECTED
PH UR STRIP: 6 [PH] (ref 5–7)
PLATELET # BLD AUTO: 232 10E3/UL (ref 150–450)
POTASSIUM SERPL-SCNC: 4 MMOL/L (ref 3.4–5.3)
PROPOXYPH UR QL: NOT DETECTED
PROT SERPL-MCNC: 8.2 G/DL (ref 6.4–8.3)
RBC # BLD AUTO: 5.25 10E6/UL (ref 4.4–5.9)
SODIUM SERPL-SCNC: 137 MMOL/L (ref 136–145)
SP GR UR STRIP: <=1.005 (ref 1–1.03)
TRICYCLICS UR QL SCN: NOT DETECTED
TRIGL SERPL-MCNC: 125 MG/DL
UROBILINOGEN UR STRIP-ACNC: 0.2 E.U./DL
WBC # BLD AUTO: 9.5 10E3/UL (ref 4–11)

## 2022-07-20 PROCEDURE — 85025 COMPLETE CBC W/AUTO DIFF WBC: CPT | Performed by: FAMILY MEDICINE

## 2022-07-20 PROCEDURE — 80306 DRUG TEST PRSMV INSTRMNT: CPT | Performed by: FAMILY MEDICINE

## 2022-07-20 PROCEDURE — 99215 OFFICE O/P EST HI 40 MIN: CPT | Performed by: FAMILY MEDICINE

## 2022-07-20 PROCEDURE — 81003 URINALYSIS AUTO W/O SCOPE: CPT | Mod: 59 | Performed by: FAMILY MEDICINE

## 2022-07-20 PROCEDURE — 80053 COMPREHEN METABOLIC PANEL: CPT | Performed by: FAMILY MEDICINE

## 2022-07-20 PROCEDURE — 82043 UR ALBUMIN QUANTITATIVE: CPT | Performed by: FAMILY MEDICINE

## 2022-07-20 PROCEDURE — 83036 HEMOGLOBIN GLYCOSYLATED A1C: CPT | Performed by: FAMILY MEDICINE

## 2022-07-20 PROCEDURE — 36415 COLL VENOUS BLD VENIPUNCTURE: CPT | Performed by: FAMILY MEDICINE

## 2022-07-20 PROCEDURE — 80061 LIPID PANEL: CPT | Performed by: FAMILY MEDICINE

## 2022-07-20 RX ORDER — METOPROLOL SUCCINATE 25 MG/1
25 TABLET, EXTENDED RELEASE ORAL DAILY
Qty: 30 TABLET | Refills: 3 | Status: SHIPPED | OUTPATIENT
Start: 2022-07-20 | End: 2022-07-26

## 2022-07-20 RX ORDER — CARVEDILOL 25 MG/1
25 TABLET ORAL 2 TIMES DAILY WITH MEALS
Qty: 60 TABLET | Refills: 11 | Status: SHIPPED | OUTPATIENT
Start: 2022-07-20 | End: 2022-08-08

## 2022-07-20 NOTE — PROGRESS NOTES
Assessment & Plan     Type 2 diabetes mellitus with other diabetic kidney complication, without long-term current use of insulin (H)  We did not discuss this much today because of his BP problem. Continue same meds/doses.  - CBC with platelets and differential  - Comprehensive metabolic panel (BMP + Alb, Alk Phos, ALT, AST, Total. Bili, TP)  - Lipid panel reflex to direct LDL Non-fasting  - UA Macro with Reflex to Micro and Culture - lab collect  - Drug Abuse Screen Panel 13, Urine (Pain Care Package) - lab collect  - Albumin Random Urine Quantitative with Creat Ratio  - Hemoglobin A1c  - CBC with platelets and differential  - Comprehensive metabolic panel (BMP + Alb, Alk Phos, ALT, AST, Total. Bili, TP)  - Lipid panel reflex to direct LDL Non-fasting  - Hemoglobin A1c  - Adult Cardiology Eval [facility] Referral  - UA Macro with Reflex to Micro and Culture - lab collect  - Drug Abuse Screen Panel 13, Urine (Pain Care Package) - lab collect  - Albumin Random Urine Quantitative with Creat Ratio    Stage 3 chronic kidney disease, unspecified whether stage 3a or 3b CKD (H)  This well might be contributing to his elevated BP - need to refer him back to nephrology, who should be following  - Comprehensive metabolic panel (BMP + Alb, Alk Phos, ALT, AST, Total. Bili, TP)  - UA Macro with Reflex to Micro and Culture - lab collect  - Drug Abuse Screen Panel 13, Urine (Pain Care Package) - lab collect  - Albumin Random Urine Quantitative with Creat Ratio  - Comprehensive metabolic panel (BMP + Alb, Alk Phos, ALT, AST, Total. Bili, TP)  - carvedilol (COREG) 25 MG tablet  Dispense: 60 tablet; Refill: 11  - Adult Cardiology Eval  Referral  - UA Macro with Reflex to Micro and Culture - lab collect  - Drug Abuse Screen Panel 13, Urine (Pain Care Package) - lab collect  - Albumin Random Urine Quantitative with Creat Ratio    Accelerated hypertension  This is in the dangerous range, yet he is asymptomatic. Thus, I'll refer  him for urgent appointments instead of trying to admit him -- however he is very close to needing to be admitted to the hospital. He and his family know to take him to the hospital with headache or chest pain. I verified that he has his meds and takes them regularly today - which took some time.  - CBC with platelets and differential  - Comprehensive metabolic panel (BMP + Alb, Alk Phos, ALT, AST, Total. Bili, TP)  - UA Macro with Reflex to Micro and Culture - lab collect  - Drug Abuse Screen Panel 13, Urine (Pain Care Package) - lab collect  - CBC with platelets and differential  - Comprehensive metabolic panel (BMP + Alb, Alk Phos, ALT, AST, Total. Bili, TP)  - carvedilol (COREG) 25 MG tablet  Dispense: 60 tablet; Refill: 11  - [facility]  - UA Macro with Reflex to Micro and Culture - lab collect  - Drug Abuse Screen Panel 13, Urine (Pain Care Package) - lab collect  - metoprolol succinate ER (TOPROL XL) 25 MG 24 hr tablet  Dispense: 30 tablet; Refill: 3    Hypercholesteremia  Checking- this is not at all good and needs to be addressed  - Lipid panel reflex to direct LDL Non-fasting  - Drug Abuse Screen Panel 13, Urine (Pain Care Package) - lab collect  - Lipid panel reflex to direct LDL Non-fasting  - Drug Abuse Screen Panel 13, Urine (Pain Care Package) - lab collect    History of nephrectomy  noted  - Comprehensive metabolic panel (BMP + Alb, Alk Phos, ALT, AST, Total. Bili, TP)  - UA Macro with Reflex to Micro and Culture - lab collect  - Drug Abuse Screen Panel 13, Urine (Pain Care Package) - lab collect  - Albumin Random Urine Quantitative with Creat Ratio  - Comprehensive metabolic panel (BMP + Alb, Alk Phos, ALT, AST, Total. Bili, TP)  - UA Macro with Reflex to Micro and Culture - lab collect  - Drug Abuse Screen Panel 13, Urine (Pain Care Package) - lab collect  - Albumin Random Urine Quantitative with Creat Ratio    Atrophic kidney  This was removed  - Comprehensive  "metabolic panel (BMP + Alb, Alk Phos, ALT, AST, Total. Bili, TP)  - UA Macro with Reflex to Micro and Culture - lab collect  - Drug Abuse Screen Panel 13, Urine (Pain Care Package) - lab collect  - Albumin Random Urine Quantitative with Creat Ratio  - Comprehensive metabolic panel (BMP + Alb, Alk Phos, ALT, AST, Total. Bili, TP)  - UA Macro with Reflex to Micro and Culture - lab collect  - Drug Abuse Screen Panel 13, Urine (Pain Care Package) - lab collect  - Albumin Random Urine Quantitative with Creat Ratio    Screen for colon cancer  ordereed  - COLOGUARD(EXACT SCIENCES)      Review of external notes as documented elsewhere in note  Ordering of each unique test  Prescription drug management  45 minutes spent on the date of the encounter doing chart review, history and exam, documentation and further activities per the note     BMI:   Estimated body mass index is 25.63 kg/m  as calculated from the following:    Height as of this encounter: 5' 5\" (1.651 m).    Weight as of this encounter: 154 lb (69.9 kg).     No follow-ups on file.    Ebony Wooten MD  [facility]  Urgent cardiology referral        Subjective   Pwo is a 67 year old, presenting for the following health issues:  Diabetes and Hypertension      History of Present Illness       Reason for visit:  HTN, DM      BP is high! He is surprised. He says he feels fine. He also notes he has not only taken his morning meds, but his second doses, too. He takes his morning meds at 6am and his PM meds about 1 or 2pm. He shows me the container that is set up for him and it includes all of his HTN meds.    DM - no problems with recent polyuria or anything    Appetite, sleep are ok.    Review of Systems         Objective    BP (!) 216/118 (BP Location: Right arm, Patient Position: Sitting, Cuff Size: Adult Regular)   Pulse 80   Temp 97.7  F (36.5  C) (Oral)   Resp 16   Ht 5' 5\" (1.651 m)   Wt 154 lb (69.9 kg)   SpO2 98%   BMI 25.63 " kg/m    Body mass index is 25.63 kg/m .  Physical Exam   GENERAL: alert and no distress  RESP: lungs clear to auscultation - no rales, rhonchi or wheezes  CV: regular rate and rhythm, normal S1 S2, no S3 or S4, no murmur, click or rub, no peripheral edema and peripheral pulses strong  MS: no gross musculoskeletal defects noted, no edema     Results for orders placed or performed in visit on 07/20/22   Comprehensive metabolic panel (BMP + Alb, Alk Phos, ALT, AST, Total. Bili, TP)     Status: Abnormal   Result Value Ref Range    Sodium 137 136 - 145 mmol/L    Potassium 4.0 3.4 - 5.3 mmol/L    Creatinine 1.39 (H) 0.67 - 1.17 mg/dL    Urea Nitrogen 19.3 8.0 - 23.0 mg/dL    Chloride 100 98 - 107 mmol/L    Carbon Dioxide (CO2) 26 22 - 29 mmol/L    Anion Gap 11 7 - 15 mmol/L    Glucose 117 (H) 70 - 99 mg/dL    Calcium 9.7 8.8 - 10.2 mg/dL    Protein Total 8.2 6.4 - 8.3 g/dL    Albumin 4.7 3.5 - 5.2 g/dL    Bilirubin Total 0.6 <=1.2 mg/dL    Alkaline Phosphatase 73 40 - 129 U/L    AST 23 10 - 50 U/L    ALT 19 10 - 50 U/L    GFR Estimate 55 (L) >60 mL/min/1.73m2   Lipid panel reflex to direct LDL Non-fasting     Status: Abnormal   Result Value Ref Range    Cholesterol 270 (H) <200 mg/dL    Triglycerides 125 <150 mg/dL    Direct Measure HDL 60 >=40 mg/dL    LDL Cholesterol Calculated 185 (H) <=100 mg/dL    Non HDL Cholesterol 210 (H) <130 mg/dL    Narrative    Cholesterol  Desirable:  <200 mg/dL    Triglycerides  Normal:  Less than 150 mg/dL  Borderline High:  150-199 mg/dL  High:  200-499 mg/dL  Very High:  Greater than or equal to 500 mg/dL    Direct Measure HDL  Female:  Greater than or equal to 50 mg/dL   Male:  Greater than or equal to 40 mg/dL    LDL Cholesterol  Desirable:  <100mg/dL  Above Desirable:  100-129 mg/dL   Borderline High:  130-159 mg/dL   High:  160-189 mg/dL   Very High:  >= 190 mg/dL    Non HDL Cholesterol  Desirable:  130 mg/dL  Above Desirable:  130-159 mg/dL  Borderline High:  160-189 mg/dL  High:   190-219 mg/dL  Very High:  Greater than or equal to 220 mg/dL   Hemoglobin A1c     Status: Abnormal   Result Value Ref Range    Hemoglobin A1C 6.1 (H) 0.0 - 5.6 %   CBC with platelets and differential     Status: Abnormal   Result Value Ref Range    WBC Count 9.5 4.0 - 11.0 10e3/uL    RBC Count 5.25 4.40 - 5.90 10e6/uL    Hemoglobin 15.5 13.3 - 17.7 g/dL    Hematocrit 45.7 40.0 - 53.0 %    MCV 87 78 - 100 fL    MCH 29.5 26.5 - 33.0 pg    MCHC 33.9 31.5 - 36.5 g/dL    RDW 12.6 10.0 - 15.0 %    Platelet Count 232 150 - 450 10e3/uL    % Neutrophils 51 %    % Lymphocytes 30 %    % Monocytes 8 %    % Eosinophils 10 %    % Basophils 1 %    % Immature Granulocytes 0 %    Absolute Neutrophils 4.9 1.6 - 8.3 10e3/uL    Absolute Lymphocytes 2.8 0.8 - 5.3 10e3/uL    Absolute Monocytes 0.7 0.0 - 1.3 10e3/uL    Absolute Eosinophils 0.9 (H) 0.0 - 0.7 10e3/uL    Absolute Basophils 0.1 0.0 - 0.2 10e3/uL    Absolute Immature Granulocytes 0.0 <=0.4 10e3/uL   UA Macro with Reflex to Micro and Culture - lab collect     Status: Normal    Specimen: Urine, Midstream   Result Value Ref Range    Color Urine Yellow Colorless, Straw, Light Yellow, Yellow    Appearance Urine Clear Clear    Glucose Urine Negative Negative mg/dL    Bilirubin Urine Negative Negative    Ketones Urine Negative Negative mg/dL    Specific Gravity Urine <=1.005 1.005 - 1.030    Blood Urine Negative Negative    pH Urine 6.0 5.0 - 7.0    Protein Albumin Urine Negative Negative mg/dL    Urobilinogen Urine 0.2 0.2, 1.0 E.U./dL    Nitrite Urine Negative Negative    Leukocyte Esterase Urine Negative Negative    Narrative    Microscopic not indicated   Drug Abuse Screen Panel 13, Urine (Pain Care Package) - lab collect     Status: Normal   Result Value Ref Range    Cannabinoids (13-pwv-1-carboxy-9-THC) Not Detected Not Detected, Indeterminate    Phencyclidine Not Detected Not Detected, Indeterminate    Cocaine (Benzoylecgonine) Not Detected Not Detected, Indeterminate     Methamphetamine (d-Methamphetamine) Not Detected Not Detected, Indeterminate    Opiates (Morphine) Not Detected Not Detected, Indeterminate    Amphetamine (d-Amphetamine) Not Detected Not Detected, Indeterminate    Benzodiazepines (Nordiazepam) Not Detected Not Detected, Indeterminate    Tricyclic Antidepressants (Desipramine) Not Detected Not Detected, Indeterminate    Methadone Not Detected Not Detected, Indeterminate    Barbiturates (Butalbital) Not Detected Not Detected, Indeterminate    Oxycodone Not Detected Not Detected, Indeterminate    Propoxyphene (Norpropoxyphene) Not Detected Not Detected, Indeterminate    Buprenorphine Not Detected Not Detected, Indeterminate   Albumin Random Urine Quantitative with Creat Ratio     Status: None   Result Value Ref Range    Albumin Urine mg/L <12.0 mg/L    Albumin Urine mg/g Cr      Creatinine Urine mg/dL 44.0 mg/dL   CBC with platelets and differential     Status: Abnormal    Narrative    The following orders were created for panel order CBC with platelets and differential.  Procedure                               Abnormality         Status                     ---------                               -----------         ------                     CBC with platelets and d...[[id number]]  Abnormal            Final result                 Please view results for these tests on the individual orders.                 .  ..

## 2022-07-21 ENCOUNTER — TELEPHONE (OUTPATIENT)
Dept: FAMILY MEDICINE | Facility: CLINIC | Age: 67
End: 2022-07-21

## 2022-07-21 DIAGNOSIS — E78.00 HYPERCHOLESTEREMIA: ICD-10-CM

## 2022-07-21 RX ORDER — ATORVASTATIN CALCIUM 20 MG/1
20 TABLET, FILM COATED ORAL EVERY EVENING
Qty: 90 TABLET | Refills: 4 | Status: SHIPPED | OUTPATIENT
Start: 2022-07-21 | End: 2022-07-26

## 2022-07-21 NOTE — TELEPHONE ENCOUNTER
"----- Message from Ebony Wooten MD sent at 7/21/2022  3:47 PM CDT -----  Please let Pwo Micaela know his kidney function is decreased compared to previous tests. This might be a kidney problem or it could be that the kidney is doing less well because of the heart. First, we'll see what the cardiologist says 7/26 (he should be sure to go to that appointment!) and then we will determine if we need to do more tests on his kidney. Also, he must be reminded to take his statin medication \"atorvastatin\" because his cholesterol is high. By taking the atorvastatin, he decreases his risk of heart attack and stroke.  "

## 2022-07-21 NOTE — TELEPHONE ENCOUNTER
Called and spoke to Jody Hinojosa (consent to communicate on file) with the assistance of an  to relay provider message below.  Family verbalized understood recommendation.    YAZMIN DangeloN, RN  [facility]

## 2022-07-26 ENCOUNTER — OFFICE VISIT (OUTPATIENT)
Dept: CARDIOLOGY | Facility: CLINIC | Age: 67
End: 2022-07-26
Attending: FAMILY MEDICINE
Payer: COMMERCIAL

## 2022-07-26 VITALS
SYSTOLIC BLOOD PRESSURE: 180 MMHG | HEART RATE: 100 BPM | WEIGHT: 150 LBS | BODY MASS INDEX: 24.99 KG/M2 | HEIGHT: 65 IN | RESPIRATION RATE: 16 BRPM | DIASTOLIC BLOOD PRESSURE: 98 MMHG

## 2022-07-26 DIAGNOSIS — E11.29 TYPE 2 DIABETES MELLITUS WITH OTHER DIABETIC KIDNEY COMPLICATION, WITHOUT LONG-TERM CURRENT USE OF INSULIN (H): ICD-10-CM

## 2022-07-26 DIAGNOSIS — N18.30 STAGE 3 CHRONIC KIDNEY DISEASE, UNSPECIFIED WHETHER STAGE 3A OR 3B CKD (H): ICD-10-CM

## 2022-07-26 DIAGNOSIS — E78.00 HYPERCHOLESTEREMIA: ICD-10-CM

## 2022-07-26 DIAGNOSIS — E78.5 HYPERLIPIDEMIA LDL GOAL <70: Primary | ICD-10-CM

## 2022-07-26 DIAGNOSIS — I10 ACCELERATED HYPERTENSION: ICD-10-CM

## 2022-07-26 PROCEDURE — 99204 OFFICE O/P NEW MOD 45 MIN: CPT | Performed by: INTERNAL MEDICINE

## 2022-07-26 PROCEDURE — 93000 ELECTROCARDIOGRAM COMPLETE: CPT | Performed by: GENERAL ACUTE CARE HOSPITAL

## 2022-07-26 RX ORDER — DORZOLAMIDE HYDROCHLORIDE AND TIMOLOL MALEATE 20; 5 MG/ML; MG/ML
SOLUTION/ DROPS OPHTHALMIC
COMMUNITY
Start: 2022-06-05 | End: 2022-12-05

## 2022-07-26 RX ORDER — ATORVASTATIN CALCIUM 40 MG/1
40 TABLET, FILM COATED ORAL EVERY EVENING
Qty: 90 TABLET | Refills: 11 | Status: SHIPPED | OUTPATIENT
Start: 2022-07-26 | End: 2022-12-28

## 2022-07-26 RX ORDER — AMLODIPINE BESYLATE 10 MG/1
10 TABLET ORAL AT BEDTIME
Qty: 90 TABLET | Refills: 11 | Status: SHIPPED | OUTPATIENT
Start: 2022-07-26 | End: 2023-08-14

## 2022-07-26 NOTE — LETTER
"7/26/2022    Yassine Larsen MD  [address]  [place] [address]    RE: Moriah Hinojosa       Dear Colleague,     I had the pleasure of seeing Moriah Hinojosa in the [facility].    Thank you, Dr. Wooten, for asking the [facility] team to see Mr. Moriah Hinojosa to evaluate       Assessment/Recommendations   Assessment/Plan:  1. HTN - agree with changes: cont losartan 100mg, hydrochlorothiazide 25mg, start carvedilol 25mg bid and amlodipine 10mg at night - if edema, can consider lowering to 5mg amlodipine and start spironolactone combination with hydrochlorothiazide 25/25mg - tracking K/Cr  2. CV prevention given DM goal LDL< 70, will raise atorvastatin 40mg and repeat lipids in 2 months fasting, ECG today.       No symptoms - but track closely and if fatigue or dyspnea on exertion consider echo and stress nuclear imaging.    Follow up in 3-4 months     History of Present Illness/Subjective    Mr. Moriah Hinojosa is a 67 year old male with HTN, DM, CKD (Cr 1.39), s/p kidney stone [id number], HTN, hyperlipdiemai ( 7/2022), no hx MI/CVA, no chest pain/pressure, PND/orthopnea, syncopal events, palpitations, or dyspnea on exertion, not fatigue, able to walk up 3-4 flights but stops after one flight due to knee pain, and tired.  Active at home, going outside to water plants, cutting the grass by hand.  No family hx of MI/CVA.   US renal artery on right no stenosis, left kidney near complete atrophy from renal stone. No claudication, hx of DVT/PE, no tob/EtOH, no NSAID other than tylenol, no pseudophed.      Meds:  Losartan 100mg  hydrochlorothiazide 25mg  Metoprolol 25mg    Given but has not started: atorvastatin 20mg, carvedilol 25mg bid and amlodipine 10mg       Physical Examination Review of Systems   BP (!) 180/98 (BP Location: Right arm, Patient Position: Sitting, Cuff Size: Adult Regular)   Pulse 100   Resp 16   Ht 1.651 m (5' 5\")   Wt 68 kg (150 lb)   BMI 24.96 kg/m    Body mass " index is 24.96 kg/m .  Wt Readings from Last 3 Encounters:   07/26/22 68 kg (150 lb)   07/20/22 69.9 kg (154 lb)   04/18/22 72.6 kg (160 lb)     [unfilled]  General Appearance:   no distress, normal body habitus   ENT/Mouth: membranes moist, no oral lesions or bleeding gums.      EYES:  no scleral icterus, normal conjunctivae   Neck: no carotid bruits or thyromegaly   Chest/Lungs:   lungs are clear to auscultation, no rales or wheezing,  sternal scar, equal chest wall expansion    Cardiovascular:   Regular. Normal first and second heart sounds with no murmurs, rubs, or gallops; the carotid, radial and posterior tibial pulses are intact, Jugular venous pressure , edema bilaterally    Abdomen:  no organomegaly, masses, bruits, or tenderness; bowel sounds are present   Extremities: no cyanosis or clubbing   Skin: no xanthelasma, warm.    Neurologic: normal  bilateral, no tremors     Psychiatric: alert and oriented x3, calm     Review of Systems - 12 points nega other than above      Medical History  Surgical History Family History Social History   Past Medical History:   Diagnosis Date     Chronic pain of both knees 2/11/2019     COVID-19 5/7/2020     Hypercholesteremia 6/18/2018     Hypertension 5/7/2018    Past Surgical History:   Procedure Laterality Date     LAPAROSCOPIC NEPHRECTOMY Left 8/24/2021    Procedure: NEPHRECTOMY, TOTAL, LAPAROSCOPIC;  Surgeon: Catracho Wright MD;  Location: [facility] OR    Family History   Problem Relation Age of Onset     Anesthesia Reaction No family hx of      Deep Vein Thrombosis (DVT) No family hx of     Social History     Socioeconomic History     Marital status:      Spouse name: Not on file     Number of children: Not on file     Years of education: Not on file     Highest education level: Not on file   Occupational History     Not on file   Tobacco Use     Smoking status: Former Smoker     Smokeless tobacco: Never Used     Tobacco comment: no passive exposure    Substance and Sexual Activity     Alcohol use: No     Drug use: Never     Sexual activity: Not Currently     Partners: Female   Other Topics Concern     Not on file   Social History Narrative     Not on file     Social Determinants of Health     Financial Resource Strain: Not on file   Food Insecurity: Not on file   Transportation Needs: Not on file   Physical Activity: Not on file   Stress: Not on file   Social Connections: Not on file   Intimate Partner Violence: Not on file   Housing Stability: Not on file          Medications  Allergies   Scheduled Meds:  Continuous Infusions:  PRN Meds:. No Known Allergies      Lab Results    Chemistry/lipid CBC Cardiac Enzymes/BNP/TSH/INR   Lab Results   Component Value Date    CHOL 270 (H) 07/20/2022    HDL 60 07/20/2022    TRIG 125 07/20/2022    BUN 19.3 07/20/2022     07/20/2022    CO2 26 07/20/2022    Lab Results   Component Value Date    WBC 9.5 07/20/2022    HGB 15.5 07/20/2022    HCT 45.7 07/20/2022    MCV 87 07/20/2022     07/20/2022    Lab Results   Component Value Date    TROPONINI 0.03 12/13/2020     (H) 12/13/2020              Matias Chavarria MD  Interventional Cardiology  [facility]    Thank you for allowing me to participate in the care of your patient.      Sincerely,     Matias Chavarria MD     [facility]  cc:   Ebony Wooten MD  [address] [place] [address]

## 2022-07-26 NOTE — PROGRESS NOTES
"  Thank you, Dr. Wooten, for asking the [facility] team to see Mr. Moriah Hinojosa to evaluate       Assessment/Recommendations   Assessment/Plan:  1. HTN - agree with changes: cont losartan 100mg, hydrochlorothiazide 25mg, start carvedilol 25mg bid and amlodipine 10mg at night - if edema, can consider lowering to 5mg amlodipine and start spironolactone combination with hydrochlorothiazide 25/25mg - tracking K/Cr  2. CV prevention given DM goal LDL< 70, will raise atorvastatin 40mg and repeat lipids in 2 months fasting, ECG today.       No symptoms - but track closely and if fatigue or dyspnea on exertion consider echo and stress nuclear imaging.    Follow up in 3-4 months     History of Present Illness/Subjective    Mr. Moriah Hinojosa is a 67 year old male with HTN, DM, CKD (Cr 1.39), s/p kidney stone [id number], HTN, hyperlipdiemai ( 7/2022), no hx MI/CVA, no chest pain/pressure, PND/orthopnea, syncopal events, palpitations, or dyspnea on exertion, not fatigue, able to walk up 3-4 flights but stops after one flight due to knee pain, and tired.  Active at home, going outside to water plants, cutting the grass by hand.  No family hx of MI/CVA.   US renal artery on right no stenosis, left kidney near complete atrophy from renal stone. No claudication, hx of DVT/PE, no tob/EtOH, no NSAID other than tylenol, no pseudophed.      Meds:  Losartan 100mg  hydrochlorothiazide 25mg  Metoprolol 25mg    Given but has not started: atorvastatin 20mg, carvedilol 25mg bid and amlodipine 10mg       Physical Examination Review of Systems   BP (!) 180/98 (BP Location: Right arm, Patient Position: Sitting, Cuff Size: Adult Regular)   Pulse 100   Resp 16   Ht 1.651 m (5' 5\")   Wt 68 kg (150 lb)   BMI 24.96 kg/m    Body mass index is 24.96 kg/m .  Wt Readings from Last 3 Encounters:   07/26/22 68 kg (150 lb)   07/20/22 69.9 kg (154 lb)   04/18/22 72.6 kg (160 lb)     [unfilled]  General Appearance:   no distress, " normal body habitus   ENT/Mouth: membranes moist, no oral lesions or bleeding gums.      EYES:  no scleral icterus, normal conjunctivae   Neck: no carotid bruits or thyromegaly   Chest/Lungs:   lungs are clear to auscultation, no rales or wheezing,  sternal scar, equal chest wall expansion    Cardiovascular:   Regular. Normal first and second heart sounds with no murmurs, rubs, or gallops; the carotid, radial and posterior tibial pulses are intact, Jugular venous pressure , edema bilaterally    Abdomen:  no organomegaly, masses, bruits, or tenderness; bowel sounds are present   Extremities: no cyanosis or clubbing   Skin: no xanthelasma, warm.    Neurologic: normal  bilateral, no tremors     Psychiatric: alert and oriented x3, calm     Review of Systems - 12 points nega other than above      Medical History  Surgical History Family History Social History   Past Medical History:   Diagnosis Date     Chronic pain of both knees 2/11/2019     COVID-19 5/7/2020     Hypercholesteremia 6/18/2018     Hypertension 5/7/2018    Past Surgical History:   Procedure Laterality Date     LAPAROSCOPIC NEPHRECTOMY Left 8/24/2021    Procedure: NEPHRECTOMY, TOTAL, LAPAROSCOPIC;  Surgeon: Catracho Wright MD;  Location: [facility] OR    Family History   Problem Relation Age of Onset     Anesthesia Reaction No family hx of      Deep Vein Thrombosis (DVT) No family hx of     Social History     Socioeconomic History     Marital status:      Spouse name: Not on file     Number of children: Not on file     Years of education: Not on file     Highest education level: Not on file   Occupational History     Not on file   Tobacco Use     Smoking status: Former Smoker     Smokeless tobacco: Never Used     Tobacco comment: no passive exposure   Substance and Sexual Activity     Alcohol use: No     Drug use: Never     Sexual activity: Not Currently     Partners: Female   Other Topics Concern     Not on file   Social History Narrative      Not on file     Social Determinants of Health     Financial Resource Strain: Not on file   Food Insecurity: Not on file   Transportation Needs: Not on file   Physical Activity: Not on file   Stress: Not on file   Social Connections: Not on file   Intimate Partner Violence: Not on file   Housing Stability: Not on file          Medications  Allergies   Scheduled Meds:  Continuous Infusions:  PRN Meds:. No Known Allergies      Lab Results    Chemistry/lipid CBC Cardiac Enzymes/BNP/TSH/INR   Lab Results   Component Value Date    CHOL 270 (H) 07/20/2022    HDL 60 07/20/2022    TRIG 125 07/20/2022    BUN 19.3 07/20/2022     07/20/2022    CO2 26 07/20/2022    Lab Results   Component Value Date    WBC 9.5 07/20/2022    HGB 15.5 07/20/2022    HCT 45.7 07/20/2022    MCV 87 07/20/2022     07/20/2022    Lab Results   Component Value Date    TROPONINI 0.03 12/13/2020     (H) 12/13/2020              Matias Chavarria MD  Interventional Cardiology  [facility]

## 2022-07-26 NOTE — PATIENT INSTRUCTIONS
Start:   Amlodipine 10mg at night   Carvedilol 25mg twice a day  Atorvastatin 40mg at night     Stop:  Metoprolol     Obtain fasting blood test in 2 months to check cholesterol    Purchase a blood pressure cuff ([employer] a good brand) and measure your blood pressure once every 1-2 weeks to track blood pressure and heart rate    Follow up with Dr. Larsen in 2-4 weeks to reassess blood pressure control

## 2022-07-27 LAB
ATRIAL RATE - MUSE: 95 BPM
DIASTOLIC BLOOD PRESSURE - MUSE: NORMAL MMHG
INTERPRETATION ECG - MUSE: NORMAL
P AXIS - MUSE: 97 DEGREES
PR INTERVAL - MUSE: 198 MS
QRS DURATION - MUSE: 94 MS
QT - MUSE: 340 MS
QTC - MUSE: 427 MS
R AXIS - MUSE: 80 DEGREES
SYSTOLIC BLOOD PRESSURE - MUSE: NORMAL MMHG
T AXIS - MUSE: 54 DEGREES
VENTRICULAR RATE- MUSE: 95 BPM

## 2022-08-05 ENCOUNTER — TELEPHONE (OUTPATIENT)
Dept: FAMILY MEDICINE | Facility: CLINIC | Age: 67
End: 2022-08-05

## 2022-08-05 DIAGNOSIS — N18.31 STAGE 3A CHRONIC KIDNEY DISEASE (H): Primary | ICD-10-CM

## 2022-08-05 LAB — NONINV COLON CA DNA+OCC BLD SCRN STL QL: NEGATIVE

## 2022-08-05 NOTE — TELEPHONE ENCOUNTER
Called patient to relay provider message below.  Patient verbalized understood recommendation.    YAZMIN DangeloN, RN  [facility]

## 2022-08-05 NOTE — TELEPHONE ENCOUNTER
----- Message from Ebony Wooten MD sent at 8/5/2022  3:40 PM CDT -----  Please let this patient know his cologuard test is negative. This is terrific. Another test is due in 3 years.

## 2022-08-08 ENCOUNTER — OFFICE VISIT (OUTPATIENT)
Dept: NEPHROLOGY | Facility: CLINIC | Age: 67
End: 2022-08-08
Attending: INTERNAL MEDICINE
Payer: COMMERCIAL

## 2022-08-08 ENCOUNTER — LAB (OUTPATIENT)
Dept: LAB | Facility: CLINIC | Age: 67
End: 2022-08-08
Payer: COMMERCIAL

## 2022-08-08 VITALS
OXYGEN SATURATION: 99 % | HEART RATE: 97 BPM | BODY MASS INDEX: 25.13 KG/M2 | SYSTOLIC BLOOD PRESSURE: 225 MMHG | WEIGHT: 151 LBS | DIASTOLIC BLOOD PRESSURE: 120 MMHG

## 2022-08-08 DIAGNOSIS — I15.1 HYPERTENSION SECONDARY TO OTHER RENAL DISORDERS: Primary | ICD-10-CM

## 2022-08-08 DIAGNOSIS — I10 ACCELERATED HYPERTENSION: ICD-10-CM

## 2022-08-08 DIAGNOSIS — N18.30 STAGE 3 CHRONIC KIDNEY DISEASE, UNSPECIFIED WHETHER STAGE 3A OR 3B CKD (H): ICD-10-CM

## 2022-08-08 DIAGNOSIS — N18.31 STAGE 3A CHRONIC KIDNEY DISEASE (H): ICD-10-CM

## 2022-08-08 LAB
ALBUMIN SERPL-MCNC: 4.7 G/DL (ref 3.4–5)
ALBUMIN UR-MCNC: NEGATIVE MG/DL
ANION GAP SERPL CALCULATED.3IONS-SCNC: 11 MMOL/L (ref 3–14)
APPEARANCE UR: CLEAR
BILIRUB UR QL STRIP: NEGATIVE
BUN SERPL-MCNC: 18 MG/DL (ref 7–30)
CALCIUM SERPL-MCNC: 9.9 MG/DL (ref 8.5–10.1)
CHLORIDE BLD-SCNC: 107 MMOL/L (ref 94–109)
CO2 SERPL-SCNC: 24 MMOL/L (ref 20–32)
COLOR UR AUTO: YELLOW
CREAT SERPL-MCNC: 1.31 MG/DL (ref 0.66–1.25)
CREAT UR-MCNC: 174 MG/DL
DEPRECATED CALCIDIOL+CALCIFEROL SERPL-MC: 25 UG/L (ref 20–75)
ERYTHROCYTE [DISTWIDTH] IN BLOOD BY AUTOMATED COUNT: 11.9 % (ref 10–15)
GFR SERPL CREATININE-BSD FRML MDRD: 59 ML/MIN/1.73M2
GLUCOSE BLD-MCNC: 109 MG/DL (ref 70–99)
GLUCOSE UR STRIP-MCNC: NEGATIVE MG/DL
HCT VFR BLD AUTO: 49.2 % (ref 40–53)
HGB BLD-MCNC: 16.6 G/DL (ref 13.3–17.7)
HGB UR QL STRIP: NEGATIVE
KETONES UR STRIP-MCNC: 40 MG/DL
LEUKOCYTE ESTERASE UR QL STRIP: NEGATIVE
MCH RBC QN AUTO: 29.3 PG (ref 26.5–33)
MCHC RBC AUTO-ENTMCNC: 33.7 G/DL (ref 31.5–36.5)
MCV RBC AUTO: 87 FL (ref 78–100)
MUCOUS THREADS #/AREA URNS LPF: PRESENT /LPF
NITRATE UR QL: NEGATIVE
PH UR STRIP: 6.5 [PH] (ref 5–7)
PHOSPHATE SERPL-MCNC: 2.6 MG/DL (ref 2.5–4.5)
PLATELET # BLD AUTO: 188 10E3/UL (ref 150–450)
POTASSIUM BLD-SCNC: 3.2 MMOL/L (ref 3.4–5.3)
PROT UR-MCNC: 0.28 G/L
PROT/CREAT 24H UR: 0.16 G/G CR (ref 0–0.2)
PTH-INTACT SERPL-MCNC: 49 PG/ML (ref 15–65)
RBC # BLD AUTO: 5.67 10E6/UL (ref 4.4–5.9)
RBC URINE: 1 /HPF
SODIUM SERPL-SCNC: 142 MMOL/L (ref 133–144)
SP GR UR STRIP: 1.02 (ref 1–1.03)
SQUAMOUS EPITHELIAL: <1 /HPF
UROBILINOGEN UR STRIP-MCNC: NORMAL MG/DL
WBC # BLD AUTO: 9.3 10E3/UL (ref 4–11)
WBC URINE: <1 /HPF

## 2022-08-08 PROCEDURE — 36415 COLL VENOUS BLD VENIPUNCTURE: CPT | Performed by: PATHOLOGY

## 2022-08-08 PROCEDURE — 80069 RENAL FUNCTION PANEL: CPT | Performed by: PATHOLOGY

## 2022-08-08 PROCEDURE — 82306 VITAMIN D 25 HYDROXY: CPT | Performed by: INTERNAL MEDICINE

## 2022-08-08 PROCEDURE — 99205 OFFICE O/P NEW HI 60 MIN: CPT | Performed by: INTERNAL MEDICINE

## 2022-08-08 PROCEDURE — 84156 ASSAY OF PROTEIN URINE: CPT | Performed by: PATHOLOGY

## 2022-08-08 PROCEDURE — G0463 HOSPITAL OUTPT CLINIC VISIT: HCPCS

## 2022-08-08 PROCEDURE — 81001 URINALYSIS AUTO W/SCOPE: CPT | Performed by: PATHOLOGY

## 2022-08-08 PROCEDURE — 83970 ASSAY OF PARATHORMONE: CPT | Performed by: PATHOLOGY

## 2022-08-08 PROCEDURE — 85027 COMPLETE CBC AUTOMATED: CPT | Performed by: PATHOLOGY

## 2022-08-08 PROCEDURE — 84443 ASSAY THYROID STIM HORMONE: CPT | Performed by: PATHOLOGY

## 2022-08-08 RX ORDER — SPIRONOLACTONE 25 MG/1
25 TABLET ORAL DAILY
Qty: 30 TABLET | Refills: 3 | Status: SHIPPED | OUTPATIENT
Start: 2022-08-08 | End: 2023-03-06

## 2022-08-08 RX ORDER — CARVEDILOL 25 MG/1
37.5 TABLET ORAL 2 TIMES DAILY WITH MEALS
Qty: 360 TABLET | Refills: 0 | Status: SHIPPED | OUTPATIENT
Start: 2022-08-08 | End: 2022-12-05

## 2022-08-08 RX ORDER — TELMISARTAN 80 MG/1
80 TABLET ORAL DAILY
Qty: 120 TABLET | Refills: 0 | Status: SHIPPED | OUTPATIENT
Start: 2022-08-08 | End: 2023-01-03

## 2022-08-08 ASSESSMENT — PAIN SCALES - GENERAL: PAINLEVEL: MILD PAIN (3)

## 2022-08-08 NOTE — PROGRESS NOTES
Nephrology Clinic    Moriah Hinojosa MRN:[id number] YOB: 1955  Date of Service: 08/08/2022  Primary care provider: Yassine Larsen  Requesting physician:  Yassine Larsen      REASON FOR CONSULT: CKD on solitary kidney and resistant HTN    HISTORY OF PRESENT ILLNESS:   Moriah Hinojosa is a 67 year old male who presents for evaluation of uncontrolled hypertension and  CKD stage 3  The past medical history is significant for longstanding hypertension and type 2 diabetes for more than 10 years, chronic severe left-sided hydronephrosis and proximal left hydroureter secondary to a proximal left ureteral stone s/p nephrectomy in August 2021 in an attempt to control his hypertension, who presents to establish care. His blood pressure has been difficult to control over the past few weeks and he has had multiple changes done by both his cardiologist and his PCP. He is currently on amlodipine 10 mg daily, hydrochlorothiazide 25 mg daily, losartan 100 mg daily and carvedilol 25 mg bid. He is however hypertensive at 225/120, tachycardic at 97 and is also complaining of a slight headache in the office today.  His diabetes is well controlled on metformin 750 mg twice daily  Urine studies done today show a UPCR at 0.16 g/g Cr, however his potassium level is low 3.2 and his creatinine level is 1.3 mg/dL. It was 1.05 in August 2021 prior to his nephrectomy.  Renal imaging done in March 2021 shows an unremarkable right kidney and no evidence of renal artery stenosis on the right.  The patient denies any dysuria, any pollakiuria, any nocturia, any LE edema, any dyspnea on exertion .  The following portions of the patient's history were reviewed and updated as appropriate: allergies, current medications, past family history, past medical history, past social history, past surgical history and problem list.    PAST MEDICAL HISTORY:  Past Medical History:   Diagnosis Date     Chronic pain of both knees 2/11/2019     COVID-19 5/7/2020      Hypercholesteremia 6/18/2018     Hypertension 5/7/2018     PAST SURGICAL HISTORY:  Past Surgical History:   Procedure Laterality Date     LAPAROSCOPIC NEPHRECTOMY Left 8/24/2021    Procedure: NEPHRECTOMY, TOTAL, LAPAROSCOPIC;  Surgeon: Catracho Wright MD;  Location:  OR     MEDICATIONS:  Prescription Medications as of 8/8/2022       Rx Number Disp Refills Start End Last Dispensed Date Next Fill Date Owning Pharmacy    acetaminophen (TYLENOL) 325 MG tablet  120 tablet 0 8/26/2021    [place], [place] - [address] [place] [address]    Sig: Take 2 tablets (650 mg) by mouth every 6 hours as needed for pain    Class: E-Prescribe    Route: Oral    amLODIPine (NORVASC) 10 MG tablet  90 tablet 11 7/26/2022    [facility] - [place], [place] - [address]    Sig: Take 1 tablet (10 mg) by mouth At Bedtime    Class: E-Prescribe    Route: Oral    atorvastatin (LIPITOR) 40 MG tablet  90 tablet 11 7/26/2022    [facility] - [place], [place] - [address]    Sig: Take 1 tablet (40 mg) by mouth every evening    Class: E-Prescribe    Route: Oral    carvedilol (COREG) 25 MG tablet  60 tablet 11 7/20/2022    [facility] - [place], [place] - [address]    Sig: Take 1 tablet (25 mg) by mouth 2 times daily (with meals)    Class: E-Prescribe    Route: Oral    dorzolamide-timolol (COSOPT) 2-0.5 % ophthalmic solution    6/5/2022    [facility] - [place], [place] - [address]    Sig: PLACE 1 DROP IN LEFT EYE TWICE A DAY *90 DAYS*    Class: Historical    hydrochlorothiazide (HYDRODIURIL) 25 MG tablet  90 tablet 3 2/15/2022    [facility] - [place], [place] - [address]    Sig: Take 1 tablet (25 mg) by mouth daily    Class: E-Prescribe    Route: Oral    losartan (COZAAR) 100 MG tablet  90 tablet 3 4/18/2022    [facility] - [place], [place] - [address]    Sig: Take 1 tablet (100 mg) by mouth daily    Class:  E-Prescribe    Route: Oral    metFORMIN (GLUCOPHAGE-XR) 750 MG 24 hr tablet  180 tablet 3 2022    [facility] - [place], [place] - [address]    Sig: Take 1 tablet (750 mg) by mouth 2 times daily (with meals)    Class: E-Prescribe    Route: Oral    polyethylene glycol (MIRALAX) 17 GM/Dose powder  510 g 0 2021    [place], [place] - [address] [place] [address]    Sig: Take 17 g by mouth daily as needed for constipation    Class: E-Prescribe    Route: Oral    prednisoLONE acetate (PRED-FORTE) 1 % ophthalmic suspension    3/24/2021        Si times daily     Class: Historical    senna-docusate (SENOKOT-S/PERICOLACE) 8.6-50 MG tablet  45 tablet 0 2021    [place], [place] - [address] [place] [address]    Sig: Take 1 tablet by mouth 2 times daily To prevent constipation    Class: E-Prescribe    Route: Oral         ALLERGIES:    No Known Allergies  REVIEW OF SYSTEMS:  Review Of Systems  Skin: negative for, pigmentation, acne, rash, scaling  Eyes: negative for, visual blurring, double vision, glaucoma, cataracts  Ears/Nose/Throat: negative for, nasal congestion, purulent rhinorrhea, sneezing, postnasal drainage  Respiratory: No shortness of breath, dyspnea on exertion, cough, or hemoptysis  Cardiovascular: negative for, palpitations, tachycardia, irregular heart beat and chest pain  Gastrointestinal: negative for, poor appetite, dysphagia, nausea and vomiting  Genitourinary: negative for, nocturia, dysuria, frequency and urgency  Musculoskeletal: negative for, fracture, back pain, neck pain and arthritis  Neurologic: positive for headache, negative for any deficit    A comprehensive review of systems was performed and found to be negative except as described here or above.  SOCIAL HISTORY:   Social History     Socioeconomic History     Marital status:      Spouse name: Not on file     Number of children: Not on file     Years of  education: Not on file     Highest education level: Not on file   Occupational History     Not on file   Tobacco Use     Smoking status: Former Smoker     Smokeless tobacco: Never Used     Tobacco comment: no passive exposure   Substance and Sexual Activity     Alcohol use: No     Drug use: Never     Sexual activity: Not Currently     Partners: Female   Other Topics Concern     Not on file   Social History Narrative     Not on file     Social Determinants of Health     Financial Resource Strain: Not on file   Food Insecurity: Not on file   Transportation Needs: Not on file   Physical Activity: Not on file   Stress: Not on file   Social Connections: Not on file   Intimate Partner Violence: Not on file   Housing Stability: Not on file     FAMILY MEDICAL HISTORY:   Family History   Problem Relation Age of Onset     Anesthesia Reaction No family hx of      Deep Vein Thrombosis (DVT) No family hx of      PHYSICAL EXAM:   BP (!) 225/120 (BP Location: Right arm, Patient Position: Sitting, Cuff Size: Adult Regular)   Pulse 97   Wt 68.5 kg (151 lb)   SpO2 99%   BMI 25.13 kg/m    GENERAL APPEARANCE: alert and no distress  EYES: nonicteric  HENT: mouth without ulcers or lesions  NECK: supple, no adenopathy  RESP: lungs clear to auscultation   CV: regular rhythm, normal rate, no rub  ABDOMEN: soft, nontender, normal bowel sounds, no HSM   Extremities: no clubbing, cyanosis, or edema  MS: no evidence of inflammation in joints, no muscle tenderness  SKIN: no rash  NEURO: mentation intact and speech normal  PSYCH: affect normal/bright   LABS:   Recent Results (from the past 672 hour(s))   Comprehensive metabolic panel (BMP + Alb, Alk Phos, ALT, AST, Total. Bili, TP)    Collection Time: 07/20/22  3:40 PM   Result Value Ref Range    Sodium 137 136 - 145 mmol/L    Potassium 4.0 3.4 - 5.3 mmol/L    Creatinine 1.39 (H) 0.67 - 1.17 mg/dL    Urea Nitrogen 19.3 8.0 - 23.0 mg/dL    Chloride 100 98 - 107 mmol/L    Carbon Dioxide (CO2)  26 22 - 29 mmol/L    Anion Gap 11 7 - 15 mmol/L    Glucose 117 (H) 70 - 99 mg/dL    Calcium 9.7 8.8 - 10.2 mg/dL    Protein Total 8.2 6.4 - 8.3 g/dL    Albumin 4.7 3.5 - 5.2 g/dL    Bilirubin Total 0.6 <=1.2 mg/dL    Alkaline Phosphatase 73 40 - 129 U/L    AST 23 10 - 50 U/L    ALT 19 10 - 50 U/L    GFR Estimate 55 (L) >60 mL/min/1.73m2   Lipid panel reflex to direct LDL Non-fasting    Collection Time: 07/20/22  3:40 PM   Result Value Ref Range    Cholesterol 270 (H) <200 mg/dL    Triglycerides 125 <150 mg/dL    Direct Measure HDL 60 >=40 mg/dL    LDL Cholesterol Calculated 185 (H) <=100 mg/dL    Non HDL Cholesterol 210 (H) <130 mg/dL   Hemoglobin A1c    Collection Time: 07/20/22  3:40 PM   Result Value Ref Range    Hemoglobin A1C 6.1 (H) 0.0 - 5.6 %   CBC with platelets and differential    Collection Time: 07/20/22  3:40 PM   Result Value Ref Range    WBC Count 9.5 4.0 - 11.0 10e3/uL    RBC Count 5.25 4.40 - 5.90 10e6/uL    Hemoglobin 15.5 13.3 - 17.7 g/dL    Hematocrit 45.7 40.0 - 53.0 %    MCV 87 78 - 100 fL    MCH 29.5 26.5 - 33.0 pg    MCHC 33.9 31.5 - 36.5 g/dL    RDW 12.6 10.0 - 15.0 %    Platelet Count 232 150 - 450 10e3/uL    % Neutrophils 51 %    % Lymphocytes 30 %    % Monocytes 8 %    % Eosinophils 10 %    % Basophils 1 %    % Immature Granulocytes 0 %    Absolute Neutrophils 4.9 1.6 - 8.3 10e3/uL    Absolute Lymphocytes 2.8 0.8 - 5.3 10e3/uL    Absolute Monocytes 0.7 0.0 - 1.3 10e3/uL    Absolute Eosinophils 0.9 (H) 0.0 - 0.7 10e3/uL    Absolute Basophils 0.1 0.0 - 0.2 10e3/uL    Absolute Immature Granulocytes 0.0 <=0.4 10e3/uL   UA Macro with Reflex to Micro and Culture - lab collect    Collection Time: 07/20/22  4:08 PM    Specimen: Urine, Midstream   Result Value Ref Range    Color Urine Yellow Colorless, Straw, Light Yellow, Yellow    Appearance Urine Clear Clear    Glucose Urine Negative Negative mg/dL    Bilirubin Urine Negative Negative    Ketones Urine Negative Negative mg/dL    Specific Gravity  Urine <=1.005 1.005 - 1.030    Blood Urine Negative Negative    pH Urine 6.0 5.0 - 7.0    Protein Albumin Urine Negative Negative mg/dL    Urobilinogen Urine 0.2 0.2, 1.0 E.U./dL    Nitrite Urine Negative Negative    Leukocyte Esterase Urine Negative Negative   Drug Abuse Screen Panel 13, Urine (Pain Care Package) - lab collect    Collection Time: 07/20/22  4:08 PM   Result Value Ref Range    Cannabinoids (22-tpt-1-carboxy-9-THC) Not Detected Not Detected, Indeterminate    Phencyclidine Not Detected Not Detected, Indeterminate    Cocaine (Benzoylecgonine) Not Detected Not Detected, Indeterminate    Methamphetamine (d-Methamphetamine) Not Detected Not Detected, Indeterminate    Opiates (Morphine) Not Detected Not Detected, Indeterminate    Amphetamine (d-Amphetamine) Not Detected Not Detected, Indeterminate    Benzodiazepines (Nordiazepam) Not Detected Not Detected, Indeterminate    Tricyclic Antidepressants (Desipramine) Not Detected Not Detected, Indeterminate    Methadone Not Detected Not Detected, Indeterminate    Barbiturates (Butalbital) Not Detected Not Detected, Indeterminate    Oxycodone Not Detected Not Detected, Indeterminate    Propoxyphene (Norpropoxyphene) Not Detected Not Detected, Indeterminate    Buprenorphine Not Detected Not Detected, Indeterminate   Albumin Random Urine Quantitative with Creat Ratio    Collection Time: 07/20/22  4:08 PM   Result Value Ref Range    Albumin Urine mg/L <12.0 mg/L    Albumin Urine mg/g Cr      Creatinine Urine mg/dL 44.0 mg/dL   ECG 12-LEAD WITH MUSE (LHE)    Collection Time: 07/26/22 11:24 AM   Result Value Ref Range    Systolic Blood Pressure  mmHg    Diastolic Blood Pressure  mmHg    Ventricular Rate 95 BPM    Atrial Rate 95 BPM    VT Interval 198 ms    QRS Duration 94 ms     ms    QTc 427 ms    P Axis 97 degrees    R AXIS 80 degrees    T Axis 54 degrees    Interpretation ECG       Poor data quality, interpretation may be adversely affected  Sinus  rhythm  Normal ECG  No previous ECGs available  Confirmed by LORETTA TRIPP MD LOC:JN ([id number]) on 7/27/2022 4:01:21 PM     MAYNOR(Ayla)    Collection Time: 07/28/22  9:00 AM   Result Value Ref Range    COLOGUARD-ABSTRACT Negative Negative   Renal panel    Collection Time: 08/08/22  9:08 AM   Result Value Ref Range    Sodium 142 133 - 144 mmol/L    Potassium 3.2 (L) 3.4 - 5.3 mmol/L    Chloride 107 94 - 109 mmol/L    Carbon Dioxide (CO2) 24 20 - 32 mmol/L    Anion Gap 11 3 - 14 mmol/L    Urea Nitrogen 18 7 - 30 mg/dL    Creatinine 1.31 (H) 0.66 - 1.25 mg/dL    Calcium 9.9 8.5 - 10.1 mg/dL    Glucose 109 (H) 70 - 99 mg/dL    Albumin 4.7 3.4 - 5.0 g/dL    Phosphorus 2.6 2.5 - 4.5 mg/dL    GFR Estimate 59 (L) >60 mL/min/1.73m2   CBC with platelets    Collection Time: 08/08/22  9:08 AM   Result Value Ref Range    WBC Count 9.3 4.0 - 11.0 10e3/uL    RBC Count 5.67 4.40 - 5.90 10e6/uL    Hemoglobin 16.6 13.3 - 17.7 g/dL    Hematocrit 49.2 40.0 - 53.0 %    MCV 87 78 - 100 fL    MCH 29.3 26.5 - 33.0 pg    MCHC 33.7 31.5 - 36.5 g/dL    RDW 11.9 10.0 - 15.0 %    Platelet Count 188 150 - 450 10e3/uL   Parathyroid Hormone Intact    Collection Time: 08/08/22  9:08 AM   Result Value Ref Range    Parathyroid Hormone Intact 49 15 - 65 pg/mL     CMP  Recent Labs   Lab Test 08/08/22  0908 07/20/22  1540 12/15/21  1326 08/26/21  1256 08/26/21  1052 08/26/21  0801 08/03/21  1437 12/23/20  1215 12/16/20  0702 12/15/20  0439 12/14/20  0442 12/13/20  2217 12/13/20  1000 11/13/19  1752 05/13/19  1709    137 138  --   --  139   < > 138 141 138 140  --  140 140 140   POTASSIUM 3.2* 4.0 3.6 3.9  --  3.2*   < > 3.2* 3.7 3.7 3.8  --  4.1 4.7 4.2   CHLORIDE 107 100 105  --   --  108   < > 98 105 106 108*  --  104 102 102   CO2 24 26 21*  --   --  28   < > 30 26 21* 22  --  24 28 25   ANIONGAP 11 11 12  --   --  3   < > 10 10 11 10  --  12 10 13   * 117* 181*  --  184* 215*   < > 267* 117 185* 142*   < > 141* 117 118   BUN  18 19.3 19  --   --  16   < > 17 38* 20 14  --  13 21 20   CR 1.31* 1.39* 1.18  --   --  1.06   < > 1.34* 1.80* 1.29 1.29  --  1.50* 1.46* 1.59*   GFRESTIMATED 59* 55* 63  --   --  72   < > 53* 38* 56* 56*  --  47* 48* 44*   GFRESTBLACK  --   --   --   --   --   --   --  >60 46* >60 >60  --  57* 59* 53*   LENNOX 9.9 9.7 9.2  --   --  8.0*   < > 9.6 9.2 8.9 9.0  --  9.7 9.5 9.8   MAG  --   --   --   --   --   --   --   --  2.0 2.1 1.6*  --   --   --   --    PHOS 2.6  --   --   --   --   --   --   --   --  2.4* 2.7  --   --   --   --    PROTTOTAL  --  8.2  --   --   --   --   --   --   --   --   --   --  7.9 7.3 7.2   ALBUMIN 4.7 4.7  --   --   --   --   --   --   --  3.7 3.7  --  4.6 4.3 4.4   BILITOTAL  --  0.6  --   --   --   --   --   --   --   --   --   --  1.0 0.9 0.9   ALKPHOS  --  73  --   --   --   --   --   --   --   --   --   --  99 78 90   AST  --  23  --   --   --   --   --   --   --   --   --   --  19 27 27   ALT  --  19  --   --   --   --   --   --   --   --   --   --  16 27 28    < > = values in this interval not displayed.     CBC  Recent Labs   Lab Test 08/08/22  0908 07/20/22  1540 08/26/21  0801 08/25/21  0712   HGB 16.6 15.5 13.4 14.2   WBC 9.3 9.5 10.0 12.5*   RBC 5.67 5.25 4.49 4.77   HCT 49.2 45.7 40.1 42.0   MCV 87 87 89 88   MCH 29.3 29.5 29.8 29.8   MCHC 33.7 33.9 33.4 33.8   RDW 11.9 12.6 12.5 12.6    232 173 200     INRNo lab results found.  ABGNo lab results found.   URINE STUDIES  Recent Labs   Lab Test 07/20/22  1608 12/13/20  1208   COLOR Yellow Yellow   APPEARANCE Clear Clear   URINEGLC Negative Negative   URINEBILI Negative Negative   URINEKETONE Negative Negative   SG <=1.005 1.006   UBLD Negative Negative   URINEPH 6.0 6.5   PROTEIN Negative Trace*   UROBILINOGEN 0.2 <2.0 E.U./dL   NITRITE Negative Negative   LEUKEST Negative Negative   RBCU  --  0-2   WBCU  --  0-5     No lab results found.    ASSESSMENT AND PLAN:    #CKD stage 3a with a UPCR at 0.16 g/g Cr on solitary  kidney  Deterioration in the renal functio iver the past year likely secondary to accelerated hypertension and nephrectomy  Renal imaging of the remaining kidney is unremarkable  No documented intake of NSAIDs or other nephrotoxic medications. Also recent urine toxicology screen is negative. The progression is tributary of BP control  The patient was instructed to keep the sodium intake around 2400 mg /day, follow a plant-based diet and to avoid NSAIDs     #HTN  Primary and secondary to CKD. Currently uncontrolled on amlodipine 10 mg daily, losartan 100 mg daily, carvedilol 25 mg bid and hydrochlorothiazide 25 mg daily. There is no evidence of renal artery stenosis and his nephrectomy a year ago didn't improve his BP. His potassium  Level is low which could further worsen the poor BP control. Will stop losartan and replace it with a more potent and longer acting ARB such as telmisartan. Will also stop hydrochlorothiazide for now and start instead spironolactone 25 mg daily. Finally will increase carvedilol to 37.5 mg twice daily. Will recheck a CMP in 2 weeks. I will also check a TSH level. The patient was instructed to keep a BP diary and to communicate the results. IF BP remains uncontrolled I will expand the work up of secondary HTN.    #Type 2 DM   Hba1c is 6.1 in July. Well controlled on metformin    #CVD/CAD dyslipidemia  On atorvastatin. Management per cardiology     #Blood count  Hemoglobin 16. Should also be screened for sleep apnea    #Acid-base status  CO2 level 24. No acute issues    #Electrolytes  K 3.2 on hydrochlorothiazide. It was stopped and I also ordered a magnesium level     #BMD  Ca  9.9        P 2.6 Alb 4.7  iPTH 49  Vitamin D level 25 Will start his on vitamin D at next visit    The total time of this encounter amounted to 60 minutes on the day of encounter. This time included time spent with the patient, reviewing records, ordering tests, and performing post visit documentation.   Labs  ordered include: CBC with diff, Renal Panel, CMP, UA with microscopy, UPCR, vitamin D levels, iPTH levels    The patient will return to follow up in 3 weeks with repeat labs and a BP diary    Dian Cox MD  Division of Renal Disease and Hypertension  August 8, 2022  9:34 AM

## 2022-08-08 NOTE — NURSING NOTE
Chief Complaint   Patient presents with     Consult     CKD     Blood pressure (!) 225/120, pulse 97, weight 68.5 kg (151 lb), SpO2 99 %.    Parmjit Alvarado on 8/8/2022 at 9:28 AM

## 2022-08-08 NOTE — LETTER
8/8/2022       RE: Moriah Hinojosa  [address] [place] [address]     Dear Colleague,    Thank you for referring your patient, Moriah Hinojosa, to the [facility] NEPHROLOGY [facility]INIC [place] [facility]. Please see a copy of my visit note below.    Nephrology Clinic    Moriah Hinojosa MRN:[id number] YOB: 1955  Date of Service: 08/08/2022  Primary care provider: Yassine Larsen  Requesting physician:  Yassine Larsen      REASON FOR CONSULT: CKD on solitary kidney and resistant HTN    HISTORY OF PRESENT ILLNESS:   Moriah Hinojosa is a 67 year old male who presents for evaluation of uncontrolled hypertension and  CKD stage 3  The past medical history is significant for longstanding hypertension and type 2 diabetes for more than 10 years, chronic severe left-sided hydronephrosis and proximal left hydroureter secondary to a proximal left ureteral stone s/p nephrectomy in August 2021 in an attempt to control his hypertension, who presents to establish care. His blood pressure has been difficult to control over the past few weeks and he has had multiple changes done by both his cardiologist and his PCP. He is currently on amlodipine 10 mg daily, hydrochlorothiazide 25 mg daily, losartan 100 mg daily and carvedilol 25 mg bid. He is however hypertensive at 225/120, tachycardic at 97 and is also complaining of a slight headache in the office today.  His diabetes is well controlled on metformin 750 mg twice daily  Urine studies done today show a UPCR at 0.16 g/g Cr, however his potassium level is low 3.2 and his creatinine level is 1.3 mg/dL. It was 1.05 in August 2021 prior to his nephrectomy.  Renal imaging done in March 2021 shows an unremarkable right kidney and no evidence of renal artery stenosis on the right.  The patient denies any dysuria, any pollakiuria, any nocturia, any LE edema, any dyspnea on exertion .  The following portions of the patient's history were reviewed  and updated as appropriate: allergies, current medications, past family history, past medical history, past social history, past surgical history and problem list.    PAST MEDICAL HISTORY:  Past Medical History:   Diagnosis Date     Chronic pain of both knees 2/11/2019     COVID-19 5/7/2020     Hypercholesteremia 6/18/2018     Hypertension 5/7/2018     PAST SURGICAL HISTORY:  Past Surgical History:   Procedure Laterality Date     LAPAROSCOPIC NEPHRECTOMY Left 8/24/2021    Procedure: NEPHRECTOMY, TOTAL, LAPAROSCOPIC;  Surgeon: Catracho Wright MD;  Location:  OR     MEDICATIONS:  Prescription Medications as of 8/8/2022       Rx Number Disp Refills Start End Last Dispensed Date Next Fill Date Owning Pharmacy    acetaminophen (TYLENOL) 325 MG tablet  120 tablet 0 8/26/2021    [place], [place] - [address] [place] [address]    Sig: Take 2 tablets (650 mg) by mouth every 6 hours as needed for pain    Class: E-Prescribe    Route: Oral    amLODIPine (NORVASC) 10 MG tablet  90 tablet 11 7/26/2022    [facility] - [place], [place] - [address]    Sig: Take 1 tablet (10 mg) by mouth At Bedtime    Class: E-Prescribe    Route: Oral    atorvastatin (LIPITOR) 40 MG tablet  90 tablet 11 7/26/2022    [facility] - [place], [place] - [address]    Sig: Take 1 tablet (40 mg) by mouth every evening    Class: E-Prescribe    Route: Oral    carvedilol (COREG) 25 MG tablet  60 tablet 11 7/20/2022    [facility] - [place], [place] - [address]    Sig: Take 1 tablet (25 mg) by mouth 2 times daily (with meals)    Class: E-Prescribe    Route: Oral    dorzolamide-timolol (COSOPT) 2-0.5 % ophthalmic solution    6/5/2022    [facility] - [place], [place] - [address]    Sig: PLACE 1 DROP IN LEFT EYE TWICE A DAY *90 DAYS*    Class: Historical    hydrochlorothiazide (HYDRODIURIL) 25 MG tablet  90 tablet 3 2/15/2022    [facility] -  [place], [place] - [address]    Sig: Take 1 tablet (25 mg) by mouth daily    Class: E-Prescribe    Route: Oral    losartan (COZAAR) 100 MG tablet  90 tablet 3 2022    [facility] - [place] - [address]    Sig: Take 1 tablet (100 mg) by mouth daily    Class: E-Prescribe    Route: Oral    metFORMIN (GLUCOPHAGE-XR) 750 MG 24 hr tablet  180 tablet 3 2022    [facility] - [place], [place] - [address]    Sig: Take 1 tablet (750 mg) by mouth 2 times daily (with meals)    Class: E-Prescribe    Route: Oral    polyethylene glycol (MIRALAX) 17 GM/Dose powder  510 g 0 2021    [address]    Sig: Take 17 g by mouth daily as needed for constipation    Class: E-Prescribe    Route: Oral    prednisoLONE acetate (PRED-FORTE) 1 % ophthalmic suspension    3/24/2021        Si times daily     Class: Historical    senna-docusate (SENOKOT-S/PERICOLACE) 8.6-50 MG tablet  45 tablet 0 2021    [address]    Sig: Take 1 tablet by mouth 2 times daily To prevent constipation    Class: E-Prescribe    Route: Oral         ALLERGIES:    No Known Allergies  REVIEW OF SYSTEMS:  Review Of Systems  Skin: negative for, pigmentation, acne, rash, scaling  Eyes: negative for, visual blurring, double vision, glaucoma, cataracts  Ears/Nose/Throat: negative for, nasal congestion, purulent rhinorrhea, sneezing, postnasal drainage  Respiratory: No shortness of breath, dyspnea on exertion, cough, or hemoptysis  Cardiovascular: negative for, palpitations, tachycardia, irregular heart beat and chest pain  Gastrointestinal: negative for, poor appetite, dysphagia, nausea and vomiting  Genitourinary: negative for, nocturia, dysuria, frequency and urgency  Musculoskeletal: negative for, fracture, back pain, neck pain and arthritis  Neurologic: positive for headache, negative for any  deficit    A comprehensive review of systems was performed and found to be negative except as described here or above.  SOCIAL HISTORY:   Social History     Socioeconomic History     Marital status:      Spouse name: Not on file     Number of children: Not on file     Years of education: Not on file     Highest education level: Not on file   Occupational History     Not on file   Tobacco Use     Smoking status: Former Smoker     Smokeless tobacco: Never Used     Tobacco comment: no passive exposure   Substance and Sexual Activity     Alcohol use: No     Drug use: Never     Sexual activity: Not Currently     Partners: Female   Other Topics Concern     Not on file   Social History Narrative     Not on file     Social Determinants of Health     Financial Resource Strain: Not on file   Food Insecurity: Not on file   Transportation Needs: Not on file   Physical Activity: Not on file   Stress: Not on file   Social Connections: Not on file   Intimate Partner Violence: Not on file   Housing Stability: Not on file     FAMILY MEDICAL HISTORY:   Family History   Problem Relation Age of Onset     Anesthesia Reaction No family hx of      Deep Vein Thrombosis (DVT) No family hx of      PHYSICAL EXAM:   BP (!) 225/120 (BP Location: Right arm, Patient Position: Sitting, Cuff Size: Adult Regular)   Pulse 97   Wt 68.5 kg (151 lb)   SpO2 99%   BMI 25.13 kg/m    GENERAL APPEARANCE: alert and no distress  EYES: nonicteric  HENT: mouth without ulcers or lesions  NECK: supple, no adenopathy  RESP: lungs clear to auscultation   CV: regular rhythm, normal rate, no rub  ABDOMEN: soft, nontender, normal bowel sounds, no HSM   Extremities: no clubbing, cyanosis, or edema  MS: no evidence of inflammation in joints, no muscle tenderness  SKIN: no rash  NEURO: mentation intact and speech normal  PSYCH: affect normal/bright   LABS:   Recent Results (from the past 672 hour(s))   Comprehensive metabolic panel (BMP + Alb, Alk Phos, ALT,  AST, Total. Bili, TP)    Collection Time: 07/20/22  3:40 PM   Result Value Ref Range    Sodium 137 136 - 145 mmol/L    Potassium 4.0 3.4 - 5.3 mmol/L    Creatinine 1.39 (H) 0.67 - 1.17 mg/dL    Urea Nitrogen 19.3 8.0 - 23.0 mg/dL    Chloride 100 98 - 107 mmol/L    Carbon Dioxide (CO2) 26 22 - 29 mmol/L    Anion Gap 11 7 - 15 mmol/L    Glucose 117 (H) 70 - 99 mg/dL    Calcium 9.7 8.8 - 10.2 mg/dL    Protein Total 8.2 6.4 - 8.3 g/dL    Albumin 4.7 3.5 - 5.2 g/dL    Bilirubin Total 0.6 <=1.2 mg/dL    Alkaline Phosphatase 73 40 - 129 U/L    AST 23 10 - 50 U/L    ALT 19 10 - 50 U/L    GFR Estimate 55 (L) >60 mL/min/1.73m2   Lipid panel reflex to direct LDL Non-fasting    Collection Time: 07/20/22  3:40 PM   Result Value Ref Range    Cholesterol 270 (H) <200 mg/dL    Triglycerides 125 <150 mg/dL    Direct Measure HDL 60 >=40 mg/dL    LDL Cholesterol Calculated 185 (H) <=100 mg/dL    Non HDL Cholesterol 210 (H) <130 mg/dL   Hemoglobin A1c    Collection Time: 07/20/22  3:40 PM   Result Value Ref Range    Hemoglobin A1C 6.1 (H) 0.0 - 5.6 %   CBC with platelets and differential    Collection Time: 07/20/22  3:40 PM   Result Value Ref Range    WBC Count 9.5 4.0 - 11.0 10e3/uL    RBC Count 5.25 4.40 - 5.90 10e6/uL    Hemoglobin 15.5 13.3 - 17.7 g/dL    Hematocrit 45.7 40.0 - 53.0 %    MCV 87 78 - 100 fL    MCH 29.5 26.5 - 33.0 pg    MCHC 33.9 31.5 - 36.5 g/dL    RDW 12.6 10.0 - 15.0 %    Platelet Count 232 150 - 450 10e3/uL    % Neutrophils 51 %    % Lymphocytes 30 %    % Monocytes 8 %    % Eosinophils 10 %    % Basophils 1 %    % Immature Granulocytes 0 %    Absolute Neutrophils 4.9 1.6 - 8.3 10e3/uL    Absolute Lymphocytes 2.8 0.8 - 5.3 10e3/uL    Absolute Monocytes 0.7 0.0 - 1.3 10e3/uL    Absolute Eosinophils 0.9 (H) 0.0 - 0.7 10e3/uL    Absolute Basophils 0.1 0.0 - 0.2 10e3/uL    Absolute Immature Granulocytes 0.0 <=0.4 10e3/uL   UA Macro with Reflex to Micro and Culture - lab collect    Collection Time: 07/20/22  4:08 PM     Specimen: Urine, Midstream   Result Value Ref Range    Color Urine Yellow Colorless, Straw, Light Yellow, Yellow    Appearance Urine Clear Clear    Glucose Urine Negative Negative mg/dL    Bilirubin Urine Negative Negative    Ketones Urine Negative Negative mg/dL    Specific Gravity Urine <=1.005 1.005 - 1.030    Blood Urine Negative Negative    pH Urine 6.0 5.0 - 7.0    Protein Albumin Urine Negative Negative mg/dL    Urobilinogen Urine 0.2 0.2, 1.0 E.U./dL    Nitrite Urine Negative Negative    Leukocyte Esterase Urine Negative Negative   Drug Abuse Screen Panel 13, Urine (Pain Care Package) - lab collect    Collection Time: 07/20/22  4:08 PM   Result Value Ref Range    Cannabinoids (77-hex-2-carboxy-9-THC) Not Detected Not Detected, Indeterminate    Phencyclidine Not Detected Not Detected, Indeterminate    Cocaine (Benzoylecgonine) Not Detected Not Detected, Indeterminate    Methamphetamine (d-Methamphetamine) Not Detected Not Detected, Indeterminate    Opiates (Morphine) Not Detected Not Detected, Indeterminate    Amphetamine (d-Amphetamine) Not Detected Not Detected, Indeterminate    Benzodiazepines (Nordiazepam) Not Detected Not Detected, Indeterminate    Tricyclic Antidepressants (Desipramine) Not Detected Not Detected, Indeterminate    Methadone Not Detected Not Detected, Indeterminate    Barbiturates (Butalbital) Not Detected Not Detected, Indeterminate    Oxycodone Not Detected Not Detected, Indeterminate    Propoxyphene (Norpropoxyphene) Not Detected Not Detected, Indeterminate    Buprenorphine Not Detected Not Detected, Indeterminate   Albumin Random Urine Quantitative with Creat Ratio    Collection Time: 07/20/22  4:08 PM   Result Value Ref Range    Albumin Urine mg/L <12.0 mg/L    Albumin Urine mg/g Cr      Creatinine Urine mg/dL 44.0 mg/dL   ECG 12-LEAD WITH MUSE (LHE)    Collection Time: 07/26/22 11:24 AM   Result Value Ref Range    Systolic Blood Pressure  mmHg    Diastolic Blood Pressure  mmHg     Ventricular Rate 95 BPM    Atrial Rate 95 BPM    GA Interval 198 ms    QRS Duration 94 ms     ms    QTc 427 ms    P Axis 97 degrees    R AXIS 80 degrees    T Axis 54 degrees    Interpretation ECG       Poor data quality, interpretation may be adversely affected  Sinus rhythm  Normal ECG  No previous ECGs available  Confirmed by LORETTA TRIPP MD LOC:JN ([id number]) on 7/27/2022 4:01:21 PM     [employer])    Collection Time: 07/28/22  9:00 AM   Result Value Ref Range    COLOGUARD-ABSTRACT Negative Negative   Renal panel    Collection Time: 08/08/22  9:08 AM   Result Value Ref Range    Sodium 142 133 - 144 mmol/L    Potassium 3.2 (L) 3.4 - 5.3 mmol/L    Chloride 107 94 - 109 mmol/L    Carbon Dioxide (CO2) 24 20 - 32 mmol/L    Anion Gap 11 3 - 14 mmol/L    Urea Nitrogen 18 7 - 30 mg/dL    Creatinine 1.31 (H) 0.66 - 1.25 mg/dL    Calcium 9.9 8.5 - 10.1 mg/dL    Glucose 109 (H) 70 - 99 mg/dL    Albumin 4.7 3.4 - 5.0 g/dL    Phosphorus 2.6 2.5 - 4.5 mg/dL    GFR Estimate 59 (L) >60 mL/min/1.73m2   CBC with platelets    Collection Time: 08/08/22  9:08 AM   Result Value Ref Range    WBC Count 9.3 4.0 - 11.0 10e3/uL    RBC Count 5.67 4.40 - 5.90 10e6/uL    Hemoglobin 16.6 13.3 - 17.7 g/dL    Hematocrit 49.2 40.0 - 53.0 %    MCV 87 78 - 100 fL    MCH 29.3 26.5 - 33.0 pg    MCHC 33.7 31.5 - 36.5 g/dL    RDW 11.9 10.0 - 15.0 %    Platelet Count 188 150 - 450 10e3/uL   Parathyroid Hormone Intact    Collection Time: 08/08/22  9:08 AM   Result Value Ref Range    Parathyroid Hormone Intact 49 15 - 65 pg/mL     CMP  Recent Labs   Lab Test 08/08/22  0908 07/20/22  1540 12/15/21  1326 08/26/21  1256 08/26/21  1052 08/26/21  0801 08/03/21  1437 12/23/20  1215 12/16/20  0702 12/15/20  0439 12/14/20  0442 12/13/20  2217 12/13/20  1000 11/13/19  1752 05/13/19  1709    137 138  --   --  139   < > 138 141 138 140  --  140 140 140   POTASSIUM 3.2* 4.0 3.6 3.9  --  3.2*   < > 3.2* 3.7 3.7 3.8  --  4.1 4.7 4.2   CHLORIDE  107 100 105  --   --  108   < > 98 105 106 108*  --  104 102 102   CO2 24 26 21*  --   --  28   < > 30 26 21* 22  --  24 28 25   ANIONGAP 11 11 12  --   --  3   < > 10 10 11 10  --  12 10 13   * 117* 181*  --  184* 215*   < > 267* 117 185* 142*   < > 141* 117 118   BUN 18 19.3 19  --   --  16   < > 17 38* 20 14  --  13 21 20   CR 1.31* 1.39* 1.18  --   --  1.06   < > 1.34* 1.80* 1.29 1.29  --  1.50* 1.46* 1.59*   GFRESTIMATED 59* 55* 63  --   --  72   < > 53* 38* 56* 56*  --  47* 48* 44*   GFRESTBLACK  --   --   --   --   --   --   --  >60 46* >60 >60  --  57* 59* 53*   LENNOX 9.9 9.7 9.2  --   --  8.0*   < > 9.6 9.2 8.9 9.0  --  9.7 9.5 9.8   MAG  --   --   --   --   --   --   --   --  2.0 2.1 1.6*  --   --   --   --    PHOS 2.6  --   --   --   --   --   --   --   --  2.4* 2.7  --   --   --   --    PROTTOTAL  --  8.2  --   --   --   --   --   --   --   --   --   --  7.9 7.3 7.2   ALBUMIN 4.7 4.7  --   --   --   --   --   --   --  3.7 3.7  --  4.6 4.3 4.4   BILITOTAL  --  0.6  --   --   --   --   --   --   --   --   --   --  1.0 0.9 0.9   ALKPHOS  --  73  --   --   --   --   --   --   --   --   --   --  99 78 90   AST  --  23  --   --   --   --   --   --   --   --   --   --  19 27 27   ALT  --  19  --   --   --   --   --   --   --   --   --   --  16 27 28    < > = values in this interval not displayed.     CBC  Recent Labs   Lab Test 08/08/22  0908 07/20/22  1540 08/26/21  0801 08/25/21  0712   HGB 16.6 15.5 13.4 14.2   WBC 9.3 9.5 10.0 12.5*   RBC 5.67 5.25 4.49 4.77   HCT 49.2 45.7 40.1 42.0   MCV 87 87 89 88   MCH 29.3 29.5 29.8 29.8   MCHC 33.7 33.9 33.4 33.8   RDW 11.9 12.6 12.5 12.6    232 173 200     INRNo lab results found.  ABGNo lab results found.   URINE STUDIES  Recent Labs   Lab Test 07/20/22  1608 12/13/20  1208   COLOR Yellow Yellow   APPEARANCE Clear Clear   URINEGLC Negative Negative   URINEBILI Negative Negative   URINEKETONE Negative Negative   SG <=1.005 1.006   UBLD Negative Negative    URINEPH 6.0 6.5   PROTEIN Negative Trace*   UROBILINOGEN 0.2 <2.0 E.U./dL   NITRITE Negative Negative   LEUKEST Negative Negative   RBCU  --  0-2   WBCU  --  0-5     No lab results found.    ASSESSMENT AND PLAN:    #CKD stage 3a with a UPCR at 0.16 g/g Cr on solitary kidney  Deterioration in the renal functio iver the past year likely secondary to accelerated hypertension and nephrectomy  Renal imaging of the remaining kidney is unremarkable  No documented intake of NSAIDs or other nephrotoxic medications. Also recent urine toxicology screen is negative. The progression is tributary of BP control  The patient was instructed to keep the sodium intake around 2400 mg /day, follow a plant-based diet and to avoid NSAIDs     #HTN  Primary and secondary to CKD. Currently uncontrolled on amlodipine 10 mg daily, losartan 100 mg daily, carvedilol 25 mg bid and hydrochlorothiazide 25 mg daily. There is no evidence of renal artery stenosis and his nephrectomy a year ago didn't improve his BP. His potassium  Level is low which could further worsen the poor BP control. Will stop losartan and replace it with a more potent and longer acting ARB such as telmisartan. Will also stop hydrochlorothiazide for now and start instead spironolactone 25 mg daily. Finally will increase carvedilol to 37.5 mg twice daily. Will recheck a CMP in 2 weeks. I will also check a TSH level. The patient was instructed to keep a BP diary and to communicate the results. IF BP remains uncontrolled I will expand the work up of secondary HTN.    #Type 2 DM   Hba1c is 6.1 in July. Well controlled on metformin    #CVD/CAD dyslipidemia  On atorvastatin. Management per cardiology     #Blood count  Hemoglobin 16. Should also be screened for sleep apnea    #Acid-base status  CO2 level 24. No acute issues    #Electrolytes  K 3.2 on hydrochlorothiazide. It was stopped and I also ordered a magnesium level     #BMD  Ca  9.9        P 2.6 Alb 4.7  iPTH 49  Vitamin D  level 25 Will start his on vitamin D at next visit    The total time of this encounter amounted to 60 minutes on the day of encounter. This time included time spent with the patient, reviewing records, ordering tests, and performing post visit documentation.   Labs ordered include: CBC with diff, Renal Panel, CMP, UA with microscopy, UPCR, vitamin D levels, iPTH levels    The patient will return to follow up in 3 weeks with repeat labs and a BP diary    Dian Cox MD  Division of Renal Disease and Hypertension  August 8, 2022  9:34 AM

## 2022-08-08 NOTE — PATIENT INSTRUCTIONS
It was a pleasure taking care of you today.  I've included a brief summary of our discussion and care plan from today's visit below.  Please review this information with your primary care provider.  _______________________________________________________________________    My recommendations are summarized as follows:  -Keep the amount of sodium in your diet at 2.4 g/day (also see instructions attached in that regard)  -Keep a Blood Pressure diary by taking your blood pressure twice a day as instructed (also see instructions attached in that regard). Please make sure that you are using a validated blood pressure device by checking that it is the case at: https://www.validatebp.org/  -Avoid all NSAID's. Examples include Ibuprofen (Advil, Motrin), naprosyn (Aleve), celebrex among others. Acetaminophen (Tylenol) is ok with maximum dose in 24 hours of 3200 mg.  -Healthy lifestyle measures will keep your kidney's functioning at their current best. This includes regular exercise, weight loss and smoking cessation if you smoke.   -Do not exceed one alcoholic drink per day  -stop losartan  -stop hydrochlorothiazide  -start spironolactone 25 mg daily  -start telmisartan 80 mg at bedtime  -Increase carvedilol to 37.5 mg twice daily  -Do labs in 2 weeks        To schedule imaging please call [phone]     To schedule your lab appointment at the [facility], please call       Return to Nephrology Clinic in 4 weeks to review your progress.    _______________________________________________________________________    Who do I call with any questions after my visit?    Please be in touch if there are any further questions that arise following today's visit.  There are multiple ways to contact your nephrology care team.      During business hours, you may reach your Nephrology LPN Care Coordinator, Portia, at .      To schedule or reschedule an appointment, please call  [phone].    You can always send a secure message through [employer].  [employer] messages are answered by your nurse or doctor typically within 24 hours.  Please allow extra time on weekends and holidays.      For urgent/emergent questions after business hours, you may reach the on-call Nephrology Fellow by contacting the [facility]  at [phone].     How will I get the results of any tests ordered?    You will receive all of your results.  If you have signed up for [employer], any tests ordered at your visit will be available to you after your physician reviews them.  Typically this takes 1-2 weeks.  If there are urgent results that require a change in your care plan, your physician or nurse will call you to discuss the next steps.      What is [employer]?  [employer] is a secure way for you to access all of your healthcare records from the [facility].  It is a web based computer program, so you can sign on to it from any location.  It also allows you to send secure messages to your care team.  I recommend signing up for [employer] access if you have not already done so and are comfortable with using a computer.      How do I schedule a follow-up visit?  If you did not schedule a follow-up visit today, please call [phone] to schedule a follow-up office visit.        Sincerely,      Dr. [facility]  Division of Nephrology and Hypertension

## 2022-08-09 LAB — TSH SERPL DL<=0.005 MIU/L-ACNC: 1.14 MU/L (ref 0.4–4)

## 2022-08-10 ENCOUNTER — TELEPHONE (OUTPATIENT)
Dept: EDUCATION SERVICES | Facility: CLINIC | Age: 67
End: 2022-08-10

## 2022-08-10 NOTE — TELEPHONE ENCOUNTER
Tried calling the patient. Left them a voicemail and also the call back number. Please contact patient to reschedule.    Thanks  Sophia Winslow RDN, MARY, [facility]  Diabetes Care and Education

## 2022-08-17 ENCOUNTER — TELEPHONE (OUTPATIENT)
Dept: NEPHROLOGY | Facility: CLINIC | Age: 67
End: 2022-08-17

## 2022-08-17 NOTE — TELEPHONE ENCOUNTER
Called patient via  service with a appointment reminder for Mon. 8/29/22 @ 10:30 am with Dr. Cxo and a lab draw @ 9:30 am    Sandeep Madden on 8/17/2022 at 4:05 PM

## 2022-08-29 ENCOUNTER — OFFICE VISIT (OUTPATIENT)
Dept: NEPHROLOGY | Facility: CLINIC | Age: 67
End: 2022-08-29
Attending: INTERNAL MEDICINE
Payer: COMMERCIAL

## 2022-08-29 ENCOUNTER — LAB (OUTPATIENT)
Dept: LAB | Facility: CLINIC | Age: 67
End: 2022-08-29
Payer: COMMERCIAL

## 2022-08-29 VITALS
BODY MASS INDEX: 24.96 KG/M2 | HEART RATE: 93 BPM | SYSTOLIC BLOOD PRESSURE: 196 MMHG | WEIGHT: 150 LBS | OXYGEN SATURATION: 99 % | DIASTOLIC BLOOD PRESSURE: 113 MMHG

## 2022-08-29 DIAGNOSIS — N18.31 STAGE 3A CHRONIC KIDNEY DISEASE (H): ICD-10-CM

## 2022-08-29 DIAGNOSIS — I10 ACCELERATED HYPERTENSION: ICD-10-CM

## 2022-08-29 DIAGNOSIS — I15.1 HYPERTENSION SECONDARY TO OTHER RENAL DISORDERS: ICD-10-CM

## 2022-08-29 DIAGNOSIS — I15.1 HYPERTENSION SECONDARY TO OTHER RENAL DISORDERS: Primary | ICD-10-CM

## 2022-08-29 DIAGNOSIS — N18.30 STAGE 3 CHRONIC KIDNEY DISEASE, UNSPECIFIED WHETHER STAGE 3A OR 3B CKD (H): ICD-10-CM

## 2022-08-29 LAB
ALBUMIN SERPL-MCNC: 3.7 G/DL (ref 3.4–5)
ALP SERPL-CCNC: 83 U/L (ref 40–150)
ALT SERPL W P-5'-P-CCNC: 29 U/L (ref 0–70)
ANION GAP SERPL CALCULATED.3IONS-SCNC: 6 MMOL/L (ref 3–14)
AST SERPL W P-5'-P-CCNC: 20 U/L (ref 0–45)
BILIRUB SERPL-MCNC: 0.9 MG/DL (ref 0.2–1.3)
BUN SERPL-MCNC: 16 MG/DL (ref 7–30)
CALCIUM SERPL-MCNC: 9.1 MG/DL (ref 8.5–10.1)
CHLORIDE BLD-SCNC: 102 MMOL/L (ref 94–109)
CO2 SERPL-SCNC: 28 MMOL/L (ref 20–32)
CREAT SERPL-MCNC: 1.21 MG/DL (ref 0.66–1.25)
GFR SERPL CREATININE-BSD FRML MDRD: 65 ML/MIN/1.73M2
GLUCOSE BLD-MCNC: 254 MG/DL (ref 70–99)
MAGNESIUM SERPL-MCNC: 2.2 MG/DL (ref 1.6–2.3)
POTASSIUM BLD-SCNC: 4.3 MMOL/L (ref 3.4–5.3)
PROT SERPL-MCNC: 7.8 G/DL (ref 6.8–8.8)
SODIUM SERPL-SCNC: 136 MMOL/L (ref 133–144)

## 2022-08-29 PROCEDURE — 83735 ASSAY OF MAGNESIUM: CPT | Performed by: PATHOLOGY

## 2022-08-29 PROCEDURE — 36415 COLL VENOUS BLD VENIPUNCTURE: CPT | Performed by: PATHOLOGY

## 2022-08-29 PROCEDURE — G0463 HOSPITAL OUTPT CLINIC VISIT: HCPCS

## 2022-08-29 PROCEDURE — 80053 COMPREHEN METABOLIC PANEL: CPT | Performed by: PATHOLOGY

## 2022-08-29 PROCEDURE — 99214 OFFICE O/P EST MOD 30 MIN: CPT | Mod: GC | Performed by: INTERNAL MEDICINE

## 2022-08-29 ASSESSMENT — PAIN SCALES - GENERAL: PAINLEVEL: NO PAIN (0)

## 2022-08-29 NOTE — NURSING NOTE
Chief Complaint   Patient presents with     RECHECK     3 weeks follow up     Blood pressure (!) 196/113, pulse 93, weight 68 kg (150 lb), SpO2 99 %.    Izzy Moss

## 2022-08-29 NOTE — PROGRESS NOTES
Nephrology Clinic    Moriah Hinojosa MRN:[id number] YOB: 1955  Date of Service: 08/29/2022  Primary care provider: Yassine Larsen  Requesting physician:  Yassine Larsen      REASON FOR CONSULT: CKD on solitary kidney and resistant HTN    HISTORY OF PRESENT ILLNESS:   Moriah Hinojosa is a 67 year old male who presents for evaluation of uncontrolled hypertension and  CKD stage 3  The past medical history is significant for longstanding hypertension and type 2 diabetes for more than 10 years, chronic severe left-sided hydronephrosis and proximal left hydroureter secondary to a proximal left ureteral stone s/p nephrectomy in August 2021 in an attempt to control his hypertension, who presents to establish care. His blood pressure has been difficult to control over the past few weeks and he has had multiple changes done by both his cardiologist and his PCP. He is currently on amlodipine 10 mg daily, hydrochlorothiazide 25 mg daily, losartan 100 mg daily and carvedilol 25 mg bid. He is however hypertensive at 225/120, tachycardic at 97 and is also complaining of a slight headache in the office today.  His diabetes is well controlled on metformin 750 mg twice daily  Urine studies done today show a UPCR at 0.16 g/g Cr, however his potassium level is low 3.2 and his creatinine level is 1.3 mg/dL. It was 1.05 in August 2021 prior to his nephrectomy.  Renal imaging done in March 2021 shows an unremarkable right kidney and no evidence of renal artery stenosis on the right.  The patient denies any dysuria, any pollakiuria, any nocturia, any LE edema, any dyspnea on exertion .  The following portions of the patient's history were reviewed and updated as appropriate: allergies, current medications, past family history, past medical history, past social history, past surgical history and problem list.    PAST MEDICAL HISTORY:  Past Medical History:   Diagnosis Date     Chronic pain of both knees 2/11/2019     COVID-19 5/7/2020      Hypercholesteremia 6/18/2018     Hypertension 5/7/2018     PAST SURGICAL HISTORY:  Past Surgical History:   Procedure Laterality Date     LAPAROSCOPIC NEPHRECTOMY Left 8/24/2021    Procedure: NEPHRECTOMY, TOTAL, LAPAROSCOPIC;  Surgeon: Catracho Wright MD;  Location:  OR     MEDICATIONS:  Prescription Medications as of 8/29/2022       Rx Number Disp Refills Start End Last Dispensed Date Next Fill Date Owning Pharmacy    acetaminophen (TYLENOL) 325 MG tablet  120 tablet 0 8/26/2021    [place], [place] - [address] [place] [address]    Sig: Take 2 tablets (650 mg) by mouth every 6 hours as needed for pain    Class: E-Prescribe    Route: Oral    amLODIPine (NORVASC) 10 MG tablet  90 tablet 11 7/26/2022    [facility] - [place], [place] - [address]    Sig: Take 1 tablet (10 mg) by mouth At Bedtime    Class: E-Prescribe    Route: Oral    atorvastatin (LIPITOR) 40 MG tablet  90 tablet 11 7/26/2022    [facility] - [place], [place] - [address]    Sig: Take 1 tablet (40 mg) by mouth every evening    Class: E-Prescribe    Route: Oral    carvedilol (COREG) 25 MG tablet  360 tablet 0 8/8/2022 12/6/2022   [facility] - [place], [place] - [address]    Sig: Take 1.5 tablets (37.5 mg) by mouth 2 times daily (with meals) for 120 days    Class: E-Prescribe    Route: Oral    dorzolamide-timolol (COSOPT) 2-0.5 % ophthalmic solution    6/5/2022    [facility] - [place], [place] - [address]    Sig: PLACE 1 DROP IN LEFT EYE TWICE A DAY *90 DAYS*    Class: Historical    metFORMIN (GLUCOPHAGE-XR) 750 MG 24 hr tablet  180 tablet 3 4/18/2022    [facility] - [place], [place] - [address]    Sig: Take 1 tablet (750 mg) by mouth 2 times daily (with meals)    Class: E-Prescribe    Route: Oral    spironolactone (ALDACTONE) 25 MG tablet  30 tablet 3 8/8/2022 12/6/2022   [facility] - [place], [place] - [address]    Sig: Take 1 tablet (25 mg) by mouth daily for 120 days    Class: E-Prescribe    Route: Oral    telmisartan (MICARDIS) 80 MG tablet  120 tablet 0 2022   [facility] - [place], [place] - [address]    Sig: Take 1 tablet (80 mg) by mouth daily for 120 days    Class: E-Prescribe    Route: Oral    polyethylene glycol (MIRALAX) 17 GM/Dose powder  510 g 0 2021    [address]    Sig: Take 17 g by mouth daily as needed for constipation    Class: E-Prescribe    Route: Oral    prednisoLONE acetate (PRED-FORTE) 1 % ophthalmic suspension    3/24/2021        Si times daily     Class: Historical    senna-docusate (SENOKOT-S/PERICOLACE) 8.6-50 MG tablet  45 tablet 0 2021    [address]    Sig: Take 1 tablet by mouth 2 times daily To prevent constipation    Class: E-Prescribe    Route: Oral         ALLERGIES:    No Known Allergies  REVIEW OF SYSTEMS:  Review Of Systems  Skin: negative for, pigmentation, acne, rash, scaling  Eyes: negative for, visual blurring, double vision, glaucoma, cataracts  Ears/Nose/Throat: negative for, nasal congestion, purulent rhinorrhea, sneezing, postnasal drainage  Respiratory: No shortness of breath, dyspnea on exertion, cough, or hemoptysis  Cardiovascular: negative for, palpitations, tachycardia, irregular heart beat and chest pain  Gastrointestinal: negative for, poor appetite, dysphagia, nausea and vomiting  Genitourinary: negative for, nocturia, dysuria, frequency and urgency  Musculoskeletal: negative for, fracture, back pain, neck pain and arthritis  Neurologic: positive for headache, negative for any deficit    A comprehensive review of systems was performed and found to be negative except as described here or above.  SOCIAL HISTORY:   Social History     Socioeconomic History     Marital status:      Spouse name: Not on  file     Number of children: Not on file     Years of education: Not on file     Highest education level: Not on file   Occupational History     Not on file   Tobacco Use     Smoking status: Former Smoker     Smokeless tobacco: Never Used     Tobacco comment: no passive exposure   Substance and Sexual Activity     Alcohol use: No     Drug use: Never     Sexual activity: Not Currently     Partners: Female   Other Topics Concern     Not on file   Social History Narrative     Not on file     Social Determinants of Health     Financial Resource Strain: Not on file   Food Insecurity: Not on file   Transportation Needs: Not on file   Physical Activity: Not on file   Stress: Not on file   Social Connections: Not on file   Intimate Partner Violence: Not on file   Housing Stability: Not on file     FAMILY MEDICAL HISTORY:   Family History   Problem Relation Age of Onset     Anesthesia Reaction No family hx of      Deep Vein Thrombosis (DVT) No family hx of      PHYSICAL EXAM:   BP (!) 196/113   Pulse 93   Wt 68 kg (150 lb)   SpO2 99%   BMI 24.96 kg/m    GENERAL APPEARANCE: alert and no distress  EYES: nonicteric  HENT: mouth without ulcers or lesions  NECK: supple, no adenopathy  RESP: lungs clear to auscultation   CV: regular rhythm, normal rate, no rub  ABDOMEN: soft, nontender, normal bowel sounds, no HSM   Extremities: no clubbing, cyanosis, or edema  MS: no evidence of inflammation in joints, no muscle tenderness  SKIN: no rash  NEURO: mentation intact and speech normal  PSYCH: affect normal/bright   LABS:   Recent Results (from the past 672 hour(s))   Renal panel    Collection Time: 08/08/22  9:08 AM   Result Value Ref Range    Sodium 142 133 - 144 mmol/L    Potassium 3.2 (L) 3.4 - 5.3 mmol/L    Chloride 107 94 - 109 mmol/L    Carbon Dioxide (CO2) 24 20 - 32 mmol/L    Anion Gap 11 3 - 14 mmol/L    Urea Nitrogen 18 7 - 30 mg/dL    Creatinine 1.31 (H) 0.66 - 1.25 mg/dL    Calcium 9.9 8.5 - 10.1 mg/dL    Glucose 109  (H) 70 - 99 mg/dL    Albumin 4.7 3.4 - 5.0 g/dL    Phosphorus 2.6 2.5 - 4.5 mg/dL    GFR Estimate 59 (L) >60 mL/min/1.73m2   CBC with platelets    Collection Time: 08/08/22  9:08 AM   Result Value Ref Range    WBC Count 9.3 4.0 - 11.0 10e3/uL    RBC Count 5.67 4.40 - 5.90 10e6/uL    Hemoglobin 16.6 13.3 - 17.7 g/dL    Hematocrit 49.2 40.0 - 53.0 %    MCV 87 78 - 100 fL    MCH 29.3 26.5 - 33.0 pg    MCHC 33.7 31.5 - 36.5 g/dL    RDW 11.9 10.0 - 15.0 %    Platelet Count 188 150 - 450 10e3/uL   Parathyroid Hormone Intact    Collection Time: 08/08/22  9:08 AM   Result Value Ref Range    Parathyroid Hormone Intact 49 15 - 65 pg/mL   Vitamin D Deficiency    Collection Time: 08/08/22  9:08 AM   Result Value Ref Range    Vitamin D, Total (25-Hydroxy) 25 20 - 75 ug/L   TSH    Collection Time: 08/08/22  9:08 AM   Result Value Ref Range    TSH 1.14 0.40 - 4.00 mU/L   Protein  random urine    Collection Time: 08/08/22  9:15 AM   Result Value Ref Range    Total Protein Random Urine g/L 0.28 g/L    Total Protein Urine g/gr Creatinine 0.16 0.00 - 0.20 g/g Cr    Creatinine Urine mg/dL 174 mg/dL   UA with Microscopic    Collection Time: 08/08/22  9:15 AM   Result Value Ref Range    Color Urine Yellow Colorless, Straw, Light Yellow, Yellow    Appearance Urine Clear Clear    Glucose Urine Negative Negative mg/dL    Bilirubin Urine Negative Negative    Ketones Urine 40  (A) Negative mg/dL    Specific Gravity Urine 1.020 1.003 - 1.035    Blood Urine Negative Negative    pH Urine 6.5 5.0 - 7.0    Protein Albumin Urine Negative Negative mg/dL    Urobilinogen Urine Normal Normal, 2.0 mg/dL    Nitrite Urine Negative Negative    Leukocyte Esterase Urine Negative Negative    Mucus Urine Present (A) None Seen /LPF    RBC Urine 1 <=2 /HPF    WBC Urine <1 <=5 /HPF    Squamous Epithelials Urine <1 <=1 /HPF   Comprehensive metabolic panel    Collection Time: 08/29/22  9:51 AM   Result Value Ref Range    Sodium 136 133 - 144 mmol/L    Potassium 4.3  3.4 - 5.3 mmol/L    Chloride 102 94 - 109 mmol/L    Carbon Dioxide (CO2) 28 20 - 32 mmol/L    Anion Gap 6 3 - 14 mmol/L    Urea Nitrogen 16 7 - 30 mg/dL    Creatinine 1.21 0.66 - 1.25 mg/dL    Calcium 9.1 8.5 - 10.1 mg/dL    Glucose 254 (H) 70 - 99 mg/dL    Alkaline Phosphatase 83 40 - 150 U/L    AST 20 0 - 45 U/L    ALT 29 0 - 70 U/L    Protein Total 7.8 6.8 - 8.8 g/dL    Albumin 3.7 3.4 - 5.0 g/dL    Bilirubin Total 0.9 0.2 - 1.3 mg/dL    GFR Estimate 65 >60 mL/min/1.73m2   Magnesium    Collection Time: 08/29/22  9:51 AM   Result Value Ref Range    Magnesium 2.2 1.6 - 2.3 mg/dL     CMP  Recent Labs   Lab Test 08/29/22  0951 08/08/22  0908 07/20/22  1540 12/15/21  1326 08/03/21  1437 12/23/20  1215 12/16/20  0702 12/15/20  0439 12/14/20  0442 12/13/20  2217 12/13/20  1000 11/13/19  1752    142 137 138   < > 138 141 138 140  --  140 140   POTASSIUM 4.3 3.2* 4.0 3.6   < > 3.2* 3.7 3.7 3.8  --  4.1 4.7   CHLORIDE 102 107 100 105   < > 98 105 106 108*  --  104 102   CO2 28 24 26 21*   < > 30 26 21* 22  --  24 28   ANIONGAP 6 11 11 12   < > 10 10 11 10  --  12 10   * 109* 117* 181*   < > 267* 117 185* 142*   < > 141* 117   BUN 16 18 19.3 19   < > 17 38* 20 14  --  13 21   CR 1.21 1.31* 1.39* 1.18   < > 1.34* 1.80* 1.29 1.29  --  1.50* 1.46*   GFRESTIMATED 65 59* 55* 63   < > 53* 38* 56* 56*  --  47* 48*   GFRESTBLACK  --   --   --   --   --  >60 46* >60 >60  --  57* 59*   LENNOX 9.1 9.9 9.7 9.2   < > 9.6 9.2 8.9 9.0  --  9.7 9.5   MAG 2.2  --   --   --   --   --  2.0 2.1 1.6*  --   --   --    PHOS  --  2.6  --   --   --   --   --  2.4* 2.7  --   --   --    PROTTOTAL 7.8  --  8.2  --   --   --   --   --   --   --  7.9 7.3   ALBUMIN 3.7 4.7 4.7  --   --   --   --  3.7 3.7  --  4.6 4.3   BILITOTAL 0.9  --  0.6  --   --   --   --   --   --   --  1.0 0.9   ALKPHOS 83  --  73  --   --   --   --   --   --   --  99 78   AST 20  --  23  --   --   --   --   --   --   --  19 27   ALT 29  --  19  --   --   --   --   --    --   --  16 27    < > = values in this interval not displayed.     CBC  Recent Labs   Lab Test 08/08/22  0908 07/20/22  1540 08/26/21  0801 08/25/21  0712   HGB 16.6 15.5 13.4 14.2   WBC 9.3 9.5 10.0 12.5*   RBC 5.67 5.25 4.49 4.77   HCT 49.2 45.7 40.1 42.0   MCV 87 87 89 88   MCH 29.3 29.5 29.8 29.8   MCHC 33.7 33.9 33.4 33.8   RDW 11.9 12.6 12.5 12.6    232 173 200     INRNo lab results found.  ABGNo lab results found.   URINE STUDIES  Recent Labs   Lab Test 08/08/22  0915 07/20/22  1608 12/13/20  1208   COLOR Yellow Yellow Yellow   APPEARANCE Clear Clear Clear   URINEGLC Negative Negative Negative   URINEBILI Negative Negative Negative   URINEKETONE 40 * Negative Negative   SG 1.020 <=1.005 1.006   UBLD Negative Negative Negative   URINEPH 6.5 6.0 6.5   PROTEIN Negative Negative Trace*   UROBILINOGEN  --  0.2 <2.0 E.U./dL   NITRITE Negative Negative Negative   LEUKEST Negative Negative Negative   RBCU 1  --  0-2   WBCU <1  --  0-5     Recent Labs   Lab Test 08/08/22  0915   [facility] 0.16       ASSESSMENT AND PLAN:    #CKD stage 3a with a UPCR at 0.16 g/g Cr on solitary kidney  Deterioration in the renal function over the past year likely secondary to accelerated hypertension and nephrectomy  Renal imaging of the remaining kidney is unremarkable  No documented intake of NSAIDs or other nephrotoxic medications. Also recent urine toxicology screen is negative. The progression is tributary of BP control  The patient was instructed to keep the sodium intake around 2400 mg /day, follow a plant-based diet and to avoid NSAIDs     #HTN  Primary and secondary to CKD. Currently uncontrolled on amlodipine 10 mg daily, losartan 100 mg daily, carvedilol 25 mg bid and hydrochlorothiazide 25 mg daily. There is no evidence of renal artery stenosis and his nephrectomy a year ago didn't improve his BP. His potassium level was low which could further worsen the poor BP control. Losartan was replaced with a more potent and longer  acting ARB such as telmisartan. Hydrochlorothiazide was also discontinued and he was started on spironolactone 25 mg daily. We will increase Spironolactone to 50 mg daily now. Carvedilol was also increased to 37.5 mg twice daily during last clinic visit. Unfortunately despite adequate explanation he was taking it once a day. Advised to take it two times a day. Will recheck a CMP in 2 weeks. The patient was instructed to keep a BP diary and to communicate the results. If BP remains uncontrolled I will expand the work up of secondary HTN.    #Type 2 DM   Hba1c is 6.1 in July. Well controlled on metformin    #CVD/CAD dyslipidemia  On atorvastatin. Management per cardiology     #Blood count  Hemoglobin 16. Should also be screened for sleep apnea    #Acid-base status  CO2 level 28. No acute issues    #Electrolytes  K 4.3 on recent check-up. Improved after stopping hydrochlorothiazide and adding spironolactone.    #BMD  Ca  9.1        P 2.2 Alb 3.7    iPTH 49  Vitamin D level 25 Will start him on vitamin D at next visit      The patient will return to follow up in 3 weeks with repeat labs and a BP diary      Patient seen and discussed with Dr Cox, who agrees with the plan.     Candelario Shea MD  Nephrology Fellow  [phone]

## 2022-08-29 NOTE — LETTER
8/29/2022       RE: Moriah Hinojosa   [address] [place] [address]     Dear Colleague,    Thank you for referring your patient, Moriah Hinojosa, to the [facility] [place] at [facility]. Please see a copy of my visit note below.    Nephrology Clinic    Moriah Hinojosa MRN:[id number] YOB: 1955  Date of Service: 08/29/2022  Primary care provider: Yassine Larsen  Requesting physician:  Yassine Larsen      REASON FOR CONSULT: CKD on solitary kidney and resistant HTN    HISTORY OF PRESENT ILLNESS:   Moriah Hinojosa is a 67 year old male who presents for evaluation of uncontrolled hypertension and  CKD stage 3  The past medical history is significant for longstanding hypertension and type 2 diabetes for more than 10 years, chronic severe left-sided hydronephrosis and proximal left hydroureter secondary to a proximal left ureteral stone s/p nephrectomy in August 2021 in an attempt to control his hypertension, who presents to establish care. His blood pressure has been difficult to control over the past few weeks and he has had multiple changes done by both his cardiologist and his PCP. He is currently on amlodipine 10 mg daily, hydrochlorothiazide 25 mg daily, losartan 100 mg daily and carvedilol 25 mg bid. He is however hypertensive at 225/120, tachycardic at 97 and is also complaining of a slight headache in the office today.  His diabetes is well controlled on metformin 750 mg twice daily  Urine studies done today show a UPCR at 0.16 g/g Cr, however his potassium level is low 3.2 and his creatinine level is 1.3 mg/dL. It was 1.05 in August 2021 prior to his nephrectomy.  Renal imaging done in March 2021 shows an unremarkable right kidney and no evidence of renal artery stenosis on the right.  The patient denies any dysuria, any pollakiuria, any nocturia, any LE edema, any dyspnea on exertion .  The following portions of the patient's history were reviewed  and updated as appropriate: allergies, current medications, past family history, past medical history, past social history, past surgical history and problem list.    PAST MEDICAL HISTORY:  Past Medical History:   Diagnosis Date     Chronic pain of both knees 2/11/2019     COVID-19 5/7/2020     Hypercholesteremia 6/18/2018     Hypertension 5/7/2018     PAST SURGICAL HISTORY:  Past Surgical History:   Procedure Laterality Date     LAPAROSCOPIC NEPHRECTOMY Left 8/24/2021    Procedure: NEPHRECTOMY, TOTAL, LAPAROSCOPIC;  Surgeon: Catracho Wright MD;  Location:  OR     MEDICATIONS:  Prescription Medications as of 8/29/2022       Rx Number Disp Refills Start End Last Dispensed Date Next Fill Date Owning Pharmacy    acetaminophen (TYLENOL) 325 MG tablet  120 tablet 0 8/26/2021    [place], [place] - [address] [place] [address]    Sig: Take 2 tablets (650 mg) by mouth every 6 hours as needed for pain    Class: E-Prescribe    Route: Oral    amLODIPine (NORVASC) 10 MG tablet  90 tablet 11 7/26/2022    [facility] - [place], [place] - [address]    Sig: Take 1 tablet (10 mg) by mouth At Bedtime    Class: E-Prescribe    Route: Oral    atorvastatin (LIPITOR) 40 MG tablet  90 tablet 11 7/26/2022    [facility] - [place], [place] - [address]    Sig: Take 1 tablet (40 mg) by mouth every evening    Class: E-Prescribe    Route: Oral    carvedilol (COREG) 25 MG tablet  360 tablet 0 8/8/2022 12/6/2022   [facility] - [place], [place] - [address]    Sig: Take 1.5 tablets (37.5 mg) by mouth 2 times daily (with meals) for 120 days    Class: E-Prescribe    Route: Oral    dorzolamide-timolol (COSOPT) 2-0.5 % ophthalmic solution    6/5/2022    [facility] - [place], [place] - [address]    Sig: PLACE 1 DROP IN LEFT EYE TWICE A DAY *90 DAYS*    Class: Historical    metFORMIN (GLUCOPHAGE-XR) 750 MG 24 hr tablet  180 tablet 3 4/18/2022     [facility] - [place], [place] - [address]    Sig: Take 1 tablet (750 mg) by mouth 2 times daily (with meals)    Class: E-Prescribe    Route: Oral    spironolactone (ALDACTONE) 25 MG tablet  30 tablet 3 2022   [facility] - [place], [place] - [address]    Sig: Take 1 tablet (25 mg) by mouth daily for 120 days    Class: E-Prescribe    Route: Oral    telmisartan (MICARDIS) 80 MG tablet  120 tablet 0 2022   [facility] - [place], [place] - [address]    Sig: Take 1 tablet (80 mg) by mouth daily for 120 days    Class: E-Prescribe    Route: Oral    polyethylene glycol (MIRALAX) 17 GM/Dose powder  510 g 0 2021    [place], [place] - [address] [place] [address]    Sig: Take 17 g by mouth daily as needed for constipation    Class: E-Prescribe    Route: Oral    prednisoLONE acetate (PRED-FORTE) 1 % ophthalmic suspension    3/24/2021        Si times daily     Class: Historical    senna-docusate (SENOKOT-S/PERICOLACE) 8.6-50 MG tablet  45 tablet 0 2021    [address]    Sig: Take 1 tablet by mouth 2 times daily To prevent constipation    Class: E-Prescribe    Route: Oral         ALLERGIES:    No Known Allergies  REVIEW OF SYSTEMS:  Review Of Systems  Skin: negative for, pigmentation, acne, rash, scaling  Eyes: negative for, visual blurring, double vision, glaucoma, cataracts  Ears/Nose/Throat: negative for, nasal congestion, purulent rhinorrhea, sneezing, postnasal drainage  Respiratory: No shortness of breath, dyspnea on exertion, cough, or hemoptysis  Cardiovascular: negative for, palpitations, tachycardia, irregular heart beat and chest pain  Gastrointestinal: negative for, poor appetite, dysphagia, nausea and vomiting  Genitourinary: negative for, nocturia, dysuria, frequency and urgency  Musculoskeletal: negative for, fracture, back pain, neck pain  and arthritis  Neurologic: positive for headache, negative for any deficit    A comprehensive review of systems was performed and found to be negative except as described here or above.  SOCIAL HISTORY:   Social History     Socioeconomic History     Marital status:      Spouse name: Not on file     Number of children: Not on file     Years of education: Not on file     Highest education level: Not on file   Occupational History     Not on file   Tobacco Use     Smoking status: Former Smoker     Smokeless tobacco: Never Used     Tobacco comment: no passive exposure   Substance and Sexual Activity     Alcohol use: No     Drug use: Never     Sexual activity: Not Currently     Partners: Female   Other Topics Concern     Not on file   Social History Narrative     Not on file     Social Determinants of Health     Financial Resource Strain: Not on file   Food Insecurity: Not on file   Transportation Needs: Not on file   Physical Activity: Not on file   Stress: Not on file   Social Connections: Not on file   Intimate Partner Violence: Not on file   Housing Stability: Not on file     FAMILY MEDICAL HISTORY:   Family History   Problem Relation Age of Onset     Anesthesia Reaction No family hx of      Deep Vein Thrombosis (DVT) No family hx of      PHYSICAL EXAM:   BP (!) 196/113   Pulse 93   Wt 68 kg (150 lb)   SpO2 99%   BMI 24.96 kg/m    GENERAL APPEARANCE: alert and no distress  EYES: nonicteric  HENT: mouth without ulcers or lesions  NECK: supple, no adenopathy  RESP: lungs clear to auscultation   CV: regular rhythm, normal rate, no rub  ABDOMEN: soft, nontender, normal bowel sounds, no HSM   Extremities: no clubbing, cyanosis, or edema  MS: no evidence of inflammation in joints, no muscle tenderness  SKIN: no rash  NEURO: mentation intact and speech normal  PSYCH: affect normal/bright   LABS:   Recent Results (from the past 672 hour(s))   Renal panel    Collection Time: 08/08/22  9:08 AM   Result Value Ref  Range    Sodium 142 133 - 144 mmol/L    Potassium 3.2 (L) 3.4 - 5.3 mmol/L    Chloride 107 94 - 109 mmol/L    Carbon Dioxide (CO2) 24 20 - 32 mmol/L    Anion Gap 11 3 - 14 mmol/L    Urea Nitrogen 18 7 - 30 mg/dL    Creatinine 1.31 (H) 0.66 - 1.25 mg/dL    Calcium 9.9 8.5 - 10.1 mg/dL    Glucose 109 (H) 70 - 99 mg/dL    Albumin 4.7 3.4 - 5.0 g/dL    Phosphorus 2.6 2.5 - 4.5 mg/dL    GFR Estimate 59 (L) >60 mL/min/1.73m2   CBC with platelets    Collection Time: 08/08/22  9:08 AM   Result Value Ref Range    WBC Count 9.3 4.0 - 11.0 10e3/uL    RBC Count 5.67 4.40 - 5.90 10e6/uL    Hemoglobin 16.6 13.3 - 17.7 g/dL    Hematocrit 49.2 40.0 - 53.0 %    MCV 87 78 - 100 fL    MCH 29.3 26.5 - 33.0 pg    MCHC 33.7 31.5 - 36.5 g/dL    RDW 11.9 10.0 - 15.0 %    Platelet Count 188 150 - 450 10e3/uL   Parathyroid Hormone Intact    Collection Time: 08/08/22  9:08 AM   Result Value Ref Range    Parathyroid Hormone Intact 49 15 - 65 pg/mL   Vitamin D Deficiency    Collection Time: 08/08/22  9:08 AM   Result Value Ref Range    Vitamin D, Total (25-Hydroxy) 25 20 - 75 ug/L   TSH    Collection Time: 08/08/22  9:08 AM   Result Value Ref Range    TSH 1.14 0.40 - 4.00 mU/L   Protein  random urine    Collection Time: 08/08/22  9:15 AM   Result Value Ref Range    Total Protein Random Urine g/L 0.28 g/L    Total Protein Urine g/gr Creatinine 0.16 0.00 - 0.20 g/g Cr    Creatinine Urine mg/dL 174 mg/dL   UA with Microscopic    Collection Time: 08/08/22  9:15 AM   Result Value Ref Range    Color Urine Yellow Colorless, Straw, Light Yellow, Yellow    Appearance Urine Clear Clear    Glucose Urine Negative Negative mg/dL    Bilirubin Urine Negative Negative    Ketones Urine 40  (A) Negative mg/dL    Specific Gravity Urine 1.020 1.003 - 1.035    Blood Urine Negative Negative    pH Urine 6.5 5.0 - 7.0    Protein Albumin Urine Negative Negative mg/dL    Urobilinogen Urine Normal Normal, 2.0 mg/dL    Nitrite Urine Negative Negative    Leukocyte Esterase  Urine Negative Negative    Mucus Urine Present (A) None Seen /LPF    RBC Urine 1 <=2 /HPF    WBC Urine <1 <=5 /HPF    Squamous Epithelials Urine <1 <=1 /HPF   Comprehensive metabolic panel    Collection Time: 08/29/22  9:51 AM   Result Value Ref Range    Sodium 136 133 - 144 mmol/L    Potassium 4.3 3.4 - 5.3 mmol/L    Chloride 102 94 - 109 mmol/L    Carbon Dioxide (CO2) 28 20 - 32 mmol/L    Anion Gap 6 3 - 14 mmol/L    Urea Nitrogen 16 7 - 30 mg/dL    Creatinine 1.21 0.66 - 1.25 mg/dL    Calcium 9.1 8.5 - 10.1 mg/dL    Glucose 254 (H) 70 - 99 mg/dL    Alkaline Phosphatase 83 40 - 150 U/L    AST 20 0 - 45 U/L    ALT 29 0 - 70 U/L    Protein Total 7.8 6.8 - 8.8 g/dL    Albumin 3.7 3.4 - 5.0 g/dL    Bilirubin Total 0.9 0.2 - 1.3 mg/dL    GFR Estimate 65 >60 mL/min/1.73m2   Magnesium    Collection Time: 08/29/22  9:51 AM   Result Value Ref Range    Magnesium 2.2 1.6 - 2.3 mg/dL     CMP  Recent Labs   Lab Test 08/29/22  0951 08/08/22  0908 07/20/22  1540 12/15/21  1326 08/03/21  1437 12/23/20  1215 12/16/20  0702 12/15/20  0439 12/14/20  0442 12/13/20  2217 12/13/20  1000 11/13/19  1752    142 137 138   < > 138 141 138 140  --  140 140   POTASSIUM 4.3 3.2* 4.0 3.6   < > 3.2* 3.7 3.7 3.8  --  4.1 4.7   CHLORIDE 102 107 100 105   < > 98 105 106 108*  --  104 102   CO2 28 24 26 21*   < > 30 26 21* 22  --  24 28   ANIONGAP 6 11 11 12   < > 10 10 11 10  --  12 10   * 109* 117* 181*   < > 267* 117 185* 142*   < > 141* 117   BUN 16 18 19.3 19   < > 17 38* 20 14  --  13 21   CR 1.21 1.31* 1.39* 1.18   < > 1.34* 1.80* 1.29 1.29  --  1.50* 1.46*   GFRESTIMATED 65 59* 55* 63   < > 53* 38* 56* 56*  --  47* 48*   GFRESTBLACK  --   --   --   --   --  >60 46* >60 >60  --  57* 59*   LENNOX 9.1 9.9 9.7 9.2   < > 9.6 9.2 8.9 9.0  --  9.7 9.5   MAG 2.2  --   --   --   --   --  2.0 2.1 1.6*  --   --   --    PHOS  --  2.6  --   --   --   --   --  2.4* 2.7  --   --   --    PROTTOTAL 7.8  --  8.2  --   --   --   --   --   --   --   7.9 7.3   ALBUMIN 3.7 4.7 4.7  --   --   --   --  3.7 3.7  --  4.6 4.3   BILITOTAL 0.9  --  0.6  --   --   --   --   --   --   --  1.0 0.9   ALKPHOS 83  --  73  --   --   --   --   --   --   --  99 78   AST 20  --  23  --   --   --   --   --   --   --  19 27   ALT 29  --  19  --   --   --   --   --   --   --  16 27    < > = values in this interval not displayed.     CBC  Recent Labs   Lab Test 08/08/22  0908 07/20/22  1540 08/26/21  0801 08/25/21  0712   HGB 16.6 15.5 13.4 14.2   WBC 9.3 9.5 10.0 12.5*   RBC 5.67 5.25 4.49 4.77   HCT 49.2 45.7 40.1 42.0   MCV 87 87 89 88   MCH 29.3 29.5 29.8 29.8   MCHC 33.7 33.9 33.4 33.8   RDW 11.9 12.6 12.5 12.6    232 173 200     INRNo lab results found.  ABGNo lab results found.   URINE STUDIES  Recent Labs   Lab Test 08/08/22  0915 07/20/22  1608 12/13/20  1208   COLOR Yellow Yellow Yellow   APPEARANCE Clear Clear Clear   URINEGLC Negative Negative Negative   URINEBILI Negative Negative Negative   URINEKETONE 40 * Negative Negative   SG 1.020 <=1.005 1.006   UBLD Negative Negative Negative   URINEPH 6.5 6.0 6.5   PROTEIN Negative Negative Trace*   UROBILINOGEN  --  0.2 <2.0 E.U./dL   NITRITE Negative Negative Negative   LEUKEST Negative Negative Negative   RBCU 1  --  0-2   WBCU <1  --  0-5     Recent Labs   Lab Test 08/08/22  0915   [facility] 0.16       ASSESSMENT AND PLAN:    #CKD stage 3a with a UPCR at 0.16 g/g Cr on solitary kidney  Deterioration in the renal function over the past year likely secondary to accelerated hypertension and nephrectomy  Renal imaging of the remaining kidney is unremarkable  No documented intake of NSAIDs or other nephrotoxic medications. Also recent urine toxicology screen is negative. The progression is tributary of BP control  The patient was instructed to keep the sodium intake around 2400 mg /day, follow a plant-based diet and to avoid NSAIDs     #HTN  Primary and secondary to CKD. Currently uncontrolled on amlodipine 10 mg daily,  losartan 100 mg daily, carvedilol 25 mg bid and hydrochlorothiazide 25 mg daily. There is no evidence of renal artery stenosis and his nephrectomy a year ago didn't improve his BP. His potassium level was low which could further worsen the poor BP control. Losartan was replaced with a more potent and longer acting ARB such as telmisartan. Hydrochlorothiazide was also discontinued and he was started on spironolactone 25 mg daily. We will increase Spironolactone to 50 mg daily now. Carvedilol was also increased to 37.5 mg twice daily during last clinic visit. Unfortunately despite adequate explanation he was taking it once a day. Advised to take it two times a day. Will recheck a CMP in 2 weeks. The patient was instructed to keep a BP diary and to communicate the results. If BP remains uncontrolled I will expand the work up of secondary HTN.    #Type 2 DM   Hba1c is 6.1 in July. Well controlled on metformin    #CVD/CAD dyslipidemia  On atorvastatin. Management per cardiology     #Blood count  Hemoglobin 16. Should also be screened for sleep apnea    #Acid-base status  CO2 level 28. No acute issues    #Electrolytes  K 4.3 on recent check-up. Improved after stopping hydrochlorothiazide and adding spironolactone.    #BMD  Ca  9.1        P 2.2 Alb 3.7    iPTH 49  Vitamin D level 25 Will start him on vitamin D at next visit      The patient will return to follow up in 3 weeks with repeat labs and a BP diary      Patient seen and discussed with Dr Cox, who agrees with the plan.     Candelario Shea MD  Nephrology Fellow  [phone]    Attestation signed by Dian Cox MD at 8/30/2022  2:07 PM:  Dian PFEIFFER, saw and evaluated Pwo Micaela with the Fellow Dr Shea  . I have reviewed and discussed with the Fellow their history, physical exam and plan.      I have reviewed  Medications, Vital Signs, Labs, and Imaging.      I discussed the findings with Dr Shea and agree with the findings as documented in the history,  assessment and plan.    In summary, slightly improved resistant hypertension but control remains clearly suboptimal. Will reenforce the need to take carvedilol twice daily and increase spironolactone to 50 mg daily. Will also see the patient back in 3 weeks.     Please see the fellow's documentation for full details  BP (!) 196/113   Pulse 93   Wt 68 kg (150 lb)   SpO2 99%   BMI 24.96 kg/m  ;   Electrolytes/Renal -   Recent Labs   Lab Test 08/29/22  0951 08/08/22  0908 07/20/22  1540 12/16/20  0702 12/15/20  0439 12/14/20  0442    142 137   < > 138 140   POTASSIUM 4.3 3.2* 4.0   < > 3.7 3.8   CO2 28 24 26   < > 21* 22   CR 1.21 1.31* 1.39*   < > 1.29 1.29   LENNOX 9.1 9.9 9.7   < > 8.9 9.0   PHOS  --  2.6  --   --  2.4* 2.7    < > = values in this interval not displayed.       CBC -   Recent Labs   Lab Test 08/08/22  0908 07/20/22  1540 08/26/21  0801   WBC 9.3 9.5 10.0   HGB 16.6 15.5 13.4    232 173     Current Outpatient Medications   Medication     acetaminophen (TYLENOL) 325 MG tablet     amLODIPine (NORVASC) 10 MG tablet     atorvastatin (LIPITOR) 40 MG tablet     carvedilol (COREG) 25 MG tablet     dorzolamide-timolol (COSOPT) 2-0.5 % ophthalmic solution     metFORMIN (GLUCOPHAGE-XR) 750 MG 24 hr tablet     spironolactone (ALDACTONE) 25 MG tablet     telmisartan (MICARDIS) 80 MG tablet     polyethylene glycol (MIRALAX) 17 GM/Dose powder     prednisoLONE acetate (PRED-FORTE) 1 % ophthalmic suspension     senna-docusate (SENOKOT-S/PERICOLACE) 8.6-50 MG tablet     No current facility-administered medications for this visit.          Please avoid placing a PICC line in any patient with CKD III or above, CLAUDETTE, or ESKD, as this will impact negatively the ability of the patient to have an AV fistula or AV Graft should they need it in the future.  A medline is the preferred type of access in patients with kidney disease. Thank you.    Dian Cox MD  Division of Nephrology and Hypertension  Pager [phone]

## 2022-09-12 ENCOUNTER — LAB (OUTPATIENT)
Dept: LAB | Facility: CLINIC | Age: 67
End: 2022-09-12
Attending: INTERNAL MEDICINE
Payer: COMMERCIAL

## 2022-09-12 DIAGNOSIS — N18.31 STAGE 3A CHRONIC KIDNEY DISEASE (H): ICD-10-CM

## 2022-09-12 LAB
ALBUMIN SERPL BCG-MCNC: 4.2 G/DL (ref 3.5–5.2)
ANION GAP SERPL CALCULATED.3IONS-SCNC: 9 MMOL/L (ref 7–15)
BUN SERPL-MCNC: 18.8 MG/DL (ref 8–23)
CALCIUM SERPL-MCNC: 9.8 MG/DL (ref 8.8–10.2)
CHLORIDE SERPL-SCNC: 101 MMOL/L (ref 98–107)
CREAT SERPL-MCNC: 1.15 MG/DL (ref 0.67–1.17)
DEPRECATED HCO3 PLAS-SCNC: 29 MMOL/L (ref 22–29)
GFR SERPL CREATININE-BSD FRML MDRD: 69 ML/MIN/1.73M2
GLUCOSE SERPL-MCNC: 146 MG/DL (ref 70–99)
PHOSPHATE SERPL-MCNC: 2.6 MG/DL (ref 2.5–4.5)
POTASSIUM SERPL-SCNC: 4.7 MMOL/L (ref 3.4–5.3)
SODIUM SERPL-SCNC: 139 MMOL/L (ref 136–145)

## 2022-09-12 PROCEDURE — 82040 ASSAY OF SERUM ALBUMIN: CPT | Performed by: PATHOLOGY

## 2022-09-12 PROCEDURE — 80051 ELECTROLYTE PANEL: CPT | Performed by: PATHOLOGY

## 2022-09-12 PROCEDURE — 84520 ASSAY OF UREA NITROGEN: CPT | Performed by: PATHOLOGY

## 2022-09-12 PROCEDURE — 36415 COLL VENOUS BLD VENIPUNCTURE: CPT | Performed by: PATHOLOGY

## 2022-09-12 PROCEDURE — 82310 ASSAY OF CALCIUM: CPT | Performed by: PATHOLOGY

## 2022-09-12 PROCEDURE — 82565 ASSAY OF CREATININE: CPT | Performed by: PATHOLOGY

## 2022-09-12 PROCEDURE — 84100 ASSAY OF PHOSPHORUS: CPT | Performed by: PATHOLOGY

## 2022-09-26 ENCOUNTER — OFFICE VISIT (OUTPATIENT)
Dept: NEPHROLOGY | Facility: CLINIC | Age: 67
End: 2022-09-26
Attending: INTERNAL MEDICINE
Payer: COMMERCIAL

## 2022-09-26 VITALS
WEIGHT: 152.9 LBS | SYSTOLIC BLOOD PRESSURE: 170 MMHG | TEMPERATURE: 98.1 F | OXYGEN SATURATION: 100 % | HEART RATE: 84 BPM | BODY MASS INDEX: 25.44 KG/M2 | DIASTOLIC BLOOD PRESSURE: 92 MMHG

## 2022-09-26 DIAGNOSIS — I15.1 HYPERTENSION SECONDARY TO OTHER RENAL DISORDERS: Primary | ICD-10-CM

## 2022-09-26 PROCEDURE — 99214 OFFICE O/P EST MOD 30 MIN: CPT | Performed by: INTERNAL MEDICINE

## 2022-09-26 PROCEDURE — G0463 HOSPITAL OUTPT CLINIC VISIT: HCPCS

## 2022-09-26 RX ORDER — HYDROCHLOROTHIAZIDE 25 MG/1
12.5 TABLET ORAL DAILY
COMMUNITY
End: 2022-11-07

## 2022-09-26 ASSESSMENT — PAIN SCALES - GENERAL: PAINLEVEL: NO PAIN (0)

## 2022-09-26 NOTE — LETTER
9/26/2022       RE: Moriah Hinojosa  [address] [place] [address]     Dear Colleague,    Thank you for referring your patient, Moriah Hinojosa, to the [facility]PHROLOG[facility] [place] at [facility]. Please see a copy of my visit note below.    Nephrology Clinic    Moriah Hinojosa MRN:[id number] YOB: 1955  Date of Service: 09/26/2022  Primary care provider: Yassine Larsen  Requesting physician: Yassine Larsen      REASON FOR CONSULT: Hypertension and CKD    HISTORY OF PRESENT ILLNESS:  Moriah Hinojosa is a 67 year old male who first  presented for evaluation of uncontrolled hypertension and  CKD stage 3 in August 2022.  The past medical history is significant for longstanding hypertension and type 2 diabetes for more than 10 years, chronic severe left-sided hydronephrosis and proximal left hydroureter secondary to a proximal left ureteral stone s/p nephrectomy in August 2021 in an attempt to control his hypertension, who presents to establish care. His blood pressure has been difficult to control over the past few weeks and he has had multiple changes done by both his cardiologist and his PCP. He was on amlodipine 10 mg daily, hydrochlorothiazide 25 mg daily, losartan 100 mg daily and carvedilol 25 mg bid. He was however hypertensive at 225/120, tachycardic at 97 and was also complaining of a slight headache in the office today.  His diabetes is well controlled on metformin 750 mg twice daily  Urine studies done  show a UPCR at 0.16 g/g Cr, however his potassium level was low 3.2 and his creatinine level was 1.3 mg/dL. It was 1.05 in August 2021 prior to his nephrectomy.  Renal imaging done in March 2021 shows an unremarkable right kidney and no evidence of renal artery stenosis on the right.  On his last visit the patient had losartan changed to telmisartan, the hydrochlorothiazide stopped and replaced with spironolactone and his BP improved significantly.  The  patient denies any dysuria, any pollakiuria, any nocturia, any LE edema, any dyspnea on exertion .  The following portions of the patient's history were reviewed and updated as appropriate: allergies, current medications, past family history, past medical history, past social history, past surgical history and problem list.   PAST MEDICAL HISTORY:  Past Medical History:   Diagnosis Date     Chronic pain of both knees 2/11/2019     COVID-19 5/7/2020     Hypercholesteremia 6/18/2018     Hypertension 5/7/2018     PAST SURGICAL HISTORY:  Past Surgical History:   Procedure Laterality Date     LAPAROSCOPIC NEPHRECTOMY Left 8/24/2021    Procedure: NEPHRECTOMY, TOTAL, LAPAROSCOPIC;  Surgeon: Catracho Wright MD;  Location:  OR     MEDICATIONS:  Prescription Medications as of 9/26/2022       Rx Number Disp Refills Start End Last Dispensed Date Next Fill Date Owning Pharmacy    acetaminophen (TYLENOL) 325 MG tablet  120 tablet 0 8/26/2021    [place], [place] - [address] [place] [address]    Sig: Take 2 tablets (650 mg) by mouth every 6 hours as needed for pain    Class: E-Prescribe    Route: Oral    amLODIPine (NORVASC) 10 MG tablet  90 tablet 11 7/26/2022    [facility] - [place], [place] - [address]    Sig: Take 1 tablet (10 mg) by mouth At Bedtime    Class: E-Prescribe    Route: Oral    atorvastatin (LIPITOR) 40 MG tablet  90 tablet 11 7/26/2022    [facility] - [place], [place] - [address]    Sig: Take 1 tablet (40 mg) by mouth every evening    Class: E-Prescribe    Route: Oral    hydrochlorothiazide (HYDRODIURIL) 25 MG tablet            Sig: Take 25 mg by mouth daily    Class: Historical    Route: Oral    metFORMIN (GLUCOPHAGE-XR) 750 MG 24 hr tablet  180 tablet 3 4/18/2022    [facility] - [place], [place] - [address]    Sig: Take 1 tablet (750 mg) by mouth 2 times daily (with meals)    Class: E-Prescribe    Route: Oral     spironolactone (ALDACTONE) 25 MG tablet  30 tablet 3 2022   [facility] - [place], [place] - [address]    Sig: Take 1 tablet (25 mg) by mouth daily for 120 days    Class: E-Prescribe    Route: Oral    telmisartan (MICARDIS) 80 MG tablet  120 tablet 0 2022   [facility] - [place], [place] - [address]    Sig: Take 1 tablet (80 mg) by mouth daily for 120 days    Class: E-Prescribe    Route: Oral    carvedilol (COREG) 25 MG tablet  360 tablet 0 2022   [facility] - [place], [place] - [address]    Sig: Take 1.5 tablets (37.5 mg) by mouth 2 times daily (with meals) for 120 days    Class: E-Prescribe    Route: Oral    dorzolamide-timolol (COSOPT) 2-0.5 % ophthalmic solution    2022    [facility] - [place], [place] - [address]    Sig: PLACE 1 DROP IN LEFT EYE TWICE A DAY *90 DAYS*    Class: Historical    polyethylene glycol (MIRALAX) 17 GM/Dose powder  510 g 0 2021    [place], [place] - [address] [place] [address]    Sig: Take 17 g by mouth daily as needed for constipation    Class: E-Prescribe    Route: Oral    prednisoLONE acetate (PRED-FORTE) 1 % ophthalmic suspension    3/24/2021        Si times daily     Class: Historical    senna-docusate (SENOKOT-S/PERICOLACE) 8.6-50 MG tablet  45 tablet 0 2021    [place], [place] - [address] [place] [address]    Sig: Take 1 tablet by mouth 2 times daily To prevent constipation    Class: E-Prescribe    Route: Oral         ALLERGIES:    No Known Allergies  REVIEW OF SYSTEMS:  Review Of Systems  Skin: negative for, pigmentation, acne, rash, scaling, itching  Eyes: negative for, visual blurring, double vision, glaucoma, cataracts, eye pain  Ears/Nose/Throat: negative for, nasal congestion, sneezing, postnasal drainage, hearing loss, deafness  Respiratory: No shortness of breath, dyspnea on exertion, cough, or  hemoptysis  Cardiovascular: negative for, palpitations, tachycardia, irregular heart beat, chest pain and exertional chest pain or pressure  Gastrointestinal: negative for, poor appetite, dysphagia, nausea, vomiting, heartburn and dyspepsia  Genitourinary: negative for, nocturia, dysuria, frequency, urgency and hesitancy  Musculoskeletal: negative for, fracture, back pain, neck pain, arthritis and joint pain  Neurologic: negative for, headaches, syncope, stroke, seizures and paralysis    A comprehensive review of systems was performed and found to be negative except as described here or above.  SOCIAL HISTORY:   Social History     Socioeconomic History     Marital status:      Spouse name: Not on file     Number of children: Not on file     Years of education: Not on file     Highest education level: Not on file   Occupational History     Not on file   Tobacco Use     Smoking status: Former Smoker     Smokeless tobacco: Never Used     Tobacco comment: no passive exposure   Substance and Sexual Activity     Alcohol use: No     Drug use: Never     Sexual activity: Not Currently     Partners: Female   Other Topics Concern     Not on file   Social History Narrative     Not on file     Social Determinants of Health     Financial Resource Strain: Not on file   Food Insecurity: Not on file   Transportation Needs: Not on file   Physical Activity: Not on file   Stress: Not on file   Social Connections: Not on file   Intimate Partner Violence: Not on file   Housing Stability: Not on file     FAMILY MEDICAL HISTORY:   Family History   Problem Relation Age of Onset     Anesthesia Reaction No family hx of      Deep Vein Thrombosis (DVT) No family hx of      PHYSICAL EXAM:   BP (!) 170/92   Pulse 84   Temp 98.1  F (36.7  C) (Oral)   Wt 69.4 kg (152 lb 14.4 oz)   SpO2 100%   BMI 25.44 kg/m    GENERAL APPEARANCE: alert and no distress  EYES: nonicteric  HENT: mouth without ulcers or lesions  NECK: supple, no  adenopathy  RESP: lungs clear to auscultation   CV: regular rhythm, normal rate, no rub  ABDOMEN: soft, nontender, normal bowel sounds, no HSM   Extremities: no clubbing, cyanosis, or edema  MS: no evidence of inflammation in joints, no muscle tenderness  SKIN: no rash  NEURO: mentation intact and speech normal  PSYCH: affect normal/bright   LABS:   Recent Results (from the past 672 hour(s))   Renal panel (Alb, BUN, Ca, Cl, CO2, Creat, Gluc, Phos, K, Na)    Collection Time: 09/12/22  9:48 AM   Result Value Ref Range    Sodium 139 136 - 145 mmol/L    Potassium 4.7 3.4 - 5.3 mmol/L    Chloride 101 98 - 107 mmol/L    Carbon Dioxide (CO2) 29 22 - 29 mmol/L    Anion Gap 9 7 - 15 mmol/L    Glucose 146 (H) 70 - 99 mg/dL    Urea Nitrogen 18.8 8.0 - 23.0 mg/dL    Creatinine 1.15 0.67 - 1.17 mg/dL    GFR Estimate 69 >60 mL/min/1.73m2    Calcium 9.8 8.8 - 10.2 mg/dL    Albumin 4.2 3.5 - 5.2 g/dL    Phosphorus 2.6 2.5 - 4.5 mg/dL     CMP  Recent Labs   Lab Test 09/12/22  0948 08/29/22  0951 08/08/22  0908 07/20/22  1540 08/03/21  1437 12/23/20  1215 12/16/20  0702 12/15/20  0439 12/14/20  0442 12/13/20  2217 12/13/20  1000 11/13/19  1752    136 142 137   < > 138 141 138 140  --  140 140   POTASSIUM 4.7 4.3 3.2* 4.0   < > 3.2* 3.7 3.7 3.8  --  4.1 4.7   CHLORIDE 101 102 107 100   < > 98 105 106 108*  --  104 102   CO2 29 28 24 26   < > 30 26 21* 22  --  24 28   ANIONGAP 9 6 11 11   < > 10 10 11 10  --  12 10   * 254* 109* 117*   < > 267* 117 185* 142*   < > 141* 117   BUN 18.8 16 18 19.3   < > 17 38* 20 14  --  13 21   CR 1.15 1.21 1.31* 1.39*   < > 1.34* 1.80* 1.29 1.29  --  1.50* 1.46*   GFRESTIMATED 69 65 59* 55*   < > 53* 38* 56* 56*  --  47* 48*   GFRESTBLACK  --   --   --   --   --  >60 46* >60 >60  --  57* 59*   LENNOX 9.8 9.1 9.9 9.7   < > 9.6 9.2 8.9 9.0  --  9.7 9.5   MAG  --  2.2  --   --   --   --  2.0 2.1 1.6*  --   --   --    PHOS 2.6  --  2.6  --   --   --   --  2.4* 2.7  --   --   --    PROTTOTAL  --   7.8  --  8.2  --   --   --   --   --   --  7.9 7.3   ALBUMIN 4.2 3.7 4.7 4.7  --   --   --  3.7 3.7  --  4.6 4.3   BILITOTAL  --  0.9  --  0.6  --   --   --   --   --   --  1.0 0.9   ALKPHOS  --  83  --  73  --   --   --   --   --   --  99 78   AST  --  20  --  23  --   --   --   --   --   --  19 27   ALT  --  29  --  19  --   --   --   --   --   --  16 27    < > = values in this interval not displayed.     CBC  Recent Labs   Lab Test 08/08/22  0908 07/20/22  1540 08/26/21  0801 08/25/21  0712   HGB 16.6 15.5 13.4 14.2   WBC 9.3 9.5 10.0 12.5*   RBC 5.67 5.25 4.49 4.77   HCT 49.2 45.7 40.1 42.0   MCV 87 87 89 88   MCH 29.3 29.5 29.8 29.8   MCHC 33.7 33.9 33.4 33.8   RDW 11.9 12.6 12.5 12.6    232 173 200     INRNo lab results found.  ABGNo lab results found.   URINE STUDIES  Recent Labs   Lab Test 08/08/22  0915 07/20/22  1608 12/13/20  1208   COLOR Yellow Yellow Yellow   APPEARANCE Clear Clear Clear   URINEGLC Negative Negative Negative   URINEBILI Negative Negative Negative   URINEKETONE 40 * Negative Negative   SG 1.020 <=1.005 1.006   UBLD Negative Negative Negative   URINEPH 6.5 6.0 6.5   PROTEIN Negative Negative Trace*   UROBILINOGEN  --  0.2 <2.0 E.U./dL   NITRITE Negative Negative Negative   LEUKEST Negative Negative Negative   RBCU 1  --  0-2   WBCU <1  --  0-5     Recent Labs   Lab Test 08/08/22  0915   [facility] 0.16       ASSESSMENT AND PLAN:   #CKD stage 3a with a UPCR at 0.16 g/g Cr on solitary kidney  Deterioration in the renal functio over the past year likely secondary to accelerated hypertension and nephrectomy  Renal imaging of the remaining kidney is unremarkable  No documented intake of NSAIDs or other nephrotoxic medications. Also recent urine toxicology screen is negative. The progression is tributary of BP control  The patient was instructed to keep the sodium intake around 2400 mg /day, follow a plant-based diet and to avoid NSAIDs     #HTN  Primary and secondary to CKD. Much improved on  amlodipine 10 mg daily, telmisartan 80 mg daily, carvedilol 37.5 mg bid and spironolactone 25 mg. There is no evidence of renal artery stenosis and his nephrectomy a year ago didn't improve his BP. A TSH level is wnl. I ordered a renin aldosterone ratio. Will restart a small dose of hydrochlorothiazide and redo a CMP in 2 weeks. The patient was also instructed to keep on monitoring his BP.     #Type 2 DM   Hba1c is 6.1 in July. Well controlled on metformin     #CVD/CAD dyslipidemia  On atorvastatin. Management per cardiology     #Blood count  Hemoglobin 16. Should also be screened for sleep apnea     #Acid-base status  CO2 level 29. No acute issues     #Electrolytes  K 3.2 -> 4.7 after hydrochlorothiazide was stopped and replaced with spironolactone. The patient is currently off potassium supplementation. The level will need to be monitored again after a small dose of hydrochlorothiazide is resumed     #BMD  Ca  9.9        P 2.6 Alb 4.7  iPTH 49  Vitamin D level 25 Will start his on vitamin D at next visit    The total time of this encounter amounted to  minutes. This time included time spent with the patient, reviewing records, ordering tests, and performing post visit documentation.     The patient will return to follow up in 6 weeks    Dian Cox MD  Division of Renal Disease and Hypertension  September 26, 2022  2:18 PM

## 2022-09-26 NOTE — PATIENT INSTRUCTIONS
It was a pleasure taking care of you today.  I've included a brief summary of our discussion and care plan from today's visit below.  Please review this information with your primary care provider.  _______________________________________________________________________    My recommendations are summarized as follows:  -Keep the amount of sodium in your diet at 2.4 g/day  -Keep a Blood Pressure diary by taking your blood pressure twice a day as instructed (also see instructions attached in that regard). Please make sure that you are using a validated blood pressure device by checking that it is the case at: https://www.validatebp.org/  -Avoid all NSAID's. Examples include Ibuprofen (Advil, Motrin), naprosyn (Aleve), celebrex among others. Acetaminophen (Tylenol) is ok with maximum dose in 24 hours of 3200 mg.  -Healthy lifestyle measures will keep your kidney's functioning at their current best. This includes regular exercise, weight loss and smoking cessation if you smoke.   -Do not exceed one alcoholic drink per day  -If for any reason, you are at risk of volume depletion/dehydration (high grade fevers, severe vomiting or diarrhea) stop telmisartan, spironolactone and hydrochlorothiazide  till you get better  -Start hydrochlorothiazide 12.5 mg once daily and do labs in 2 weeks      To schedule imaging please call [phone]     To schedule your lab appointment at the [facility] and [facility], please call       Return to Nephrology Clinic in 6 weeks to review your progress.    _______________________________________________________________________    Who do I call with any questions after my visit?    Please be in touch if there are any further questions that arise following today's visit.  There are multiple ways to contact your nephrology care team.      During business hours, you may reach your Nephrology LPN Care Coordinator, Portia, at .      To schedule or reschedule an  appointment, please call [phone].    You can always send a secure message through [employer].  [employer] messages are answered by your nurse or doctor typically within 24 hours.  Please allow extra time on weekends and holidays.      For urgent/emergent questions after business hours, you may reach the on-call Nephrology Fellow by contacting the [facility]  at [phone].     How will I get the results of any tests ordered?    You will receive all of your results.  If you have signed up for [employer], any tests ordered at your visit will be available to you after your physician reviews them.  Typically this takes 1-2 weeks.  If there are urgent results that require a change in your care plan, your physician or nurse will call you to discuss the next steps.      What is [employer]?  [employer] is a secure way for you to access all of your healthcare records from the [facility].  It is a web based computer program, so you can sign on to it from any location.  It also allows you to send secure messages to your care team.  I recommend signing up for [employer] access if you have not already done so and are comfortable with using a computer.      How do I schedule a follow-up visit?  If you did not schedule a follow-up visit today, please call [phone] to schedule a follow-up office visit.        Sincerely,      Dr. [facility]  Division of Nephrology and Hypertension

## 2022-09-26 NOTE — NURSING NOTE
Chief Complaint   Patient presents with     RECHECK     3 WK F/U       BP (!) 170/92   Pulse 84   Temp 98.1  F (36.7  C) (Oral)   Wt 69.4 kg (152 lb 14.4 oz)   SpO2 100%   BMI 25.44 kg/m      Sandeep Madden on 9/26/2022 at 2:08 PM

## 2022-09-26 NOTE — PROGRESS NOTES
Nephrology Clinic    Moriah Hinojosa MRN:[id number] YOB: 1955  Date of Service: 09/26/2022  Primary care provider: Yassine Larsen  Requesting physician: Yassine Larsen      REASON FOR CONSULT: Hypertension and CKD    HISTORY OF PRESENT ILLNESS:  Moriah Hinojosa is a 67 year old male who first  presented for evaluation of uncontrolled hypertension and  CKD stage 3 in August 2022.  The past medical history is significant for longstanding hypertension and type 2 diabetes for more than 10 years, chronic severe left-sided hydronephrosis and proximal left hydroureter secondary to a proximal left ureteral stone s/p nephrectomy in August 2021 in an attempt to control his hypertension, who presents to [facility] care. His blood pressure has been difficult to control over the past few weeks and he has had multiple changes done by both his cardiologist and his PCP. He was on amlodipine 10 mg daily, hydrochlorothiazide 25 mg daily, losartan 100 mg daily and carvedilol 25 mg bid. He was however hypertensive at 225/120, tachycardic at 97 and was also complaining of a slight headache in the office today.  His diabetes is well controlled on metformin 750 mg twice daily  Urine studies done  show a UPCR at 0.16 g/g Cr, however his potassium level was low 3.2 and his creatinine level was 1.3 mg/dL. It was 1.05 in August 2021 prior to his nephrectomy.  Renal imaging done in March 2021 shows an unremarkable right kidney and no evidence of renal artery stenosis on the right.  On his last visit the patient had losartan changed to telmisartan, the hydrochlorothiazide stopped and replaced with spironolactone and his BP improved significantly.  The patient denies any dysuria, any pollakiuria, any nocturia, any LE edema, any dyspnea on exertion .  The following portions of the patient's history were reviewed and updated as appropriate: allergies, current medications, past family history, past medical history, past social history, past surgical  history and problem list.   PAST MEDICAL HISTORY:  Past Medical History:   Diagnosis Date     Chronic pain of both knees 2/11/2019     COVID-19 5/7/2020     Hypercholesteremia 6/18/2018     Hypertension 5/7/2018     PAST SURGICAL HISTORY:  Past Surgical History:   Procedure Laterality Date     LAPAROSCOPIC NEPHRECTOMY Left 8/24/2021    Procedure: NEPHRECTOMY, TOTAL, LAPAROSCOPIC;  Surgeon: Catracho Wright MD;  Location:  OR     MEDICATIONS:  Prescription Medications as of 9/26/2022       Rx Number Disp Refills Start End Last Dispensed Date Next Fill Date Owning Pharmacy    acetaminophen (TYLENOL) 325 MG tablet  120 tablet 0 8/26/2021    [place], [place] - [address] [place] [address]    Sig: Take 2 tablets (650 mg) by mouth every 6 hours as needed for pain    Class: E-Prescribe    Route: Oral    amLODIPine (NORVASC) 10 MG tablet  90 tablet 11 7/26/2022    [facility] - [place], [place] - [address]    Sig: Take 1 tablet (10 mg) by mouth At Bedtime    Class: E-Prescribe    Route: Oral    atorvastatin (LIPITOR) 40 MG tablet  90 tablet 11 7/26/2022    [facility] - [place], [place] - [address]    Sig: Take 1 tablet (40 mg) by mouth every evening    Class: E-Prescribe    Route: Oral    hydrochlorothiazide (HYDRODIURIL) 25 MG tablet            Sig: Take 25 mg by mouth daily    Class: Historical    Route: Oral    metFORMIN (GLUCOPHAGE-XR) 750 MG 24 hr tablet  180 tablet 3 4/18/2022    [facility] - [place], [place] - [address]    Sig: Take 1 tablet (750 mg) by mouth 2 times daily (with meals)    Class: E-Prescribe    Route: Oral    spironolactone (ALDACTONE) 25 MG tablet  30 tablet 3 8/8/2022 12/6/2022   [facility] - [place], [place] - [address]    Sig: Take 1 tablet (25 mg) by mouth daily for 120 days    Class: E-Prescribe    Route: Oral    telmisartan (MICARDIS) 80 MG tablet  120 tablet 0 8/8/2022 12/6/2022    [facility] - [place], [place] - [address]    Sig: Take 1 tablet (80 mg) by mouth daily for 120 days    Class: E-Prescribe    Route: Oral    carvedilol (COREG) 25 MG tablet  360 tablet 0 2022   [facility] - [place], [place] - [address]    Sig: Take 1.5 tablets (37.5 mg) by mouth 2 times daily (with meals) for 120 days    Class: E-Prescribe    Route: Oral    dorzolamide-timolol (COSOPT) 2-0.5 % ophthalmic solution    2022    [facility] - [place], [place] - [address]    Sig: PLACE 1 DROP IN LEFT EYE TWICE A DAY *90 DAYS*    Class: Historical    polyethylene glycol (MIRALAX) 17 GM/Dose powder  510 g 0 2021    [place], [place] - [address] [place] [address]    Sig: Take 17 g by mouth daily as needed for constipation    Class: E-Prescribe    Route: Oral    prednisoLONE acetate (PRED-FORTE) 1 % ophthalmic suspension    3/24/2021        Si times daily     Class: Historical    senna-docusate (SENOKOT-S/PERICOLACE) 8.6-50 MG tablet  45 tablet 0 2021    [address]    Sig: Take 1 tablet by mouth 2 times daily To prevent constipation    Class: E-Prescribe    Route: Oral         ALLERGIES:    No Known Allergies  REVIEW OF SYSTEMS:  Review Of Systems  Skin: negative for, pigmentation, acne, rash, scaling, itching  Eyes: negative for, visual blurring, double vision, glaucoma, cataracts, eye pain  Ears/Nose/Throat: negative for, nasal congestion, sneezing, postnasal drainage, hearing loss, deafness  Respiratory: No shortness of breath, dyspnea on exertion, cough, or hemoptysis  Cardiovascular: negative for, palpitations, tachycardia, irregular heart beat, chest pain and exertional chest pain or pressure  Gastrointestinal: negative for, poor appetite, dysphagia, nausea, vomiting, heartburn and dyspepsia  Genitourinary: negative for, nocturia, dysuria, frequency,  urgency and hesitancy  Musculoskeletal: negative for, fracture, back pain, neck pain, arthritis and joint pain  Neurologic: negative for, headaches, syncope, stroke, seizures and paralysis    A comprehensive review of systems was performed and found to be negative except as described here or above.  SOCIAL HISTORY:   Social History     Socioeconomic History     Marital status:      Spouse name: Not on file     Number of children: Not on file     Years of education: Not on file     Highest education level: Not on file   Occupational History     Not on file   Tobacco Use     Smoking status: Former Smoker     Smokeless tobacco: Never Used     Tobacco comment: no passive exposure   Substance and Sexual Activity     Alcohol use: No     Drug use: Never     Sexual activity: Not Currently     Partners: Female   Other Topics Concern     Not on file   Social History Narrative     Not on file     Social Determinants of Health     Financial Resource Strain: Not on file   Food Insecurity: Not on file   Transportation Needs: Not on file   Physical Activity: Not on file   Stress: Not on file   Social Connections: Not on file   Intimate Partner Violence: Not on file   Housing Stability: Not on file     FAMILY MEDICAL HISTORY:   Family History   Problem Relation Age of Onset     Anesthesia Reaction No family hx of      Deep Vein Thrombosis (DVT) No family hx of      PHYSICAL EXAM:   BP (!) 170/92   Pulse 84   Temp 98.1  F (36.7  C) (Oral)   Wt 69.4 kg (152 lb 14.4 oz)   SpO2 100%   BMI 25.44 kg/m    GENERAL APPEARANCE: alert and no distress  EYES: nonicteric  HENT: mouth without ulcers or lesions  NECK: supple, no adenopathy  RESP: lungs clear to auscultation   CV: regular rhythm, normal rate, no rub  ABDOMEN: soft, nontender, normal bowel sounds, no HSM   Extremities: no clubbing, cyanosis, or edema  MS: no evidence of inflammation in joints, no muscle tenderness  SKIN: no rash  NEURO: mentation intact and speech  normal  PSYCH: affect normal/bright   LABS:   Recent Results (from the past 672 hour(s))   Renal panel (Alb, BUN, Ca, Cl, CO2, Creat, Gluc, Phos, K, Na)    Collection Time: 09/12/22  9:48 AM   Result Value Ref Range    Sodium 139 136 - 145 mmol/L    Potassium 4.7 3.4 - 5.3 mmol/L    Chloride 101 98 - 107 mmol/L    Carbon Dioxide (CO2) 29 22 - 29 mmol/L    Anion Gap 9 7 - 15 mmol/L    Glucose 146 (H) 70 - 99 mg/dL    Urea Nitrogen 18.8 8.0 - 23.0 mg/dL    Creatinine 1.15 0.67 - 1.17 mg/dL    GFR Estimate 69 >60 mL/min/1.73m2    Calcium 9.8 8.8 - 10.2 mg/dL    Albumin 4.2 3.5 - 5.2 g/dL    Phosphorus 2.6 2.5 - 4.5 mg/dL     CMP  Recent Labs   Lab Test 09/12/22  0948 08/29/22  0951 08/08/22  0908 07/20/22  1540 08/03/21  1437 12/23/20  1215 12/16/20  0702 12/15/20  0439 12/14/20  0442 12/13/20  2217 12/13/20  1000 11/13/19  1752    136 142 137   < > 138 141 138 140  --  140 140   POTASSIUM 4.7 4.3 3.2* 4.0   < > 3.2* 3.7 3.7 3.8  --  4.1 4.7   CHLORIDE 101 102 107 100   < > 98 105 106 108*  --  104 102   CO2 29 28 24 26   < > 30 26 21* 22  --  24 28   ANIONGAP 9 6 11 11   < > 10 10 11 10  --  12 10   * 254* 109* 117*   < > 267* 117 185* 142*   < > 141* 117   BUN 18.8 16 18 19.3   < > 17 38* 20 14  --  13 21   CR 1.15 1.21 1.31* 1.39*   < > 1.34* 1.80* 1.29 1.29  --  1.50* 1.46*   GFRESTIMATED 69 65 59* 55*   < > 53* 38* 56* 56*  --  47* 48*   GFRESTBLACK  --   --   --   --   --  >60 46* >60 >60  --  57* 59*   LENNOX 9.8 9.1 9.9 9.7   < > 9.6 9.2 8.9 9.0  --  9.7 9.5   MAG  --  2.2  --   --   --   --  2.0 2.1 1.6*  --   --   --    PHOS 2.6  --  2.6  --   --   --   --  2.4* 2.7  --   --   --    PROTTOTAL  --  7.8  --  8.2  --   --   --   --   --   --  7.9 7.3   ALBUMIN 4.2 3.7 4.7 4.7  --   --   --  3.7 3.7  --  4.6 4.3   BILITOTAL  --  0.9  --  0.6  --   --   --   --   --   --  1.0 0.9   ALKPHOS  --  83  --  73  --   --   --   --   --   --  99 78   AST  --  20  --  23  --   --   --   --   --   --  19 27   ALT   --  29  --  19  --   --   --   --   --   --  16 27    < > = values in this interval not displayed.     CBC  Recent Labs   Lab Test 08/08/22  0908 07/20/22  1540 08/26/21  0801 08/25/21  0712   HGB 16.6 15.5 13.4 14.2   WBC 9.3 9.5 10.0 12.5*   RBC 5.67 5.25 4.49 4.77   HCT 49.2 45.7 40.1 42.0   MCV 87 87 89 88   MCH 29.3 29.5 29.8 29.8   MCHC 33.7 33.9 33.4 33.8   RDW 11.9 12.6 12.5 12.6    232 173 200     INRNo lab results found.  ABGNo lab results found.   URINE STUDIES  Recent Labs   Lab Test 08/08/22  0915 07/20/22  1608 12/13/20  1208   COLOR Yellow Yellow Yellow   APPEARANCE Clear Clear Clear   URINEGLC Negative Negative Negative   URINEBILI Negative Negative Negative   URINEKETONE 40 * Negative Negative   SG 1.020 <=1.005 1.006   UBLD Negative Negative Negative   URINEPH 6.5 6.0 6.5   PROTEIN Negative Negative Trace*   UROBILINOGEN  --  0.2 <2.0 E.U./dL   NITRITE Negative Negative Negative   LEUKEST Negative Negative Negative   RBCU 1  --  0-2   WBCU <1  --  0-5     Recent Labs   Lab Test 08/08/22  0915   [facility] 0.16       ASSESSMENT AND PLAN:   #CKD stage 3a with a UPCR at 0.16 g/g Cr on solitary kidney  Deterioration in the renal functio over the past year likely secondary to accelerated hypertension and nephrectomy  Renal imaging of the remaining kidney is unremarkable  No documented intake of NSAIDs or other nephrotoxic medications. Also recent urine toxicology screen is negative. The progression is tributary of BP control  The patient was instructed to keep the sodium intake around 2400 mg /day, follow a plant-based diet and to avoid NSAIDs     #HTN  Primary and secondary to CKD. Much improved on amlodipine 10 mg daily, telmisartan 80 mg daily, carvedilol 37.5 mg bid and spironolactone 25 mg. There is no evidence of renal artery stenosis and his nephrectomy a year ago didn't improve his BP. A TSH level is wnl. I ordered a renin aldosterone ratio. Will restart a small dose of hydrochlorothiazide and  redo a CMP in 2 weeks. The patient was also instructed to keep on monitoring his BP.     #Type 2 DM   Hba1c is 6.1 in July. Well controlled on metformin     #CVD/CAD dyslipidemia  On atorvastatin. Management per cardiology     #Blood count  Hemoglobin 16. Should also be screened for sleep apnea     #Acid-base status  CO2 level 29. No acute issues     #Electrolytes  K 3.2 -> 4.7 after hydrochlorothiazide was stopped and replaced with spironolactone. The patient is currently off potassium supplementation. The level will need to be monitored again after a small dose of hydrochlorothiazide is resumed     #BMD  Ca  9.9        P 2.6 Alb 4.7  iPTH 49  Vitamin D level 25 Will start his on vitamin D at next visit    The total time of this encounter amounted to  minutes. This time included time spent with the patient, reviewing records, ordering tests, and performing post visit documentation.     The patient will return to follow up in 6 weeks    Dian Cox MD  Division of Renal Disease and Hypertension  September 26, 2022  2:18 PM

## 2022-10-12 ENCOUNTER — LAB (OUTPATIENT)
Dept: LAB | Facility: CLINIC | Age: 67
End: 2022-10-12
Payer: COMMERCIAL

## 2022-10-12 DIAGNOSIS — I15.1 HYPERTENSION SECONDARY TO OTHER RENAL DISORDERS: ICD-10-CM

## 2022-10-12 LAB
ALBUMIN SERPL BCG-MCNC: 4.5 G/DL (ref 3.5–5.2)
ALP SERPL-CCNC: 75 U/L (ref 40–129)
ALT SERPL W P-5'-P-CCNC: 14 U/L (ref 10–50)
ANION GAP SERPL CALCULATED.3IONS-SCNC: 9 MMOL/L (ref 7–15)
AST SERPL W P-5'-P-CCNC: 17 U/L (ref 10–50)
BILIRUB SERPL-MCNC: 0.8 MG/DL
BUN SERPL-MCNC: 27.8 MG/DL (ref 8–23)
CALCIUM SERPL-MCNC: 10.1 MG/DL (ref 8.8–10.2)
CHLORIDE SERPL-SCNC: 97 MMOL/L (ref 98–107)
CREAT SERPL-MCNC: 1.55 MG/DL (ref 0.67–1.17)
DEPRECATED HCO3 PLAS-SCNC: 31 MMOL/L (ref 22–29)
GFR SERPL CREATININE-BSD FRML MDRD: 48 ML/MIN/1.73M2
GLUCOSE SERPL-MCNC: 190 MG/DL (ref 70–99)
POTASSIUM SERPL-SCNC: 3.8 MMOL/L (ref 3.4–5.3)
PROT SERPL-MCNC: 7.5 G/DL (ref 6.4–8.3)
SODIUM SERPL-SCNC: 137 MMOL/L (ref 136–145)

## 2022-10-12 PROCEDURE — 36415 COLL VENOUS BLD VENIPUNCTURE: CPT | Performed by: PATHOLOGY

## 2022-10-12 PROCEDURE — 99000 SPECIMEN HANDLING OFFICE-LAB: CPT | Performed by: PATHOLOGY

## 2022-10-12 PROCEDURE — 82088 ASSAY OF ALDOSTERONE: CPT | Performed by: INTERNAL MEDICINE

## 2022-10-12 PROCEDURE — 84244 ASSAY OF RENIN: CPT | Mod: 90 | Performed by: PATHOLOGY

## 2022-10-12 PROCEDURE — 80053 COMPREHEN METABOLIC PANEL: CPT | Performed by: PATHOLOGY

## 2022-10-14 LAB — ALDOST SERPL-MCNC: 9.4 NG/DL (ref 0–31)

## 2022-10-20 LAB
ALDOST/RENIN PLAS-RTO: 15.7 {RATIO} (ref 0–25)
RENIN PLAS-CCNC: 0.6 NG/ML/HR

## 2022-10-25 DIAGNOSIS — N18.31 STAGE 3A CHRONIC KIDNEY DISEASE (H): Primary | ICD-10-CM

## 2022-11-02 ENCOUNTER — TELEPHONE (OUTPATIENT)
Dept: NEPHROLOGY | Facility: CLINIC | Age: 67
End: 2022-11-02

## 2022-11-02 NOTE — TELEPHONE ENCOUNTER
Called patient via  service with a appointment reminder for Mon. 11/7/22 @ 3:30 pm with Dr. Cox. And a lab draw @ 2:30 pm.    Sandeep Madden on 11/2/2022 at 9:19 AM

## 2022-11-07 ENCOUNTER — LAB (OUTPATIENT)
Dept: LAB | Facility: CLINIC | Age: 67
End: 2022-11-07
Payer: COMMERCIAL

## 2022-11-07 ENCOUNTER — OFFICE VISIT (OUTPATIENT)
Dept: NEPHROLOGY | Facility: CLINIC | Age: 67
End: 2022-11-07
Attending: INTERNAL MEDICINE
Payer: COMMERCIAL

## 2022-11-07 VITALS
DIASTOLIC BLOOD PRESSURE: 101 MMHG | HEART RATE: 95 BPM | TEMPERATURE: 97.8 F | OXYGEN SATURATION: 98 % | SYSTOLIC BLOOD PRESSURE: 196 MMHG | WEIGHT: 156.6 LBS | BODY MASS INDEX: 26.06 KG/M2

## 2022-11-07 DIAGNOSIS — N18.31 STAGE 3A CHRONIC KIDNEY DISEASE (H): ICD-10-CM

## 2022-11-07 DIAGNOSIS — I15.1 HYPERTENSION SECONDARY TO OTHER RENAL DISORDERS: ICD-10-CM

## 2022-11-07 DIAGNOSIS — N18.31 STAGE 3A CHRONIC KIDNEY DISEASE (H): Primary | ICD-10-CM

## 2022-11-07 LAB
ALBUMIN MFR UR ELPH: <6 MG/DL
ALBUMIN SERPL BCG-MCNC: 4.6 G/DL (ref 3.5–5.2)
ALP SERPL-CCNC: 76 U/L (ref 40–129)
ALT SERPL W P-5'-P-CCNC: 24 U/L (ref 10–50)
ANION GAP SERPL CALCULATED.3IONS-SCNC: 11 MMOL/L (ref 7–15)
AST SERPL W P-5'-P-CCNC: 23 U/L (ref 10–50)
BILIRUB SERPL-MCNC: 0.6 MG/DL
BUN SERPL-MCNC: 18.4 MG/DL (ref 8–23)
CALCIUM SERPL-MCNC: 10.1 MG/DL (ref 8.8–10.2)
CHLORIDE SERPL-SCNC: 102 MMOL/L (ref 98–107)
CREAT SERPL-MCNC: 1.21 MG/DL (ref 0.67–1.17)
CREAT UR-MCNC: 41.8 MG/DL
DEPRECATED HCO3 PLAS-SCNC: 26 MMOL/L (ref 22–29)
ERYTHROCYTE [DISTWIDTH] IN BLOOD BY AUTOMATED COUNT: 12.6 % (ref 10–15)
GFR SERPL CREATININE-BSD FRML MDRD: 65 ML/MIN/1.73M2
GLUCOSE SERPL-MCNC: 124 MG/DL (ref 70–99)
HCT VFR BLD AUTO: 47.8 % (ref 40–53)
HGB BLD-MCNC: 15.5 G/DL (ref 13.3–17.7)
MCH RBC QN AUTO: 29.1 PG (ref 26.5–33)
MCHC RBC AUTO-ENTMCNC: 32.4 G/DL (ref 31.5–36.5)
MCV RBC AUTO: 90 FL (ref 78–100)
PHOSPHATE SERPL-MCNC: 3.4 MG/DL (ref 2.5–4.5)
PLATELET # BLD AUTO: 191 10E3/UL (ref 150–450)
POTASSIUM SERPL-SCNC: 4.9 MMOL/L (ref 3.4–5.3)
PROT SERPL-MCNC: 7.6 G/DL (ref 6.4–8.3)
PROT/CREAT 24H UR: NORMAL MG/G{CREAT}
RBC # BLD AUTO: 5.33 10E6/UL (ref 4.4–5.9)
SODIUM SERPL-SCNC: 139 MMOL/L (ref 136–145)
WBC # BLD AUTO: 7.3 10E3/UL (ref 4–11)

## 2022-11-07 PROCEDURE — 99214 OFFICE O/P EST MOD 30 MIN: CPT | Performed by: INTERNAL MEDICINE

## 2022-11-07 PROCEDURE — 82088 ASSAY OF ALDOSTERONE: CPT | Performed by: INTERNAL MEDICINE

## 2022-11-07 PROCEDURE — 84100 ASSAY OF PHOSPHORUS: CPT | Performed by: PATHOLOGY

## 2022-11-07 PROCEDURE — 99000 SPECIMEN HANDLING OFFICE-LAB: CPT | Performed by: PATHOLOGY

## 2022-11-07 PROCEDURE — 80053 COMPREHEN METABOLIC PANEL: CPT | Performed by: PATHOLOGY

## 2022-11-07 PROCEDURE — 84156 ASSAY OF PROTEIN URINE: CPT | Performed by: PATHOLOGY

## 2022-11-07 PROCEDURE — 84244 ASSAY OF RENIN: CPT | Mod: 90 | Performed by: PATHOLOGY

## 2022-11-07 PROCEDURE — G0463 HOSPITAL OUTPT CLINIC VISIT: HCPCS

## 2022-11-07 PROCEDURE — 36415 COLL VENOUS BLD VENIPUNCTURE: CPT | Performed by: PATHOLOGY

## 2022-11-07 PROCEDURE — 85027 COMPLETE CBC AUTOMATED: CPT | Performed by: PATHOLOGY

## 2022-11-07 RX ORDER — CHLORTHALIDONE 25 MG/1
25 TABLET ORAL DAILY
Qty: 30 TABLET | Refills: 3 | Status: SHIPPED | OUTPATIENT
Start: 2022-11-07 | End: 2023-08-31

## 2022-11-07 ASSESSMENT — PAIN SCALES - GENERAL: PAINLEVEL: NO PAIN (0)

## 2022-11-07 NOTE — LETTER
11/7/2022       RE: Moriah Hinojosa  [address] [place] [address]     Dear Colleague,    Thank you for referring your patient, Moriah Hinojosa, to the [facility] [place] at [facility]. Please see a copy of my visit note below.    Nephrology Clinic    Moriah Hinojosa MRN:[id number] YOB: 1955  Date of Service: 11/07/2022  Primary care provider: Yassine Larsen  Requesting physician: Yassine Larsen      REASON FOR CONSULT: Hypertension and CKD    HISTORY OF PRESENT ILLNESS:   Moriah Hinojosa is a 67 year old male who first  presented for evaluation of uncontrolled hypertension and  CKD stage 3 in August 2022.  The past medical history is significant for longstanding hypertension and type 2 diabetes for more than 10 years, chronic severe left-sided hydronephrosis and proximal left hydroureter secondary to a proximal left ureteral stone s/p nephrectomy in August 2021 in an attempt to control his hypertension, who presents to establish care. His blood pressure has been difficult to control over the past few weeks and he has had multiple changes done by both his cardiologist and his PCP. He was on amlodipine 10 mg daily, hydrochlorothiazide 25 mg daily, losartan 100 mg daily and carvedilol 25 mg bid. He was however hypertensive at 225/120, tachycardic at 97 and was also complaining of a slight headache in the office today.  His diabetes is well controlled on metformin 750 mg twice daily  Urine studies done  show a UPCR at 0.16 g/g Cr, however his potassium level was low 3.2 and his creatinine level was 1.3 mg/dL. It was 1.05 in August 2021 prior to his nephrectomy.  Renal imaging done in March 2021 shows an unremarkable right kidney and no evidence of renal artery stenosis on the right.  On his last visit the patient had losartan changed to telmisartan, the hydrochlorothiazide stopped and replaced with spironolactone and his BP improved significantly.  The  patient denies any dysuria, any pollakiuria, any nocturia, any LE edema, any dyspnea on exertion .  The following portions of the patient's history were reviewed and updated as appropriate: allergies, current medications, past family history, past medical history, past social history, past surgical history and problem list.  PAST MEDICAL HISTORY:  Past Medical History:   Diagnosis Date     Chronic pain of both knees 2/11/2019     COVID-19 5/7/2020     Hypercholesteremia 6/18/2018     Hypertension 5/7/2018     PAST SURGICAL HISTORY:  Past Surgical History:   Procedure Laterality Date     LAPAROSCOPIC NEPHRECTOMY Left 8/24/2021    Procedure: NEPHRECTOMY, TOTAL, LAPAROSCOPIC;  Surgeon: Catracho Wright MD;  Location:  OR     MEDICATIONS:  Prescription Medications as of 11/7/2022       Rx Number Disp Refills Start End Last Dispensed Date Next Fill Date Owning Pharmacy    acetaminophen (TYLENOL) 325 MG tablet  120 tablet 0 8/26/2021    [place], [place] - [address] [place] [address]    Sig: Take 2 tablets (650 mg) by mouth every 6 hours as needed for pain    Class: E-Prescribe    Route: Oral    amLODIPine (NORVASC) 10 MG tablet  90 tablet 11 7/26/2022    [facility] - [place], [place] - [address]    Sig: Take 1 tablet (10 mg) by mouth At Bedtime    Class: E-Prescribe    Route: Oral    atorvastatin (LIPITOR) 40 MG tablet  90 tablet 11 7/26/2022    [facility] - [place], [place] - [address]    Sig: Take 1 tablet (40 mg) by mouth every evening    Class: E-Prescribe    Route: Oral    carvedilol (COREG) 25 MG tablet  360 tablet 0 8/8/2022 12/6/2022   [facility] - [place], [place] - [address]    Sig: Take 1.5 tablets (37.5 mg) by mouth 2 times daily (with meals) for 120 days    Class: E-Prescribe    Route: Oral    dorzolamide-timolol (COSOPT) 2-0.5 % ophthalmic solution    6/5/2022    [facility] - [place], [place] - [address]    Sig: PLACE 1 DROP IN LEFT EYE TWICE A DAY *90 DAYS*    Class: Historical    hydrochlorothiazide (HYDRODIURIL) 25 MG tablet            Sig: Take 12.5 mg by mouth daily    Class: Historical    Route: Oral    metFORMIN (GLUCOPHAGE-XR) 750 MG 24 hr tablet  180 tablet 3 2022    [facility] - [place], [place] - [address]    Sig: Take 1 tablet (750 mg) by mouth 2 times daily (with meals)    Class: E-Prescribe    Route: Oral    polyethylene glycol (MIRALAX) 17 GM/Dose powder  510 g 0 2021    [address]    Sig: Take 17 g by mouth daily as needed for constipation    Class: E-Prescribe    Route: Oral    prednisoLONE acetate (PRED-FORTE) 1 % ophthalmic suspension    3/24/2021        Si times daily     Class: Historical    senna-docusate (SENOKOT-S/PERICOLACE) 8.6-50 MG tablet  45 tablet 0 2021    [address]    Sig: Take 1 tablet by mouth 2 times daily To prevent constipation    Class: E-Prescribe    Route: Oral    spironolactone (ALDACTONE) 25 MG tablet  30 tablet 3 2022   [facility] - [place], [place] - [address]    Sig: Take 1 tablet (25 mg) by mouth daily for 120 days    Class: E-Prescribe    Route: Oral    telmisartan (MICARDIS) 80 MG tablet  120 tablet 0 2022   [facility] - [place], [place] - [address]    Sig: Take 1 tablet (80 mg) by mouth daily for 120 days    Class: E-Prescribe    Route: Oral         ALLERGIES:    No Known Allergies  REVIEW OF SYSTEMS:  Review Of Systems  Skin: negative for, pigmentation, acne, rash, scaling, itching, bruising  Eyes: negative for, visual blurring, double vision, glaucoma, cataracts, eye pain, color blindness  Ears/Nose/Throat: negative for, nasal congestion, sneezing, postnasal drainage, hearing loss, deafness, tinnitus, vertigo  Respiratory: No shortness of  breath, dyspnea on exertion, cough, or hemoptysis  Cardiovascular: negative for, palpitations, tachycardia, irregular heart beat, chest pain and exertional chest pain or pressure  Gastrointestinal: negative for, poor appetite, dysphagia, nausea, vomiting, heartburn and dyspepsia  Genitourinary: negative for, nocturia, dysuria, frequency, urgency, hesitancy and hematuria  Musculoskeletal: negative for, fracture, back pain, neck pain, arthritis, joint pain and joint swelling  Neurologic: negative for, headaches, syncope, stroke, seizures, paralysis and local weakness    A comprehensive review of systems was performed and found to be negative except as described here or above.  SOCIAL HISTORY:   Social History     Socioeconomic History     Marital status:      Spouse name: Not on file     Number of children: Not on file     Years of education: Not on file     Highest education level: Not on file   Occupational History     Not on file   Tobacco Use     Smoking status: Former     Smokeless tobacco: Never     Tobacco comments:     no passive exposure   Substance and Sexual Activity     Alcohol use: No     Drug use: Never     Sexual activity: Not Currently     Partners: Female   Other Topics Concern     Not on file   Social History Narrative     Not on file     Social Determinants of Health     Financial Resource Strain: Not on file   Food Insecurity: Not on file   Transportation Needs: Not on file   Physical Activity: Not on file   Stress: Not on file   Social Connections: Not on file   Intimate Partner Violence: Not on file   Housing Stability: Not on file     FAMILY MEDICAL HISTORY:   Family History   Problem Relation Age of Onset     Anesthesia Reaction No family hx of      Deep Vein Thrombosis (DVT) No family hx of      PHYSICAL EXAM:   There were no vitals taken for this visit.  GENERAL APPEARANCE: alert and no distress  EYES: nonicteric  HENT: mouth without ulcers or lesions  NECK: supple, no  adenopathy  RESP: lungs clear to auscultation   CV: regular rhythm, normal rate, no rub  ABDOMEN: soft, nontender, normal bowel sounds, no HSM   Extremities: no clubbing, cyanosis, or edema  MS: no evidence of inflammation in joints, no muscle tenderness  SKIN: no rash  NEURO: mentation intact and speech normal  PSYCH: affect normal/bright   LABS:   Recent Results (from the past 672 hour(s))   Comprehensive metabolic panel    Collection Time: 10/12/22 10:49 AM   Result Value Ref Range    Sodium 137 136 - 145 mmol/L    Potassium 3.8 3.4 - 5.3 mmol/L    Chloride 97 (L) 98 - 107 mmol/L    Carbon Dioxide (CO2) 31 (H) 22 - 29 mmol/L    Anion Gap 9 7 - 15 mmol/L    Urea Nitrogen 27.8 (H) 8.0 - 23.0 mg/dL    Creatinine 1.55 (H) 0.67 - 1.17 mg/dL    Calcium 10.1 8.8 - 10.2 mg/dL    Glucose 190 (H) 70 - 99 mg/dL    Alkaline Phosphatase 75 40 - 129 U/L    AST 17 10 - 50 U/L    ALT 14 10 - 50 U/L    Protein Total 7.5 6.4 - 8.3 g/dL    Albumin 4.5 3.5 - 5.2 g/dL    Bilirubin Total 0.8 <=1.2 mg/dL    GFR Estimate 48 (L) >60 mL/min/1.73m2   Aldosterone    Collection Time: 10/12/22 10:49 AM   Result Value Ref Range    Aldosterone 9.4 0.0 - 31.0 ng/dL   Aldosterone Renin Ratio    Collection Time: 10/12/22 10:49 AM   Result Value Ref Range    Aldosterone Renin Ratio 15.7 0.0 - 25.0   Renin activity    Collection Time: 10/12/22 10:49 AM   Result Value Ref Range    Renin Activity 0.6 ng/mL/hr     CMP  Recent Labs   Lab Test 10/12/22  1049 09/12/22  0948 08/29/22  0951 08/08/22  0908 07/20/22  1540 08/03/21  1437 12/23/20  1215 12/16/20  0702 12/15/20  0439 12/14/20  0442 12/13/20  2217 12/13/20  1000    139 136 142 137   < > 138 141 138 140  --  140   POTASSIUM 3.8 4.7 4.3 3.2* 4.0   < > 3.2* 3.7 3.7 3.8  --  4.1   CHLORIDE 97* 101 102 107 100   < > 98 105 106 108*  --  104   CO2 31* 29 28 24 26   < > 30 26 21* 22  --  24   ANIONGAP 9 9 6 11 11   < > 10 10 11 10  --  12   * 146* 254* 109* 117*   < > 267* 117 185* 142*   <  > 141*   BUN 27.8* 18.8 16 18 19.3   < > 17 38* 20 14  --  13   CR 1.55* 1.15 1.21 1.31* 1.39*   < > 1.34* 1.80* 1.29 1.29  --  1.50*   GFRESTIMATED 48* 69 65 59* 55*   < > 53* 38* 56* 56*  --  47*   GFRESTBLACK  --   --   --   --   --   --  >60 46* >60 >60  --  57*   LENNOX 10.1 9.8 9.1 9.9 9.7   < > 9.6 9.2 8.9 9.0  --  9.7   MAG  --   --  2.2  --   --   --   --  2.0 2.1 1.6*  --   --    PHOS  --  2.6  --  2.6  --   --   --   --  2.4* 2.7  --   --    PROTTOTAL 7.5  --  7.8  --  8.2  --   --   --   --   --   --  7.9   ALBUMIN 4.5 4.2 3.7 4.7 4.7  --   --   --  3.7 3.7  --  4.6   BILITOTAL 0.8  --  0.9  --  0.6  --   --   --   --   --   --  1.0   ALKPHOS 75  --  83  --  73  --   --   --   --   --   --  99   AST 17  --  20  --  23  --   --   --   --   --   --  19   ALT 14  --  29  --  19  --   --   --   --   --   --  16    < > = values in this interval not displayed.     CBC  Recent Labs   Lab Test 08/08/22  0908 07/20/22  1540 08/26/21  0801 08/25/21  0712   HGB 16.6 15.5 13.4 14.2   WBC 9.3 9.5 10.0 12.5*   RBC 5.67 5.25 4.49 4.77   HCT 49.2 45.7 40.1 42.0   MCV 87 87 89 88   MCH 29.3 29.5 29.8 29.8   MCHC 33.7 33.9 33.4 33.8   RDW 11.9 12.6 12.5 12.6    232 173 200     INRNo lab results found.  ABGNo lab results found.   URINE STUDIES  Recent Labs   Lab Test 08/08/22  0915 07/20/22  1608 12/13/20  1208   COLOR Yellow Yellow Yellow   APPEARANCE Clear Clear Clear   URINEGLC Negative Negative Negative   URINEBILI Negative Negative Negative   URINEKETONE 40* Negative Negative   SG 1.020 <=1.005 1.006   UBLD Negative Negative Negative   URINEPH 6.5 6.0 6.5   PROTEIN Negative Negative Trace*   UROBILINOGEN  --  0.2 <2.0 E.U./dL   NITRITE Negative Negative Negative   LEUKEST Negative Negative Negative   RBCU 1  --  0-2   WBCU <1  --  0-5     Recent Labs   Lab Test 08/08/22  0915   [facility] 0.16       ASSESSMENT AND PLAN:   #CKD stage 3a with a UPCR at 0.16 g/g Cr on solitary kidney  Deterioration in the renal functio  over the past year likely secondary to accelerated hypertension and nephrectomy  Renal imaging of the remaining kidney is unremarkable  No documented intake of NSAIDs or other nephrotoxic medications. Also recent urine toxicology screen is negative. The progression is tributary of BP control  The patient was instructed to keep the sodium intake around 2400 mg /day, follow a plant-based diet and to avoid NSAIDs     #HTN  Primary and secondary to CKD. Much improved on amlodipine 10 mg daily, telmisartan 80 mg daily, carvedilol 37.5 mg bid and spironolactone 25 mg. There is no evidence of renal artery stenosis and his nephrectomy a year ago didn't improve his BP. A TSH level is wnl. I ordered a renin aldosterone ratio. as the BP remains sub optimal will stop hydrochlorothiazide and replace it with chlorthalidone 25 mg daily.  The patient was also instructed to keep on monitoring his BP.     #Type 2 DM   Hba1c is 6.1 in July. Well controlled on metformin     #CVD/CAD dyslipidemia  On atorvastatin. Management per cardiology     #Blood count  Hemoglobin 16. Should also be screened for sleep apnea     #Acid-base status  CO2 level 29. No acute issues     #Electrolytes  K 3.8 The patient is currently off potassium supplementation and on a small dose of hydrochlorothiazide . The level will need to be monitored again after hydrochlorothiazide 12.5 mg is replaced with chlorthalidone 25 mg daily     #BMD  Ca  9.9        P 2.6 Alb 4.7  iPTH 49  Vitamin D level 25 Will start his on vitamin D at next visit       The total time of this encounter amounted to 30 minutes on the day of the encounter. This time included time spent with the patient, reviewing records, ordering tests, and performing post visit documentation.     The patient will return to follow up in 4 weeks    Dian Cox MD  Division of Renal Disease and Hypertension  November 7, 2022  3:16 PM

## 2022-11-07 NOTE — PROGRESS NOTES
Nephrology Clinic    Moriah Hinojosa MRN:[id number] YOB: 1955  Date of Service: 11/07/2022  Primary care provider: Yassine Larsen  Requesting physician: Yassine Larsen      REASON FOR CONSULT: Hypertension and CKD    HISTORY OF PRESENT ILLNESS:   Moriah Hinojosa is a 67 year old male who first  presented for evaluation of uncontrolled hypertension and  CKD stage 3 in August 2022.  The past medical history is significant for longstanding hypertension and type 2 diabetes for more than 10 years, chronic severe left-sided hydronephrosis and proximal left hydroureter secondary to a proximal left ureteral stone s/p nephrectomy in August 2021 in an attempt to control his hypertension, who presents to [facility] care. His blood pressure has been difficult to control over the past few weeks and he has had multiple changes done by both his cardiologist and his PCP. He was on amlodipine 10 mg daily, hydrochlorothiazide 25 mg daily, losartan 100 mg daily and carvedilol 25 mg bid. He was however hypertensive at 225/120, tachycardic at 97 and was also complaining of a slight headache in the office today.  His diabetes is well controlled on metformin 750 mg twice daily  Urine studies done  show a UPCR at 0.16 g/g Cr, however his potassium level was low 3.2 and his creatinine level was 1.3 mg/dL. It was 1.05 in August 2021 prior to his nephrectomy.  Renal imaging done in March 2021 shows an unremarkable right kidney and no evidence of renal artery stenosis on the right.  On his last visit the patient had losartan changed to telmisartan, the hydrochlorothiazide stopped and replaced with spironolactone and his BP improved significantly.  The patient denies any dysuria, any pollakiuria, any nocturia, any LE edema, any dyspnea on exertion .  The following portions of the patient's history were reviewed and updated as appropriate: allergies, current medications, past family history, past medical history, past social history, past  surgical history and problem list.  PAST MEDICAL HISTORY:  Past Medical History:   Diagnosis Date     Chronic pain of both knees 2/11/2019     COVID-19 5/7/2020     Hypercholesteremia 6/18/2018     Hypertension 5/7/2018     PAST SURGICAL HISTORY:  Past Surgical History:   Procedure Laterality Date     LAPAROSCOPIC NEPHRECTOMY Left 8/24/2021    Procedure: NEPHRECTOMY, TOTAL, LAPAROSCOPIC;  Surgeon: Catracho [place] MD;  Location:  OR     MEDICATIONS:  Prescription Medications as of 11/7/2022       Rx Number Disp Refills Start End Last Dispensed Date Next Fill Date Owning Pharmacy    acetaminophen (TYLENOL) 325 MG tablet  120 tablet 0 8/26/2021    [place] - [address] [facility]    Sig: Take 2 tablets (650 mg) by mouth every 6 hours as needed for pain    Class: E-Prescribe    Route: Oral    amLODIPine (NORVASC) 10 MG tablet  90 tablet 11 7/26/2022    [facility] - [place], [place] - [address]    Sig: Take 1 tablet (10 mg) by mouth At Bedtime    Class: E-Prescribe    Route: Oral    atorvastatin (LIPITOR) 40 MG tablet  90 tablet 11 7/26/2022    [facility] - [place], [place] - [address]    Sig: Take 1 tablet (40 mg) by mouth every evening    Class: E-Prescribe    Route: Oral    carvedilol (COREG) 25 MG tablet  360 tablet 0 8/8/2022 12/6/2022   [facility] - [place], [place] - [address]    Sig: Take 1.5 tablets (37.5 mg) by mouth 2 times daily (with meals) for 120 days    Class: E-Prescribe    Route: Oral    dorzolamide-timolol (COSOPT) 2-0.5 % ophthalmic solution    6/5/2022    [facility] - [place], [place] - [address]    Sig: PLACE 1 DROP IN LEFT EYE TWICE A DAY *90 DAYS*    Class: Historical    hydrochlorothiazide (HYDRODIURIL) 25 MG tablet            Sig: Take 12.5 mg by mouth daily    Class: Historical    Route: Oral    metFORMIN (GLUCOPHAGE-XR) 750 MG 24 hr tablet  180 tablet 3 4/18/2022     [facility] - [place], [place] - [address]    Sig: Take 1 tablet (750 mg) by mouth 2 times daily (with meals)    Class: E-Prescribe    Route: Oral    polyethylene glycol (MIRALAX) 17 GM/Dose powder  510 g 0 2021    [place], [place] - [address] [place] [address]    Sig: Take 17 g by mouth daily as needed for constipation    Class: E-Prescribe    Route: Oral    prednisoLONE acetate (PRED-FORTE) 1 % ophthalmic suspension    3/24/2021        Si times daily     Class: Historical    senna-docusate (SENOKOT-S/PERICOLACE) 8.6-50 MG tablet  45 tablet 0 2021    [address]    Sig: Take 1 tablet by mouth 2 times daily To prevent constipation    Class: E-Prescribe    Route: Oral    spironolactone (ALDACTONE) 25 MG tablet  30 tablet 3 2022   [facility] - [place], [place] - [address]    Sig: Take 1 tablet (25 mg) by mouth daily for 120 days    Class: E-Prescribe    Route: Oral    telmisartan (MICARDIS) 80 MG tablet  120 tablet 0 2022   [facility] - [place], [place] - [address]    Sig: Take 1 tablet (80 mg) by mouth daily for 120 days    Class: E-Prescribe    Route: Oral         ALLERGIES:    No Known Allergies  REVIEW OF SYSTEMS:  Review Of Systems  Skin: negative for, pigmentation, acne, rash, scaling, itching, bruising  Eyes: negative for, visual blurring, double vision, glaucoma, cataracts, eye pain, color blindness  Ears/Nose/Throat: negative for, nasal congestion, sneezing, postnasal drainage, hearing loss, deafness, tinnitus, vertigo  Respiratory: No shortness of breath, dyspnea on exertion, cough, or hemoptysis  Cardiovascular: negative for, palpitations, tachycardia, irregular heart beat, chest pain and exertional chest pain or pressure  Gastrointestinal: negative for, poor appetite, dysphagia, nausea, vomiting, heartburn and  dyspepsia  Genitourinary: negative for, nocturia, dysuria, frequency, urgency, hesitancy and hematuria  Musculoskeletal: negative for, fracture, back pain, neck pain, arthritis, joint pain and joint swelling  Neurologic: negative for, headaches, syncope, stroke, seizures, paralysis and local weakness    A comprehensive review of systems was performed and found to be negative except as described here or above.  SOCIAL HISTORY:   Social History     Socioeconomic History     Marital status:      Spouse name: Not on file     Number of children: Not on file     Years of education: Not on file     Highest education level: Not on file   Occupational History     Not on file   Tobacco Use     Smoking status: Former     Smokeless tobacco: Never     Tobacco comments:     no passive exposure   Substance and Sexual Activity     Alcohol use: No     Drug use: Never     Sexual activity: Not Currently     Partners: Female   Other Topics Concern     Not on file   Social History Narrative     Not on file     Social Determinants of Health     Financial Resource Strain: Not on file   Food Insecurity: Not on file   Transportation Needs: Not on file   Physical Activity: Not on file   Stress: Not on file   Social Connections: Not on file   Intimate Partner Violence: Not on file   Housing Stability: Not on file     FAMILY MEDICAL HISTORY:   Family History   Problem Relation Age of Onset     Anesthesia Reaction No family hx of      Deep Vein Thrombosis (DVT) No family hx of      PHYSICAL EXAM:   There were no vitals taken for this visit.  GENERAL APPEARANCE: alert and no distress  EYES: nonicteric  HENT: mouth without ulcers or lesions  NECK: supple, no adenopathy  RESP: lungs clear to auscultation   CV: regular rhythm, normal rate, no rub  ABDOMEN: soft, nontender, normal bowel sounds, no HSM   Extremities: no clubbing, cyanosis, or edema  MS: no evidence of inflammation in joints, no muscle tenderness  SKIN: no rash  NEURO:  mentation intact and speech normal  PSYCH: affect normal/bright   LABS:   Recent Results (from the past 672 hour(s))   Comprehensive metabolic panel    Collection Time: 10/12/22 10:49 AM   Result Value Ref Range    Sodium 137 136 - 145 mmol/L    Potassium 3.8 3.4 - 5.3 mmol/L    Chloride 97 (L) 98 - 107 mmol/L    Carbon Dioxide (CO2) 31 (H) 22 - 29 mmol/L    Anion Gap 9 7 - 15 mmol/L    Urea Nitrogen 27.8 (H) 8.0 - 23.0 mg/dL    Creatinine 1.55 (H) 0.67 - 1.17 mg/dL    Calcium 10.1 8.8 - 10.2 mg/dL    Glucose 190 (H) 70 - 99 mg/dL    Alkaline Phosphatase 75 40 - 129 U/L    AST 17 10 - 50 U/L    ALT 14 10 - 50 U/L    Protein Total 7.5 6.4 - 8.3 g/dL    Albumin 4.5 3.5 - 5.2 g/dL    Bilirubin Total 0.8 <=1.2 mg/dL    GFR Estimate 48 (L) >60 mL/min/1.73m2   Aldosterone    Collection Time: 10/12/22 10:49 AM   Result Value Ref Range    Aldosterone 9.4 0.0 - 31.0 ng/dL   Aldosterone Renin Ratio    Collection Time: 10/12/22 10:49 AM   Result Value Ref Range    Aldosterone Renin Ratio 15.7 0.0 - 25.0   Renin activity    Collection Time: 10/12/22 10:49 AM   Result Value Ref Range    Renin Activity 0.6 ng/mL/hr     CMP  Recent Labs   Lab Test 10/12/22  1049 09/12/22  0948 08/29/22  0951 08/08/22  0908 07/20/22  1540 08/03/21  1437 12/23/20  1215 12/16/20  0702 12/15/20  0439 12/14/20  0442 12/13/20  2217 12/13/20  1000    139 136 142 137   < > 138 141 138 140  --  140   POTASSIUM 3.8 4.7 4.3 3.2* 4.0   < > 3.2* 3.7 3.7 3.8  --  4.1   CHLORIDE 97* 101 102 107 100   < > 98 105 106 108*  --  104   CO2 31* 29 28 24 26   < > 30 26 21* 22  --  24   ANIONGAP 9 9 6 11 11   < > 10 10 11 10  --  12   * 146* 254* 109* 117*   < > 267* 117 185* 142*   < > 141*   BUN 27.8* 18.8 16 18 19.3   < > 17 38* 20 14  --  13   CR 1.55* 1.15 1.21 1.31* 1.39*   < > 1.34* 1.80* 1.29 1.29  --  1.50*   GFRESTIMATED 48* 69 65 59* 55*   < > 53* 38* 56* 56*  --  47*   GFRESTBLACK  --   --   --   --   --   --  >60 46* >60 >60  --  57*   LENNOX 10.1  9.8 9.1 9.9 9.7   < > 9.6 9.2 8.9 9.0  --  9.7   MAG  --   --  2.2  --   --   --   --  2.0 2.1 1.6*  --   --    PHOS  --  2.6  --  2.6  --   --   --   --  2.4* 2.7  --   --    PROTTOTAL 7.5  --  7.8  --  8.2  --   --   --   --   --   --  7.9   ALBUMIN 4.5 4.2 3.7 4.7 4.7  --   --   --  3.7 3.7  --  4.6   BILITOTAL 0.8  --  0.9  --  0.6  --   --   --   --   --   --  1.0   ALKPHOS 75  --  83  --  73  --   --   --   --   --   --  99   AST 17  --  20  --  23  --   --   --   --   --   --  19   ALT 14  --  29  --  19  --   --   --   --   --   --  16    < > = values in this interval not displayed.     CBC  Recent Labs   Lab Test 08/08/22  0908 07/20/22  1540 08/26/21  0801 08/25/21  0712   HGB 16.6 15.5 13.4 14.2   WBC 9.3 9.5 10.0 12.5*   RBC 5.67 5.25 4.49 4.77   HCT 49.2 45.7 40.1 42.0   MCV 87 87 89 88   MCH 29.3 29.5 29.8 29.8   MCHC 33.7 33.9 33.4 33.8   RDW 11.9 12.6 12.5 12.6    232 173 200     INRNo lab results found.  ABGNo lab results found.   URINE STUDIES  Recent Labs   Lab Test 08/08/22  0915 07/20/22  1608 12/13/20  1208   COLOR Yellow Yellow Yellow   APPEARANCE Clear Clear Clear   URINEGLC Negative Negative Negative   URINEBILI Negative Negative Negative   URINEKETONE 40* Negative Negative   SG 1.020 <=1.005 1.006   UBLD Negative Negative Negative   URINEPH 6.5 6.0 6.5   PROTEIN Negative Negative Trace*   UROBILINOGEN  --  0.2 <2.0 E.U./dL   NITRITE Negative Negative Negative   LEUKEST Negative Negative Negative   RBCU 1  --  0-2   WBCU <1  --  0-5     Recent Labs   Lab Test 08/08/22  0915   [facility] 0.16       ASSESSMENT AND PLAN:   #CKD stage 3a with a UPCR at 0.16 g/g Cr on solitary kidney  Deterioration in the renal functio over the past year likely secondary to accelerated hypertension and nephrectomy  Renal imaging of the remaining kidney is unremarkable  No documented intake of NSAIDs or other nephrotoxic medications. Also recent urine toxicology screen is negative. The progression is tributary of  BP control  The patient was instructed to keep the sodium intake around 2400 mg /day, follow a plant-based diet and to avoid NSAIDs     #HTN  Primary and secondary to CKD. Much improved on amlodipine 10 mg daily, telmisartan 80 mg daily, carvedilol 37.5 mg bid and spironolactone 25 mg. There is no evidence of renal artery stenosis and his nephrectomy a year ago didn't improve his BP. A TSH level is wnl. I ordered a renin aldosterone ratio. as the BP remains sub optimal will stop hydrochlorothiazide and replace it with chlorthalidone 25 mg daily.  The patient was also instructed to keep on monitoring his BP.     #Type 2 DM   Hba1c is 6.1 in July. Well controlled on metformin     #CVD/CAD dyslipidemia  On atorvastatin. Management per cardiology     #Blood count  Hemoglobin 16. Should also be screened for sleep apnea     #Acid-base status  CO2 level 29. No acute issues     #Electrolytes  K 3.8 The patient is currently off potassium supplementation and on a small dose of hydrochlorothiazide . The level will need to be monitored again after hydrochlorothiazide 12.5 mg is replaced with chlorthalidone 25 mg daily     #BMD  Ca  9.9        P 2.6 Alb 4.7  iPTH 49  Vitamin D level 25 Will start his on vitamin D at next visit       The total time of this encounter amounted to 30 minutes on the day of the encounter. This time included time spent with the patient, reviewing records, ordering tests, and performing post visit documentation.     The patient will return to follow up in 4 weeks    Dian Cox MD  Division of Renal Disease and Hypertension  November 7, 2022  3:16 PM

## 2022-11-07 NOTE — PATIENT INSTRUCTIONS
It was a pleasure taking care of you today.  I've included a brief summary of our discussion and care plan from today's visit below.  Please review this information with your primary care provider.  _______________________________________________________________________    My recommendations are summarized as follows:  -Keep the amount of sodium in your diet at 2.4 g/day   -Keep a Blood Pressure diary by taking your blood pressure twice a day as instructed. Please make sure that you are using a validated blood pressure device by checking that it is the case at: https://www.validatebp.org/  -Avoid all NSAID's. Examples include Ibuprofen (Advil, Motrin), naprosyn (Aleve), celebrex among others. Acetaminophen (Tylenol) is ok with maximum dose in 24 hours of 3200 mg.  -Healthy lifestyle measures will keep your kidney's functioning at their current best. This includes regular exercise, weight loss and smoking cessation if you smoke.   -Do not exceed one alcoholic drink per day  -If for any reason, you are at risk of volume depletion/dehydration (high grade fevers, severe vomiting or diarrhea) stop telmisartan, spironolactone and chlorthalidone till you get better  -Stop hydrochlorothiazide   -Start instead chlorthalidone 25 mg once daily  -Do labs now, in 2 weeks and again in 4 weeks    To schedule imaging please call [phone]     To schedule your lab appointment at the [facility] [place] [facility], please call       Return to Nephrology Clinic in 4 weeks to review your progress.    _______________________________________________________________________    Who do I call with any questions after my visit?    Please be in touch if there are any further questions that arise following today's visit.  There are multiple ways to contact your nephrology care team.      During business hours, you may reach your Nephrology LPN Care Coordinator, Portia, at .      To schedule or reschedule an  appointment, please call [phone].    You can always send a secure message through Quipper.  Quipper messages are answered by your nurse or doctor typically within 24 hours.  Please allow extra time on weekends and holidays.      For urgent/emergent questions after business hours, you may reach the on-call Nephrology Fellow by contacting the [facility]  at [phone].     How will I get the results of any tests ordered?    You will receive all of your results.  If you have signed up for [employer], any tests ordered at your visit will be available to you after your physician reviews them.  Typically this takes 1-2 weeks.  If there are urgent results that require a change in your care plan, your physician or nurse will call you to discuss the next steps.      What is Quipper?  Quipper is a secure way for you to access all of your healthcare records from the [facility].  It is a web based computer program, so you can sign on to it from any location.  It also allows you to send secure messages to your care team.  I recommend signing up for Quipper access if you have not already done so and are comfortable with using a computer.      How do I schedule a follow-up visit?  If you did not schedule a follow-up visit today, please call [phone] to schedule a follow-up office visit.        Sincerely,      Dr. [facility]  Division of Nephrology and Hypertension

## 2022-11-07 NOTE — NURSING NOTE
Chief Complaint   Patient presents with     RECHECK     Return visit.     Blood pressure (!) 196/101, pulse 95, temperature 97.8  F (36.6  C), weight 71 kg (156 lb 9.6 oz), SpO2 98 %.    CRUZ SALMON

## 2022-11-08 LAB — ALDOST SERPL-MCNC: 5.9 NG/DL (ref 0–31)

## 2022-11-11 LAB
ALDOST/RENIN PLAS-RTO: 29.5 {RATIO} (ref 0–25)
RENIN PLAS-CCNC: 0.2 NG/ML/HR

## 2022-11-30 ENCOUNTER — TRANSFERRED RECORDS (OUTPATIENT)
Dept: HEALTH INFORMATION MANAGEMENT | Facility: CLINIC | Age: 67
End: 2022-11-30

## 2022-11-30 LAB — RETINOPATHY: NEGATIVE

## 2022-12-02 ENCOUNTER — TELEPHONE (OUTPATIENT)
Dept: NEPHROLOGY | Facility: CLINIC | Age: 67
End: 2022-12-02

## 2022-12-02 NOTE — TELEPHONE ENCOUNTER
Called patient via  service with a appointment reminder for Mon. 12/5/22 @ 11:00 am with Dr. Cox and a lab draw @ 10:15 am    Sandeep Madden on 12/2/2022 at 1:57 PM

## 2022-12-05 ENCOUNTER — OFFICE VISIT (OUTPATIENT)
Dept: NEPHROLOGY | Facility: CLINIC | Age: 67
End: 2022-12-05
Attending: INTERNAL MEDICINE
Payer: COMMERCIAL

## 2022-12-05 VITALS
HEART RATE: 111 BPM | OXYGEN SATURATION: 99 % | WEIGHT: 158.4 LBS | SYSTOLIC BLOOD PRESSURE: 168 MMHG | BODY MASS INDEX: 26.36 KG/M2 | TEMPERATURE: 98.3 F | DIASTOLIC BLOOD PRESSURE: 103 MMHG

## 2022-12-05 DIAGNOSIS — I10 ACCELERATED HYPERTENSION: Primary | ICD-10-CM

## 2022-12-05 DIAGNOSIS — N18.30 STAGE 3 CHRONIC KIDNEY DISEASE, UNSPECIFIED WHETHER STAGE 3A OR 3B CKD (H): ICD-10-CM

## 2022-12-05 PROCEDURE — G0463 HOSPITAL OUTPT CLINIC VISIT: HCPCS

## 2022-12-05 PROCEDURE — 99214 OFFICE O/P EST MOD 30 MIN: CPT | Performed by: INTERNAL MEDICINE

## 2022-12-05 RX ORDER — CARVEDILOL 25 MG/1
50 TABLET ORAL 2 TIMES DAILY WITH MEALS
Qty: 360 TABLET | Refills: 3 | Status: SHIPPED | OUTPATIENT
Start: 2022-12-05 | End: 2023-07-27

## 2022-12-05 RX ORDER — SPIRONOLACTONE 25 MG/1
50 TABLET ORAL DAILY
Qty: 120 TABLET | Refills: 3 | Status: SHIPPED | OUTPATIENT
Start: 2022-12-05 | End: 2023-07-27

## 2022-12-05 ASSESSMENT — PAIN SCALES - GENERAL: PAINLEVEL: NO PAIN (0)

## 2022-12-05 NOTE — PATIENT INSTRUCTIONS
It was a pleasure taking care of you today.  I've included a brief summary of our discussion and care plan from today's visit below.  Please review this information with your primary care provider.  _______________________________________________________________________    My recommendations are summarized as follows:  -Keep the amount of sodium in your diet at 2.4 g/day   -Keep a Blood Pressure diary by taking your blood pressure twice a day as instructed -Avoid all NSAID's. Examples include Ibuprofen (Advil, Motrin), naprosyn (Aleve), celebrex among others. Acetaminophen (Tylenol) is ok with maximum dose in 24 hours of 3200 mg.  -Healthy lifestyle measures will keep your kidney's functioning at their current best. This includes regular exercise, weight loss and smoking cessation if you smoke.   -Do not exceed one alcoholic drink per day  -If for any reason, you are at risk of volume depletion/dehydration (high grade fevers, severe vomiting or diarrhea) stop chlorthalidone, telmisartan and spironolactone till you get better  -Increase spironolactone to 50 mg once daily and do labs in 2 weeks from now  -Increase carvedilol to 50 mg twice daily with meals      To schedule imaging please call [phone]     To schedule your lab appointment at the [facility], please call       Return to Nephrology Clinic in 6 weeks to review your progress.    _______________________________________________________________________    Who do I call with any questions after my visit?    Please be in touch if there are any further questions that arise following today's visit.  There are multiple ways to contact your nephrology care team.      During business hours, you may reach your Nephrology LPN Care Coordinator, Portia, at .      To schedule or reschedule an appointment, please call [phone].    You can always send a secure message through [employer].  [employer] messages are answered by your  nurse or doctor typically within 24 hours.  Please allow extra time on weekends and holidays.      For urgent/emergent questions after business hours, you may reach the on-call Nephrology Fellow by contacting the [facility]  at [phone].     How will I get the results of any tests ordered?    You will receive all of your results.  If you have signed up for [employer], any tests ordered at your visit will be available to you after your physician reviews them.  Typically this takes 1-2 weeks.  If there are urgent results that require a change in your care plan, your physician or nurse will call you to discuss the next steps.      What is iCont[employer]?  iCont[employer] is a secure way for you to access all of your healthcare records from the [facility].  It is a web based computer program, so you can sign on to it from any location.  It also allows you to send secure messages to your care team.  I recommend signing up for iContainers access if you have not already done so and are comfortable with using a computer.      How do I schedule a follow-up visit?  If you did not schedule a follow-up visit today, please call [phone] to schedule a follow-up office visit.        Sincerely,      Dr. Dian Cox  [facility]  Division of Nephrology and Hypertension

## 2022-12-05 NOTE — NURSING NOTE
Chief Complaint   Patient presents with     RECHECK     Return visit.     Blood pressure (!) 168/103, pulse 111, temperature 98.3  F (36.8  C), weight 71.8 kg (158 lb 6.4 oz), SpO2 99 %.    CRUZ SALMON

## 2022-12-05 NOTE — LETTER
12/5/2022       RE: Moriah Hinojosa  [address] [place] [address]     Dear Colleague,    Thank you for referring your patient, Moriah Hinojosa, to the [facility] [place] at [facility]. Please see a copy of my visit note below.    Nephrology Clinic    Moriah Hinojosa MRN:[id number] YOB: 1955  Date of Service: 12/05/2022  Primary care provider: Yassine Larsen  Requesting physician: Yassine Larsen      REASON FOR CONSULT: Hypertension and CKD    HISTORY OF PRESENT ILLNESS:   Moriah Hinojosa is a 67 year old male who first  presented for evaluation of uncontrolled hypertension and  CKD stage 3 in August 2022.  The past medical history is significant for longstanding hypertension and type 2 diabetes for more than 10 years, chronic severe left-sided hydronephrosis and proximal left hydroureter secondary to a proximal left ureteral stone s/p nephrectomy in August 2021 in an attempt to control his hypertension, who presents to establish care. His blood pressure has been difficult to control over the past few weeks and he has had multiple changes done by both his cardiologist and his PCP. He was on amlodipine 10 mg daily, hydrochlorothiazide 25 mg daily, losartan 100 mg daily and carvedilol 25 mg bid. He was however hypertensive at 225/120, tachycardic at 97 and was also complaining of headache.  His diabetes is well controlled on metformin 750 mg twice daily  Urine studies done  show a UPCR at 0.16 g/g Cr, however his potassium level was low 3.2 and his creatinine level was 1.3 mg/dL. It was 1.05 in August 2021 prior to his nephrectomy.  Renal imaging done in March 2021 shows an unremarkable right kidney and no evidence of renal artery stenosis on the right.  The patient had losartan changed to telmisartan, the hydrochlorothiazide stopped and replaced with spironolactone 25 mg daily and his BP improved but remained suboptimal. On his last visit,  chlorthalidone 25 mg was added but the BP remains suboptimal.  The patient denies any dysuria, any pollakiuria, any nocturia, any LE edema, any dyspnea on exertion .    The following portions of the patient's history were reviewed and updated as appropriate: allergies, current medications, past family history, past medical history, past social history, past surgical history and problem list.    PAST MEDICAL HISTORY:  Past Medical History:   Diagnosis Date     Chronic pain of both knees 2/11/2019     COVID-19 5/7/2020     Hypercholesteremia 6/18/2018     Hypertension 5/7/2018     PAST SURGICAL HISTORY:  Past Surgical History:   Procedure Laterality Date     LAPAROSCOPIC NEPHRECTOMY Left 8/24/2021    Procedure: NEPHRECTOMY, TOTAL, LAPAROSCOPIC;  Surgeon: Catracho Wright MD;  Location:  OR     MEDICATIONS:  Prescription Medications as of 12/5/2022       Rx Number Disp Refills Start End Last Dispensed Date Next Fill Date Owning Pharmacy    acetaminophen (TYLENOL) 325 MG tablet  120 tablet 0 8/26/2021    [place], [place] - [address] [place] [address]    Sig: Take 2 tablets (650 mg) by mouth every 6 hours as needed for pain    Class: E-Prescribe    Route: Oral    amLODIPine (NORVASC) 10 MG tablet  90 tablet 11 7/26/2022    [facility] - [place], [place] - [address]    Sig: Take 1 tablet (10 mg) by mouth At Bedtime    Class: E-Prescribe    Route: Oral    atorvastatin (LIPITOR) 40 MG tablet  90 tablet 11 7/26/2022    [facility] - [place], [place] - [address]    Sig: Take 1 tablet (40 mg) by mouth every evening    Class: E-Prescribe    Route: Oral    carvedilol (COREG) 25 MG tablet  360 tablet 0 8/8/2022 12/6/2022   [facility] - [place], [place] - [address]    Sig: Take 1.5 tablets (37.5 mg) by mouth 2 times daily (with meals) for 120 days    Class: E-Prescribe    Route: Oral    chlorthalidone (HYGROTON) 25 MG tablet  30 tablet 3  11/7/2022 3/7/2023   [facility] - [place], [place] - [address]    Sig: Take 1 tablet (25 mg) by mouth daily for 120 days    Class: E-Prescribe    Route: Oral    metFORMIN (GLUCOPHAGE-XR) 750 MG 24 hr tablet  180 tablet 3 4/18/2022    [facility] - [place], [place] - [address]    Sig: Take 1 tablet (750 mg) by mouth 2 times daily (with meals)    Class: E-Prescribe    Route: Oral    spironolactone (ALDACTONE) 25 MG tablet  30 tablet 3 8/8/2022 12/6/2022   [facility] - [place], [place] - [address]    Sig: Take 1 tablet (25 mg) by mouth daily for 120 days    Class: E-Prescribe    Route: Oral    telmisartan (MICARDIS) 80 MG tablet  120 tablet 0 8/8/2022 12/6/2022   [facility] - [place], [place] - [address]    Sig: Take 1 tablet (80 mg) by mouth daily for 120 days    Class: E-Prescribe    Route: Oral         ALLERGIES:    No Known Allergies  REVIEW OF SYSTEMS:  Review Of Systems  Skin: negative for, pigmentation, acne, rash, scaling, itching, bruising  Eyes: negative for, visual blurring, double vision, glaucoma, cataracts, eye pain, color blindness  Ears/Nose/Throat: negative for, nasal congestion, sneezing, postnasal drainage, hearing loss, deafness, tinnitus, vertigo  Respiratory: No shortness of breath, dyspnea on exertion, cough, or hemoptysis  Cardiovascular: negative for, palpitations, tachycardia, irregular heart beat, chest pain and exertional chest pain or pressure  Gastrointestinal: negative for, poor appetite, dysphagia, nausea, vomiting, heartburn and dyspepsia  Genitourinary: negative for, nocturia, dysuria, frequency, urgency, hesitancy and hematuria  Musculoskeletal: negative for, fracture, back pain, neck pain, arthritis, joint pain and joint swelling  Neurologic: negative for, headaches, syncope, stroke, seizures, paralysis and local weakness    A comprehensive review of systems was performed and found to be negative except as  described here or above.  SOCIAL HISTORY:   Social History     Socioeconomic History     Marital status:      Spouse name: Not on file     Number of children: Not on file     Years of education: Not on file     Highest education level: Not on file   Occupational History     Not on file   Tobacco Use     Smoking status: Former     Smokeless tobacco: Never     Tobacco comments:     no passive exposure   Substance and Sexual Activity     Alcohol use: No     Drug use: Never     Sexual activity: Not Currently     Partners: Female   Other Topics Concern     Not on file   Social History Narrative     Not on file     Social Determinants of Health     Financial Resource Strain: Not on file   Food Insecurity: Not on file   Transportation Needs: Not on file   Physical Activity: Not on file   Stress: Not on file   Social Connections: Not on file   Intimate Partner Violence: Not on file   Housing Stability: Not on file     FAMILY MEDICAL HISTORY:   Family History   Problem Relation Age of Onset     Anesthesia Reaction No family hx of      Deep Vein Thrombosis (DVT) No family hx of      PHYSICAL EXAM:   BP (!) 168/103   Pulse 111   Temp 98.3  F (36.8  C)   Wt 71.8 kg (158 lb 6.4 oz)   SpO2 99%   BMI 26.36 kg/m    GENERAL APPEARANCE: alert and no distress  EYES: nonicteric  HENT: mouth without ulcers or lesions  NECK: supple, no adenopathy  RESP: lungs clear to auscultation   CV: regular rhythm, normal rate, no rub  ABDOMEN: soft, nontender, normal bowel sounds, no HSM   Extremities: no clubbing, cyanosis, or edema  MS: no evidence of inflammation in joints, no muscle tenderness  SKIN: no rash  NEURO: mentation intact and speech normal  PSYCH: affect normal/bright   LABS:   Recent Results (from the past 672 hour(s))   CBC with platelets    Collection Time: 11/07/22  4:27 PM   Result Value Ref Range    WBC Count 7.3 4.0 - 11.0 10e3/uL    RBC Count 5.33 4.40 - 5.90 10e6/uL    Hemoglobin 15.5 13.3 - 17.7 g/dL     Hematocrit 47.8 40.0 - 53.0 %    MCV 90 78 - 100 fL    MCH 29.1 26.5 - 33.0 pg    MCHC 32.4 31.5 - 36.5 g/dL    RDW 12.6 10.0 - 15.0 %    Platelet Count 191 150 - 450 10e3/uL   Comprehensive metabolic panel    Collection Time: 11/07/22  4:27 PM   Result Value Ref Range    Sodium 139 136 - 145 mmol/L    Potassium 4.9 3.4 - 5.3 mmol/L    Chloride 102 98 - 107 mmol/L    Carbon Dioxide (CO2) 26 22 - 29 mmol/L    Anion Gap 11 7 - 15 mmol/L    Urea Nitrogen 18.4 8.0 - 23.0 mg/dL    Creatinine 1.21 (H) 0.67 - 1.17 mg/dL    Calcium 10.1 8.8 - 10.2 mg/dL    Glucose 124 (H) 70 - 99 mg/dL    Alkaline Phosphatase 76 40 - 129 U/L    AST 23 10 - 50 U/L    ALT 24 10 - 50 U/L    Protein Total 7.6 6.4 - 8.3 g/dL    Albumin 4.6 3.5 - 5.2 g/dL    Bilirubin Total 0.6 <=1.2 mg/dL    GFR Estimate 65 >60 mL/min/1.73m2   Aldosterone    Collection Time: 11/07/22  4:27 PM   Result Value Ref Range    Aldosterone 5.9 0.0 - 31.0 ng/dL   Renin activity    Collection Time: 11/07/22  4:27 PM   Result Value Ref Range    Renin Activity 0.2 ng/mL/hr   Aldosterone Renin Ratio    Collection Time: 11/07/22  4:27 PM   Result Value Ref Range    Aldosterone Renin Ratio 29.5 (H) 0.0 - 25.0   Phosphorus    Collection Time: 11/07/22  4:27 PM   Result Value Ref Range    Phosphorus 3.4 2.5 - 4.5 mg/dL   Protein  random urine    Collection Time: 11/07/22  4:35 PM   Result Value Ref Range    Total Protein Urine mg/dL <6.0 mg/dL    Total Protein UR MG/MG CR      Creatinine Urine mg/dL 41.8 mg/dL   Comprehensive metabolic panel    Collection Time: 12/05/22 10:13 AM   Result Value Ref Range    Sodium 139 136 - 145 mmol/L    Potassium 4.2 3.4 - 5.3 mmol/L    Chloride 101 98 - 107 mmol/L    Carbon Dioxide (CO2) 28 22 - 29 mmol/L    Anion Gap 10 7 - 15 mmol/L    Urea Nitrogen 19.4 8.0 - 23.0 mg/dL    Creatinine 1.18 (H) 0.67 - 1.17 mg/dL    Calcium 9.7 8.8 - 10.2 mg/dL    Glucose 154 (H) 70 - 99 mg/dL    Alkaline Phosphatase 73 40 - 129 U/L    AST 23 10 - 50 U/L    ALT  14 10 - 50 U/L    Protein Total 7.4 6.4 - 8.3 g/dL    Albumin 4.5 3.5 - 5.2 g/dL    Bilirubin Total 0.7 <=1.2 mg/dL    GFR Estimate 67 >60 mL/min/1.73m2     CMP  Recent Labs   Lab Test 12/05/22  1013 11/07/22  1627 10/12/22  1049 09/12/22  0948 08/29/22  0951 08/08/22  0908 08/03/21  1437 12/23/20  1215 12/16/20  0702 12/15/20  0439 12/14/20  0442    139 137 139 136 142   < > 138 141 138 140   POTASSIUM 4.2 4.9 3.8 4.7 4.3 3.2*   < > 3.2* 3.7 3.7 3.8   CHLORIDE 101 102 97* 101 102 107   < > 98 105 106 108*   CO2 28 26 31* 29 28 24   < > 30 26 21* 22   ANIONGAP 10 11 9 9 6 11   < > 10 10 11 10   * 124* 190* 146* 254* 109*   < > 267* 117 185* 142*   BUN 19.4 18.4 27.8* 18.8 16 18   < > 17 38* 20 14   CR 1.18* 1.21* 1.55* 1.15 1.21 1.31*   < > 1.34* 1.80* 1.29 1.29   GFRESTIMATED 67 65 48* 69 65 59*   < > 53* 38* 56* 56*   GFRESTBLACK  --   --   --   --   --   --   --  >60 46* >60 >60   LENNOX 9.7 10.1 10.1 9.8 9.1 9.9   < > 9.6 9.2 8.9 9.0   MAG  --   --   --   --  2.2  --   --   --  2.0 2.1 1.6*   PHOS  --  3.4  --  2.6  --  2.6  --   --   --  2.4* 2.7   PROTTOTAL 7.4 7.6 7.5  --  7.8  --    < >  --   --   --   --    ALBUMIN 4.5 4.6 4.5 4.2 3.7 4.7   < >  --   --  3.7 3.7   BILITOTAL 0.7 0.6 0.8  --  0.9  --    < >  --   --   --   --    ALKPHOS 73 76 75  --  83  --    < >  --   --   --   --    AST 23 23 17  --  20  --    < >  --   --   --   --    ALT 14 24 14  --  29  --    < >  --   --   --   --     < > = values in this interval not displayed.     CBC  Recent Labs   Lab Test 11/07/22  1627 08/08/22  0908 07/20/22  1540 08/26/21  0801   HGB 15.5 16.6 15.5 13.4   WBC 7.3 9.3 9.5 10.0   RBC 5.33 5.67 5.25 4.49   HCT 47.8 49.2 45.7 40.1   MCV 90 87 87 89   MCH 29.1 29.3 29.5 29.8   MCHC 32.4 33.7 33.9 33.4   RDW 12.6 11.9 12.6 12.5    188 232 173     INRNo lab results found.  ABGNo lab results found.   URINE STUDIES  Recent Labs   Lab Test 08/08/22  0915 07/20/22  1608 12/13/20  1208   COLOR Yellow  Yellow Yellow   APPEARANCE Clear Clear Clear   URINEGLC Negative Negative Negative   URINEBILI Negative Negative Negative   URINEKETONE 40* Negative Negative   SG 1.020 <=1.005 1.006   UBLD Negative Negative Negative   URINEPH 6.5 6.0 6.5   PROTEIN Negative Negative Trace*   UROBILINOGEN  --  0.2 <2.0 E.U./dL   NITRITE Negative Negative Negative   LEUKEST Negative Negative Negative   RBCU 1  --  0-2   WBCU <1  --  0-5     Recent Labs   Lab Test 08/08/22  0915   [facility] 0.16       ASSESSMENT AND PLAN:   #CKD stage 3a with a UPCR at 0.16 g/g Cr on solitary kidney  Deterioration in the renal function over the past year likely secondary to accelerated hypertension and nephrectomy  Renal imaging of the remaining kidney is unremarkable  No documented intake of NSAIDs or other nephrotoxic medications. Also recent urine toxicology screen is negative. The progression is tributary of BP control  The patient was instructed to keep the sodium intake around 2400 mg /day, follow a plant-based diet and to avoid NSAIDs     #HTN  Primary and secondary to CKD. Remains suboptimal on amlodipine 10 mg daily, telmisartan 80 mg daily, carvedilol 37.5 mg bid and spironolactone 25 mg. There is no evidence of renal artery stenosis and his nephrectomy a year ago didn't improve his BP. A TSH level is wnl. A renin aldosterone ratio is elevated. Will increase spironolactone to 50 mg daily and carvedilol to 50 mg twice daily. I will also recheck a CMP in 2 weeks. The patient was also instructed to keep on monitoring his BP.     #Type 2 DM   Hba1c is 6.1 in July. Well controlled on metformin     #CVD/CAD dyslipidemia  On atorvastatin. Management per cardiology     #Blood count  Hemoglobin 16. Should also be screened for sleep apnea     #Acid-base status  CO2 level 28. No acute issues     #Electrolytes  K 4.2 The patient is currently off potassium supplementation      #BMD  Ca  9.9        P 2.6 Alb 4.7  iPTH 49  Vitamin D level 25 Will start him on  vitamin D at next visit       The total time of this encounter amounted to 30 minutes on the day of the encounter. This time included time spent with the patient, reviewing records, ordering tests, and performing post visit documentation.     The patient will return to follow up in 6 weeks    Dian Cox MD  Division of Renal Disease and Hypertension  November 7, 2022  3:16 PM      Again, thank you for allowing me to participate in the care of your patient.      Sincerely,    Dian Cox MD

## 2022-12-05 NOTE — PROGRESS NOTES
Nephrology Clinic    Moriah Hinojosa MRN:[id number] YOB: 1955  Date of Service: 12/05/2022  Primary care provider: Yassine Larsen  Requesting physician: Yassine Larsen      REASON FOR CONSULT: Hypertension and CKD    HISTORY OF PRESENT ILLNESS:   Moriah Hinojosa is a 67 year old male who first  presented for evaluation of uncontrolled hypertension and  CKD stage 3 in August 2022.  The past medical history is significant for longstanding hypertension and type 2 diabetes for more than 10 years, chronic severe left-sided hydronephrosis and proximal left hydroureter secondary to a proximal left ureteral stone s/p nephrectomy in August 2021 in an attempt to control his hypertension, who presents to [facility] care. His blood pressure has been difficult to control over the past few weeks and he has had multiple changes done by both his cardiologist and his PCP. He was on amlodipine 10 mg daily, hydrochlorothiazide 25 mg daily, losartan 100 mg daily and carvedilol 25 mg bid. He was however hypertensive at 225/120, tachycardic at 97 and was also complaining of headache.  His diabetes is well controlled on metformin 750 mg twice daily  Urine studies done  show a UPCR at 0.16 g/g Cr, however his potassium level was low 3.2 and his creatinine level was 1.3 mg/dL. It was 1.05 in August 2021 prior to his nephrectomy.  Renal imaging done in March 2021 shows an unremarkable right kidney and no evidence of renal artery stenosis on the right.  The patient had losartan changed to telmisartan, the hydrochlorothiazide stopped and replaced with spironolactone 25 mg daily and his BP improved but remained suboptimal. On his last visit, chlorthalidone 25 mg was added but the BP remains suboptimal.  The patient denies any dysuria, any pollakiuria, any nocturia, any LE edema, any dyspnea on exertion .    The following portions of the patient's history were reviewed and updated as appropriate: allergies, current medications, past family  history, past medical history, past social history, past surgical history and problem list.    PAST MEDICAL HISTORY:  Past Medical History:   Diagnosis Date     Chronic pain of both knees 2/11/2019     COVID-19 5/7/2020     Hypercholesteremia 6/18/2018     Hypertension 5/7/2018     PAST SURGICAL HISTORY:  Past Surgical History:   Procedure Laterality Date     LAPAROSCOPIC NEPHRECTOMY Left 8/24/2021    Procedure: NEPHRECTOMY, TOTAL, LAPAROSCOPIC;  Surgeon: Catracho Wright MD;  Location:  OR     MEDICATIONS:  Prescription Medications as of 12/5/2022       Rx Number Disp Refills Start End Last Dispensed Date Next Fill Date Owning Pharmacy    acetaminophen (TYLENOL) 325 MG tablet  120 tablet 0 8/26/2021    [place], [place] - [address] [facility]    Sig: Take 2 tablets (650 mg) by mouth every 6 hours as needed for pain    Class: E-Prescribe    Route: Oral    amLODIPine (NORVASC) 10 MG tablet  90 tablet 11 7/26/2022    [facility] - [place], [place] - [address]    Sig: Take 1 tablet (10 mg) by mouth At Bedtime    Class: E-Prescribe    Route: Oral    atorvastatin (LIPITOR) 40 MG tablet  90 tablet 11 7/26/2022    [facility] - [place], [place] - [address]    Sig: Take 1 tablet (40 mg) by mouth every evening    Class: E-Prescribe    Route: Oral    carvedilol (COREG) 25 MG tablet  360 tablet 0 8/8/2022 12/6/2022   [facility] - [place], [place] - [address]    Sig: Take 1.5 tablets (37.5 mg) by mouth 2 times daily (with meals) for 120 days    Class: E-Prescribe    Route: Oral    chlorthalidone (HYGROTON) 25 MG tablet  30 tablet 3 11/7/2022 3/7/2023   [facility] - [place], [place] - [address]    Sig: Take 1 tablet (25 mg) by mouth daily for 120 days    Class: E-Prescribe    Route: Oral    metFORMIN (GLUCOPHAGE-XR) 750 MG 24 hr tablet  180 tablet 3 4/18/2022    [facility] - [place], [place] - [address]    Sig: Take 1 tablet (750 mg) by mouth 2 times daily (with meals)    Class: E-Prescribe    Route: Oral    spironolactone (ALDACTONE) 25 MG tablet  30 tablet 3 8/8/2022 12/6/2022   [facility] - [place], [place] - [address]    Sig: Take 1 tablet (25 mg) by mouth daily for 120 days    Class: E-Prescribe    Route: Oral    telmisartan (MICARDIS) 80 MG tablet  120 tablet 0 8/8/2022 12/6/2022   [facility] - [place], [place] - [address]    Sig: Take 1 tablet (80 mg) by mouth daily for 120 days    Class: E-Prescribe    Route: Oral         ALLERGIES:    No Known Allergies  REVIEW OF SYSTEMS:  Review Of Systems  Skin: negative for, pigmentation, acne, rash, scaling, itching, bruising  Eyes: negative for, visual blurring, double vision, glaucoma, cataracts, eye pain, color blindness  Ears/Nose/Throat: negative for, nasal congestion, sneezing, postnasal drainage, hearing loss, deafness, tinnitus, vertigo  Respiratory: No shortness of breath, dyspnea on exertion, cough, or hemoptysis  Cardiovascular: negative for, palpitations, tachycardia, irregular heart beat, chest pain and exertional chest pain or pressure  Gastrointestinal: negative for, poor appetite, dysphagia, nausea, vomiting, heartburn and dyspepsia  Genitourinary: negative for, nocturia, dysuria, frequency, urgency, hesitancy and hematuria  Musculoskeletal: negative for, fracture, back pain, neck pain, arthritis, joint pain and joint swelling  Neurologic: negative for, headaches, syncope, stroke, seizures, paralysis and local weakness    A comprehensive review of systems was performed and found to be negative except as described here or above.  SOCIAL HISTORY:   Social History     Socioeconomic History     Marital status:      Spouse name: Not on file     Number of children: Not on file     Years of education: Not on file     Highest education level: Not on file   Occupational History     Not on file   Tobacco Use      Smoking status: Former     Smokeless tobacco: Never     Tobacco comments:     no passive exposure   Substance and Sexual Activity     Alcohol use: No     Drug use: Never     Sexual activity: Not Currently     Partners: Female   Other Topics Concern     Not on file   Social History Narrative     Not on file     Social Determinants of Health     Financial Resource Strain: Not on file   Food Insecurity: Not on file   Transportation Needs: Not on file   Physical Activity: Not on file   Stress: Not on file   Social Connections: Not on file   Intimate Partner Violence: Not on file   Housing Stability: Not on file     FAMILY MEDICAL HISTORY:   Family History   Problem Relation Age of Onset     Anesthesia Reaction No family hx of      Deep Vein Thrombosis (DVT) No family hx of      PHYSICAL EXAM:   BP (!) 168/103   Pulse 111   Temp 98.3  F (36.8  C)   Wt 71.8 kg (158 lb 6.4 oz)   SpO2 99%   BMI 26.36 kg/m    GENERAL APPEARANCE: alert and no distress  EYES: nonicteric  HENT: mouth without ulcers or lesions  NECK: supple, no adenopathy  RESP: lungs clear to auscultation   CV: regular rhythm, normal rate, no rub  ABDOMEN: soft, nontender, normal bowel sounds, no HSM   Extremities: no clubbing, cyanosis, or edema  MS: no evidence of inflammation in joints, no muscle tenderness  SKIN: no rash  NEURO: mentation intact and speech normal  PSYCH: affect normal/bright   LABS:   Recent Results (from the past 672 hour(s))   CBC with platelets    Collection Time: 11/07/22  4:27 PM   Result Value Ref Range    WBC Count 7.3 4.0 - 11.0 10e3/uL    RBC Count 5.33 4.40 - 5.90 10e6/uL    Hemoglobin 15.5 13.3 - 17.7 g/dL    Hematocrit 47.8 40.0 - 53.0 %    MCV 90 78 - 100 fL    MCH 29.1 26.5 - 33.0 pg    MCHC 32.4 31.5 - 36.5 g/dL    RDW 12.6 10.0 - 15.0 %    Platelet Count 191 150 - 450 10e3/uL   Comprehensive metabolic panel    Collection Time: 11/07/22  4:27 PM   Result Value Ref Range    Sodium 139 136 - 145 mmol/L    Potassium 4.9 3.4  - 5.3 mmol/L    Chloride 102 98 - 107 mmol/L    Carbon Dioxide (CO2) 26 22 - 29 mmol/L    Anion Gap 11 7 - 15 mmol/L    Urea Nitrogen 18.4 8.0 - 23.0 mg/dL    Creatinine 1.21 (H) 0.67 - 1.17 mg/dL    Calcium 10.1 8.8 - 10.2 mg/dL    Glucose 124 (H) 70 - 99 mg/dL    Alkaline Phosphatase 76 40 - 129 U/L    AST 23 10 - 50 U/L    ALT 24 10 - 50 U/L    Protein Total 7.6 6.4 - 8.3 g/dL    Albumin 4.6 3.5 - 5.2 g/dL    Bilirubin Total 0.6 <=1.2 mg/dL    GFR Estimate 65 >60 mL/min/1.73m2   Aldosterone    Collection Time: 11/07/22  4:27 PM   Result Value Ref Range    Aldosterone 5.9 0.0 - 31.0 ng/dL   Renin activity    Collection Time: 11/07/22  4:27 PM   Result Value Ref Range    Renin Activity 0.2 ng/mL/hr   Aldosterone Renin Ratio    Collection Time: 11/07/22  4:27 PM   Result Value Ref Range    Aldosterone Renin Ratio 29.5 (H) 0.0 - 25.0   Phosphorus    Collection Time: 11/07/22  4:27 PM   Result Value Ref Range    Phosphorus 3.4 2.5 - 4.5 mg/dL   Protein  random urine    Collection Time: 11/07/22  4:35 PM   Result Value Ref Range    Total Protein Urine mg/dL <6.0 mg/dL    Total Protein UR MG/MG CR      Creatinine Urine mg/dL 41.8 mg/dL   Comprehensive metabolic panel    Collection Time: 12/05/22 10:13 AM   Result Value Ref Range    Sodium 139 136 - 145 mmol/L    Potassium 4.2 3.4 - 5.3 mmol/L    Chloride 101 98 - 107 mmol/L    Carbon Dioxide (CO2) 28 22 - 29 mmol/L    Anion Gap 10 7 - 15 mmol/L    Urea Nitrogen 19.4 8.0 - 23.0 mg/dL    Creatinine 1.18 (H) 0.67 - 1.17 mg/dL    Calcium 9.7 8.8 - 10.2 mg/dL    Glucose 154 (H) 70 - 99 mg/dL    Alkaline Phosphatase 73 40 - 129 U/L    AST 23 10 - 50 U/L    ALT 14 10 - 50 U/L    Protein Total 7.4 6.4 - 8.3 g/dL    Albumin 4.5 3.5 - 5.2 g/dL    Bilirubin Total 0.7 <=1.2 mg/dL    GFR Estimate 67 >60 mL/min/1.73m2     CMP  Recent Labs   Lab Test 12/05/22  1013 11/07/22  1627 10/12/22  1049 09/12/22  0948 08/29/22  0951 08/08/22  0908 08/03/21  1437 12/23/20  1215 12/16/20  0702  12/15/20  0439 12/14/20  1202    139 137 139 136 142   < > 138 141 138 140   POTASSIUM 4.2 4.9 3.8 4.7 4.3 3.2*   < > 3.2* 3.7 3.7 3.8   CHLORIDE 101 102 97* 101 102 107   < > 98 105 106 108*   CO2 28 26 31* 29 28 24   < > 30 26 21* 22   ANIONGAP 10 11 9 9 6 11   < > 10 10 11 10   * 124* 190* 146* 254* 109*   < > 267* 117 185* 142*   BUN 19.4 18.4 27.8* 18.8 16 18   < > 17 38* 20 14   CR 1.18* 1.21* 1.55* 1.15 1.21 1.31*   < > 1.34* 1.80* 1.29 1.29   GFRESTIMATED 67 65 48* 69 65 59*   < > 53* 38* 56* 56*   GFRESTBLACK  --   --   --   --   --   --   --  >60 46* >60 >60   LENNOX 9.7 10.1 10.1 9.8 9.1 9.9   < > 9.6 9.2 8.9 9.0   MAG  --   --   --   --  2.2  --   --   --  2.0 2.1 1.6*   PHOS  --  3.4  --  2.6  --  2.6  --   --   --  2.4* 2.7   PROTTOTAL 7.4 7.6 7.5  --  7.8  --    < >  --   --   --   --    ALBUMIN 4.5 4.6 4.5 4.2 3.7 4.7   < >  --   --  3.7 3.7   BILITOTAL 0.7 0.6 0.8  --  0.9  --    < >  --   --   --   --    ALKPHOS 73 76 75  --  83  --    < >  --   --   --   --    AST 23 23 17  --  20  --    < >  --   --   --   --    ALT 14 24 14  --  29  --    < >  --   --   --   --     < > = values in this interval not displayed.     CBC  Recent Labs   Lab Test 11/07/22  1627 08/08/22  0908 07/20/22  1540 08/26/21  0801   HGB 15.5 16.6 15.5 13.4   WBC 7.3 9.3 9.5 10.0   RBC 5.33 5.67 5.25 4.49   HCT 47.8 49.2 45.7 40.1   MCV 90 87 87 89   MCH 29.1 29.3 29.5 29.8   MCHC 32.4 33.7 33.9 33.4   RDW 12.6 11.9 12.6 12.5    188 232 173     INRNo lab results found.  ABGNo lab results found.   URINE STUDIES  Recent Labs   Lab Test 08/08/22  0915 07/20/22  1608 12/13/20  1208   COLOR Yellow Yellow Yellow   APPEARANCE Clear Clear Clear   URINEGLC Negative Negative Negative   URINEBILI Negative Negative Negative   URINEKETONE 40* Negative Negative   SG 1.020 <=1.005 1.006   UBLD Negative Negative Negative   URINEPH 6.5 6.0 6.5   PROTEIN Negative Negative Trace*   UROBILINOGEN  --  0.2 <2.0 E.U./dL   NITRITE  Negative Negative Negative   LEUKEST Negative Negative Negative   RBCU 1  --  0-2   WBCU <1  --  0-5     Recent Labs   Lab Test 08/08/22  0915   UTPG 0.16       ASSESSMENT AND PLAN:   #CKD stage 3a with a UPCR at 0.16 g/g Cr on solitary kidney  Deterioration in the renal function over the past year likely secondary to accelerated hypertension and nephrectomy  Renal imaging of the remaining kidney is unremarkable  No documented intake of NSAIDs or other nephrotoxic medications. Also recent urine toxicology screen is negative. The progression is tributary of BP control  The patient was instructed to keep the sodium intake around 2400 mg /day, follow a plant-based diet and to avoid NSAIDs     #HTN  Primary and secondary to CKD. Remains suboptimal on amlodipine 10 mg daily, telmisartan 80 mg daily, carvedilol 37.5 mg bid and spironolactone 25 mg. There is no evidence of renal artery stenosis and his nephrectomy a year ago didn't improve his BP. A TSH level is wnl. A renin aldosterone ratio is elevated. Will increase spironolactone to 50 mg daily and carvedilol to 50 mg twice daily. I will also recheck a CMP in 2 weeks. The patient was also instructed to keep on monitoring his BP.     #Type 2 DM   Hba1c is 6.1 in July. Well controlled on metformin     #CVD/CAD dyslipidemia  On atorvastatin. Management per cardiology     #Blood count  Hemoglobin 16. Should also be screened for sleep apnea     #Acid-base status  CO2 level 28. No acute issues     #Electrolytes  K 4.2 The patient is currently off potassium supplementation      #BMD  Ca  9.9        P 2.6 Alb 4.7  iPTH 49  Vitamin D level 25 Will start him on vitamin D at next visit       The total time of this encounter amounted to 30 minutes on the day of the encounter. This time included time spent with the patient, reviewing records, ordering tests, and performing post visit documentation.     The patient will return to follow up in 6 weeks    Dian Cox MD  Division of  Renal Disease and Hypertension  November 7, 2022  3:16 PM

## 2022-12-19 ENCOUNTER — LAB (OUTPATIENT)
Dept: LAB | Facility: CLINIC | Age: 67
End: 2022-12-19
Attending: INTERNAL MEDICINE
Payer: COMMERCIAL

## 2022-12-19 DIAGNOSIS — E78.5 HYPERLIPIDEMIA LDL GOAL <70: ICD-10-CM

## 2022-12-19 DIAGNOSIS — I10 ACCELERATED HYPERTENSION: ICD-10-CM

## 2022-12-19 LAB
ALBUMIN SERPL BCG-MCNC: 4.1 G/DL (ref 3.5–5.2)
ALP SERPL-CCNC: 65 U/L (ref 40–129)
ALT SERPL W P-5'-P-CCNC: 17 U/L (ref 10–50)
ANION GAP SERPL CALCULATED.3IONS-SCNC: 8 MMOL/L (ref 7–15)
AST SERPL W P-5'-P-CCNC: 21 U/L (ref 10–50)
BILIRUB SERPL-MCNC: 0.7 MG/DL
BUN SERPL-MCNC: 20.3 MG/DL (ref 8–23)
CALCIUM SERPL-MCNC: 9.3 MG/DL (ref 8.8–10.2)
CHLORIDE SERPL-SCNC: 103 MMOL/L (ref 98–107)
CHOLEST SERPL-MCNC: 233 MG/DL
CREAT SERPL-MCNC: 1.28 MG/DL (ref 0.67–1.17)
DEPRECATED HCO3 PLAS-SCNC: 28 MMOL/L (ref 22–29)
GFR SERPL CREATININE-BSD FRML MDRD: 61 ML/MIN/1.73M2
GLUCOSE SERPL-MCNC: 207 MG/DL (ref 70–99)
HDLC SERPL-MCNC: 66 MG/DL
LDLC SERPL CALC-MCNC: 150 MG/DL
NONHDLC SERPL-MCNC: 167 MG/DL
POTASSIUM SERPL-SCNC: 4 MMOL/L (ref 3.4–5.3)
PROT SERPL-MCNC: 6.7 G/DL (ref 6.4–8.3)
SODIUM SERPL-SCNC: 139 MMOL/L (ref 136–145)
TRIGL SERPL-MCNC: 87 MG/DL

## 2022-12-19 PROCEDURE — 36415 COLL VENOUS BLD VENIPUNCTURE: CPT | Performed by: PATHOLOGY

## 2022-12-19 PROCEDURE — 80053 COMPREHEN METABOLIC PANEL: CPT | Performed by: PATHOLOGY

## 2022-12-19 PROCEDURE — 80061 LIPID PANEL: CPT | Performed by: PATHOLOGY

## 2022-12-20 ENCOUNTER — NURSE TRIAGE (OUTPATIENT)
Dept: NURSING | Facility: CLINIC | Age: 67
End: 2022-12-20

## 2022-12-20 ENCOUNTER — ALLIED HEALTH/NURSE VISIT (OUTPATIENT)
Dept: EDUCATION SERVICES | Facility: CLINIC | Age: 67
End: 2022-12-20
Payer: COMMERCIAL

## 2022-12-20 ENCOUNTER — TELEPHONE (OUTPATIENT)
Dept: FAMILY MEDICINE | Facility: CLINIC | Age: 67
End: 2022-12-20

## 2022-12-20 ENCOUNTER — NURSE TRIAGE (OUTPATIENT)
Dept: FAMILY MEDICINE | Facility: CLINIC | Age: 67
End: 2022-12-20

## 2022-12-20 VITALS — WEIGHT: 161 LBS | BODY MASS INDEX: 26.79 KG/M2

## 2022-12-20 DIAGNOSIS — E11.29 TYPE 2 DIABETES MELLITUS WITH OTHER DIABETIC KIDNEY COMPLICATION, WITHOUT LONG-TERM CURRENT USE OF INSULIN (H): Primary | ICD-10-CM

## 2022-12-20 LAB — HBA1C MFR BLD: 7 % (ref 0–5.6)

## 2022-12-20 PROCEDURE — 83036 HEMOGLOBIN GLYCOSYLATED A1C: CPT | Performed by: DIETITIAN, REGISTERED

## 2022-12-20 PROCEDURE — 36415 COLL VENOUS BLD VENIPUNCTURE: CPT | Performed by: DIETITIAN, REGISTERED

## 2022-12-20 PROCEDURE — G0108 DIAB MANAGE TRN  PER INDIV: HCPCS | Mod: AE | Performed by: DIETITIAN, REGISTERED

## 2022-12-20 NOTE — TELEPHONE ENCOUNTER
Dr. Larsen,     I saw Pwo today. He reported watery and itchy eyes (especially the left one) and wondered if he can get any prescription eye drops for that.     Thanks   Sophia Winslow RDN, MARY, [facility]   Diabetes Care and Education     Yassine Larsen, MD  [facility] [place] [facility]  Please triage, thanks.         Called with assistance of an  to triage patient. However, reached patient's daughter Jody Peraltaoo, C2C on file. Daughter reported patient does not live in the same house and patient does not have a phone.  RN advised to patient return call to clinic later tonight if able to, or tomorrow between 7am - 5:30 pm.  Family understood recommendation.  Clinic number provided to family.    YAZMIN DangeloN, RN  [facility]

## 2022-12-20 NOTE — TELEPHONE ENCOUNTER
Last eye appointment with [facility] was early in December, 2022. Not due to be seen for another year.  Next due is 12/23/2023.  Otometrist was aware of eye drainage and discomfort but declined to prescribed eye drop.  Patient just want provider to prescribe him eye drop medication.   Future appointment made for patient to follow up with pcp for evaluation and treatment recommendation.  Date, time and location of future appointments made with family and patient.  Advised to seek care at urgent care should new or worsening symptoms developed.    YAZMIN DangeloN, RN  [facility]      Reason for Disposition    Eye pain present > 24 hours    Additional Information    Negative: Followed an eye injury    Negative: Eye pain from chemical in the eye    Negative: Eye pain from foreign body in eye    Negative: Has sinus pain or pressure    Negative: Severe eye pain    Negative: Complete loss of vision in one or both eyes    Negative: Eyelids are very swollen (shut or almost) and fever    Negative: Eyelid (outer) is very red and fever    Negative: Foreign body sensation ('feels like something is in there') and irrigation didn't help    Negative: Vomiting    Negative: Ulcer or sore seen on the cornea (clear center part of the eye)    Negative: Recent eye surgery and increasing eye pain    Negative: Blurred vision and new or worsening    Negative: Patient sounds very sick or weak to the triager    Negative: Eye pain/discomfort and more than mild    Negative: Eyelids are very swollen (shut or almost) and no fever    Negative: Painful rash near eye and multiple small blisters grouped together    Negative: Pain followed bright light exposure from welding    Negative: Looking at light causes severe pain (i.e., photophobia)    Negative: Yellow or green pus occurs    Negative: Patient wants to be seen    Negative: Mild eyelid pain is a recurrent problem and red and crusty eyelids    Negative: Mild eye pains are a  "recurrent problem    Negative: Mild eye pain and present < 24 hours    Negative: Mild eye pain caused by staring at computer screen    Negative: Mild eye pain caused by sunscreen, smoke, smog, chlorine, food, soap or other mild irritant and no blurred vision    Negative: Mild eye pain caused by sun lamp or excessive sun exposure and no blurred vision    Negative: Mild eye pain caused by wearing contact lenses and no blurred vision    Negative: Mild pain with foreign body sensation ('feels like something is in there') and has not attempted irrigation    Answer Assessment - Initial Assessment Questions  1. ONSET: \"When did the pain start?\" (e.g., minutes, hours, days)      Eye aching after surgery from August 2021. This was discussed with primary care provider.  PCP refer to eye doctor and this was discussed as well but no treatment.  Patient was seen by [facility] in [facility].   2. TIMING: \"Does the pain come and go, or has it been constant since it started?\" (e.g., constant, intermittent, fleeting)      No.  Constant achyness and irritation.  Denied drainage.  3. SEVERITY: \"How bad is the pain?\"   (Scale 1-10; mild, moderate or severe)    - MILD (1-3): doesn't interfere with normal activities     - MODERATE (4-7): interferes with normal activities or awakens from sleep     - SEVERE (8-10): excruciating pain and patient unable to do normal activities      Pain at 6/10  4. LOCATION: \"Where does it hurt?\"  (e.g., eyelid, eye, cheekbone)      Hurts all over the eyes, outside and inside  5. CAUSE: \"What do you think is causing the pain?\"      At first after surgery, eyes starts to burry followed by achyness.   6. VISION: \"Do you have blurred vision or changes in your vision?\"       No, eyes remain the same before surgery.  What changes is the achyness and drainage/watery eyes  7. EYE DISCHARGE: \"Is there any discharge (pus) from the eye(s)?\"  If yes, ask: \"What color is it?\"       Clear drainage  8. " "FEVER: \"Do you have a fever?\" If so, ask: \"What is it, how was it measured, and when did it start?\"       No, not associated with eyes  9. OTHER SYMPTOMS: \"Do you have any other symptoms?\" (e.g., headache, nasal discharge, facial rash)      No.  10. PREGNANCY: \"Is there any chance you are pregnant?\" \"When was your last menstrual period?\"        n/a    Protocols used: EYE PAIN-A-[place]      "

## 2022-12-20 NOTE — PROGRESS NOTES
"Diabetes Self-Management Education & Support    Presents for:      Type of Service: In Person Visit    Assessment Type:   ASSESSMENT:  Follow up visit for diabetes education, conducted with the help of a professional .    CKD  Nephrolithiasis  HTN  Hypercholesteremia   Cataract  Decreased visual acuity    Unable to fully assess - no SMBG data, hence we will draw A1C today  ++ Of note home monitoring of BG has been discussed previously - pt has not been interested, however, he is somewhat open to it  today. States he will have one of his grand kids accompany him next visit and maybe they can then learn how to do it.       Component Ref Range & Units  9:40 AM   (12/20/22) 5 mo ago   (7/20/22) 8 mo ago   (4/18/22) 1 yr ago   (12/15/21) 1 yr ago   (8/3/21) 4 yr ago   (8/7/18)    Hemoglobin A1C 0.0 - 5.6 % 7.0 High   6.1 High  CM  8.9 High  CM  7.8 High  CM  6.2 High  CM          We reviewed A1C results and targets and long term implications of uncontrolled DM.    Endorses blurry vision. most recent eye appointment in November 2022.   Denies polydipsia/polyuria/polyphagia/headaches any other sx of hyperglycemia   Reports watery and itchy eyes (especially the left eye) and wonders if he can get any prescription eyedrops for that - have communicated with Dr. Larsen about this     Pt has good support from family.  Grand daughter helps set up the pill box. Pt self manages his meds - denies any missed doses       Pt appropriately taking 750 mg metformin bid with meals - tolerating well.      2 meals daily with rice, veg and chicken/pork   Endorses a \"plate full\" of rice per meal and also \"sweet\" snacks and also orange juice at times.  Likes to snack on fruit.  Is trying to drink more water  Reviewed general diet guidelines and CHO foods and portions     Stays active around the house, does chores    Uses his stepper at home at times. No other concerns today.     Education/Discussion: Reviewed A1C results and target, " "sx and tx of hyperglycemia, general activity and diet guidelines: pt expressed understanding.     Patient's most recent   Lab Results   Component Value Date    A1C 6.1 07/20/2022     is meeting goal of <7.0    Diabetes knowledge and skills assessment:   Patient is knowledgeable in diabetes management concepts related to: needs AADE 7 review     Continue education with the following diabetes management concepts: Problem Solving and Reducing Risks    Based on learning assessment above, most appropriate setting for further diabetes education would be: Individual setting.      PLAN    Continue with the current therapy and DM management plan - daily physical activity as able/safe, eating healthy & watching portions of carbs.  Future: recommend considering a GLP-1 or SGLT-2 for further control and additional renal and CVD benefits, pending next A1C.     Limit rice to 1 cup/fist sized portions per meal    No orange juice, okay to have a small fruit instead.  Drink water instead     Reports watery and itchy eyes (especially the left eye) and wonders if he can get any prescription eyedrops for that - have communicated with Dr. Larsen about this    Topics to cover at upcoming visits: Regular monitoring, Problem Solving and Reducing Risks     Follow-up: 3 months (or sooner if concerns)     See Goals Section for co-developed, patient-stated behavior change goals.    Education Materials Provided:  n/a    SUBJECTIVE/OBJECTIVE:     Cultural Influences/Ethnic Background:  Not  or     Diabetes Symptoms & Complications:     Patient Problem List and Family Medical History reviewed for relevant medical history, current medical status, and diabetes risk factors.    Vitals:  Wt 73 kg (161 lb)   BMI 26.79 kg/m    Estimated body mass index is 26.79 kg/m  as calculated from the following:    Height as of 7/26/22: 1.651 m (5' 5\").    Weight as of this encounter: 73 kg (161 lb).   Last 3 BP:   BP Readings from Last 3 Encounters: "   12/05/22 (!) 168/103   11/07/22 (!) 196/101   09/26/22 (!) 170/92       History   Smoking Status     Former   Smokeless Tobacco     Never     Comment: no passive exposure       Labs:  Lab Results   Component Value Date    A1C 6.1 07/20/2022     Lab Results   Component Value Date     12/19/2022     08/29/2022     Lab Results   Component Value Date     12/19/2022    LDL 73 08/07/2018     Direct Measure HDL   Date Value Ref Range Status   12/19/2022 66 >=40 mg/dL Final   ]  GFR Estimate   Date Value Ref Range Status   12/19/2022 61 >60 mL/min/1.73m2 Final     Comment:     Effective December 21, 2021 eGFRcr in adults is calculated using the 2021 CKD-EPI creatinine equation which includes age and gender (Roberto tilley al., NEJ, DOI: 10.1056/CWAYny4562398)   12/23/2020 53 (L) >60 mL/min/1.73m2 Final     GFR Estimate If Black   Date Value Ref Range Status   12/23/2020 >60 >60 mL/min/1.73m2 Final     Lab Results   Component Value Date    CR 1.28 12/19/2022     No results found for: MICROALBUMIN     Taking Medications:  Diabetes Medication(s)     Biguanides       metFORMIN (GLUCOPHAGE-XR) 750 MG 24 hr tablet    Take 1 tablet (750 mg) by mouth 2 times daily (with meals)         Patient Activation Measure Survey Score:  No flowsheet data found.    Care Plan and Education Provided:  Healthy eating, physical activity, reducing risks, problem solving     Time Spent: 70 minutes  Encounter Type: Individual    Any diabetes medication dose changes were made via the CDE Protocol per the patient's referring provider. A copy of this encounter was shared with the provider.

## 2022-12-20 NOTE — LETTER
"    12/20/2022         RE: Moriah Hinojosa  [address] [place] [address]        Dear Colleague,    [facility] Please see a copy of my visit note below.    Diabetes Self-Management Education & Support    Presents for:      Type of Service: In Person Visit    Assessment Type:   ASSESSMENT:  Follow up visit for diabetes education, conducted with the help of a professional .    CKD  Nephrolithiasis  HTN  Hypercholesteremia   Cataract  Decreased visual acuity    Unable to fully assess - no SMBG data, hence we will draw A1C today  ++ Of note home monitoring of BG has been discussed previously - pt has not been interested, however, he is somewhat open to it  today. States he will have one of his grand kids accompany him next visit and maybe they can then learn how to do it.       Component Ref Range & Units  9:40 AM   (12/20/22) 5 mo ago   (7/20/22) 8 mo ago   (4/18/22) 1 yr ago   (12/15/21) 1 yr ago   (8/3/21) 4 yr ago   (8/7/18)    Hemoglobin A1C 0.0 - 5.6 % 7.0 High   6.1 High  CM  8.9 High  CM  7.8 High  CM  6.2 High  CM          We reviewed A1C results and targets and long term implications of uncontrolled DM.    Endorses blurry vision. most recent eye appointment in November 2022.   Denies polydipsia/polyuria/polyphagia/headaches any other sx of hyperglycemia   Reports watery and itchy eyes (especially the left eye) and wonders if he can get any prescription eyedrops for that - have communicated with Dr. Larsen about this     Pt has good support from family.  Grand daughter helps set up the pill box. Pt self manages his meds - denies any missed doses       Pt appropriately taking 750 mg metformin bid with meals - tolerating well.      2 meals daily with rice, veg and chicken/pork   Endorses a \"plate full\" of rice per meal and also \"sweet\" snacks and also orange juice at times.  Likes to snack on fruit.  Is trying to drink more " water  Reviewed general diet guidelines and CHO foods and portions     Stays active around the house, does chores    Uses his stepper at home at times. No other concerns today.     Education/Discussion: Reviewed A1C results and target, sx and tx of hyperglycemia, general activity and diet guidelines: pt expressed understanding.     Patient's most recent   Lab Results   Component Value Date    A1C 6.1 07/20/2022     is meeting goal of <7.0    Diabetes knowledge and skills assessment:   Patient is knowledgeable in diabetes management concepts related to: needs AADE 7 review     Continue education with the following diabetes management concepts: Problem Solving and Reducing Risks    Based on learning assessment above, most appropriate setting for further diabetes education would be: Individual setting.      PLAN    Continue with the current therapy and DM management plan - daily physical activity as able/safe, eating healthy & watching portions of carbs.  Future: recommend considering a GLP-1 or SGLT-2 for further control and additional renal and CVD benefits, pending next A1C.     Limit rice to 1 cup/fist sized portions per meal    No orange juice, okay to have a small fruit instead.  Drink water instead     Reports watery and itchy eyes (especially the left eye) and wonders if he can get any prescription eyedrops for that - have communicated with Dr. Larsen about this    Topics to cover at upcoming visits: Regular monitoring, Problem Solving and Reducing Risks     Follow-up: 3 months (or sooner if concerns)     See Goals Section for co-developed, patient-stated behavior change goals.    Education Materials Provided:  n/a    SUBJECTIVE/OBJECTIVE:     Cultural Influences/Ethnic Background:  Not  or     Diabetes Symptoms & Complications:     Patient Problem List and Family Medical History reviewed for relevant medical history, current medical status, and diabetes risk factors.    Vitals:  Wt 73 kg (161 lb)   " BMI 26.79 kg/m    Estimated body mass index is 26.79 kg/m  as calculated from the following:    Height as of 7/26/22: 1.651 m (5' 5\").    Weight as of this encounter: 73 kg (161 lb).   Last 3 BP:   BP Readings from Last 3 Encounters:   12/05/22 (!) 168/103   11/07/22 (!) 196/101   09/26/22 (!) 170/92       History   Smoking Status     Former   Smokeless Tobacco     Never     Comment: no passive exposure       Labs:  Lab Results   Component Value Date    A1C 6.1 07/20/2022     Lab Results   Component Value Date     12/19/2022     08/29/2022     Lab Results   Component Value Date     12/19/2022    LDL 73 08/07/2018     Direct Measure HDL   Date Value Ref Range Status   12/19/2022 66 >=40 mg/dL Final   ]  GFR Estimate   Date Value Ref Range Status   12/19/2022 61 >60 mL/min/1.73m2 Final     Comment:     Effective December 21, 2021 eGFRcr in adults is calculated using the 2021 CKD-EPI creatinine equation which includes age and gender (Roberto et al., NEJM, DOI: 10.1056/GPKSkh7321263)   12/23/2020 53 (L) >60 mL/min/1.73m2 Final     GFR Estimate If Black   Date Value Ref Range Status   12/23/2020 >60 >60 mL/min/1.73m2 Final     Lab Results   Component Value Date    CR 1.28 12/19/2022     No results found for: MICROALBUMIN     Taking Medications:  Diabetes Medication(s)     Biguanides       metFORMIN (GLUCOPHAGE-XR) 750 MG 24 hr tablet    Take 1 tablet (750 mg) by mouth 2 times daily (with meals)         Patient Activation Measure Survey Score:  No flowsheet data found.    Care Plan and Education Provided:  Healthy eating, physical activity, reducing risks, problem solving     Time Spent: 70 minutes  Encounter Type: Individual    Any diabetes medication dose changes were made via the CDE Protocol per the patient's referring provider. A copy of this encounter was shared with the provider.          "

## 2022-12-28 DIAGNOSIS — I10 ACCELERATED HYPERTENSION: Primary | ICD-10-CM

## 2022-12-28 DIAGNOSIS — E78.5 HYPERLIPIDEMIA LDL GOAL <70: ICD-10-CM

## 2022-12-28 DIAGNOSIS — I1A.0 RESISTANT HYPERTENSION: ICD-10-CM

## 2022-12-28 RX ORDER — ATORVASTATIN CALCIUM 80 MG/1
80 TABLET, FILM COATED ORAL EVERY EVENING
Qty: 90 TABLET | Refills: 1 | Status: SHIPPED | OUTPATIENT
Start: 2022-12-28 | End: 2023-06-30

## 2022-12-28 NOTE — PROGRESS NOTES
Matias Chavarria MD   12/25/2022  6:51 PM CST Back to Top      1. HTN - on carvedilol, ARB, spironolactone and amlodipine but still elevated - would advocate repeat US kidneys renal arteries to screen for stenosis (left was not well visualized last year), and would refer to Dr. Bains for input on HTN control   2. Hyperlipidemia -  still too high - would raise atorvastatin 80mg and repeat lipids in 3 months  Follow up with cardiology after the US renal arteries.   Thanks  Matias     Atorvastatin dose updated.  Lipids ordered.  Pt is following with nephrology and they are adjusting medications.  Pt scheduled 1/19/23 with Dr Chavarria.  Referral entered for Dr Bains.  -[facility]

## 2022-12-30 ENCOUNTER — TELEPHONE (OUTPATIENT)
Dept: NEPHROLOGY | Facility: CLINIC | Age: 67
End: 2022-12-30

## 2022-12-30 NOTE — TELEPHONE ENCOUNTER
Called and talked with patients daughter via  service with a appointment reminder for Mon. 1/16/23 @ 2:00 pm with Dr. Cox and a lab draw @ 1:00 pm.    Sandeep Madden on 12/30/2022 at 9:42 AM

## 2023-01-03 ENCOUNTER — OFFICE VISIT (OUTPATIENT)
Dept: FAMILY MEDICINE | Facility: CLINIC | Age: 68
End: 2023-01-03
Payer: COMMERCIAL

## 2023-01-03 VITALS
TEMPERATURE: 98.4 F | HEIGHT: 65 IN | DIASTOLIC BLOOD PRESSURE: 90 MMHG | HEART RATE: 102 BPM | SYSTOLIC BLOOD PRESSURE: 170 MMHG | OXYGEN SATURATION: 98 % | BODY MASS INDEX: 26.66 KG/M2 | WEIGHT: 160 LBS | RESPIRATION RATE: 16 BRPM

## 2023-01-03 DIAGNOSIS — E11.29 TYPE 2 DIABETES MELLITUS WITH OTHER DIABETIC KIDNEY COMPLICATION, WITHOUT LONG-TERM CURRENT USE OF INSULIN (H): ICD-10-CM

## 2023-01-03 DIAGNOSIS — N18.30 STAGE 3 CHRONIC KIDNEY DISEASE, UNSPECIFIED WHETHER STAGE 3A OR 3B CKD (H): ICD-10-CM

## 2023-01-03 DIAGNOSIS — H57.89 IRRITATION OF LEFT EYE: Primary | ICD-10-CM

## 2023-01-03 DIAGNOSIS — I15.1 HYPERTENSION SECONDARY TO OTHER RENAL DISORDERS: ICD-10-CM

## 2023-01-03 PROCEDURE — 99214 OFFICE O/P EST MOD 30 MIN: CPT | Mod: 25 | Performed by: FAMILY MEDICINE

## 2023-01-03 PROCEDURE — G0008 ADMIN INFLUENZA VIRUS VAC: HCPCS | Performed by: FAMILY MEDICINE

## 2023-01-03 PROCEDURE — 90662 IIV NO PRSV INCREASED AG IM: CPT | Performed by: FAMILY MEDICINE

## 2023-01-03 RX ORDER — POLYVINYL ALCOHOL, POVIDONE .5; .6 G/100ML; G/100ML
1 LIQUID OPHTHALMIC 3 TIMES DAILY PRN
Qty: 15 ML | Refills: 6 | Status: SHIPPED | OUTPATIENT
Start: 2023-01-03 | End: 2023-04-12

## 2023-01-03 RX ORDER — TELMISARTAN 80 MG/1
80 TABLET ORAL DAILY
Qty: 90 TABLET | Refills: 3 | Status: SHIPPED | OUTPATIENT
Start: 2023-01-03 | End: 2024-01-10

## 2023-01-03 NOTE — PROGRESS NOTES
ASSESMENT AND PLAN:  Diagnoses and all orders for this visit:  Irritation of left eye  He has a very complicated eye history.  Reviewed his most recent available consultation from his eye surgery subspecialist but no recent consultation from his ophthalmologist is available.  We are requesting those records.  I am reluctant to prescribe anything other than artificial tears given the complexity of his eye history, it sounds like he did raise these concerns with his ophthalmologist at his recent visit but again I do not have the records yet to see what was recommended.  Patient should follow-up with his ophthalmologist and we are searching for those records.  -     Polyvinyl Alcohol-Povidone (ARTIFICIAL TEARS) 5-6 MG/ML SOLN; Apply 1 drop to eye 3 times daily as needed (left eye irritation)  Type 2 diabetes mellitus with other diabetic kidney complication, without long-term current use of insulin (H)  Last A1c 7.0, too soon for recheck, well controlled, will continue with current treatment plan.  Counseled the patient on the importance of getting a flu shot with his chronic conditions and he is agreeable.  He is not interested in the COVID booster.  -     INFLUENZA VACCINE 65+ (FLUZONE HD)  Hypertension secondary to other renal disorders  Patient ran out of his ARB medication which will be restarted as prescribed below.  He also has an upcoming appointment in less than 2 weeks with his nephrologist with further blood pressure medication adjustments at that time if his blood pressure has not improved significantly.  Patient will follow-up with me in 2 months for recheck.  -     telmisartan (MICARDIS) 80 MG tablet; Take 1 tablet (80 mg) by mouth daily  Stage 3 chronic kidney disease, unspecified whether stage 3a or 3b CKD (H)  Importance of immunizations discussed with the patient with the help of a professional .  Agreeable with influenza but not with COVID booster.  Additional plan and follow-up as detailed  above.  -     INFLUENZA VACCINE 65+ (FLUZONE HD)      Reviewed the risks and benefits of the treatment plan with the patient and/or caregiver and we discussed indications for routine and emergent follow-up.        SUBJECTIVE: 68-year-old male has a very complicated eye history, has been having mild pain and irritation and watering of his left eye over the last several weeks.  Patient reports that he was able to see his ophthalmologist but nothing was prescribed for this issue, those records are not available at the time.  He does not think it is gotten progressively worse since then he just does not think it is improved.  He is wondering about getting eyedrops to help relieve his symptoms.  When his eye moya he gets some blurry vision but there is been no other acute visual changes.  No purulent drainage from the eye, no spreading redness around the eye.  Patient brings all of his medications in today, telmisartan is empty and it sounds like he has been taking his other medications as prescribed.    Past Medical History:   Diagnosis Date     Chronic pain of both knees 2/11/2019     COVID-19 5/7/2020     Hypercholesteremia 6/18/2018     Hypertension 5/7/2018     Patient Active Problem List   Diagnosis     Resistant hypertension     Cataract     Hypercholesteremia     Chronic pain of both knees     CKD (chronic kidney disease) stage 3, GFR 30-59 ml/min     Allergic conjunctivitis, bilateral     COVID-19     Tachycardia     Accelerated hypertension     Nephrolithiasis     SOB (shortness of breath)     Decreased visual acuity     Corneal scarring     Poor dentition     Type 2 diabetes mellitus with other diabetic kidney complication, without long-term current use of insulin (H)     History of nephrectomy     Current Outpatient Medications   Medication Sig Dispense Refill     amLODIPine (NORVASC) 10 MG tablet Take 1 tablet (10 mg) by mouth At Bedtime 90 tablet 11     atorvastatin (LIPITOR) 80 MG tablet Take 1 tablet  "(80 mg) by mouth every evening 90 tablet 1     carvedilol (COREG) 25 MG tablet Take 2 tablets (50 mg) by mouth 2 times daily (with meals) 360 tablet 3     chlorthalidone (HYGROTON) 25 MG tablet Take 1 tablet (25 mg) by mouth daily for 120 days 30 tablet 3     metFORMIN (GLUCOPHAGE-XR) 750 MG 24 hr tablet Take 1 tablet (750 mg) by mouth 2 times daily (with meals) 180 tablet 3     Polyvinyl Alcohol-Povidone (ARTIFICIAL TEARS) 5-6 MG/ML SOLN Apply 1 drop to eye 3 times daily as needed (left eye irritation) 15 mL 6     spironolactone (ALDACTONE) 25 MG tablet Take 2 tablets (50 mg) by mouth daily for 30 days 120 tablet 3     telmisartan (MICARDIS) 80 MG tablet Take 1 tablet (80 mg) by mouth daily 90 tablet 3     acetaminophen (TYLENOL) 325 MG tablet Take 2 tablets (650 mg) by mouth every 6 hours as needed for pain 120 tablet 0     spironolactone (ALDACTONE) 25 MG tablet Take 1 tablet (25 mg) by mouth daily for 120 days 30 tablet 3     History   Smoking Status     Former   Smokeless Tobacco     Never     Comment: no passive exposure       OBJECTICE: BP (!) 170/90   Pulse 102   Temp 98.4  F (36.9  C) (Oral)   Resp 16   Ht 1.651 m (5' 5\")   Wt 72.6 kg (160 lb)   SpO2 98%   BMI 26.63 kg/m       No results found for this or any previous visit (from the past 24 hour(s)).     GEN-alert, appropriate, in no apparent distress   HEENT-his left conjunctiva shows some mild redness.  No opacification of the cornea.   CV-regular rate and rhythm   RESP-lungs clear   EXTREM-no pitting edema   SKIN-no ulcers      Yassine Larsen MD     "

## 2023-01-19 ENCOUNTER — OFFICE VISIT (OUTPATIENT)
Dept: CARDIOLOGY | Facility: CLINIC | Age: 68
End: 2023-01-19
Attending: INTERNAL MEDICINE
Payer: COMMERCIAL

## 2023-01-19 VITALS
DIASTOLIC BLOOD PRESSURE: 84 MMHG | SYSTOLIC BLOOD PRESSURE: 168 MMHG | BODY MASS INDEX: 26.34 KG/M2 | HEART RATE: 106 BPM | WEIGHT: 158.3 LBS | RESPIRATION RATE: 16 BRPM

## 2023-01-19 DIAGNOSIS — I10 ACCELERATED HYPERTENSION: ICD-10-CM

## 2023-01-19 DIAGNOSIS — E78.5 HYPERLIPIDEMIA LDL GOAL <70: ICD-10-CM

## 2023-01-19 PROCEDURE — 99213 OFFICE O/P EST LOW 20 MIN: CPT | Performed by: INTERNAL MEDICINE

## 2023-01-19 NOTE — PATIENT INSTRUCTIONS
Here are your medications:  Atorvastatin 80mg at night  Carvedilol 50mg twice a day (two 25mg tablets twice a day)  Spironolactone 50mg daily (two 25mg tablets a day)  Chlorthalidone 25mg daily  Amlodipine 10mg daily  Telmisartan 80mg daily    Metformin 750mg twice a day    Check your blood pressure and heart rate twice a week and call us in 2 weeks with results [phone] or [phone], Vivian Valencia    Check a fasting blood test of your cholesterol in 2 months

## 2023-01-19 NOTE — PROGRESS NOTES
Thank you, Dr. Chavarria, for asking the [facility] team to see Mr. Moriah Hinojosa to evaluate       Assessment/Recommendations   Assessment/Plan:  1. Hyperlipdiemia - LDL last 150mg/dL raised atorvastatin 80mg, will repeat in two months  2. HTN - no severe renal arteries by US,     He was given list of medications, reviewed and instructed to check BP /HR and call us in 2 weeks, follow up lipids in 2 mo and see cardiology in 4 mo     History of Present Illness/Subjective    Mr. Moriah Hinojosa is a 68 year old male with hx fo HTN and DM, here for follow up, no chest pain, pressure, dyspnea, syncopal events, reactions to medication.  Last LDL 150mg/dL on atorvastatin 80mg, Cr 1.28.    Noted LDL down from 210 mg/dL rasied in 12/22. He is on carvedilol 50mg bid, spironolactone 50mg, chlorthalidone 25mg, amlodipine 10mg, telmisartan 80mg, metformine 750mg         Physical Examination Review of Systems   BP (!) 168/84 (BP Location: Right arm, Patient Position: Sitting, Cuff Size: Adult Regular)   Pulse 106   Resp 16   Wt 71.8 kg (158 lb 4.8 oz)   BMI 26.34 kg/m    Body mass index is 26.34 kg/m .  Wt Readings from Last 3 Encounters:   01/19/23 71.8 kg (158 lb 4.8 oz)   01/03/23 72.6 kg (160 lb)   12/20/22 73 kg (161 lb)     [unfilled]  General Appearance:   no distress, normal body habitus   ENT/Mouth: membranes moist, no oral lesions or bleeding gums.      EYES:  no scleral icterus, normal conjunctivae   Neck: no carotid bruits or thyromegaly   Chest/Lungs:   lungs are clear to auscultation, no rales or wheezing,  sternal scar, equal chest wall expansion    Cardiovascular:   Regular. Normal first and second heart sounds with no murmurs, rubs, or gallops; the carotid, radial and posterior tibial pulses are intact, Jugular venous pressure , edema bilaterally    Abdomen:  no organomegaly, masses, bruits, or tenderness; bowel sounds are present   Extremities: no cyanosis or clubbing   Skin: no xanthelasma, warm.     Neurologic: normal  bilateral, no tremors     Psychiatric: alert and oriented x3, calm     Review of Systems - 12 points nega other than above      Medical History  Surgical History Family History Social History   Past Medical History:   Diagnosis Date     Chronic pain of both knees 2/11/2019     COVID-19 5/7/2020     Hypercholesteremia 6/18/2018     Hypertension 5/7/2018    Past Surgical History:   Procedure Laterality Date     LAPAROSCOPIC NEPHRECTOMY Left 8/24/2021    Procedure: NEPHRECTOMY, TOTAL, LAPAROSCOPIC;  Surgeon: Catracho Wright MD;  Location: [facility] OR    Family History   Problem Relation Age of Onset     Anesthesia Reaction No family hx of      Deep Vein Thrombosis (DVT) No family hx of     Social History     Socioeconomic History     Marital status:      Spouse name: Not on file     Number of children: Not on file     Years of education: Not on file     Highest education level: Not on file   Occupational History     Not on file   Tobacco Use     Smoking status: Former     Smokeless tobacco: Never     Tobacco comments:     no passive exposure   Vaping Use     Vaping Use: Never used   Substance and Sexual Activity     Alcohol use: No     Drug use: Never     Sexual activity: Not Currently     Partners: Female   Other Topics Concern     Not on file   Social History Narrative     Not on file     Social Determinants of Health     Financial Resource Strain: Not on file   Food Insecurity: Not on file   Transportation Needs: Not on file   Physical Activity: Not on file   Stress: Not on file   Social Connections: Not on file   Intimate Partner Violence: Not on file   Housing Stability: Not on file          Medications  Allergies   Scheduled Meds:  Continuous Infusions:  PRN Meds:. No Known Allergies      Lab Results    Chemistry/lipid CBC Cardiac Enzymes/BNP/TSH/INR   Lab Results   Component Value Date    CHOL 233 (H) 12/19/2022    HDL 66 12/19/2022    TRIG 87 12/19/2022    BUN 20.3  12/19/2022     12/19/2022    CO2 28 12/19/2022    Lab Results   Component Value Date    WBC 7.3 11/07/2022    HGB 15.5 11/07/2022    HCT 47.8 11/07/2022    MCV 90 11/07/2022     11/07/2022    Lab Results   Component Value Date    TROPONINI 0.03 12/13/2020     (H) 12/13/2020    TSH 1.14 08/08/2022              Matias Chavarria MD  Interventional Cardiology  [facility]          \

## 2023-01-19 NOTE — LETTER
1/19/2023    Yassine Larsen MD  [address] [facility] [address]  [place] [address]    RE: Moriah Micaela       Dear Colleague,     I had the pleasure of seeing Pwo Micaela in the [facility].    Thank you, Dr. Chavarria, for asking the [facility] team to see Mr. Moriah Hinojosa to evaluate       Assessment/Recommendations   Assessment/Plan:  1. Hyperlipdiemia - LDL last 150mg/dL raised atorvastatin 80mg, will repeat in two months  2. HTN - no severe renal arteries by US,     He was given list of medications, reviewed and instructed to check BP /HR and call us in 2 weeks, follow up lipids in 2 mo and see cardiology in 4 mo     History of Present Illness/Subjective    Mr. Moriah Hinojosa is a 68 year old male with hx fo HTN and DM, here for follow up, no chest pain, pressure, dyspnea, syncopal events, reactions to medication.  Last LDL 150mg/dL on atorvastatin 80mg, Cr 1.28.    Noted LDL down from 210 mg/dL rasied in 12/22. He is on carvedilol 50mg bid, spironolactone 50mg, chlorthalidone 25mg, amlodipine 10mg, telmisartan 80mg, metformine 750mg         Physical Examination Review of Systems   BP (!) 168/84 (BP Location: Right arm, Patient Position: Sitting, Cuff Size: Adult Regular)   Pulse 106   Resp 16   Wt 71.8 kg (158 lb 4.8 oz)   BMI 26.34 kg/m    Body mass index is 26.34 kg/m .  Wt Readings from Last 3 Encounters:   01/19/23 71.8 kg (158 lb 4.8 oz)   01/03/23 72.6 kg (160 lb)   12/20/22 73 kg (161 lb)     [unfilled]  General Appearance:   no distress, normal body habitus   ENT/Mouth: membranes moist, no oral lesions or bleeding gums.      EYES:  no scleral icterus, normal conjunctivae   Neck: no carotid bruits or thyromegaly   Chest/Lungs:   lungs are clear to auscultation, no rales or wheezing,  sternal scar, equal chest wall expansion    Cardiovascular:   Regular. Normal first and second heart sounds with no murmurs, rubs, or gallops; the carotid, radial and posterior tibial pulses are intact, Jugular  venous pressure , edema bilaterally    Abdomen:  no organomegaly, masses, bruits, or tenderness; bowel sounds are present   Extremities: no cyanosis or clubbing   Skin: no xanthelasma, warm.    Neurologic: normal  bilateral, no tremors     Psychiatric: alert and oriented x3, calm     Review of Systems - 12 points nega other than above      Medical History  Surgical History Family History Social History   Past Medical History:   Diagnosis Date     Chronic pain of both knees 2/11/2019     COVID-19 5/7/2020     Hypercholesteremia 6/18/2018     Hypertension 5/7/2018    Past Surgical History:   Procedure Laterality Date     LAPAROSCOPIC NEPHRECTOMY Left 8/24/2021    Procedure: NEPHRECTOMY, TOTAL, LAPAROSCOPIC;  Surgeon: Catracho Wright MD;  Location: [facility] OR    Family History   Problem Relation Age of Onset     Anesthesia Reaction No family hx of      Deep Vein Thrombosis (DVT) No family hx of     Social History     Socioeconomic History     Marital status:      Spouse name: Not on file     Number of children: Not on file     Years of education: Not on file     Highest education level: Not on file   Occupational History     Not on file   Tobacco Use     Smoking status: Former     Smokeless tobacco: Never     Tobacco comments:     no passive exposure   Vaping Use     Vaping Use: Never used   Substance and Sexual Activity     Alcohol use: No     Drug use: Never     Sexual activity: Not Currently     Partners: Female   Other Topics Concern     Not on file   Social History Narrative     Not on file     Social Determinants of Health     Financial Resource Strain: Not on file   Food Insecurity: Not on file   Transportation Needs: Not on file   Physical Activity: Not on file   Stress: Not on file   Social Connections: Not on file   Intimate Partner Violence: Not on file   Housing Stability: Not on file          Medications  Allergies   Scheduled Meds:  Continuous Infusions:  PRN Meds:. No Known Allergies       Lab Results    Chemistry/lipid CBC Cardiac Enzymes/BNP/TSH/INR   Lab Results   Component Value Date    CHOL 233 (H) 12/19/2022    HDL 66 12/19/2022    TRIG 87 12/19/2022    BUN 20.3 12/19/2022     12/19/2022    CO2 28 12/19/2022    Lab Results   Component Value Date    WBC 7.3 11/07/2022    HGB 15.5 11/07/2022    HCT 47.8 11/07/2022    MCV 90 11/07/2022     11/07/2022    Lab Results   Component Value Date    TROPONINI 0.03 12/13/2020     (H) 12/13/2020    TSH 1.14 08/08/2022              Matias Chavarria MD  Interventional Cardiology  [facility]  Thank you for allowing me to participate in the care of your patient.      Sincerely,     Matias Chavarria MD     [facility]  cc:   Matias Chavarria MD  [address] [facility] [address]  [place] [address]

## 2023-03-06 ENCOUNTER — OFFICE VISIT (OUTPATIENT)
Dept: FAMILY MEDICINE | Facility: CLINIC | Age: 68
End: 2023-03-06
Payer: COMMERCIAL

## 2023-03-06 VITALS
RESPIRATION RATE: 16 BRPM | DIASTOLIC BLOOD PRESSURE: 90 MMHG | SYSTOLIC BLOOD PRESSURE: 171 MMHG | OXYGEN SATURATION: 99 % | WEIGHT: 160 LBS | BODY MASS INDEX: 26.66 KG/M2 | HEART RATE: 76 BPM | TEMPERATURE: 98.2 F | HEIGHT: 65 IN

## 2023-03-06 DIAGNOSIS — I15.1 HYPERTENSION SECONDARY TO OTHER RENAL DISORDERS: Primary | ICD-10-CM

## 2023-03-06 DIAGNOSIS — E11.29 TYPE 2 DIABETES MELLITUS WITH OTHER DIABETIC KIDNEY COMPLICATION, WITHOUT LONG-TERM CURRENT USE OF INSULIN (H): ICD-10-CM

## 2023-03-06 DIAGNOSIS — H35.81 MACULAR EDEMA: ICD-10-CM

## 2023-03-06 DIAGNOSIS — H43.811 POSTERIOR VITREOUS DETACHMENT, RIGHT: ICD-10-CM

## 2023-03-06 DIAGNOSIS — H17.9 CORNEAL SCARRING: ICD-10-CM

## 2023-03-06 PROCEDURE — 99214 OFFICE O/P EST MOD 30 MIN: CPT | Performed by: FAMILY MEDICINE

## 2023-03-06 RX ORDER — SPIRONOLACTONE 25 MG/1
25 TABLET ORAL DAILY
Qty: 30 TABLET | Refills: 3 | Status: CANCELLED | OUTPATIENT
Start: 2023-03-06

## 2023-03-06 RX ORDER — METFORMIN HYDROCHLORIDE 750 MG/1
750 TABLET, EXTENDED RELEASE ORAL 2 TIMES DAILY WITH MEALS
Qty: 180 TABLET | Refills: 3 | Status: SHIPPED | OUTPATIENT
Start: 2023-03-06 | End: 2023-04-12

## 2023-03-06 NOTE — PROGRESS NOTES
ASSESMENT AND PLAN:  Diagnoses and all orders for this visit:  Hypertension secondary to other renal disorders  Reviewed the most recent nephrology consultation today with the patient with the help of a professional .  Medication review and counseling done, he has not yet started taking the 50 mg Coreg twice daily recommended by his nephrologist.  He will start that tomorrow.  I offered him MTM or potentially even a home nurse to help him get better understanding and set up of his medications but he declines that for now, he is going to work with his granddaughter who speaks English well and reads English well to help him get his medication set up so he can make sure that he is taking them properly.      Type 2 diabetes mellitus with other diabetic kidney complication, without long-term current use of insulin (H)  -     metFORMIN (GLUCOPHAGE-XR) 750 MG 24 hr tablet; Take 1 tablet (750 mg) by mouth 2 times daily (with meals)  Continue to follow-up with Sophia and follow-up with me again in 4 months.    Corneal scarring, Macular edema, Posterior vitreous detachment, right  Reviewed the most recent consultation from his retinal specialist.  Currently for his symptoms he is just using artificial tears.  Updated his problem list based on the consultation and encouraged him to follow-up with his ophthalmologist and retinal specialist as directed.    Reviewed the risks and benefits of the treatment plan with the patient and/or caregiver and we discussed indications for routine and emergent follow-up.        SUBJECTIVE: Since last visit the patient has had improvement in his eye irritation with the use of artificial tears.  He brings in a bag with all of his medications in it, there are some duplicate bottles present and he reports that his newest medication he has not started taking yet because he was not sure of how to use it.  This is the Coreg 50 mg twice daily that was prescribed by his nephrologist.  As  detailed in previous notes the patient has a history of very high and resistant hypertension.  He is not having any out of the ordinary headaches or any chest pain or shortness of breath.  No sudden visual changes.    Past Medical History:   Diagnosis Date     Chronic pain of both knees 2/11/2019     COVID-19 5/7/2020     Hypercholesteremia 6/18/2018     Hypertension 5/7/2018     Patient Active Problem List   Diagnosis     Resistant hypertension     Cataract     Hypercholesteremia     Chronic pain of both knees     CKD (chronic kidney disease) stage 3, GFR 30-59 ml/min     Allergic conjunctivitis, bilateral     COVID-19     Tachycardia     Accelerated hypertension     Nephrolithiasis     SOB (shortness of breath)     Decreased visual acuity     Corneal scarring     Poor dentition     Type 2 diabetes mellitus with other diabetic kidney complication, without long-term current use of insulin (H)     History of nephrectomy     Posterior vitreous detachment, right     Macular edema     Hypertension secondary to other renal disorders     Current Outpatient Medications   Medication Sig Dispense Refill     acetaminophen (TYLENOL) 325 MG tablet Take 2 tablets (650 mg) by mouth every 6 hours as needed for pain 120 tablet 0     amLODIPine (NORVASC) 10 MG tablet Take 1 tablet (10 mg) by mouth At Bedtime 90 tablet 11     atorvastatin (LIPITOR) 80 MG tablet Take 1 tablet (80 mg) by mouth every evening 90 tablet 1     carvedilol (COREG) 25 MG tablet Take 2 tablets (50 mg) by mouth 2 times daily (with meals) 360 tablet 3     chlorthalidone (HYGROTON) 25 MG tablet Take 1 tablet (25 mg) by mouth daily for 120 days 30 tablet 3     metFORMIN (GLUCOPHAGE-XR) 750 MG 24 hr tablet Take 1 tablet (750 mg) by mouth 2 times daily (with meals) 180 tablet 3     Polyvinyl Alcohol-Povidone (ARTIFICIAL TEARS) 5-6 MG/ML SOLN Apply 1 drop to eye 3 times daily as needed (left eye irritation) 15 mL 6     telmisartan (MICARDIS) 80 MG tablet Take 1  "tablet (80 mg) by mouth daily 90 tablet 3     spironolactone (ALDACTONE) 25 MG tablet Take 2 tablets (50 mg) by mouth daily for 30 days 120 tablet 3     History   Smoking Status     Former     Packs/day: 0.50     Types: Cigarettes     Quit date: 3/6/2016   Smokeless Tobacco     Never       OBJECTICE: BP (!) 162/86   Pulse 76   Temp 98.2  F (36.8  C) (Oral)   Resp 16   Ht 1.651 m (5' 5\")   Wt 72.6 kg (160 lb)   SpO2 99%   BMI 26.63 kg/m       No results found for this or any previous visit (from the past 24 hour(s)).     Eyes- limited by office equipment and undilated pupils but appears symmetric and normal given the limitations.   CV-regular rate and rhythm   RESP-lungs clear   EXTREM-no pitting edema of the ankles     Yassine Larsen MD       "

## 2023-04-12 ENCOUNTER — ALLIED HEALTH/NURSE VISIT (OUTPATIENT)
Dept: EDUCATION SERVICES | Facility: CLINIC | Age: 68
End: 2023-04-12
Payer: COMMERCIAL

## 2023-04-12 ENCOUNTER — APPOINTMENT (OUTPATIENT)
Dept: LAB | Facility: CLINIC | Age: 68
End: 2023-04-12
Payer: COMMERCIAL

## 2023-04-12 VITALS — WEIGHT: 164.7 LBS | BODY MASS INDEX: 27.41 KG/M2

## 2023-04-12 DIAGNOSIS — E11.29 TYPE 2 DIABETES MELLITUS WITH OTHER DIABETIC KIDNEY COMPLICATION, WITHOUT LONG-TERM CURRENT USE OF INSULIN (H): ICD-10-CM

## 2023-04-12 DIAGNOSIS — N18.31 STAGE 3A CHRONIC KIDNEY DISEASE (H): ICD-10-CM

## 2023-04-12 DIAGNOSIS — H57.89 IRRITATION OF LEFT EYE: ICD-10-CM

## 2023-04-12 DIAGNOSIS — I15.1 HYPERTENSION SECONDARY TO OTHER RENAL DISORDERS: ICD-10-CM

## 2023-04-12 DIAGNOSIS — E11.29 TYPE 2 DIABETES MELLITUS WITH OTHER DIABETIC KIDNEY COMPLICATION, WITHOUT LONG-TERM CURRENT USE OF INSULIN (H): Primary | ICD-10-CM

## 2023-04-12 DIAGNOSIS — E78.5 HYPERLIPIDEMIA LDL GOAL <70: ICD-10-CM

## 2023-04-12 LAB
CHOLEST SERPL-MCNC: 194 MG/DL
HBA1C MFR BLD: 7.8 % (ref 0–5.6)
HDLC SERPL-MCNC: 46 MG/DL
LDLC SERPL CALC-MCNC: 112 MG/DL
NONHDLC SERPL-MCNC: 148 MG/DL
TRIGL SERPL-MCNC: 180 MG/DL

## 2023-04-12 PROCEDURE — 80061 LIPID PANEL: CPT | Performed by: DIETITIAN, REGISTERED

## 2023-04-12 PROCEDURE — 83036 HEMOGLOBIN GLYCOSYLATED A1C: CPT | Performed by: DIETITIAN, REGISTERED

## 2023-04-12 PROCEDURE — 36415 COLL VENOUS BLD VENIPUNCTURE: CPT | Performed by: DIETITIAN, REGISTERED

## 2023-04-12 PROCEDURE — G0108 DIAB MANAGE TRN  PER INDIV: HCPCS | Mod: AE | Performed by: DIETITIAN, REGISTERED

## 2023-04-12 PROCEDURE — 80053 COMPREHEN METABOLIC PANEL: CPT | Performed by: DIETITIAN, REGISTERED

## 2023-04-12 RX ORDER — METFORMIN HYDROCHLORIDE 750 MG/1
750 TABLET, EXTENDED RELEASE ORAL 3 TIMES DAILY
Qty: 270 TABLET | Refills: 3 | Status: SHIPPED | OUTPATIENT
Start: 2023-04-12 | End: 2023-07-27

## 2023-04-12 RX ORDER — POLYVINYL ALCOHOL, POVIDONE .5; .6 G/100ML; G/100ML
1 LIQUID OPHTHALMIC 3 TIMES DAILY PRN
Qty: 30 ML | Refills: 11 | Status: SHIPPED | OUTPATIENT
Start: 2023-04-12 | End: 2023-07-05

## 2023-04-12 NOTE — LETTER
4/12/2023         RE: Moriah Hinojosa  [address] [place] [address]        Dear Colleague,    Thank you for referring your patient, Moriah Hinojosa, to the [facility]. Please see a copy of my visit note below.    Diabetes Self-Management Education & Support    ++ We got disconnected with the language line a few minutes into the visit and the granddaughter offered to interpret for the remainder of the visit.    Presents for:      Type of Service: In Person Visit    Assessment Type:   ASSESSMENT:  Follow up visit for diabetes education, conducted with the help of a professional .  Pt is accompanied by his granddaughter today     Unable to fully assess - no SMBG data, hence we will draw A1C today  A1C:7.8 (4/12/23) > 7.0 (12/20/22)  Worsening control. We reviewed A1C results and targets and long term implications of uncontrolled DM. And also how important it was to have a stricter BG control (and BP control)  to delay the progression of kidney disease    ++Home monitoring of BG has been discussed previously - pt has not been interested. We revisited the importance of SMBG, grand daughter willing to discuss with pt, and if he agrees, she wants to learn meter use at next visit and help pt test BG at home.      Pt endorses occasional blurry vision - he has had irritation and watering of his left eye over the last several weeks and when his eye moya, he gets blurry vision. Grand daughter requests more frequent refills of the prescription tear drops since patient has been using them for both eyes now. Writer relayed to Dr. Larsen and he will send in the updated rx.    Denies polydipsia/polyuria/polyphagia/headaches any other sx of hyperglycemia   Pt has good support from family.  Grand daughter helps set up the pill box. Pt self manages his meds - no missed doses       Pt appropriately taking 750 mg metformin bid with meals - tolerating well.      2 meals daily with rice, veg and  chicken/pork   Shares that he does less rice than before. Has also quit drinking orange juice now  Drinking more water  Reviewed general diet guidelines and CHO foods and portions, plate method for portion control      Has been working in his yard the last few days.Tries to stay active round the house.  Also uses his stepper at home sometimes.    No other concerns today.     Education/Discussion: Reviewed A1C results and target, sx and tx of hyperglycemia, importance of home monitoring of BG, general activity and diet guidelines: pt/grand daughter expressed understanding.     Patient's most recent   Lab Results   Component Value Date    A1C 7.0 12/20/2022     is not meeting goal of <7.0    Diabetes knowledge and skills assessment:   Patient is knowledgeable in diabetes management concepts related to: needs AADE 7 review     Continue education with the following diabetes management concepts: needs AADE 7 review     Based on learning assessment above, most appropriate setting for further diabetes education would be: Individual setting.      PLAN    Recommend maximizing metformin for further control - Dr. Larsen on board with the plan and will send in the rx.  Reviewed the updated rx with pt/grand daughter  Future: recommend considering an SGLT-2 for further control and additional renal and CVD benefits, pending next A1C.     Grand daughter requests more frequent refills of the prescription tear drops. Writer relayed to Dr. Larsen and he will send in the updated rx.  Revisit SMBG and meter use next visit?    Continue to watch CHO foods and portions   Drink lots of water     Regular activity as able/safe    Topics to cover at upcoming visits: Healthy Eating, Monitoring, Taking Medication, Problem Solving and Reducing Risks    Follow-up: 4-6 weeks (or sooner if concerns)  PCP:7/5    See Care Plan for co-developed, patient-state behavior change goals.  AVS provided for patient today.    Education Materials  "Provided:    SUBJECTIVE/OBJECTIVE:     Cultural Influences/Ethnic Background:  Not  or     Diabetes Symptoms & Complications:    Patient Problem List and Family Medical History reviewed for relevant medical history, current medical status, and diabetes risk factors.    Vitals:  Wt 74.7 kg (164 lb 11.2 oz)   BMI 27.41 kg/m    Estimated body mass index is 27.41 kg/m  as calculated from the following:    Height as of 3/6/23: 1.651 m (5' 5\").    Weight as of this encounter: 74.7 kg (164 lb 11.2 oz).   Last 3 BP:   BP Readings from Last 3 Encounters:   03/06/23 (!) 171/90   01/19/23 (!) 168/84   01/03/23 (!) 170/90       History   Smoking Status     Former     Packs/day: 0.50     Types: Cigarettes     Quit date: 3/6/2016   Smokeless Tobacco     Never       Labs:  Lab Results   Component Value Date    A1C 7.0 12/20/2022     Lab Results   Component Value Date     12/19/2022     08/29/2022     Lab Results   Component Value Date     12/19/2022    LDL 73 08/07/2018     Direct Measure HDL   Date Value Ref Range Status   12/19/2022 66 >=40 mg/dL Final   ]  GFR Estimate   Date Value Ref Range Status   12/19/2022 61 >60 mL/min/1.73m2 Final     Comment:     Effective December 21, 2021 eGFRcr in adults is calculated using the 2021 CKD-EPI creatinine equation which includes age and gender (Roberto tilley al., [place], DOI: 10.1056/ZDLXhf6798715)   12/23/2020 53 (L) >60 mL/min/1.73m2 Final     GFR Estimate If Black   Date Value Ref Range Status   12/23/2020 >60 >60 mL/min/1.73m2 Final     Lab Results   Component Value Date    CR 1.28 12/19/2022     No results found for: MICROALBUMIN    Taking Medications:  Diabetes Medication(s)     Biguanides       metFORMIN (GLUCOPHAGE-XR) 750 MG 24 hr tablet    Take 1 tablet (750 mg) by mouth 2 times daily (with meals)          Patient Activation Measure Survey Score:       View : No data to display.              Care Plan and Education Provided:  Healthy Eating, " Monitoring, Taking Medication, Problem Solving and Reducing Risks    Time Spent: 70 minutes  Encounter Type: Individual    Any diabetes medication dose changes were made via the CDE Protocol per the patient's referring provider. A copy of this encounter was shared with the provider.

## 2023-04-12 NOTE — LETTER
May 1, 2023      Pwo Micaela  [address] [place] [address]        Dear ,    We are writing to inform you of your test results.    Resulted Orders   Lipid panel reflex to direct LDL Fasting   Result Value Ref Range    Cholesterol 194 <200 mg/dL    Triglycerides 180 (H) <150 mg/dL    Direct Measure HDL 46 >=40 mg/dL    LDL Cholesterol Calculated 112 (H) <=100 mg/dL    Non HDL Cholesterol 148 (H) <130 mg/dL    Narrative    Cholesterol  Desirable:  <200 mg/dL    Triglycerides  Normal:  Less than 150 mg/dL  Borderline High:  150-199 mg/dL  High:  200-499 mg/dL  Very High:  Greater than or equal to 500 mg/dL    Direct Measure HDL  Female:  Greater than or equal to 50 mg/dL   Male:  Greater than or equal to 40 mg/dL    LDL Cholesterol  Desirable:  <100mg/dL  Above Desirable:  100-129 mg/dL   Borderline High:  130-159 mg/dL   High:  160-189 mg/dL   Very High:  >= 190 mg/dL    Non HDL Cholesterol  Desirable:  130 mg/dL  Above Desirable:  130-159 mg/dL  Borderline High:  160-189 mg/dL  High:  190-219 mg/dL  Very High:  Greater than or equal to 220 mg/dL   Wow- doing better!!  LDL down to 112 from 185, presume on atorvastatin 80mg - would continue exercise, diet  - avoiding high fat diet/deep fried foods, exercise with walking - keep this going and then repeat lipids in 3-4 months fasting - but goal should be <70 given diabetes.    If you have any questions or concerns, please call the clinic at the number listed above.       Sincerely,      Matias Chavarria MD

## 2023-04-12 NOTE — PROGRESS NOTES
Diabetes Self-Management Education & Support    ++ We got disconnected with the language line a few minutes into the visit and the granddaughter offered to interpret for the remainder of the visit.    Presents for:      Type of Service: In Person Visit    Assessment Type:   ASSESSMENT:  Follow up visit for diabetes education, conducted with the help of a professional .  Pt is accompanied by his granddaughter today     Unable to fully assess - no SMBG data, hence we will draw A1C today  A1C:7.8 (4/12/23) > 7.0 (12/20/22)  Worsening control. We reviewed A1C results and targets and long term implications of uncontrolled DM. And also how important it was to have a stricter BG control (and BP control)  to delay the progression of kidney disease    ++Home monitoring of BG has been discussed previously - pt has not been interested. We revisited the importance of SMBG, grand daughter willing to discuss with pt, and if he agrees, she wants to learn meter use at next visit and help pt test BG at home.      Pt endorses occasional blurry vision - he has had irritation and watering of his left eye over the last several weeks and when his eye moya, he gets blurry vision. Grand daughter requests more frequent refills of the prescription tear drops since patient has been using them for both eyes now. Writer relayed to Dr. Larsen and he will send in the updated rx.    Denies polydipsia/polyuria/polyphagia/headaches any other sx of hyperglycemia   Pt has good support from family.  Grand daughter helps set up the pill box. Pt self manages his meds - no missed doses       Pt appropriately taking 750 mg metformin bid with meals - tolerating well.      2 meals daily with rice, veg and chicken/pork   Shares that he does less rice than before. Has also quit drinking orange juice now  Drinking more water  Reviewed general diet guidelines and CHO foods and portions, plate method for portion control      Has been working in his  yard the last few days.Tries to stay active round the house.  Also uses his stepper at home sometimes.    No other concerns today.     Education/Discussion: Reviewed A1C results and target, sx and tx of hyperglycemia, importance of home monitoring of BG, general activity and diet guidelines: pt/grand daughter expressed understanding.     Patient's most recent   Lab Results   Component Value Date    A1C 7.0 12/20/2022     is not meeting goal of <7.0    Diabetes knowledge and skills assessment:   Patient is knowledgeable in diabetes management concepts related to: needs AADE 7 review     Continue education with the following diabetes management concepts: needs AADE 7 review     Based on learning assessment above, most appropriate setting for further diabetes education would be: Individual setting.      PLAN    Recommend maximizing metformin for further control - Dr. Larsen on board with the plan and will send in the rx.  Reviewed the updated rx with pt/grand daughter  Future: recommend considering an SGLT-2 for further control and additional renal and CVD benefits, pending next A1C.     Grand daughter requests more frequent refills of the prescription tear drops. Writer relayed to Dr. Larsen and he will send in the updated rx.  Revisit SMBG and meter use next visit?    Continue to watch CHO foods and portions   Drink lots of water     Regular activity as able/safe    Topics to cover at upcoming visits: Healthy Eating, Monitoring, Taking Medication, Problem Solving and Reducing Risks    Follow-up: 4-6 weeks (or sooner if concerns)  PCP:7/5    See Care Plan for co-developed, patient-state behavior change goals.  AVS provided for patient today.    Education Materials Provided:    SUBJECTIVE/OBJECTIVE:     Cultural Influences/Ethnic Background:  Not  or     Diabetes Symptoms & Complications:    Patient Problem List and Family Medical History reviewed for relevant medical history, current medical status, and  "diabetes risk factors.    Vitals:  Wt 74.7 kg (164 lb 11.2 oz)   BMI 27.41 kg/m    Estimated body mass index is 27.41 kg/m  as calculated from the following:    Height as of 3/6/23: 1.651 m (5' 5\").    Weight as of this encounter: 74.7 kg (164 lb 11.2 oz).   Last 3 BP:   BP Readings from Last 3 Encounters:   03/06/23 (!) 171/90   01/19/23 (!) 168/84   01/03/23 (!) 170/90       History   Smoking Status     Former     Packs/day: 0.50     Types: Cigarettes     Quit date: 3/6/2016   Smokeless Tobacco     Never       Labs:  Lab Results   Component Value Date    A1C 7.0 12/20/2022     Lab Results   Component Value Date     12/19/2022     08/29/2022     Lab Results   Component Value Date     12/19/2022    LDL 73 08/07/2018     Direct Measure HDL   Date Value Ref Range Status   12/19/2022 66 >=40 mg/dL Final   ]  GFR Estimate   Date Value Ref Range Status   12/19/2022 61 >60 mL/min/1.73m2 Final     Comment:     Effective December 21, 2021 eGFRcr in adults is calculated using the 2021 CKD-EPI creatinine equation which includes age and gender (Roberto tilley al., NEJ, DOI: 10.1056/IHXQxu1295487)   12/23/2020 53 (L) >60 mL/min/1.73m2 Final     GFR Estimate If Black   Date Value Ref Range Status   12/23/2020 >60 >60 mL/min/1.73m2 Final     Lab Results   Component Value Date    CR 1.28 12/19/2022     No results found for: MICROALBUMIN    Taking Medications:  Diabetes Medication(s)     Biguanides       metFORMIN (GLUCOPHAGE-XR) 750 MG 24 hr tablet    Take 1 tablet (750 mg) by mouth 2 times daily (with meals)          Patient Activation Measure Survey Score:       View : No data to display.              Care Plan and Education Provided:  Healthy Eating, Monitoring, Taking Medication, Problem Solving and Reducing Risks    Time Spent: 70 minutes  Encounter Type: Individual    Any diabetes medication dose changes were made via the CDE Protocol per the patient's referring provider. A copy of this encounter was shared " with the provider.

## 2023-04-13 LAB
ALBUMIN SERPL BCG-MCNC: 4.1 G/DL (ref 3.5–5.2)
ALP SERPL-CCNC: 69 U/L (ref 40–129)
ALT SERPL W P-5'-P-CCNC: 22 U/L (ref 10–50)
ANION GAP SERPL CALCULATED.3IONS-SCNC: 13 MMOL/L (ref 7–15)
AST SERPL W P-5'-P-CCNC: 17 U/L (ref 10–50)
BILIRUB SERPL-MCNC: 0.6 MG/DL
BUN SERPL-MCNC: 24.1 MG/DL (ref 8–23)
CALCIUM SERPL-MCNC: 9.1 MG/DL (ref 8.8–10.2)
CHLORIDE SERPL-SCNC: 103 MMOL/L (ref 98–107)
CREAT SERPL-MCNC: 1.4 MG/DL (ref 0.67–1.17)
DEPRECATED HCO3 PLAS-SCNC: 22 MMOL/L (ref 22–29)
GFR SERPL CREATININE-BSD FRML MDRD: 54 ML/MIN/1.73M2
GLUCOSE SERPL-MCNC: 178 MG/DL (ref 70–99)
POTASSIUM SERPL-SCNC: 3.9 MMOL/L (ref 3.4–5.3)
PROT SERPL-MCNC: 6.7 G/DL (ref 6.4–8.3)
SODIUM SERPL-SCNC: 138 MMOL/L (ref 136–145)

## 2023-05-01 DIAGNOSIS — E78.5 HYPERLIPIDEMIA LDL GOAL <70: Primary | ICD-10-CM

## 2023-05-11 ENCOUNTER — MEDICAL CORRESPONDENCE (OUTPATIENT)
Dept: HEALTH INFORMATION MANAGEMENT | Facility: CLINIC | Age: 68
End: 2023-05-11
Payer: COMMERCIAL

## 2023-05-17 ENCOUNTER — TELEPHONE (OUTPATIENT)
Dept: NEPHROLOGY | Facility: CLINIC | Age: 68
End: 2023-05-17
Payer: COMMERCIAL

## 2023-05-17 NOTE — TELEPHONE ENCOUNTER
Attempted to schedule follow up appt after no show on 4/12. Unable to reach pt at this time, lvm via [employer]  services member (ID [phone]) with service line call back number.    Richelle Yip    Nephrology Clinic Navigator

## 2023-05-31 ENCOUNTER — ALLIED HEALTH/NURSE VISIT (OUTPATIENT)
Dept: EDUCATION SERVICES | Facility: CLINIC | Age: 68
End: 2023-05-31
Payer: COMMERCIAL

## 2023-05-31 VITALS — WEIGHT: 157 LBS | BODY MASS INDEX: 26.13 KG/M2

## 2023-05-31 DIAGNOSIS — E11.9 DIABETES MELLITUS (H): Primary | ICD-10-CM

## 2023-05-31 PROCEDURE — G0108 DIAB MANAGE TRN  PER INDIV: HCPCS | Performed by: DIETITIAN, REGISTERED

## 2023-05-31 RX ORDER — LANCETS
EACH MISCELLANEOUS
Qty: 100 EACH | Refills: 6 | Status: SHIPPED | OUTPATIENT
Start: 2023-05-31

## 2023-05-31 NOTE — PROGRESS NOTES
Diabetes Self-Management Education & Support    Presents for:      Type of Service: In Person Visit    Assessment Type:   ASSESSMENT:  Follow up visit for diabetes education, pt is accompanied by his granddaughter who will also help interpret for us today.     Reports occasional blurry vision - has been using tear drops.  Denies polydipsia/polyuria/polyphagia/headaches or any other sx of hyperglycemia   Reports better energy levels and feeling well, overall    Good support from family - grand daughter helps set up the pill box and helps with med management - no missed doses    Most recent A1C not meeting ADA goal of A1C <7  A1C:7.8 (4/12/23) > 7.0 (12/20/22)    Unable to fully assess - no SMBG data  ++Home monitoring of BG has been discussed previously - pt is now interested.   We reviewed basics of meter use and testing and also watched [employer] videos online. Granddaughter has seen a friend use it and feels pretty comfortable helping pt with it. Encouraged to call with any questions.    Medications:  Pt appropriately taking three, 750 mg tablets of metformin (2 tabs in the AM and 1 in the PM, with meals) - tolerating well.      Doing 2-3 meals daily with rice, veg and chicken/pork - being more mindful  Less rice than before. No more orange juice now  Drinking more water     Likes to be outside/work in his yard. Also uses the stepper at home almost daily.     No other concerns today.     Education/Discussion: Reviewed A1C results and target, sx and tx of hyperglycemia, importance of home monitoring of BG, general activity and diet guidelines: pt/grand daughter expressed understanding.    No other concerns today    Patient's most recent   Lab Results   Component Value Date    A1C 7.8 04/12/2023     is not meeting goal of <7.0    PLAN    Recommend no change in therapy today   Future: recommend considering an SGLT-2 for further control and additional renal and CVD benefits, pending next A1C.      Rx for glucose meter and  "testing supplies was sent in     Pt to start testing blood sugars once daily, at different times: in the morning (fasting) and 2 hours after a meal.  Blood sugars goals are:  Before eating (fasting): 80 - 130 mg/dL  2 - hours after meals (post-prandial): less than 180 mg/dL     To continue to watch CHO foods and portions   Drink lots of water      Regular activity as able/safe     Topics to cover at upcoming visits: Healthy Eating, Monitoring, Problem Solving and Reducing Risks     Follow-up: 2-3 months (or sooner if concerns)  PCP:7/5    Instructed pt to always bring their meter, BG log and med vials to all clinic visits - pt expressed understanding     See Care Plan for co-developed, patient-state behavior change goals.  AVS provided for patient today.  SUBJECTIVE/OBJECTIVE:     Cultural Influences/Ethnic Background:  Not  or     Diabetes Symptoms & Complications:    Patient Problem List and Family Medical History reviewed for relevant medical history, current medical status, and diabetes risk factors.    Vitals:  Wt 71.2 kg (157 lb)   BMI 26.13 kg/m    Estimated body mass index is 26.13 kg/m  as calculated from the following:    Height as of 3/6/23: 1.651 m (5' 5\").    Weight as of this encounter: 71.2 kg (157 lb).   Last 3 BP:   BP Readings from Last 3 Encounters:   03/06/23 (!) 171/90   01/19/23 (!) 168/84   01/03/23 (!) 170/90       History   Smoking Status     Former     Packs/day: 0.50     Types: Cigarettes     Quit date: 3/6/2016   Smokeless Tobacco     Never       Labs:  Lab Results   Component Value Date    A1C 7.8 04/12/2023     Lab Results   Component Value Date     04/12/2023     08/29/2022     Lab Results   Component Value Date     04/12/2023    LDL 73 08/07/2018     Direct Measure HDL   Date Value Ref Range Status   04/12/2023 46 >=40 mg/dL Final   ]  GFR Estimate   Date Value Ref Range Status   04/12/2023 54 (L) >60 mL/min/1.73m2 Final     Comment:     eGFR " calculated using 2021 CKD-EPI equation.   12/23/2020 53 (L) >60 mL/min/1.73m2 Final     GFR Estimate If Black   Date Value Ref Range Status   12/23/2020 >60 >60 mL/min/1.73m2 Final     Lab Results   Component Value Date    CR 1.40 04/12/2023     No results found for: MICROALBUMIN    Taking Medications:  Diabetes Medication(s)     Biguanides       metFORMIN (GLUCOPHAGE-XR) 750 MG 24 hr tablet    Take 1 tablet (750 mg) by mouth 3 times daily         Patient Activation Measure Survey Score:       View : No data to display.                Time Spent: 70 minutes  Encounter Type: Individual    Any diabetes medication dose changes were made via the CDE Protocol per the patient's referring provider. A copy of this encounter was shared with the provider.

## 2023-05-31 NOTE — PATIENT INSTRUCTIONS
Instructions:     prescription for glucose meter and testing supplies.    Start testing your blood sugars once daily, at different times: in the morning (fasting) and 2 hours after a meal.  Blood sugars goals are:  Before eating (fasting): 80 - 130 mg/dL  2-hours after meals (post-prandial): less than 180 mg/dL     Continue to watch CHO foods and portions   Drink lots of water      Regular activity as able/safe    Call with any questions or concerns.    Thanks   Sophia Winslow RDN, LD, [facility]  Diabetes Care and Education

## 2023-05-31 NOTE — Clinical Note
5/31/2023         RE: Moriah Hinojosa  [address] [place] [address]        Dear Colleague,    [facility] you [facility] your [facility] Please see a copy of my visit note below.    Diabetes Self-Management Education & Support    Presents for:      Type of Service: In Person Visit    Assessment Type:   ASSESSMENT:  Follow up visit for diabetes education,pt is accompanied by his granddaughter today who will also help interpret for us.      Reports occasional blurry vision - has been using tear drops  Denies polydipsia/polyuria/polyphagia/headaches any other sx of hyperglycemia   Good support from family - grand daughter helps set up the pill box and helps with med management - no missed doses    Most recent A1C not meeting ADA goal of A1C <7  A1C:7.8 (4/12/23) > 7.0 (12/20/22)    Unable to fully assess - no SMBG data  ++Home monitoring of BG has been discussed previously - pt is now interested. We reviewed basics of meter use and testing and also watched videos online meter use at next visit and help pt test BG at home.         Pt appropriately taking three, 750 mg tablets of metformin (2 tabs in the AM and 1 in the PM, with meals) - tolerating well.      Consumes 2-3 meals daily with rice, veg and chicken/pork - being more mindful  Shares that he does less rice than before. Has also quit drinking orange juice now  Drinking more water  Reviewed general diet guidelines and CHO foods and portions, plate method for portion control      Likes to be outside/work in his yard.Also uses the stepper at home almost daily.     No other concerns today.     Education/Discussion: Reviewed A1C results and target, sx and tx of hyperglycemia, importance of home monitoring of BG, general activity and diet guidelines: pt/grand daughter expressed understanding.    Patient's most recent   Lab Results   Component Value Date    A1C 7.8 04/12/2023     is not meeting goal of  "<7.0    PLAN    Recommend no change in therapy today   Future: recommend considering an SGLT-2 for further control and additional renal and CVD benefits, pending next A1C.      Rx for glucose meter and testing supplies was sent in     Start testing your blood sugars once daily, at different times: in the morning (fasting) and 2 hours after a meal.  Blood sugars goals are:  Before eating (fasting): 80 - 130 mg/dL  2-hours after meals (post-prandial): less than 180 mg/dL     Continue to watch CHO foods and portions   Drink lots of water      Regular activity as able/safe     Topics to cover at upcoming visits: Healthy Eating, Monitoring, Problem Solving and Reducing Risks     Follow-up: 2-3 months (or sooner if concerns)  PCP:7/5    Instructed pt to always bring their meter, BG log and med vials to all clinic visits - pt expressed understanding     See Care Plan for co-developed, patient-state behavior change goals.  AVS provided for patient today.  SUBJECTIVE/OBJECTIVE:     Cultural Influences/Ethnic Background:  Not  or     Diabetes Symptoms & Complications:    Patient Problem List and Family Medical History reviewed for relevant medical history, current medical status, and diabetes risk factors.    Vitals:  Wt 71.2 kg (157 lb)   BMI 26.13 kg/m    Estimated body mass index is 26.13 kg/m  as calculated from the following:    Height as of 3/6/23: 1.651 m (5' 5\").    Weight as of this encounter: 71.2 kg (157 lb).   Last 3 BP:   BP Readings from Last 3 Encounters:   03/06/23 (!) 171/90   01/19/23 (!) 168/84   01/03/23 (!) 170/90       History   Smoking Status     Former     Packs/day: 0.50     Types: Cigarettes     Quit date: 3/6/2016   Smokeless Tobacco     Never       Labs:  Lab Results   Component Value Date    A1C 7.8 04/12/2023     Lab Results   Component Value Date     04/12/2023     08/29/2022     Lab Results   Component Value Date     04/12/2023    LDL 73 08/07/2018     Direct " Measure HDL   Date Value Ref Range Status   04/12/2023 46 >=40 mg/dL Final   ]  GFR Estimate   Date Value Ref Range Status   04/12/2023 54 (L) >60 mL/min/1.73m2 Final     Comment:     eGFR calculated using 2021 CKD-EPI equation.   12/23/2020 53 (L) >60 mL/min/1.73m2 Final     GFR Estimate If Black   Date Value Ref Range Status   12/23/2020 >60 >60 mL/min/1.73m2 Final     Lab Results   Component Value Date    CR 1.40 04/12/2023     No results found for: MICROALBUMIN    Taking Medications:  Diabetes Medication(s)     Biguanides       metFORMIN (GLUCOPHAGE-XR) 750 MG 24 hr tablet    Take 1 tablet (750 mg) by mouth 3 times daily         Patient Activation Measure Survey Score:       View : No data to display.                Time Spent: {cde time spent:134306} minutes  Encounter Type: Individual    Any diabetes medication dose changes were made via the CDE Protocol per the patient's {diabetes education provider list:159508}. A copy of this encounter was shared with the provider.

## 2023-06-30 DIAGNOSIS — E78.5 HYPERLIPIDEMIA LDL GOAL <70: ICD-10-CM

## 2023-06-30 RX ORDER — ATORVASTATIN CALCIUM 80 MG/1
80 TABLET, FILM COATED ORAL EVERY EVENING
Qty: 90 TABLET | Refills: 2 | Status: SHIPPED | OUTPATIENT
Start: 2023-06-30 | End: 2024-01-10

## 2023-07-05 ENCOUNTER — OFFICE VISIT (OUTPATIENT)
Dept: FAMILY MEDICINE | Facility: CLINIC | Age: 68
End: 2023-07-05
Payer: COMMERCIAL

## 2023-07-05 VITALS
RESPIRATION RATE: 16 BRPM | TEMPERATURE: 98.2 F | SYSTOLIC BLOOD PRESSURE: 176 MMHG | OXYGEN SATURATION: 99 % | BODY MASS INDEX: 25.83 KG/M2 | HEIGHT: 65 IN | HEART RATE: 76 BPM | WEIGHT: 155 LBS | DIASTOLIC BLOOD PRESSURE: 90 MMHG

## 2023-07-05 DIAGNOSIS — N18.30 STAGE 3 CHRONIC KIDNEY DISEASE, UNSPECIFIED WHETHER STAGE 3A OR 3B CKD (H): ICD-10-CM

## 2023-07-05 DIAGNOSIS — I1A.0 RESISTANT HYPERTENSION: Primary | ICD-10-CM

## 2023-07-05 DIAGNOSIS — H57.89 IRRITATION OF LEFT EYE: ICD-10-CM

## 2023-07-05 DIAGNOSIS — H35.81 MACULAR EDEMA: ICD-10-CM

## 2023-07-05 DIAGNOSIS — Z90.5 SOLITARY KIDNEY, ACQUIRED: ICD-10-CM

## 2023-07-05 DIAGNOSIS — E11.29 TYPE 2 DIABETES MELLITUS WITH OTHER DIABETIC KIDNEY COMPLICATION, WITHOUT LONG-TERM CURRENT USE OF INSULIN (H): ICD-10-CM

## 2023-07-05 LAB
ANION GAP SERPL CALCULATED.3IONS-SCNC: 10 MMOL/L (ref 7–15)
BUN SERPL-MCNC: 13.6 MG/DL (ref 8–23)
CALCIUM SERPL-MCNC: 10 MG/DL (ref 8.8–10.2)
CHLORIDE SERPL-SCNC: 104 MMOL/L (ref 98–107)
CREAT SERPL-MCNC: 1.49 MG/DL (ref 0.67–1.17)
DEPRECATED HCO3 PLAS-SCNC: 26 MMOL/L (ref 22–29)
GFR SERPL CREATININE-BSD FRML MDRD: 50 ML/MIN/1.73M2
GLUCOSE SERPL-MCNC: 147 MG/DL (ref 70–99)
HBA1C MFR BLD: 6.6 % (ref 0–5.6)
POTASSIUM SERPL-SCNC: 4.8 MMOL/L (ref 3.4–5.3)
SODIUM SERPL-SCNC: 140 MMOL/L (ref 136–145)

## 2023-07-05 PROCEDURE — 36415 COLL VENOUS BLD VENIPUNCTURE: CPT | Performed by: FAMILY MEDICINE

## 2023-07-05 PROCEDURE — 83036 HEMOGLOBIN GLYCOSYLATED A1C: CPT | Performed by: FAMILY MEDICINE

## 2023-07-05 PROCEDURE — 99214 OFFICE O/P EST MOD 30 MIN: CPT | Performed by: FAMILY MEDICINE

## 2023-07-05 PROCEDURE — 83835 ASSAY OF METANEPHRINES: CPT | Mod: 90 | Performed by: FAMILY MEDICINE

## 2023-07-05 PROCEDURE — 80048 BASIC METABOLIC PNL TOTAL CA: CPT | Performed by: FAMILY MEDICINE

## 2023-07-05 PROCEDURE — 99000 SPECIMEN HANDLING OFFICE-LAB: CPT | Performed by: FAMILY MEDICINE

## 2023-07-05 RX ORDER — POLYVINYL ALCOHOL, POVIDONE .5; .6 G/100ML; G/100ML
1 LIQUID OPHTHALMIC 3 TIMES DAILY PRN
Qty: 30 ML | Refills: 11 | Status: SHIPPED | OUTPATIENT
Start: 2023-07-05 | End: 2024-07-09

## 2023-07-05 NOTE — PROGRESS NOTES
ASSESMENT AND PLAN:  Diagnoses and all orders for this visit:  Resistant hypertension  This had been thought to be secondary to an atrophic kidney but has not improved significantly after removal of the atrophic kidney.  Patient has had renal ultrasound done in the past but I cannot find the past plasma metanephrines so were going to check those today in lab.  I think there is a good chance that he is having difficulty with good medication adherence despite what he tells me detailed below.  I like him to have better understanding of his medications and better plan for good adherence.  Patient agreeable with MTM consultation.  -     Basic metabolic panel  (Ca, Cl, CO2, Creat, Gluc, K, Na, BUN); Future  -     Metanephrines Plasma Free; Future  -     Med Therapy Management Referral  Stage 3 chronic kidney disease, unspecified whether stage 3a or 3b CKD (H) in the setting of Solitary kidney, acquired  Stable.  Checking basic metabolic panel today in lab.  Type 2 diabetes mellitus with other diabetic kidney complication, without long-term current use of insulin (H)  -     Hemoglobin A1c; Future   Irritation of left eye  -     Polyvinyl Alcohol-Povidone (ARTIFICIAL TEARS) 5-6 MG/ML SOLN; Apply 1 drop to eye 3 times daily as needed (both eyes)  Macular edema  Patient counseled on the importance of his scheduled follow-up with his ophthalmologist.    Reviewed the risks and benefits of the treatment plan with the patient and/or caregiver and we discussed indications for routine and emergent follow-up.        SUBJECTIVE: 68-year-old male with a history of very resistant hypertension.  Blood pressure continues to be elevated today.  He brings his medication bottles in today and it appears as though he has all of his prescribed medications and the patient reports that he is taking them every day and not missing doses.  Patient has a complicated eye history.  He reports some mild to moderate eye irritation on the left side but  no visual changes or visual decline.  Patient reports that there is been no worsening of his vision over this last few months.    Past Medical History:   Diagnosis Date     Chronic pain of both knees 2/11/2019     COVID-19 5/7/2020     Hypercholesteremia 6/18/2018     Hypertension 5/7/2018     Patient Active Problem List   Diagnosis     Resistant hypertension     Cataract     Hypercholesteremia     Chronic pain of both knees     CKD (chronic kidney disease) stage 3, GFR 30-59 ml/min     Allergic conjunctivitis, bilateral     COVID-19     Tachycardia     Accelerated hypertension     Nephrolithiasis     SOB (shortness of breath)     Decreased visual acuity     Corneal scarring     Poor dentition     Type 2 diabetes mellitus with other diabetic kidney complication, without long-term current use of insulin (H)     History of nephrectomy     Posterior vitreous detachment, right     Macular edema     Hypertension secondary to other renal disorders     Solitary kidney, acquired     Current Outpatient Medications   Medication Sig Dispense Refill     amLODIPine (NORVASC) 10 MG tablet Take 1 tablet (10 mg) by mouth At Bedtime 90 tablet 11     atorvastatin (LIPITOR) 80 MG tablet TAKE 1 TABLET (80 MG) BY MOUTH EVERY EVENING 90 tablet 2     carvedilol (COREG) 25 MG tablet Take 2 tablets (50 mg) by mouth 2 times daily (with meals) 360 tablet 3     metFORMIN (GLUCOPHAGE-XR) 750 MG 24 hr tablet Take 1 tablet (750 mg) by mouth 3 times daily 270 tablet 3     Polyvinyl Alcohol-Povidone (ARTIFICIAL TEARS) 5-6 MG/ML SOLN Apply 1 drop to eye 3 times daily as needed (both eyes) 30 mL 11     spironolactone (ALDACTONE) 25 MG tablet Take 2 tablets (50 mg) by mouth daily for 30 days 120 tablet 3     telmisartan (MICARDIS) 80 MG tablet Take 1 tablet (80 mg) by mouth daily 90 tablet 3     acetaminophen (TYLENOL) 325 MG tablet Take 2 tablets (650 mg) by mouth every 6 hours as needed for pain 120 tablet 0     blood glucose (NO BRAND SPECIFIED)  "test strip Use to test blood sugar one times daily or as directed. To accompany: Blood Glucose Monitor Brands: per insurance. 100 strip 6     blood glucose monitoring (NO BRAND SPECIFIED) meter device kit Use to test blood sugar 1 times daily or as directed. Preferred blood glucose meter OR supplies to accompany: Blood Glucose Monitor Brands: per insurance. 1 kit 0     chlorthalidone (HYGROTON) 25 MG tablet Take 1 tablet (25 mg) by mouth daily for 120 days 30 tablet 3     thin (NO BRAND SPECIFIED) lancets Use with lanceting device. To accompany: Blood Glucose Monitor Brands: per insurance. 100 each 6     History   Smoking Status     Former     Packs/day: 0.50     Types: Cigarettes     Quit date: 3/6/2016   Smokeless Tobacco     Never       OBJECTICE: BP (!) 176/90   Pulse 76   Temp 98.2  F (36.8  C) (Oral)   Resp 16   Ht 1.651 m (5' 5\")   Wt 70.3 kg (155 lb)   SpO2 99%   BMI 25.79 kg/m       No results found for this or any previous visit (from the past 24 hour(s)).     GEN-alert, appropriate, in no apparent distress   HEENT-mild redness of the conjunctiva left side, no corneal opacities.   CV-regular rate and rhythm   RESP-lungs clear to auscultation   EXTREM-no pitting edema of the ankles     Yassine Larsen MD       "

## 2023-07-07 LAB
ANNOTATION COMMENT IMP: ABNORMAL
METANEPHS SERPL-SCNC: 0.63 NMOL/L
NORMETANEPHRINE SERPL-SCNC: 0.58 NMOL/L

## 2023-07-18 ENCOUNTER — TELEPHONE (OUTPATIENT)
Dept: FAMILY MEDICINE | Facility: CLINIC | Age: 68
End: 2023-07-18
Payer: COMMERCIAL

## 2023-07-18 NOTE — TELEPHONE ENCOUNTER
----- Message from Yassine Larsen MD sent at 7/17/2023  6:46 PM CDT -----  Team - please call patient with results -the kidney test is mildly elevated and the metanephrine level is mildly elevated so we will need to recheck these tests at his next clinic visit with me here in October.  These tests are related to his high blood pressure.  He should continue taking all of his medications.  A1c is showing good improvement in his diabetes is now under good control.

## 2023-07-27 ENCOUNTER — OFFICE VISIT (OUTPATIENT)
Dept: PHARMACY | Facility: CLINIC | Age: 68
End: 2023-07-27
Attending: FAMILY MEDICINE
Payer: COMMERCIAL

## 2023-07-27 VITALS
WEIGHT: 154.5 LBS | HEART RATE: 63 BPM | BODY MASS INDEX: 25.71 KG/M2 | SYSTOLIC BLOOD PRESSURE: 176 MMHG | DIASTOLIC BLOOD PRESSURE: 96 MMHG | OXYGEN SATURATION: 99 %

## 2023-07-27 DIAGNOSIS — I10 ACCELERATED HYPERTENSION: ICD-10-CM

## 2023-07-27 DIAGNOSIS — E11.29 TYPE 2 DIABETES MELLITUS WITH OTHER DIABETIC KIDNEY COMPLICATION, WITHOUT LONG-TERM CURRENT USE OF INSULIN (H): ICD-10-CM

## 2023-07-27 DIAGNOSIS — I1A.0 RESISTANT HYPERTENSION: Primary | ICD-10-CM

## 2023-07-27 DIAGNOSIS — N18.30 STAGE 3 CHRONIC KIDNEY DISEASE, UNSPECIFIED WHETHER STAGE 3A OR 3B CKD (H): ICD-10-CM

## 2023-07-27 PROCEDURE — 99605 MTMS BY PHARM NP 15 MIN: CPT | Performed by: PHARMACIST

## 2023-07-27 PROCEDURE — 99607 MTMS BY PHARM ADDL 15 MIN: CPT | Performed by: PHARMACIST

## 2023-07-27 RX ORDER — SPIRONOLACTONE 25 MG/1
25 TABLET ORAL DAILY
Qty: 30 TABLET | Refills: 3 | Status: SHIPPED | OUTPATIENT
Start: 2023-07-27 | End: 2023-11-30

## 2023-07-27 RX ORDER — METFORMIN HYDROCHLORIDE 750 MG/1
750 TABLET, EXTENDED RELEASE ORAL 2 TIMES DAILY WITH MEALS
Qty: 180 TABLET | Refills: 3 | Status: SHIPPED | OUTPATIENT
Start: 2023-07-27 | End: 2024-02-28

## 2023-07-27 RX ORDER — CARVEDILOL 25 MG/1
25 TABLET ORAL 2 TIMES DAILY WITH MEALS
Qty: 360 TABLET | Refills: 3 | Status: SHIPPED | OUTPATIENT
Start: 2023-07-27 | End: 2023-10-25

## 2023-07-27 NOTE — LETTER
_  Medication List        Prepared on: 07/28/2023     Bring your Medication List when you go to the doctor, hospital, or   emergency room. And, share it with your family or caregivers.     Note any changes to how you take your medications.  Cross out medications when you no longer use them.    Medication How I take it Why I use it Prescriber   acetaminophen (TYLENOL) 325 MG tablet Take 2 tablets (650 mg) by mouth every 6 hours as needed for pain Postoperative Pain WALDO Mayo   amLODIPine (NORVASC) 10 MG tablet Take 1 tablet (10 mg) by mouth At Bedtime Accelerated Hypertension Matias Chavarria MD   atorvastatin (LIPITOR) 80 MG tablet TAKE 1 TABLET (80 MG) BY MOUTH EVERY EVENING Hyperlipidemia LDL Goal <70 Matias Chavarria MD   blood glucose (NO BRAND SPECIFIED) test strip Use to test blood sugar one times daily or as directed. To accompany: Blood Glucose Monitor Brands: per insurance. Diabetes Mellitus (H) Yassine Larsen MD   blood glucose monitoring (NO BRAND SPECIFIED) meter device kit Use to test blood sugar 1 times daily or as directed. Preferred blood glucose meter OR supplies to accompany: Blood Glucose Monitor Brands: per insurance. Diabetes Mellitus (H) Yassine Larsen MD   carvedilol (COREG) 25 MG tablet Take 1 tablet (25 mg) by mouth 2 times daily (with meals) Accelerated Hypertension; Stage 3 chronic kidney disease, unspecified whether stage 3a or 3b CKD Yassine Larsen MD   chlorthalidone (HYGROTON) 25 MG tablet Take 1 tablet (25 mg) by mouth daily for 120 days Hypertension secondary to other renal disorders Dian Cox MD   metFORMIN (GLUCOPHAGE-XR) 750 MG 24 hr tablet Take 1 tablet (750 mg) by mouth 2 times daily (with meals) Type 2 diabetes mellitus with other diabetic kidney complication, without long-term current use of insulin (H) Yassine Larsen MD   Polyvinyl Alcohol-Povidone (ARTIFICIAL TEARS) 5-6 MG/ML SOLN Apply 1 drop to eye 3 times daily as needed (both eyes) Irritation  of left eye Yassine Larsen MD   spironolactone (ALDACTONE) 25 MG tablet Take 1 tablet (25 mg) by mouth daily Accelerated Hypertension Yassine Larsen MD   telmisartan (MICARDIS) 80 MG tablet Take 1 tablet (80 mg) by mouth daily Hypertension secondary to other renal disorders Yassine Larsen MD   thin (NO BRAND SPECIFIED) lancets Use with lanceting device. To accompany: Blood Glucose Monitor Brands: per insurance. Diabetes Mellitus (H) Yassine Larsen MD         Add new medications, over-the-counter drugs, herbals, vitamins, or  minerals in the blank rows below.    Medication How I take it Why I use it Prescriber                                      Allergies:      No Known Allergies        Side effects I have had:               Other Information:              My notes and questions:

## 2023-07-27 NOTE — LETTER
"Recommended To-Do List      Prepared on: 07/28/2023       You can get the best results from your medications by completing the items on this \"To-Do List.\"      Bring your To-Do List when you go to your doctor. And, share it with your family or caregivers.    My To-Do List:  What we talked about: What I should do:   Your medication dosage being too high    Decrease your dosage of carvedilol (COREG) to 25 mg twice daily           What we talked about: What I should do:   Your medication dosage being too high    Decrease your dosage of spironolactone (ALDACTONE) to 25 mg once daily in the morning          What we talked about: What I should do:                       "

## 2023-07-27 NOTE — PROGRESS NOTES
Medication Therapy Management (MTM) Encounter    ASSESSMENT:                            Medication Adherence/Access: During visit today this is the primary concern. Appears that medications have been increased in attempt to improve BP, though medication adherence and understanding seems to be the cause of concern.   MTM provided patient AM and PM pillboxes along with stickers on vials to indicate number of tablets and time of day for administration and instructions on how to set up pillboxes.     1. Resistant hypertension  BP not meeting goal <130/80, likely primarily due to concerns noted above. Therefore, medication doses decreased today to avoid symptomatic hypotension with resumption of titrated doses. Continue current dose of amlodipine and telmisartan. For now will decrease spironolactone and carvedilol dose.     2. Type 2 diabetes mellitus with other diabetic kidney complication, without long-term current use of insulin (H)  A1c at goal of <7%. Given patients renal function and stable control on current metformin, recommend continuing current dose, prescription updated to reflect this. Directed patient to take atorvastatin 80 mg once daily.     PLAN:                            Provided patient 2 weekly pillboxes for twice daily administration and stickers (yellow AM, blue PM) on pillboxes and medication vials   Amlodipine 10 mg PM  Spirolactone 25 mg AM  Telmisartan 80 mg AM  Carvedilol 25 mg twice daily   Atorvastatin 80 mg PM  Metformin  mg twice daily     Follow-up: Return in about 4 weeks (around 8/24/2023) for with me.    SUBJECTIVE/OBJECTIVE:                          Moriah Hinojosa is a 68 year old male coming in for an initial visit. He was referred to me from Yassine Larsen Patient seen with BONG Byrd .  paged and had to call language line again ID#[id number].     Reason for visit: MTM initial visit/referral from PCP.    Allergies/ADRs: Reviewed in chart  Past Medical History:  Reviewed in chart  Tobacco: He reports that he quit smoking about 7 years ago. His smoking use included cigarettes. He smoked an average of .5 packs per day. He has never been exposed to tobacco smoke. He has never used smokeless tobacco.  Alcohol: none  Caffeine: soda sometimes    Medication Adherence/Access: Patient states that his grandkids show him how to take the medications and then after that he takes them on his own. Patient takes medications directly from the medication vials. He is not interested in using a pillbox at first, then agreeable later in the visit to use. Patient states that he has been forgetting medications, though not sure how often.   Patient brings with medication vials today, all in separate bags. When reviewing medications, discrepancies identified as noted below.     Hypertension:   Almodipine 10 mg twice daily (prescribed once daily)  Carvedilol 25 mg - 1 tablet twice daily (prescribed 2 tablets (50 mg) twice daily)  Telmisartan 80 mg 1 tablet twice daily (prescribed one daily)  Spironolactone 25 mg - 1 tablet twice daily (prescribed 2 tablets (50 mg) daily)    Per medication list also prescribed chlorthalidone 25 mg once daily, though presents without this today and per chart review was last filled at [facility] pharmacy on 1/31/2023 #30/30.  States that he never increased the dose of carvedilol as directed by nephrology, he forgot. Brings with vial filed 5/31/23 #360, appears full.  Spironolactone 25 mg filled 5/31/23 #120/60, appears full.   Amlodipine and telmisartan filled 5/14/23 #90/90, both appear half full  Per chart review, patient no-show to nephrology appointment on 4/12, follow-up not yet scheduled.  Notes that he has NOT yet taken his BP medications today because he was in a rush to get out of the house.  Patient denies any medication side effects or other concerns today.  BP Readings from Last 3 Encounters:   07/27/23 (!) 176/96   07/05/23 (!) 176/90   03/06/23 (!) 171/90       Pulse Readings from Last 3 Encounters:   07/27/23 63   07/05/23 76   03/06/23 76     Type 2 Diabetes:    Metformin  mg twice daily (prescribed 3 times daily)  Atorvastatin 80 mg daily in the evening  Current metformin vial today is from 4/2/23 #180/90. Has only used the mtformin twice daily.   Brings with 2 vials of atorvasttin 80 mg (filled 4/2/23 and 6/30/23, #90 each) April vial partially filled.   Patient is not experiencing side effects.  Blood sugar monitoring: Unable to address today.  Urine Albumin: 7/2022, on ARB  Lab Results   Component Value Date    A1C 6.6 (H) 07/05/2023     Today's Vitals: BP (!) 176/96   Pulse 63   Wt 154 lb 8 oz (70.1 kg)   SpO2 99%   BMI 25.71 kg/m    ----------------      I spent 60 minutes with this patient today. All changes were made via collaborative practice agreement with Yassine Larsen MD. A copy of the visit note was provided to the patient's provider(s).    A summary of these recommendations was given to the patient.    Pascale Cesar, PharmD, BCACP  Medication Therapy Management Pharmacist     Medication Therapy Recommendations  Resistant hypertension    Current Medication: carvedilol (COREG) 25 MG tablet   Rationale: Dose too high - Dosage too high - Safety   Recommendation: Decrease Dose   Status: Accepted per CPA          Current Medication: spironolactone (ALDACTONE) 25 MG tablet   Rationale: Dose too high - Dosage too high - Safety   Recommendation: Decrease Dose   Status: Accepted per CPA

## 2023-07-27 NOTE — LETTER
July 28, 2023  Pwo Micaela  [address] [place] [address]    Dear Mr. Hinojosa, [facility]     Thank you for talking with me on Jul 27, 2023 about your health and medications. As a follow-up to our conversation, I have included two documents:      Your Recommended To-Do List has steps you should take to get the best results from your medications.  Your Medication List will help you keep track of your medications and how to take them.    If you want to talk about these documents, please call Pascale Cesar PharmD at phone: [phone], Monday-Friday 8-4:30pm.    I look forward to working with you and your doctors to make sure your medications work well for you.    Sincerely,  Pascale Cesar, PharmD  [facility] Pharmacist, [facility]

## 2023-07-27 NOTE — PATIENT INSTRUCTIONS
"Recommendations from today's MTM visit:                                                    Provided patient 2 weekly pillboxes for twice daily administration and stickers (yellow AM, blue PM) on pillboxes and medication vials   Amlodipine 10 mg PM  Spirolactone 25 mg AM  Telmisartan 80 mh AM  Carvedilol 25 mg twice daily   Atorvastatin 80 mg PM  Metformin  mg twice daily     Follow-up: Return in about 4 weeks (around 8/24/2023) for with me.    It was great speaking with you today.  I value your experience and would be very thankful for your time in providing feedback in our clinic survey. In the next few days, you may receive an email or text message from [employer] with a link to a survey related to your  clinical pharmacist.\"     To schedule another MTM appointment, please call the clinic directly or you may call the MTM scheduling line at [phone] or toll-free at [phone].     My Clinical Pharmacist's contact information:                                                      Please feel free to contact me with any questions or concerns you have.      Pascale Cesar, PharmD, BCACP  Medication Therapy Management Pharmacist  "

## 2023-07-27 NOTE — Clinical Note
Primary issue is medication adherence. Which I think we were able to resolve today. I will check in with him again next month.   Thanks Lenore

## 2023-08-02 ENCOUNTER — TELEPHONE (OUTPATIENT)
Dept: FAMILY MEDICINE | Facility: CLINIC | Age: 68
End: 2023-08-02
Payer: COMMERCIAL

## 2023-08-02 NOTE — TELEPHONE ENCOUNTER
Patient Quality Outreach    Patient is due for the following:   Diabetes -  Microalbumin, Diabetic Follow-Up Visit, and Foot Exam  Physical Annual Wellness Visit    Next Steps:   Schedule a Annual Wellness Visit    Type of outreach:    Chart review performed, no outreach needed. Patient has an upcoming appointment . Update note for  to schedule appointment in 3 months for AWV.       Questions for provider review:    None           Felicita Marie  Chart routed to n/a.

## 2023-08-12 DIAGNOSIS — I10 ACCELERATED HYPERTENSION: ICD-10-CM

## 2023-08-14 RX ORDER — AMLODIPINE BESYLATE 10 MG/1
TABLET ORAL
Qty: 90 TABLET | Refills: 0 | Status: SHIPPED | OUTPATIENT
Start: 2023-08-14 | End: 2023-11-10

## 2023-08-24 ENCOUNTER — OFFICE VISIT (OUTPATIENT)
Dept: PHARMACY | Facility: CLINIC | Age: 68
End: 2023-08-24
Payer: COMMERCIAL

## 2023-08-24 VITALS
HEART RATE: 75 BPM | DIASTOLIC BLOOD PRESSURE: 96 MMHG | SYSTOLIC BLOOD PRESSURE: 164 MMHG | WEIGHT: 152 LBS | BODY MASS INDEX: 25.29 KG/M2 | OXYGEN SATURATION: 95 %

## 2023-08-24 DIAGNOSIS — E11.29 TYPE 2 DIABETES MELLITUS WITH OTHER DIABETIC KIDNEY COMPLICATION, WITHOUT LONG-TERM CURRENT USE OF INSULIN (H): ICD-10-CM

## 2023-08-24 DIAGNOSIS — I1A.0 RESISTANT HYPERTENSION: Primary | ICD-10-CM

## 2023-08-24 PROCEDURE — 99607 MTMS BY PHARM ADDL 15 MIN: CPT | Performed by: PHARMACIST

## 2023-08-24 PROCEDURE — 99606 MTMS BY PHARM EST 15 MIN: CPT | Performed by: PHARMACIST

## 2023-08-24 NOTE — PROGRESS NOTES
Medication Therapy Management (MTM) Encounter    ASSESSMENT:                            Medication Adherence/Access: Adherence remains primary concern today. Per patient request, provided education on how to set up medication pillbox today. Though still unclear if he has truly been taking the medications given oversupply of medications that he presents with today.   CONSIDER referral to home health nurse or community paramedic if patient continues to have issues with medication confusion at follow up visit.     1. Resistant hypertension  BP not meeting goal <130/80, likely primarily due to concerns noted above.  Will avoid changing medication doses until we can confirm that patient truly taking medications as prescribed.  For now, provided extensive education on currently prescribed medication administration today.    2. Type 2 diabetes mellitus with other diabetic kidney complication, without long-term current use of insulin (H)  A1c at goal of <7%.  Recommend patient continue current metformin.  Patient has not yet been monitoring blood sugars, though given other priorities at this visit this was not addressed.  We will further address at follow-up visit.    PLAN:                            Helped patient with twice daily pillboxes set up today as follows:  Amlodipine 10 mg PM  Spirolactone 25 mg AM  Telmisartan 80 mg AM  Carvedilol 25 mg twice daily   Atorvastatin 80 mg PM  Metformin  mg twice daily     Medication issues to be addressed at a future visit:   Consider referral to home care or community paramedic if needed    Follow-up: Return in about 1 week (around 8/31/2023) for With PharmD.  PCP 10/10    SUBJECTIVE/OBJECTIVE:                          Pwjohn Hinojosa is a 68 year old male coming in for a follow-up visit from 7/27/23. Patient seen with BONG Byrd .      Reason for visit: MTM follow up.    Allergies/ADRs: Reviewed in chart  Past Medical History: Reviewed in chart  Tobacco: He reports that  he quit smoking about 7 years ago. His smoking use included cigarettes. He smoked an average of .5 packs per day. He has never been exposed to tobacco smoke. He has never used smokeless tobacco.  Alcohol: none    Medication Adherence/Access: Patient has NOT been using the twice daily pillboxes provided at last [facility] visit because he doesn't know how to use the stickers as directed at last visit. Patient brings with medication vials and empty pillboxes today. He is interested in using the pillboxes at home and would like help with medication set up again today. States that he will be able to fill his pillboxes on his own going forward.   - Of note, patient does appear quite confused during the medication set up process during visit today. [facility] offered referral to community paramedic or home health nurse to aid with medication set up, but patient declines today.     Hypertension:   Almodipine 10 mg 1 tablet daily (see below)  Carvedilol 25 mg 1 tablet daily (prescribed 1 tablet twice daily)  Telmisartan 80 mg 1 tablet daily   Spironolactone 25 mg 1 tablet daily (see below)    Brings with 2 vials of amlodipine 10 mg - one filled 5/14 and 8/14 #90.   Brings 2 vials of spironolactone 25 mg filled 7/27 #30 and 5/31 #90.   Patient states that he didn't know these were the same medications and therefore states that he might have been taking double the dose of amlodipine and spironolactone.  Reports checking his BP at home, reports systolic of 140 mmHg.   Per chart review, patient no-show to nephrology appointment on 4/12, follow-up not yet scheduled.  Patient denies any medication side effects or other concerns today.  BP Readings from Last 3 Encounters:   08/24/23 (!) 164/96   07/27/23 (!) 176/96   07/05/23 (!) 176/90      Pulse Readings from Last 3 Encounters:   08/24/23 75   07/27/23 63   07/05/23 76     Type 2 Diabetes:    Metformin  mg twice daily  Atorvastatin 80 mg daily in the evening  Patient is not experiencing  side effects.  Blood sugar monitoring:  States that he does not check his sugars at home, unable to further address today.   Urine Albumin: 7/2022, on ARB  Lab Results   Component Value Date    A1C 6.6 (H) 07/05/2023     Today's Vitals: BP (!) 164/96   Pulse 75   Wt 152 lb (68.9 kg)   SpO2 95%   BMI 25.29 kg/m    ----------------      I spent 40 minutes with this patient today. All changes were made via collaborative practice agreement with Yassine Larsen MD. A copy of the visit note was provided to the patient's provider(s).    A summary of these recommendations was given to the patient.    Pascale Cesar, PharmD, BCACP  Medication Therapy Management Pharmacist     Medication Therapy Recommendations  Resistant hypertension    Current Medication: carvedilol (COREG) 25 MG tablet   Rationale: Does not understand instructions - Adherence - Adherence   Recommendation: Provide Education   Status: Patient Agreed - Adherence/Education

## 2023-08-24 NOTE — Clinical Note
Can someone please check if this patient has an upcoming visit with nephrology? Do we need to place another referral?   Thank you Randell

## 2023-08-24 NOTE — Clinical Note
Dr. Larsen,  Med adherence is still a huge issue. I will see him next week and if not improving I think we will need to refer to home health or community paramedic.  I also routed to specialty schedulers to see if he has a visit scheduled with nephrology or if a new referral needs to be placed.   Randell

## 2023-08-29 NOTE — PROGRESS NOTES
Patient have no up coming visit with nephrology. Patient was schedule with nephology on 4/12, but no show appt. Nephrology had been attempting to call patient with voice mail left to return call to schedule follow up with no success. Specialty will contact patient to help schedule appt with nephrology.

## 2023-08-31 ENCOUNTER — OFFICE VISIT (OUTPATIENT)
Dept: PHARMACY | Facility: CLINIC | Age: 68
End: 2023-08-31
Payer: COMMERCIAL

## 2023-08-31 VITALS
HEART RATE: 68 BPM | BODY MASS INDEX: 25.42 KG/M2 | OXYGEN SATURATION: 97 % | SYSTOLIC BLOOD PRESSURE: 136 MMHG | WEIGHT: 152.75 LBS | DIASTOLIC BLOOD PRESSURE: 72 MMHG

## 2023-08-31 DIAGNOSIS — E11.29 TYPE 2 DIABETES MELLITUS WITH OTHER DIABETIC KIDNEY COMPLICATION, WITHOUT LONG-TERM CURRENT USE OF INSULIN (H): Primary | ICD-10-CM

## 2023-08-31 DIAGNOSIS — I15.1 HYPERTENSION SECONDARY TO OTHER RENAL DISORDERS: ICD-10-CM

## 2023-08-31 PROCEDURE — 99607 MTMS BY PHARM ADDL 15 MIN: CPT | Performed by: PHARMACIST

## 2023-08-31 PROCEDURE — 99606 MTMS BY PHARM EST 15 MIN: CPT | Performed by: PHARMACIST

## 2023-08-31 NOTE — PROGRESS NOTES
Medication Therapy Management (MTM) Encounter    ASSESSMENT:                            Medication Adherence/Access: No issues identified    1. Hypertension secondary to other renal disorders  BP at goal of <140/90.  If able in the future, could consider further aiming for more strict goal of <130/80, especially since patient noting blood pressures meeting this goal at home.  Recommend patient bring in blood pressure log or monitor to follow-up visit for assessment.  For now, recommend continuation of current medications without change.    2. Type 2 diabetes mellitus with other diabetic kidney complication, without long-term current use of insulin (H)  A1c at goal of <7%, therefore not due for recheck for another 6 months.  Patient has not been monitoring his blood sugars at home, though reasonable at this time given well-controlled A1c.  Recommended patient bring glucometer to follow-up visit for MTM to provide education on use.  Continue current metformin without change.    PLAN:                            Recommend patient continue to monitor at home BP and bring readings with him to next visit    Follow-up: Return in about 4 months (around 1/10/2024).  PCP 10/10    SUBJECTIVE/OBJECTIVE:                          Moriah Hinojosa is a 68 year old male coming in for a follow-up visit from 8/24/23. Patient seen with Rochelle powell.      Reason for visit: MTM follow up.    Allergies/ADRs: Reviewed in chart  Past Medical History: Reviewed in chart  Tobacco: He reports that he quit smoking about 7 years ago. His smoking use included cigarettes. He smoked an average of .5 packs per day. He has never been exposed to tobacco smoke. He has never used smokeless tobacco.  Alcohol: none    Medication Adherence/Access: Patient was successfully able to set up the twice daily pillboxes this week, utilizing a.m. and p.m. stickers on his medication vials. Reports no missed medication doses. States that he feels confident in his ability  to set up the pillboxes going forward and gets medications delivered to him from the pharmacy.     Hypertension   Almodipine 10 mg 1 tablet daily  Carvedilol 25 mg 1 tablet twice daily  Telmisartan 80 mg 1 tablet daily   Spironolactone 25 mg 1 tablet daily     Patient reports no current medication side effects.  Patient self-monitors blood pressure.  Home BP monitoring 109/73, 108/70.  He is wondering if his cuff is accurate.        BP Readings from Last 3 Encounters:   08/31/23 136/72   08/24/23 (!) 164/96   07/27/23 (!) 176/96     Pulse Readings from Last 3 Encounters:   08/31/23 68   08/24/23 75   07/27/23 63     Diabetes Type 2:    Metformin  mg twice daily  Atorvastatin 80 mg daily in the evening  Patient is not experiencing side effects.  Blood sugar monitoring:  States that he does not check his sugars at home. Does not know how to use the meter.   Current diabetes symptoms:  None     Eye exam is up to date  Foot exam: due  Urine Albumin:   Lab Results   Component Value Date    UMALCR  07/20/2022      Comment:      Unable to calculate, urine albumin and/or urine creatinine is outside detectable limits.  Microalbuminuria is defined as an albumin:creatinine ratio of 17 to 299 for males and 25 to 299 for females. A ratio of albumin:creatinine of 300 or higher is indicative of overt proteinuria.  Due to biologic variability, positive results should be confirmed by a second, first-morning random or 24-hour timed urine specimen. If there is discrepancy, a third specimen is recommended. When 2 out of 3 results are in the microalbuminuria range, this is evidence for incipient nephropathy and warrants increased efforts at glucose control, blood pressure control, and institution of therapy with an angiotensin-converting-enzyme (ACE) inhibitor (if the patient can tolerate it).        Lab Results   Component Value Date    A1C 6.6 (H) 07/05/2023     Today's Vitals: /72   Pulse 68   Wt 152 lb 12 oz (69.3 kg)    SpO2 97%   BMI 25.42 kg/m    ----------------      I spent 30 minutes with this patient today. All changes were made via collaborative practice agreement with Yassine Larsen MD. A copy of the visit note was provided to the patient's provider(s).    A summary of these recommendations was declined by the patient.    Pascale Cesar, PharmD, [facility]  Medication Therapy Management Pharmacist     Medication Therapy Recommendations  No medication therapy recommendations to display

## 2023-09-01 DIAGNOSIS — N18.30 STAGE 3 CHRONIC KIDNEY DISEASE, UNSPECIFIED WHETHER STAGE 3A OR 3B CKD (H): Primary | ICD-10-CM

## 2023-09-27 ENCOUNTER — TELEPHONE (OUTPATIENT)
Dept: FAMILY MEDICINE | Facility: CLINIC | Age: 68
End: 2023-09-27
Payer: COMMERCIAL

## 2023-09-27 NOTE — TELEPHONE ENCOUNTER
Patient Quality Outreach    Patient is due for the following:   Physical Annual Wellness Visit    Next Steps:   Patient was scheduled for AWV    Type of outreach:    Phone, spoke to patient/parent. granddaughter      Questions for provider review:    None           Hafsa Escobar MA

## 2023-10-10 ENCOUNTER — OFFICE VISIT (OUTPATIENT)
Dept: FAMILY MEDICINE | Facility: CLINIC | Age: 68
End: 2023-10-10
Payer: COMMERCIAL

## 2023-10-10 VITALS
SYSTOLIC BLOOD PRESSURE: 138 MMHG | OXYGEN SATURATION: 99 % | HEIGHT: 64 IN | DIASTOLIC BLOOD PRESSURE: 76 MMHG | BODY MASS INDEX: 25.74 KG/M2 | HEART RATE: 73 BPM | TEMPERATURE: 97.8 F | WEIGHT: 150.8 LBS

## 2023-10-10 DIAGNOSIS — I1A.0 RESISTANT HYPERTENSION: ICD-10-CM

## 2023-10-10 DIAGNOSIS — Z23 NEED FOR VACCINATION: ICD-10-CM

## 2023-10-10 DIAGNOSIS — E11.29 TYPE 2 DIABETES MELLITUS WITH OTHER DIABETIC KIDNEY COMPLICATION, WITHOUT LONG-TERM CURRENT USE OF INSULIN (H): Primary | ICD-10-CM

## 2023-10-10 LAB
HBA1C MFR BLD: 6.7 % (ref 0–5.6)
HOLD SPECIMEN: NORMAL
HOLD SPECIMEN: NORMAL

## 2023-10-10 PROCEDURE — 90662 IIV NO PRSV INCREASED AG IM: CPT | Performed by: FAMILY MEDICINE

## 2023-10-10 PROCEDURE — G0008 ADMIN INFLUENZA VIRUS VAC: HCPCS | Performed by: FAMILY MEDICINE

## 2023-10-10 PROCEDURE — 83036 HEMOGLOBIN GLYCOSYLATED A1C: CPT | Performed by: FAMILY MEDICINE

## 2023-10-10 PROCEDURE — 99213 OFFICE O/P EST LOW 20 MIN: CPT | Mod: 25 | Performed by: FAMILY MEDICINE

## 2023-10-10 PROCEDURE — 36415 COLL VENOUS BLD VENIPUNCTURE: CPT | Performed by: FAMILY MEDICINE

## 2023-10-10 NOTE — PROGRESS NOTES
ASSESMENT AND PLAN:  Diagnoses and all orders for this visit:  Type 2 diabetes mellitus with other diabetic kidney complication, without long-term current use of insulin (H)  -     HEMOGLOBIN A1C today is 6.7 showing excellent control.  Counseling done with the patient with help of a professional , continue current plan and recheck again in 6 months.  Resistant hypertension  Now well controlled.  The MTM and medication set up plan seems to be really making a big difference.  Continue current plan and recheck again in 6 months.  Need for vaccination  Patient declines COVID vaccination despite my counseling and recommendation, he is agreeable with influenza vaccination.  -     INFLUENZA VACCINE 65+ (FLUZONE HD)        Reviewed the risks and benefits of the treatment plan with the patient and/or caregiver and we discussed indications for routine and emergent follow-up.        SUBJECTIVE: 68-year-old male in for follow-up.  Since I last saw him he has had 2 MTM sessions and has had good success with medication box set up and is getting much better adherence and much better results on his blood pressures.  No hypoglycemia.  Blood sugars have been well controlled.  Patient reports he is taking his medications as prescribed and is not having any side effects or problems with the medications.    Past Medical History:   Diagnosis Date    Chronic pain of both knees 2/11/2019    COVID-19 5/7/2020    Hypercholesteremia 6/18/2018    Hypertension 5/7/2018     Patient Active Problem List   Diagnosis    Resistant hypertension    Cataract    Hypercholesteremia    Chronic pain of both knees    CKD (chronic kidney disease) stage 3, GFR 30-59 ml/min    Allergic conjunctivitis, bilateral    COVID-19    Tachycardia    Accelerated hypertension    Nephrolithiasis    SOB (shortness of breath)    Decreased visual acuity    Corneal scarring    Poor dentition    Type 2 diabetes mellitus with other diabetic kidney complication,  "without long-term current use of insulin (H)    History of nephrectomy    Posterior vitreous detachment, right    Macular edema    Hypertension secondary to other renal disorders    Solitary kidney, acquired     Current Outpatient Medications   Medication Sig Dispense Refill    acetaminophen (TYLENOL) 325 MG tablet Take 2 tablets (650 mg) by mouth every 6 hours as needed for pain 120 tablet 0    amLODIPine (NORVASC) 10 MG tablet TAKE 1 TABLET (10 MG) BY MOUTH AT BEDTIME FOR BLOOD PRESSURE 90 tablet 0    atorvastatin (LIPITOR) 80 MG tablet TAKE 1 TABLET (80 MG) BY MOUTH EVERY EVENING 90 tablet 2    blood glucose (NO BRAND SPECIFIED) test strip Use to test blood sugar one times daily or as directed. To accompany: Blood Glucose Monitor Brands: per insurance. 100 strip 6    blood glucose monitoring (NO BRAND SPECIFIED) meter device kit Use to test blood sugar 1 times daily or as directed. Preferred blood glucose meter OR supplies to accompany: Blood Glucose Monitor Brands: per insurance. 1 kit 0    carvedilol (COREG) 25 MG tablet Take 1 tablet (25 mg) by mouth 2 times daily (with meals) 360 tablet 3    metFORMIN (GLUCOPHAGE-XR) 750 MG 24 hr tablet Take 1 tablet (750 mg) by mouth 2 times daily (with meals) 180 tablet 3    Polyvinyl Alcohol-Povidone (ARTIFICIAL TEARS) 5-6 MG/ML SOLN Apply 1 drop to eye 3 times daily as needed (both eyes) 30 mL 11    spironolactone (ALDACTONE) 25 MG tablet Take 1 tablet (25 mg) by mouth daily 30 tablet 3    telmisartan (MICARDIS) 80 MG tablet Take 1 tablet (80 mg) by mouth daily 90 tablet 3    thin (NO BRAND SPECIFIED) lancets Use with lanceting device. To accompany: Blood Glucose Monitor Brands: per insurance. 100 each 6     History   Smoking Status    Former    Packs/day: 0.50    Types: Cigarettes    Quit date: 3/6/2016   Smokeless Tobacco    Never       OBJECTICE: /76   Pulse 73   Temp 97.8  F (36.6  C) (Oral)   Ht 1.62 m (5' 3.78\")   Wt 68.4 kg (150 lb 12.8 oz)   SpO2 99%   " BMI 26.06 kg/m       Recent Results (from the past 24 hour(s))   HEMOGLOBIN A1C    Collection Time: 10/10/23  3:37 PM   Result Value Ref Range    Hemoglobin A1C 6.7 (H) 0.0 - 5.6 %   Extra Serum Separator Tube (SST)    Collection Time: 10/10/23  3:41 PM   Result Value Ref Range    Hold Specimen JIC    Extra Green Top (Lithium Heparin) Tube    Collection Time: 10/10/23  3:41 PM   Result Value Ref Range    Hold Specimen JIC         GEN-alert, appropriate, in no apparent distress   CV-regular rate and rhythm with no murmur   RESP-lungs clear to auscultation   EXTREM-no pitting edema        Yassine Larsen MD

## 2023-10-17 ENCOUNTER — TELEPHONE (OUTPATIENT)
Dept: NEPHROLOGY | Facility: CLINIC | Age: 68
End: 2023-10-17
Payer: COMMERCIAL

## 2023-10-17 NOTE — TELEPHONE ENCOUNTER
Called pt's daughter to remind appt on Wednesday Oct 25 th at 4:00 pm in person with Non- fasting labs at 3:00 pm.  Richelle Yip,  Nephrology Clinic Navigator  [facility]  Direct: [phone].

## 2023-10-25 ENCOUNTER — OFFICE VISIT (OUTPATIENT)
Dept: NEPHROLOGY | Facility: CLINIC | Age: 68
End: 2023-10-25
Attending: INTERNAL MEDICINE
Payer: COMMERCIAL

## 2023-10-25 ENCOUNTER — LAB (OUTPATIENT)
Dept: LAB | Facility: CLINIC | Age: 68
End: 2023-10-25
Attending: INTERNAL MEDICINE
Payer: COMMERCIAL

## 2023-10-25 VITALS
TEMPERATURE: 97.9 F | HEART RATE: 92 BPM | DIASTOLIC BLOOD PRESSURE: 106 MMHG | BODY MASS INDEX: 26.15 KG/M2 | WEIGHT: 151.3 LBS | SYSTOLIC BLOOD PRESSURE: 187 MMHG | OXYGEN SATURATION: 100 %

## 2023-10-25 DIAGNOSIS — I10 ACCELERATED HYPERTENSION: ICD-10-CM

## 2023-10-25 DIAGNOSIS — E11.29 TYPE 2 DIABETES MELLITUS WITH OTHER DIABETIC KIDNEY COMPLICATION, WITHOUT LONG-TERM CURRENT USE OF INSULIN (H): ICD-10-CM

## 2023-10-25 DIAGNOSIS — N18.30 STAGE 3 CHRONIC KIDNEY DISEASE, UNSPECIFIED WHETHER STAGE 3A OR 3B CKD (H): ICD-10-CM

## 2023-10-25 DIAGNOSIS — E78.5 HYPERLIPIDEMIA LDL GOAL <70: ICD-10-CM

## 2023-10-25 LAB
ALBUMIN SERPL BCG-MCNC: 4.5 G/DL (ref 3.5–5.2)
ALBUMIN UR-MCNC: 10 MG/DL
ANION GAP SERPL CALCULATED.3IONS-SCNC: 8 MMOL/L (ref 7–15)
APPEARANCE UR: CLEAR
BILIRUB UR QL STRIP: NEGATIVE
BUN SERPL-MCNC: 17.1 MG/DL (ref 8–23)
CALCIUM SERPL-MCNC: 9.7 MG/DL (ref 8.8–10.2)
CHLORIDE SERPL-SCNC: 103 MMOL/L (ref 98–107)
CHOLEST SERPL-MCNC: 272 MG/DL
COLOR UR AUTO: ABNORMAL
CREAT SERPL-MCNC: 1.61 MG/DL (ref 0.67–1.17)
CREAT UR-MCNC: 220 MG/DL
DEPRECATED HCO3 PLAS-SCNC: 29 MMOL/L (ref 22–29)
EGFRCR SERPLBLD CKD-EPI 2021: 46 ML/MIN/1.73M2
GLUCOSE SERPL-MCNC: 191 MG/DL (ref 70–99)
GLUCOSE UR STRIP-MCNC: 500 MG/DL
HBA1C MFR BLD: 7 %
HDLC SERPL-MCNC: 66 MG/DL
HGB BLD-MCNC: 15.6 G/DL (ref 13.3–17.7)
HGB UR QL STRIP: NEGATIVE
KETONES UR STRIP-MCNC: NEGATIVE MG/DL
LDLC SERPL CALC-MCNC: 178 MG/DL
LEUKOCYTE ESTERASE UR QL STRIP: NEGATIVE
MICROALBUMIN UR-MCNC: 41.7 MG/L
MICROALBUMIN/CREAT UR: 18.95 MG/G CR (ref 0–17)
NITRATE UR QL: NEGATIVE
NONHDLC SERPL-MCNC: 206 MG/DL
PH UR STRIP: 7 [PH] (ref 5–7)
PHOSPHATE SERPL-MCNC: 2.5 MG/DL (ref 2.5–4.5)
POTASSIUM SERPL-SCNC: 4.7 MMOL/L (ref 3.4–5.3)
RBC URINE: <1 /HPF
SODIUM SERPL-SCNC: 140 MMOL/L (ref 135–145)
SP GR UR STRIP: 1.02 (ref 1–1.03)
TRIGL SERPL-MCNC: 139 MG/DL
UROBILINOGEN UR STRIP-MCNC: NORMAL MG/DL
WBC URINE: 1 /HPF

## 2023-10-25 PROCEDURE — 81001 URINALYSIS AUTO W/SCOPE: CPT | Performed by: PATHOLOGY

## 2023-10-25 PROCEDURE — G0463 HOSPITAL OUTPT CLINIC VISIT: HCPCS | Performed by: INTERNAL MEDICINE

## 2023-10-25 PROCEDURE — 83036 HEMOGLOBIN GLYCOSYLATED A1C: CPT | Performed by: DIETITIAN, REGISTERED

## 2023-10-25 PROCEDURE — 82570 ASSAY OF URINE CREATININE: CPT | Performed by: INTERNAL MEDICINE

## 2023-10-25 PROCEDURE — 80069 RENAL FUNCTION PANEL: CPT | Performed by: PATHOLOGY

## 2023-10-25 PROCEDURE — 85018 HEMOGLOBIN: CPT | Performed by: PATHOLOGY

## 2023-10-25 PROCEDURE — 80061 LIPID PANEL: CPT | Performed by: PATHOLOGY

## 2023-10-25 PROCEDURE — 36415 COLL VENOUS BLD VENIPUNCTURE: CPT | Performed by: PATHOLOGY

## 2023-10-25 PROCEDURE — 99214 OFFICE O/P EST MOD 30 MIN: CPT | Performed by: INTERNAL MEDICINE

## 2023-10-25 PROCEDURE — 99000 SPECIMEN HANDLING OFFICE-LAB: CPT | Performed by: PATHOLOGY

## 2023-10-25 RX ORDER — CARVEDILOL 25 MG/1
50 TABLET ORAL 2 TIMES DAILY WITH MEALS
Qty: 360 TABLET | Refills: 1 | Status: SHIPPED | OUTPATIENT
Start: 2023-10-25 | End: 2024-02-02

## 2023-10-25 ASSESSMENT — PAIN SCALES - GENERAL: PAINLEVEL: SEVERE PAIN (6)

## 2023-10-25 NOTE — NURSING NOTE
Chief Complaint   Patient presents with    RECHECK     6 wk f/u       BP (!) 187/106   Pulse 92   Temp 97.9  F (36.6  C) (Oral)   Wt 68.6 kg (151 lb 4.8 oz)   SpO2 100%   BMI 26.15 kg/m      Sandeep Madden on 10/25/2023 at 3:50 PM

## 2023-10-25 NOTE — PROGRESS NOTES
Nephrology Clinic    Moriah Hinojosa MRN:[id number] YOB: 1955  Date of Service: 10/25/2023  Primary care provider: Yassine Larsen  Requesting physician: Yassine Larsen      REASON FOR CONSULT: Hypertension and CKD    HISTORY OF PRESENT ILLNESS:   Moriah Hinojosa is a 68 year old male who first  presented for evaluation of uncontrolled hypertension and  CKD stage 3 in August 2022.  The past medical history is significant for longstanding hypertension and type 2 diabetes for more than 10 years, chronic severe left-sided hydronephrosis and proximal left hydroureter secondary to a proximal left ureteral stone s/p nephrectomy in August 2021 in an attempt to control his hypertension, and CKD. His blood pressure had been difficult to control and he had had multiple changes done by both his cardiologist and his PCP. He was on amlodipine 10 mg daily, hydrochlorothiazide 25 mg daily, losartan 100 mg daily and carvedilol 25 mg bid. He was however hypertensive at 225/120, tachycardic at 97 and was also complaining of headache.  His diabetes is well controlled on metformin 750 mg twice daily  Urine studies done  showed a UPCR at 0.16 g/g Cr, however his potassium level was low 3.2 and his creatinine level was 1.3 mg/dL. It was 1.05 in August 2021 prior to his nephrectomy.  Renal imaging done in March 2021 shows an unremarkable right kidney and no evidence of renal artery stenosis on the right.  The patient had losartan changed to telmisartan, the hydrochlorothiazide stopped and replaced with spironolactone 25 mg daily and his BP improved but remained suboptimal. Therefore chlorthalidone 25 mg was added but the BP remained suboptimal. Subsequently spironolactone was increased to 50 mg daily and carvedilol was increased to 50 mg twice daily. He however never increased them and also didn't fill chlorthalidone. He has also been seen by a pharmacist for MTM and she has deemed that he had a compliance issue with pseudo-resistant  hypertension rather than truly resistant hypertension. He brings today his pill box and medication boxes with him.  His current medications are the following: spironolactone 25 mg daily, telmisartan 80 mg daily, metformin 750 mg twice daily, amlodipine 10 mg at bedtime and carvedilol 25 mg twice daily . His blood pressure is extremely elevated in clinic at 187/106 despite evidence from the pill box that he has been taking his medications on time this week. He also reports that his BP remains suboptimal at home.   The patient denies any dysuria, any pollakiuria, any nocturia, any LE edema, any dyspnea on exertion .    The following portions of the patient's history were reviewed and updated as appropriate: allergies, current medications, past family history, past medical history, past social history, past surgical history and problem list.    PAST MEDICAL HISTORY:  Past Medical History:   Diagnosis Date    Chronic pain of both knees 2/11/2019    COVID-19 5/7/2020    Hypercholesteremia 6/18/2018    Hypertension 5/7/2018     PAST SURGICAL HISTORY:  Past Surgical History:   Procedure Laterality Date    LAPAROSCOPIC NEPHRECTOMY Left 8/24/2021    Procedure: NEPHRECTOMY, TOTAL, LAPAROSCOPIC;  Surgeon: Catracho Wright MD;  Location:  OR     MEDICATIONS:  Prescription Medications as of 10/25/2023         Rx Number Disp Refills Start End Last Dispensed Date Next Fill Date Owning Pharmacy    acetaminophen (TYLENOL) 325 MG tablet  120 tablet 0 8/26/2021    [place], [place] - [address] [facility]    Sig: Take 2 tablets (650 mg) by mouth every 6 hours as needed for pain    Class: E-Prescribe    Route: Oral    amLODIPine (NORVASC) 10 MG tablet  90 tablet 0 8/14/2023    [facility] - [place], [place] - [address]    Sig: TAKE 1 TABLET (10 MG) BY MOUTH AT BEDTIME FOR BLOOD PRESSURE    Class: E-Prescribe    atorvastatin (LIPITOR) 80 MG tablet  90 tablet 2 6/30/2023     [facility] - [place], [place] - [address]    Sig: TAKE 1 TABLET (80 MG) BY MOUTH EVERY EVENING    Class: E-Prescribe    Route: Oral    blood glucose (NO BRAND SPECIFIED) test strip  100 strip 6 5/31/2023    [facility] - [place], [place] - [address]    Sig: Use to test blood sugar one times daily or as directed. To accompany: Blood Glucose Monitor Brands: per insurance.    Class: E-Prescribe    Notes to Pharmacy: Use to test blood sugar 1 times daily or as directed. Preferred blood glucose meter OR supplies to accompany: Blood Glucose Monitor Brands: per insurance.    Cosign for Ordering: Accepted by Yassine Larsen MD on 5/31/2023  2:42 PM    blood glucose monitoring (NO BRAND SPECIFIED) meter device kit  1 kit 0 5/31/2023    [facility] - [place], [place] - [address]    Sig: Use to test blood sugar 1 times daily or as directed. Preferred blood glucose meter OR supplies to accompany: Blood Glucose Monitor Brands: per insurance.    Class: E-Prescribe    Cosign for Ordering: Accepted by Yassine Larsen MD on 5/31/2023  2:42 PM    carvedilol (COREG) 25 MG tablet  360 tablet 3 7/27/2023    [facility] - [place], [place] - [address]    Sig: Take 1 tablet (25 mg) by mouth 2 times daily (with meals)    Class: E-Prescribe    Route: Oral    metFORMIN (GLUCOPHAGE-XR) 750 MG 24 hr tablet  180 tablet 3 7/27/2023    [facility] - [place], [place] - [address]    Sig: Take 1 tablet (750 mg) by mouth 2 times daily (with meals)    Class: E-Prescribe    Route: Oral    Polyvinyl Alcohol-Povidone (ARTIFICIAL TEARS) 5-6 MG/ML SOLN  30 mL 11 7/5/2023    [facility] - [place], [place] - [address]    Sig: Apply 1 drop to eye 3 times daily as needed (both eyes)    Class: E-Prescribe    Route: Ophthalmic    spironolactone (ALDACTONE) 25 MG tablet  30 tablet 3 7/27/2023    [facility] - [place], [place] - [address]    Sig:  Take 1 tablet (25 mg) by mouth daily    Class: E-Prescribe    Route: Oral    telmisartan (MICARDIS) 80 MG tablet  90 tablet 3 1/3/2023    [facility] - [place], [place] - [address]    Sig: Take 1 tablet (80 mg) by mouth daily    Class: E-Prescribe    Route: Oral    thin (NO BRAND SPECIFIED) lancets  100 each 6 5/31/2023    [facility] - [place], [place] - [address]    Sig: Use with lanceting device. To accompany: Blood Glucose Monitor Brands: per insurance.    Class: E-Prescribe    Notes to Pharmacy: Use to test blood sugar 1 times daily or as directed. Preferred blood glucose meter OR supplies to accompany: Blood Glucose Monitor Brands: per insurance.    Cosign for Ordering: Accepted by Yassine Larsen MD on 5/31/2023  2:42 PM           ALLERGIES:    No Known Allergies  REVIEW OF SYSTEMS:  Review Of Systems  Skin: negative for, pigmentation, acne, rash, scaling, itching, bruising  Eyes: negative for, visual blurring, double vision, glaucoma, cataracts, eye pain, color blindness  Ears/Nose/Throat: negative for, nasal congestion, sneezing, postnasal drainage, hearing loss, deafness, tinnitus, vertigo  Respiratory: No shortness of breath, dyspnea on exertion, cough, or hemoptysis  Cardiovascular: negative for, palpitations, tachycardia, irregular heart beat, chest pain and exertional chest pain or pressure  Gastrointestinal: negative for, poor appetite, dysphagia, nausea, vomiting, heartburn and dyspepsia  Genitourinary: negative for, nocturia, dysuria, frequency, urgency, hesitancy and hematuria  Musculoskeletal: negative for, fracture, back pain, neck pain, arthritis, joint pain and joint swelling  Neurologic: negative for, headaches, syncope, stroke, seizures, paralysis and local weakness    A comprehensive review of systems was performed and found to be negative except as described here or above.  SOCIAL HISTORY:   Social History     Socioeconomic History    Marital status:       Spouse name: Not on file    Number of children: Not on file    Years of education: Not on file    Highest education level: Not on file   Occupational History    Not on file   Tobacco Use    Smoking status: Former     Packs/day: .5     Types: Cigarettes     Quit date: 3/6/2016     Years since quittin.6     Passive exposure: Never    Smokeless tobacco: Never    Tobacco comments:     no passive exposure   Vaping Use    Vaping Use: Never used   Substance and Sexual Activity    Alcohol use: No    Drug use: Never    Sexual activity: Not Currently     Partners: Female   Other Topics Concern    Not on file   Social History Narrative    Not on file     Social Determinants of Health     Financial Resource Strain: Low Risk  (10/10/2023)    Financial Resource Strain     Within the past 12 months, have you or your family members you live with been unable to get utilities (heat, electricity) when it was really needed?: No   Food Insecurity: Low Risk  (10/10/2023)    Food Insecurity     Within the past 12 months, did you worry that your food would run out before you got money to buy more?: No     Within the past 12 months, did the food you bought just not last and you didn t have money to get more?: No   Transportation Needs: Low Risk  (10/10/2023)    Transportation Needs     Within the past 12 months, has lack of transportation kept you from medical appointments, getting your medicines, non-medical meetings or appointments, work, or from getting things that you need?: No   Physical Activity: Not on file   Stress: Not on file   Social Connections: Not on file   Interpersonal Safety: Not on file   Housing Stability: Low Risk  (10/10/2023)    Housing Stability     Do you have housing? : Yes     Are you worried about losing your housing?: No     FAMILY MEDICAL HISTORY:   Family History   Problem Relation Age of Onset    Anesthesia Reaction No family hx of     Deep Vein Thrombosis (DVT) No family hx of      PHYSICAL  EXAM:   BP (!) 193/95   Pulse 92   Temp 97.9  F (36.6  C) (Oral)   Wt 68.6 kg (151 lb 4.8 oz)   SpO2 100%   BMI 26.15 kg/m    GENERAL APPEARANCE: alert and no distress  EYES: nonicteric  HENT: mouth without ulcers or lesions  NECK: supple, no adenopathy  RESP: lungs clear to auscultation   CV: regular rhythm, normal rate, no rub  ABDOMEN: soft, nontender, normal bowel sounds, no HSM   Extremities: no clubbing, cyanosis, or edema  MS: no evidence of inflammation in joints, no muscle tenderness  SKIN: no rash  NEURO: mentation intact and speech normal  PSYCH: affect normal/bright   LABS:   Recent Results (from the past 672 hour(s))   HEMOGLOBIN A1C    Collection Time: 10/10/23  3:37 PM   Result Value Ref Range    Hemoglobin A1C 6.7 (H) 0.0 - 5.6 %   Extra Serum Separator Tube (SST)    Collection Time: 10/10/23  3:41 PM   Result Value Ref Range    Hold Specimen JIC    Extra Green Top (Lithium Heparin) Tube    Collection Time: 10/10/23  3:41 PM   Result Value Ref Range    Hold Specimen JIC    Hemoglobin    Collection Time: 10/25/23  3:17 PM   Result Value Ref Range    Hemoglobin 15.6 13.3 - 17.7 g/dL   UA with Microscopic    Collection Time: 10/25/23  3:22 PM   Result Value Ref Range    Color Urine Light Yellow Colorless, Straw, Light Yellow, Yellow    Appearance Urine Clear Clear    Glucose Urine 500 (A) Negative mg/dL    Bilirubin Urine Negative Negative    Ketones Urine Negative Negative mg/dL    Specific Gravity Urine 1.023 1.003 - 1.035    Blood Urine Negative Negative    pH Urine 7.0 5.0 - 7.0    Protein Albumin Urine 10 (A) Negative mg/dL    Urobilinogen Urine Normal Normal, 2.0 mg/dL    Nitrite Urine Negative Negative    Leukocyte Esterase Urine Negative Negative    RBC Urine <1 <=2 /HPF    WBC Urine 1 <=5 /HPF     CMP  Recent Labs   Lab Test 07/05/23  1526 04/12/23  1257 12/19/22  1021 12/05/22  1013 11/07/22  1627 10/12/22  1049 09/12/22  0948 08/29/22  0951 08/08/22  0908 08/03/21  1437 12/23/20  1215  12/16/20  0702 12/15/20  0439 12/14/20  0442    138 139 139 139   < > 139 136 142   < > 138 141 138 140   POTASSIUM 4.8 3.9 4.0 4.2 4.9   < > 4.7 4.3 3.2*   < > 3.2* 3.7 3.7 3.8   CHLORIDE 104 103 103 101 102   < > 101 102 107   < > 98 105 106 108*   CO2 26 22 28 28 26   < > 29 28 24   < > 30 26 21* 22   ANIONGAP 10 13 8 10 11   < > 9 6 11   < > 10 10 11 10   * 178* 207* 154* 124*   < > 146* 254* 109*   < > 267* 117 185* 142*   BUN 13.6 24.1* 20.3 19.4 18.4   < > 18.8 16 18   < > 17 38* 20 14   CR 1.49* 1.40* 1.28* 1.18* 1.21*   < > 1.15 1.21 1.31*   < > 1.34* 1.80* 1.29 1.29   GFRESTIMATED 50* 54* 61 67 65   < > 69 65 59*   < > 53* 38* 56* 56*   GFRESTBLACK  --   --   --   --   --   --   --   --   --   --  >60 46* >60 >60   LENNOX 10.0 9.1 9.3 9.7 10.1   < > 9.8 9.1 9.9   < > 9.6 9.2 8.9 9.0   MAG  --   --   --   --   --   --   --  2.2  --   --   --  2.0 2.1 1.6*   PHOS  --   --   --   --  3.4  --  2.6  --  2.6  --   --   --  2.4* 2.7   PROTTOTAL  --  6.7 6.7 7.4 7.6   < >  --  7.8  --    < >  --   --   --   --    ALBUMIN  --  4.1 4.1 4.5 4.6   < > 4.2 3.7 4.7   < >  --   --  3.7 3.7   BILITOTAL  --  0.6 0.7 0.7 0.6   < >  --  0.9  --    < >  --   --   --   --    ALKPHOS  --  69 65 73 76   < >  --  83  --    < >  --   --   --   --    AST  --  17 21 23 23   < >  --  20  --    < >  --   --   --   --    ALT  --  22 17 14 24   < >  --  29  --    < >  --   --   --   --     < > = values in this interval not displayed.     CBC  Recent Labs   Lab Test 10/25/23  1517 11/07/22  1627 08/08/22  0908 07/20/22  1540 08/26/21  0801   HGB 15.6 15.5 16.6 15.5 13.4   WBC  --  7.3 9.3 9.5 10.0   RBC  --  5.33 5.67 5.25 4.49   HCT  --  47.8 49.2 45.7 40.1   MCV  --  90 87 87 89   MCH  --  29.1 29.3 29.5 29.8   MCHC  --  32.4 33.7 33.9 33.4   RDW  --  12.6 11.9 12.6 12.5   PLT  --  191 188 232 173     INRNo lab results found.  ABGNo lab results found.   URINE STUDIES  Recent Labs   Lab Test 10/25/23  1522 08/08/22  0915  07/20/22  1608 12/13/20  1208   COLOR Light Yellow Yellow Yellow Yellow   APPEARANCE Clear Clear Clear Clear   URINEGLC 500* Negative Negative Negative   URINEBILI Negative Negative Negative Negative   URINEKETONE Negative 40* Negative Negative   SG 1.023 1.020 <=1.005 1.006   UBLD Negative Negative Negative Negative   URINEPH 7.0 6.5 6.0 6.5   PROTEIN 10* Negative Negative Trace*   UROBILINOGEN  --   --  0.2 <2.0 E.U./dL   NITRITE Negative Negative Negative Negative   LEUKEST Negative Negative Negative Negative   RBCU <1 1  --  0-2   WBCU 1 <1  --  0-5     Recent Labs   Lab Test 08/08/22  0915   [facility] 0.16       ASSESSMENT AND PLAN:   #CKD stage 3a with a UPCR at 0.16 g/g Cr on solitary kidney  Deterioration in the renal function over the past year likely secondary to accelerated hypertension and nephrectomy  Renal imaging of the remaining kidney is unremarkable  No documented intake of NSAIDs or other nephrotoxic medications. Also recent urine toxicology screen is negative. The progression is tributary of BP control  The patient was instructed to keep the sodium intake around 2400 mg /day, follow a plant-based diet and to avoid NSAIDs     #HTN  Primary and secondary to CKD. Remains suboptimal on amlodipine 10 mg daily, telmisartan 80 mg daily, carvedilol 25 mg bid and spironolactone 25 mg. There is no evidence of renal artery stenosis and his nephrectomy didn't improve his BP. A TSH level is wnl. Will increase carvedilol to 50 mg twice daily. Compliance was reenforced and the patient was also instructed to keep on monitoring his BP.     #Type 2 DM   Hba1c is 6.7 in October 2023. Well controlled on metformin     #CVD/CAD dyslipidemia  On atorvastatin. Management per cardiology     #Blood count  Hemoglobin 16 -> 15.6. Should also be screened for sleep apnea     #Acid-base status  CO2 level 28 -> 29. No acute issues     #Electrolytes  K 4.2 -> 4.7 The patient is currently off potassium supplementation      #BMD  Ca   9.9        P 2.6 Alb 4.7  iPTH 49  Vitamin D level 25 Will start him on vitamin D at next visit       The total time of this encounter amounted to 30 minutes on the day of the encounter. This time included time spent with the patient, reviewing records, ordering tests, and performing post visit documentation.     The patient will return to follow up in 4 months    Dian Cox MD  Division of Renal Diseases and Hypertension

## 2023-10-25 NOTE — PATIENT INSTRUCTIONS
-Please take carvedilol 50 mg twice daily instead of 25 mg twice daily  -Please return to clinic in 4 months

## 2023-10-25 NOTE — LETTER
10/25/2023       RE: Moriah Hinojosa  [address] [place] [address]     Dear Colleague,    Thank you for referring your patient, Moriah Hinojosa, to the [facility] NEPHROLOG[facility] [place] at [facility]. Please see a copy of my visit note below.    Nephrology Clinic    Moriah Hinojosa MRN:[id number] YOB: 1955  Date of Service: 10/25/2023  Primary care provider: Yassine Larsen  Requesting physician: Yassine Larsen      REASON FOR CONSULT: Hypertension and CKD    HISTORY OF PRESENT ILLNESS:   Moriah Hinojosa is a 68 year old male who first  presented for evaluation of uncontrolled hypertension and  CKD stage 3 in August 2022.  The past medical history is significant for longstanding hypertension and type 2 diabetes for more than 10 years, chronic severe left-sided hydronephrosis and proximal left hydroureter secondary to a proximal left ureteral stone s/p nephrectomy in August 2021 in an attempt to control his hypertension, and CKD. His blood pressure had been difficult to control and he had had multiple changes done by both his cardiologist and his PCP. He was on amlodipine 10 mg daily, hydrochlorothiazide 25 mg daily, losartan 100 mg daily and carvedilol 25 mg bid. He was however hypertensive at 225/120, tachycardic at 97 and was also complaining of headache.  His diabetes is well controlled on metformin 750 mg twice daily  Urine studies done  showed a UPCR at 0.16 g/g Cr, however his potassium level was low 3.2 and his creatinine level was 1.3 mg/dL. It was 1.05 in August 2021 prior to his nephrectomy.  Renal imaging done in March 2021 shows an unremarkable right kidney and no evidence of renal artery stenosis on the right.  The patient had losartan changed to telmisartan, the hydrochlorothiazide stopped and replaced with spironolactone 25 mg daily and his BP improved but remained suboptimal. Therefore chlorthalidone 25 mg was added but the BP remained suboptimal.  Subsequently spironolactone was increased to 50 mg daily and carvedilol was increased to 50 mg twice daily. He however never increased them and also didn't fill chlorthalidone. He has also been seen by a pharmacist for MTM and she has deemed that he had a compliance issue with pseudo-resistant hypertension rather than truly resistant hypertension. He brings today his pill box and medication boxes with him.  His current medications are the following: spironolactone 25 mg daily, telmisartan 80 mg daily, metformin 750 mg twice daily, amlodipine 10 mg at bedtime and carvedilol 25 mg twice daily . His blood pressure is extremely elevated in clinic at 187/106 despite evidence from the pill box that he has been taking his medications on time this week. He also reports that his BP remains suboptimal at home.   The patient denies any dysuria, any pollakiuria, any nocturia, any LE edema, any dyspnea on exertion .    The following portions of the patient's history were reviewed and updated as appropriate: allergies, current medications, past family history, past medical history, past social history, past surgical history and problem list.    PAST MEDICAL HISTORY:  Past Medical History:   Diagnosis Date    Chronic pain of both knees 2/11/2019    COVID-19 5/7/2020    Hypercholesteremia 6/18/2018    Hypertension 5/7/2018     PAST SURGICAL HISTORY:  Past Surgical History:   Procedure Laterality Date    LAPAROSCOPIC NEPHRECTOMY Left 8/24/2021    Procedure: NEPHRECTOMY, TOTAL, LAPAROSCOPIC;  Surgeon: Catracho Wright MD;  Location:  OR     MEDICATIONS:  Prescription Medications as of 10/25/2023         Rx Number Disp Refills Start End Last Dispensed Date Next Fill Date Owning Pharmacy    acetaminophen (TYLENOL) 325 MG tablet  120 tablet 0 8/26/2021    [place], [place] - [address] [facility]    Sig: Take 2 tablets (650 mg) by mouth every 6 hours as needed for pain    Class: E-Prescribe     Route: Oral    amLODIPine (NORVASC) 10 MG tablet  90 tablet 0 8/14/2023    [facility] - [place], [place] - [address]    Sig: TAKE 1 TABLET (10 MG) BY MOUTH AT BEDTIME FOR BLOOD PRESSURE    Class: E-Prescribe    atorvastatin (LIPITOR) 80 MG tablet  90 tablet 2 6/30/2023    [facility] - [place], [place] - [address]    Sig: TAKE 1 TABLET (80 MG) BY MOUTH EVERY EVENING    Class: E-Prescribe    Route: Oral    blood glucose (NO BRAND SPECIFIED) test strip  100 strip 6 5/31/2023    [facility] - [place], [place] - [address]    Sig: Use to test blood sugar one times daily or as directed. To accompany: Blood Glucose Monitor Brands: per insurance.    Class: E-Prescribe    Notes to Pharmacy: Use to test blood sugar 1 times daily or as directed. Preferred blood glucose meter OR supplies to accompany: Blood Glucose Monitor Brands: per insurance.    Cosign for Ordering: Accepted by Yassine Larsen MD on 5/31/2023  2:42 PM    blood glucose monitoring (NO BRAND SPECIFIED) meter device kit  1 kit 0 5/31/2023    [facility] - [place], [place] - [address]    Sig: Use to test blood sugar 1 times daily or as directed. Preferred blood glucose meter OR supplies to accompany: Blood Glucose Monitor Brands: per insurance.    Class: E-Prescribe    Cosign for Ordering: Accepted by Yassine Larsen MD on 5/31/2023  2:42 PM    carvedilol (COREG) 25 MG tablet  360 tablet 3 7/27/2023    [facility] - [place], [place] - [address]    Sig: Take 1 tablet (25 mg) by mouth 2 times daily (with meals)    Class: E-Prescribe    Route: Oral    metFORMIN (GLUCOPHAGE-XR) 750 MG 24 hr tablet  180 tablet 3 7/27/2023    [facility] - [place], [place] - [address]    Sig: Take 1 tablet (750 mg) by mouth 2 times daily (with meals)    Class: E-Prescribe    Route: Oral    Polyvinyl Alcohol-Povidone (ARTIFICIAL TEARS) 5-6 MG/ML SOLN  30 mL 11 7/5/2023    [facility] - [place], [place] - [address]    Sig: Apply 1 drop to eye 3 times daily as needed (both eyes)    Class: E-Prescribe    Route: Ophthalmic    spironolactone (ALDACTONE) 25 MG tablet  30 tablet 3 7/27/2023    [facility] - [place], [place] - [address]    Sig: Take 1 tablet (25 mg) by mouth daily    Class: E-Prescribe    Route: Oral    telmisartan (MICARDIS) 80 MG tablet  90 tablet 3 1/3/2023    [facility] - [place] - [address]    Sig: Take 1 tablet (80 mg) by mouth daily    Class: E-Prescribe    Route: Oral    thin (NO BRAND SPECIFIED) lancets  100 each 6 5/31/2023    [facility] - [place], [place] - [address]    Sig: Use with lanceting device. To accompany: Blood Glucose Monitor Brands: per insurance.    Class: E-Prescribe    Notes to Pharmacy: Use to test blood sugar 1 times daily or as directed. Preferred blood glucose meter OR supplies to accompany: Blood Glucose Monitor Brands: per insurance.    Cosign for Ordering: Accepted by Yassine Larsen MD on 5/31/2023  2:42 PM           ALLERGIES:    No Known Allergies  REVIEW OF SYSTEMS:  Review Of Systems  Skin: negative for, pigmentation, acne, rash, scaling, itching, bruising  Eyes: negative for, visual blurring, double vision, glaucoma, cataracts, eye pain, color blindness  Ears/Nose/Throat: negative for, nasal congestion, sneezing, postnasal drainage, hearing loss, deafness, tinnitus, vertigo  Respiratory: No shortness of breath, dyspnea on exertion, cough, or hemoptysis  Cardiovascular: negative for, palpitations, tachycardia, irregular heart beat, chest pain and exertional chest pain or pressure  Gastrointestinal: negative for, poor appetite, dysphagia, nausea, vomiting, heartburn and dyspepsia  Genitourinary: negative for, nocturia, dysuria, frequency, urgency, hesitancy and hematuria  Musculoskeletal: negative for, fracture, back pain, neck pain, arthritis, joint pain and joint  swelling  Neurologic: negative for, headaches, syncope, stroke, seizures, paralysis and local weakness    A comprehensive review of systems was performed and found to be negative except as described here or above.  SOCIAL HISTORY:   Social History     Socioeconomic History    Marital status:      Spouse name: Not on file    Number of children: Not on file    Years of education: Not on file    Highest education level: Not on file   Occupational History    Not on file   Tobacco Use    Smoking status: Former     Packs/day: .5     Types: Cigarettes     Quit date: 3/6/2016     Years since quittin.6     Passive exposure: Never    Smokeless tobacco: Never    Tobacco comments:     no passive exposure   Vaping Use    Vaping Use: Never used   Substance and Sexual Activity    Alcohol use: No    Drug use: Never    Sexual activity: Not Currently     Partners: Female   Other Topics Concern    Not on file   Social History Narrative    Not on file     Social Determinants of Health     Financial Resource Strain: Low Risk  (10/10/2023)    Financial Resource Strain     Within the past 12 months, have you or your family members you live with been unable to get utilities (heat, electricity) when it was really needed?: No   Food Insecurity: Low Risk  (10/10/2023)    Food Insecurity     Within the past 12 months, did you worry that your food would run out before you got money to buy more?: No     Within the past 12 months, did the food you bought just not last and you didn t have money to get more?: No   Transportation Needs: Low Risk  (10/10/2023)    Transportation Needs     Within the past 12 months, has lack of transportation kept you from medical appointments, getting your medicines, non-medical meetings or appointments, work, or from getting things that you need?: No   Physical Activity: Not on file   Stress: Not on file   Social Connections: Not on file   Interpersonal Safety: Not on file   Housing Stability: Low Risk   (10/10/2023)    Housing Stability     Do you have housing? : Yes     Are you worried about losing your housing?: No     FAMILY MEDICAL HISTORY:   Family History   Problem Relation Age of Onset    Anesthesia Reaction No family hx of     Deep Vein Thrombosis (DVT) No family hx of      PHYSICAL EXAM:   BP (!) 193/95   Pulse 92   Temp 97.9  F (36.6  C) (Oral)   Wt 68.6 kg (151 lb 4.8 oz)   SpO2 100%   BMI 26.15 kg/m    GENERAL APPEARANCE: alert and no distress  EYES: nonicteric  HENT: mouth without ulcers or lesions  NECK: supple, no adenopathy  RESP: lungs clear to auscultation   CV: regular rhythm, normal rate, no rub  ABDOMEN: soft, nontender, normal bowel sounds, no HSM   Extremities: no clubbing, cyanosis, or edema  MS: no evidence of inflammation in joints, no muscle tenderness  SKIN: no rash  NEURO: mentation intact and speech normal  PSYCH: affect normal/bright   LABS:   Recent Results (from the past 672 hour(s))   HEMOGLOBIN A1C    Collection Time: 10/10/23  3:37 PM   Result Value Ref Range    Hemoglobin A1C 6.7 (H) 0.0 - 5.6 %   Extra Serum Separator Tube (SST)    Collection Time: 10/10/23  3:41 PM   Result Value Ref Range    Hold Specimen JIC    Extra Green Top (Lithium Heparin) Tube    Collection Time: 10/10/23  3:41 PM   Result Value Ref Range    Hold Specimen JIC    Hemoglobin    Collection Time: 10/25/23  3:17 PM   Result Value Ref Range    Hemoglobin 15.6 13.3 - 17.7 g/dL   UA with Microscopic    Collection Time: 10/25/23  3:22 PM   Result Value Ref Range    Color Urine Light Yellow Colorless, Straw, Light Yellow, Yellow    Appearance Urine Clear Clear    Glucose Urine 500 (A) Negative mg/dL    Bilirubin Urine Negative Negative    Ketones Urine Negative Negative mg/dL    Specific Gravity Urine 1.023 1.003 - 1.035    Blood Urine Negative Negative    pH Urine 7.0 5.0 - 7.0    Protein Albumin Urine 10 (A) Negative mg/dL    Urobilinogen Urine Normal Normal, 2.0 mg/dL    Nitrite Urine Negative Negative     Leukocyte Esterase Urine Negative Negative    RBC Urine <1 <=2 /HPF    WBC Urine 1 <=5 /HPF     CMP  Recent Labs   Lab Test 07/05/23  1526 04/12/23  1257 12/19/22  1021 12/05/22  1013 11/07/22  1627 10/12/22  1049 09/12/22  0948 08/29/22  0951 08/08/22  0908 08/03/21  1437 12/23/20  1215 12/16/20  0702 12/15/20  0439 12/14/20  0442    138 139 139 139   < > 139 136 142   < > 138 141 138 140   POTASSIUM 4.8 3.9 4.0 4.2 4.9   < > 4.7 4.3 3.2*   < > 3.2* 3.7 3.7 3.8   CHLORIDE 104 103 103 101 102   < > 101 102 107   < > 98 105 106 108*   CO2 26 22 28 28 26   < > 29 28 24   < > 30 26 21* 22   ANIONGAP 10 13 8 10 11   < > 9 6 11   < > 10 10 11 10   * 178* 207* 154* 124*   < > 146* 254* 109*   < > 267* 117 185* 142*   BUN 13.6 24.1* 20.3 19.4 18.4   < > 18.8 16 18   < > 17 38* 20 14   CR 1.49* 1.40* 1.28* 1.18* 1.21*   < > 1.15 1.21 1.31*   < > 1.34* 1.80* 1.29 1.29   GFRESTIMATED 50* 54* 61 67 65   < > 69 65 59*   < > 53* 38* 56* 56*   GFRESTBLACK  --   --   --   --   --   --   --   --   --   --  >60 46* >60 >60   LENNOX 10.0 9.1 9.3 9.7 10.1   < > 9.8 9.1 9.9   < > 9.6 9.2 8.9 9.0   MAG  --   --   --   --   --   --   --  2.2  --   --   --  2.0 2.1 1.6*   PHOS  --   --   --   --  3.4  --  2.6  --  2.6  --   --   --  2.4* 2.7   PROTTOTAL  --  6.7 6.7 7.4 7.6   < >  --  7.8  --    < >  --   --   --   --    ALBUMIN  --  4.1 4.1 4.5 4.6   < > 4.2 3.7 4.7   < >  --   --  3.7 3.7   BILITOTAL  --  0.6 0.7 0.7 0.6   < >  --  0.9  --    < >  --   --   --   --    ALKPHOS  --  69 65 73 76   < >  --  83  --    < >  --   --   --   --    AST  --  17 21 23 23   < >  --  20  --    < >  --   --   --   --    ALT  --  22 17 14 24   < >  --  29  --    < >  --   --   --   --     < > = values in this interval not displayed.     CBC  Recent Labs   Lab Test 10/25/23  1517 11/07/22  1627 08/08/22  0908 07/20/22  1540 08/26/21  0801   HGB 15.6 15.5 16.6 15.5 13.4   WBC  --  7.3 9.3 9.5 10.0   RBC  --  5.33 5.67 5.25 4.49   HCT  --   47.8 49.2 45.7 40.1   MCV  --  90 87 87 89   MCH  --  29.1 29.3 29.5 29.8   MCHC  --  32.4 33.7 33.9 33.4   RDW  --  12.6 11.9 12.6 12.5   PLT  --  191 188 232 173     INRNo lab results found.  ABGNo lab results found.   URINE STUDIES  Recent Labs   Lab Test 10/25/23  1522 08/08/22  0915 07/20/22  1608 12/13/20  1208   COLOR Light Yellow Yellow Yellow Yellow   APPEARANCE Clear Clear Clear Clear   URINEGLC 500* Negative Negative Negative   URINEBILI Negative Negative Negative Negative   URINEKETONE Negative 40* Negative Negative   SG 1.023 1.020 <=1.005 1.006   UBLD Negative Negative Negative Negative   URINEPH 7.0 6.5 6.0 6.5   PROTEIN 10* Negative Negative Trace*   UROBILINOGEN  --   --  0.2 <2.0 E.U./dL   NITRITE Negative Negative Negative Negative   LEUKEST Negative Negative Negative Negative   RBCU <1 1  --  0-2   WBCU 1 <1  --  0-5     Recent Labs   Lab Test 08/08/22  0915   [facility] 0.16       ASSESSMENT AND PLAN:   #CKD stage 3a with a UPCR at 0.16 g/g Cr on solitary kidney  Deterioration in the renal function over the past year likely secondary to accelerated hypertension and nephrectomy  Renal imaging of the remaining kidney is unremarkable  No documented intake of NSAIDs or other nephrotoxic medications. Also recent urine toxicology screen is negative. The progression is tributary of BP control  The patient was instructed to keep the sodium intake around 2400 mg /day, follow a plant-based diet and to avoid NSAIDs     #HTN  Primary and secondary to CKD. Remains suboptimal on amlodipine 10 mg daily, telmisartan 80 mg daily, carvedilol 25 mg bid and spironolactone 25 mg. There is no evidence of renal artery stenosis and his nephrectomy didn't improve his BP. A TSH level is wnl. Will increase carvedilol to 50 mg twice daily. Compliance was reenforced and the patient was also instructed to keep on monitoring his BP.     #Type 2 DM   Hba1c is 6.7 in October 2023. Well controlled on metformin     #CVD/CAD  dyslipidemia  On atorvastatin. Management per cardiology     #Blood count  Hemoglobin 16 -> 15.6. Should also be screened for sleep apnea     #Acid-base status  CO2 level 28 -> 29. No acute issues     #Electrolytes  K 4.2 -> 4.7 The patient is currently off potassium supplementation      #BMD  Ca  9.9        P 2.6 Alb 4.7  iPTH 49  Vitamin D level 25 Will start him on vitamin D at next visit       The total time of this encounter amounted to 30 minutes on the day of the encounter. This time included time spent with the patient, reviewing records, ordering tests, and performing post visit documentation.     The patient will return to follow up in 4 months    Dian Cox MD  Division of Renal Diseases and Hypertension

## 2023-11-10 DIAGNOSIS — I10 ACCELERATED HYPERTENSION: ICD-10-CM

## 2023-11-10 RX ORDER — AMLODIPINE BESYLATE 10 MG/1
10 TABLET ORAL AT BEDTIME
Qty: 90 TABLET | Refills: 0 | Status: SHIPPED | OUTPATIENT
Start: 2023-11-10 | End: 2024-01-10

## 2023-11-29 DIAGNOSIS — I10 ACCELERATED HYPERTENSION: ICD-10-CM

## 2023-11-30 RX ORDER — SPIRONOLACTONE 25 MG/1
25 TABLET ORAL DAILY
Qty: 90 TABLET | Refills: 3 | Status: SHIPPED | OUTPATIENT
Start: 2023-11-30 | End: 2024-02-02

## 2023-12-06 ENCOUNTER — TRANSFERRED RECORDS (OUTPATIENT)
Dept: HEALTH INFORMATION MANAGEMENT | Facility: CLINIC | Age: 68
End: 2023-12-06
Payer: COMMERCIAL

## 2023-12-06 LAB — RETINOPATHY: NEGATIVE

## 2024-01-10 ENCOUNTER — OFFICE VISIT (OUTPATIENT)
Dept: FAMILY MEDICINE | Facility: CLINIC | Age: 69
End: 2024-01-10
Payer: COMMERCIAL

## 2024-01-10 VITALS
HEART RATE: 77 BPM | DIASTOLIC BLOOD PRESSURE: 89 MMHG | RESPIRATION RATE: 16 BRPM | OXYGEN SATURATION: 100 % | TEMPERATURE: 97.6 F | WEIGHT: 153.04 LBS | SYSTOLIC BLOOD PRESSURE: 159 MMHG | BODY MASS INDEX: 26.13 KG/M2 | HEIGHT: 64 IN

## 2024-01-10 DIAGNOSIS — N18.30 STAGE 3 CHRONIC KIDNEY DISEASE, UNSPECIFIED WHETHER STAGE 3A OR 3B CKD (H): ICD-10-CM

## 2024-01-10 DIAGNOSIS — I15.1 HYPERTENSION SECONDARY TO OTHER RENAL DISORDERS: ICD-10-CM

## 2024-01-10 DIAGNOSIS — Z00.00 ENCOUNTER FOR MEDICARE ANNUAL WELLNESS EXAM: Primary | ICD-10-CM

## 2024-01-10 DIAGNOSIS — E78.00 HYPERCHOLESTEREMIA: ICD-10-CM

## 2024-01-10 DIAGNOSIS — Z12.5 SCREENING FOR PROSTATE CANCER: ICD-10-CM

## 2024-01-10 DIAGNOSIS — E11.29 TYPE 2 DIABETES MELLITUS WITH OTHER DIABETIC KIDNEY COMPLICATION, WITHOUT LONG-TERM CURRENT USE OF INSULIN (H): ICD-10-CM

## 2024-01-10 PROCEDURE — 99213 OFFICE O/P EST LOW 20 MIN: CPT | Mod: 25 | Performed by: FAMILY MEDICINE

## 2024-01-10 PROCEDURE — G0439 PPPS, SUBSEQ VISIT: HCPCS | Performed by: FAMILY MEDICINE

## 2024-01-10 PROCEDURE — G0103 PSA SCREENING: HCPCS | Performed by: FAMILY MEDICINE

## 2024-01-10 PROCEDURE — 36415 COLL VENOUS BLD VENIPUNCTURE: CPT | Performed by: FAMILY MEDICINE

## 2024-01-10 RX ORDER — AMLODIPINE BESYLATE 10 MG/1
10 TABLET ORAL AT BEDTIME
Qty: 90 TABLET | Refills: 3 | Status: SHIPPED | OUTPATIENT
Start: 2024-01-10 | End: 2024-02-02

## 2024-01-10 RX ORDER — TELMISARTAN 80 MG/1
80 TABLET ORAL DAILY
Qty: 90 TABLET | Refills: 3 | Status: SHIPPED | OUTPATIENT
Start: 2024-01-10

## 2024-01-10 RX ORDER — ATORVASTATIN CALCIUM 80 MG/1
80 TABLET, FILM COATED ORAL EVERY EVENING
Qty: 90 TABLET | Refills: 3 | Status: SHIPPED | OUTPATIENT
Start: 2024-01-10 | End: 2024-02-02

## 2024-01-10 ASSESSMENT — ENCOUNTER SYMPTOMS
CHILLS: 0
NAUSEA: 0
HEARTBURN: 0
HEMATURIA: 0
EYE PAIN: 1
HEMATOCHEZIA: 0
FREQUENCY: 0
COUGH: 0
WEAKNESS: 0
NERVOUS/ANXIOUS: 0
HEADACHES: 0
DYSURIA: 0
ABDOMINAL PAIN: 0
SHORTNESS OF BREATH: 0
JOINT SWELLING: 0
PARESTHESIAS: 0
PALPITATIONS: 0
CONSTIPATION: 0
FEVER: 0
ARTHRALGIAS: 1
DIZZINESS: 0
MYALGIAS: 0
SORE THROAT: 0
DIARRHEA: 0

## 2024-01-10 ASSESSMENT — ACTIVITIES OF DAILY LIVING (ADL)
CURRENT_FUNCTION: HOUSEWORK REQUIRES ASSISTANCE
CURRENT_FUNCTION: PREPARING MEALS REQUIRES ASSISTANCE
CURRENT_FUNCTION: TRANSPORTATION REQUIRES ASSISTANCE
CURRENT_FUNCTION: MONEY MANAGEMENT REQUIRES ASSISTANCE
CURRENT_FUNCTION: LAUNDRY REQUIRES ASSISTANCE
CURRENT_FUNCTION: MEDICATION ADMINISTRATION REQUIRES ASSISTANCE
CURRENT_FUNCTION: BATHING REQUIRES ASSISTANCE
CURRENT_FUNCTION: SHOPPING REQUIRES ASSISTANCE

## 2024-01-10 NOTE — PROGRESS NOTES
"SUBJECTIVE:   Pwo is a 69 year old, presenting for the following:  Annual Visit and Recheck Medication (Pt states he feel like vomiting after taking metformin, but has never actually vomit. He also hasn't monitor his blood sugar at home as he doesn't know how it work.)    Patient reports that the nausea after taking metformin just last for a few minutes and then resolves.  Is not severe.  It does not bother him enough that he wants to change medication.  On medication review, his telmisartan is missing but all his other medications he appears to be taking them correctly and has them with him today.        1/10/2024     1:06 PM   Additional Questions   Roomed by rosy galdamez MA   Accompanied by self       Are you in the first 12 months of your Medicare coverage?  No    Healthy Habits:     In general, how would you rate your overall health?  Good    Frequency of exercise:  None    Do you usually eat at least 4 servings of fruit and vegetables a day, include whole grains    & fiber and avoid regularly eating high fat or \"junk\" foods?  No    Ability to successfully perform activities of daily living:  Transportation requires assistance, shopping requires assistance, preparing meals requires assistance, housework requires assistance, bathing requires assistance, laundry requires assistance, medication administration requires assistance and money management requires assistance    Home Safety:  No safety concerns identified    Hearing Impairment:  No hearing concerns    In the past 6 months, have you been bothered by leaking of urine?  No    In general, how would you rate your overall mental or emotional health?  Excellent      Today's PHQ-2 Score:       1/10/2024     1:05 PM   PHQ-2 ( 1999 [employer])   Q1: Little interest or pleasure in doing things 0   Q2: Feeling down, depressed or hopeless 0   PHQ-2 Score 0   Q1: Little interest or pleasure in doing things Not at all   Q2: Feeling down, depressed or hopeless Not at all   PHQ-2 " Score 0           Have you ever done Advance Care Planning? (For example, a Health Directive, POLST, or a discussion with a medical provider or your loved ones about your wishes): No, advance care planning information given to patient to review.  Patient plans to discuss their wishes with loved ones or provider.         Fall risk  Fallen 2 or more times in the past year?: No  Any fall with injury in the past year?: No    Cognitive Screening   1) Repeat 3 items (Leader, Season, Table)    2) Clock draw: ABNORMAL    3) 3 item recall: Recalls 3 objects  Results: I think that the clock draw is not culture involvement as he did not have experience with this type of clock growing up in [facility], he recalls 3 out of 3 items on his memory test and I think cognitive impairment is unlikely.    Mini-CogTM Copyright VIC Barahona. Licensed by the author for use in [facility]; reprinted with permission ([email or web address]). All rights reserved.      Do you have sleep apnea, excessive snoring or daytime drowsiness? : no    Reviewed and updated as needed this visit by clinical staff   Tobacco  Allergies  Meds              Reviewed and updated as needed this visit by Provider                 Social History     Tobacco Use    Smoking status: Former     Packs/day: .5     Types: Cigarettes     Quit date: 3/6/2016     Years since quittin.8     Passive exposure: Never    Smokeless tobacco: Never    Tobacco comments:     no passive exposure   Substance Use Topics    Alcohol use: No             1/10/2024     1:05 PM   Alcohol Use   Prescreen: >3 drinks/day or >7 drinks/week? No     Do you have a current opioid prescription? No  Do you use any other controlled substances or medications that are not prescribed by a provider? None              Current providers sharing in care for this patient include:   Patient Care Team:  Yassine Larsen MD as PCP - General (Family Medicine)  Catracho Wright MD as MD (Urology)  Suzie  Yassine DAY MD as Assigned PCP  Ebony Wooten MD (Family Medicine)  Matias Chavarria MD as Assigned Heart and Vascular Provider  Dian Cox MD as Assigned Nephrology Provider  Pascale Cesar, PharmD as Pharmacist (Pharmacist)  Pascale Cesar, PharmD as Assigned [facility] Pharmacist    The following health maintenance items are reviewed in Epic and correct as of today:  Health Maintenance   Topic Date Due    DIABETIC FOOT EXAM  Never done    ANNUAL REVIEW OF HM ORDERS  Never done    RSV VACCINE (Pregnancy & 60+) (1 - 1-dose 60+ series) Never done    ZOSTER IMMUNIZATION (2 of 2) 03/25/2021    LUNG CANCER SCREENING  12/13/2021    MEDICARE ANNUAL WELLNESS VISIT  01/26/2022    URINE DRUG SCREEN  07/20/2023    COVID-19 Vaccine (3 - 2023-24 season) 09/01/2023    A1C  01/25/2024    BMP  10/25/2024    LIPID  10/25/2024    MICROALBUMIN  10/25/2024    HEMOGLOBIN  10/25/2024    EYE EXAM  12/06/2024    FALL RISK ASSESSMENT  01/10/2025    COLORECTAL CANCER SCREENING  07/28/2025    ADVANCE CARE PLANNING  01/26/2026    DTAP/TDAP/TD IMMUNIZATION (2 - Td or Tdap) 05/13/2029    HEPATITIS C SCREENING  Completed    PHQ-2 (once per calendar year)  Completed    INFLUENZA VACCINE  Completed    Pneumococcal Vaccine: 65+ Years  Completed    URINALYSIS  Completed    AORTIC ANEURYSM SCREENING (SYSTEM ASSIGNED)  Completed    IPV IMMUNIZATION  Aged Out    HPV IMMUNIZATION  Aged Out    MENINGITIS IMMUNIZATION  Aged Out    RSV MONOCLONAL ANTIBODY  Aged Out     Labs reviewed in EPIC        Review of Systems   Constitutional:  Negative for chills and fever.   HENT:  Positive for congestion. Negative for ear pain, hearing loss and sore throat.    Eyes:  Positive for pain and visual disturbance.   Respiratory:  Negative for cough and shortness of breath.    Cardiovascular:  Negative for chest pain, palpitations and peripheral edema.   Gastrointestinal:  Negative for abdominal pain, constipation, diarrhea, heartburn, hematochezia and  "nausea.   Genitourinary:  Negative for dysuria, frequency, genital sores, hematuria, impotence, penile discharge and urgency.   Musculoskeletal:  Positive for arthralgias. Negative for joint swelling and myalgias.   Skin:  Negative for rash.   Neurological:  Negative for dizziness, weakness, headaches and paresthesias.   Psychiatric/Behavioral:  Negative for mood changes. The patient is not nervous/anxious.          OBJECTIVE:   BP (!) 164/89   Pulse 77   Temp 97.6  F (36.4  C) (Oral)   Resp 16   Ht 1.615 m (5' 3.58\")   Wt 69.4 kg (153 lb 0.6 oz)   SpO2 100%   BMI 26.61 kg/m   Estimated body mass index is 26.61 kg/m  as calculated from the following:    Height as of this encounter: 1.615 m (5' 3.58\").    Weight as of this encounter: 69.4 kg (153 lb 0.6 oz).  Physical Exam    Gen - alert, orientated, NAD  Eyes - fundascopic exam limited by the undialated pupil but looks symmetric  ENT - oropharynx clear, TMs clear  Neck - supple, no palpable mass or lymphadenopathy  CV - RRR, no murmur  Resp - lungs CTA  Ab - soft, nontender, no palpable mass or organomegaly   - normal appearance to the external genetalia, normal testicular exam bilaterally, no hernia  Extrem - warm, no edema  Neuro - CN II-XII intact, strength, sensation, reflexes intact and symmetric  Skin - no rash, no atypical appearing lesions seen.         ASSESSMENT / PLAN:   Pwo was seen today for annual visit and recheck medication.    Diagnoses and all orders for this visit:    Encounter for Medicare annual wellness exam    Screening for prostate cancer  -     PSA, screen; Future  -     PSA, screen    Hypertension secondary to other renal disorders  Refill amlodipine and restart telmisartan.  Medication review and counseling done with the patient, he certainly improved his medication understanding and adherence.  -     amLODIPine (NORVASC) 10 MG tablet; Take 1 tablet (10 mg) by mouth at bedtime Need appt for further refills.  -     telmisartan " "(MICARDIS) 80 MG tablet; Take 1 tablet (80 mg) by mouth daily    Type 2 diabetes mellitus with other diabetic kidney complication, without long-term current use of insulin (H)  Discussed options for changing metformin but for now he is going to continue with that given the minimal nausea that he is experiencing, I did remind him to make sure he is taking it with his meals.    Stage 3 chronic kidney disease, unspecified whether stage 3a or 3b CKD (H)  Patient has upcoming follow-up with his nephrology clinic.    Hypercholesteremia  -     atorvastatin (LIPITOR) 80 MG tablet; Take 1 tablet (80 mg) by mouth every evening        COUNSELING:  Reviewed preventive health counseling, as reflected in patient instructions      BMI:   Estimated body mass index is 26.61 kg/m  as calculated from the following:    Height as of this encounter: 1.615 m (5' 3.58\").    Weight as of this encounter: 69.4 kg (153 lb 0.6 oz).         He reports that he quit smoking about 7 years ago. His smoking use included cigarettes. He smoked an average of 0.5 packs per day. He has never been exposed to tobacco smoke. He has never used smokeless tobacco.      Appropriate preventive services were discussed with this patient, including applicable screening as appropriate for fall prevention, nutrition, physical activity, Tobacco-use cessation, weight loss and cognition.  Checklist reviewing preventive services available has been given to the patient.    Reviewed patients plan of care and provided an AVS. The Basic Care Plan (routine screening as documented in Health Maintenance) for Pwo meets the Care Plan requirement. This Care Plan has been established and reviewed with the Patient.        Yassine Larsen MD  [facility]    Identified Health Risks:  I have reviewed Opioid Use Disorder and Substance Use Disorder risk factors and made any needed referrals.   "

## 2024-01-10 NOTE — PROGRESS NOTES
"He is at risk for lack of exercise and has been provided with information to increase physical activity for the benefit of his well-being.  The patient was counseled and encouraged to consider modifying their diet and eating habits. He was provided with information on recommended healthy diet options.  The patient reports that he has difficulty with activities of daily living. I have asked that the patient make a follow up appointment in 12 weeks where this issue will be further evaluated and addressed.      Also see other note      Answers submitted by the patient for this visit:  Annual Preventive Visit (Submitted on 1/10/2024)  Chief Complaint: Annual Exam:  In general, how would you rate your overall physical health?: good  Frequency of exercise:: None  Do you usually eat at least 4 servings of fruit and vegetables a day, include whole grains & fiber, and avoid regularly eating high fat or \"junk\" foods? : No  Activities of Daily Living: transportation requires assistance, shopping requires assistance, preparing meals requires assistance, housework requires assistance, bathing requires assistance, laundry requires assistance, medication administration requires assistance, money management requires assistance  Home safety: no safety concerns identified  Hearing Impairment:: no hearing concerns  In the past 6 months, have you been bothered by leaking of urine?: No  abdominal pain: No  Blood in stool: No  Blood in urine: No  chest pain: No  chills: No  congestion: Yes  constipation: No  cough: No  diarrhea: No  dizziness: No  ear pain: No  eye pain: Yes  nervous/anxious: No  fever: No  frequency: No  genital sores: No  headaches: No  hearing loss: No  heartburn: No  arthralgias: Yes  joint swelling: No  peripheral edema: No  mood changes: No  myalgias: No  nausea: No  dysuria: No  palpitations: No  Skin sensation changes: No  sore throat: No  urgency: No  rash: No  shortness of breath: No  visual disturbance: " Yes  weakness: No  impotence: No  penile discharge: No  In general, how would you rate your overall mental or emotional health?: excellent

## 2024-01-10 NOTE — PATIENT INSTRUCTIONS
"Patient Education   Personalized Prevention Plan  You are due for the preventive services outlined below.  Your care team is available to assist you in scheduling these services.  If you have already completed any of these items, please share that information with your care team to update in your medical record.  Health Maintenance Due   Topic Date Due     Diabetic Foot Exam  Never done     ANNUAL REVIEW OF HM ORDERS  Never done     RSV VACCINE (Pregnancy & 60+) (1 - 1-dose 60+ series) Never done     Zoster (Shingles) Vaccine (2 of 2) 03/25/2021     LUNG CANCER SCREENING  12/13/2021     URINE DRUG SCREEN  07/20/2023     COVID-19 Vaccine (3 - 2023-24 season) 09/01/2023     Learning About Being Physically Active  What is physical activity?     Being physically active means doing any kind of activity that gets your body moving.  The types of physical activity that can help you get fit and stay healthy include:  Aerobic or \"cardio\" activities. These make your heart beat faster and make you breathe harder, such as brisk walking, riding a bike, or running. They strengthen your heart and lungs and build up your endurance.  Strength training activities. These make your muscles work against, or \"resist,\" something. Examples include lifting weights or doing push-ups. These activities help tone and strengthen your muscles and bones.  Stretches. These let you move your joints and muscles through their full range of motion. Stretching helps you be more flexible.  Reaching a balance between these three types of physical activity is important because each one contributes to your overall fitness.  What are the benefits of being active?  Being active is one of the best things you can do for your health. It helps you to:  Feel stronger and have more energy to do all the things you like to do.  Focus better at school or work.  Feel, think, and sleep better.  Reach and stay at a healthy weight.  Lose fat and build lean muscle.  Lower " "your risk for serious health problems, including diabetes, heart attack, high blood pressure, and some cancers.  Keep your heart, lungs, bones, muscles, and joints strong and healthy.  How can you make being active part of your life?  Start slowly. Make it your long-term goal to get at least 30 minutes of exercise on most days of the week. Walking is a good choice. You also may want to do other activities, such as running, swimming, cycling, or playing tennis or team sports.  Pick activities that you like--ones that make your heart beat faster, your muscles stronger, and your muscles and joints more flexible. If you find more than one thing you like doing, do them all. You don't have to do the same thing every day.  Get your heart pumping every day. Any activity that makes your heart beat faster and keeps it at that rate for a while counts.  Here are some great ways to get your heart beating faster:  Go for a brisk walk, run, or hike.  Go for a swim or bike ride.  Take an online exercise class or dance.  Play a game of touch football, basketball, or soccer.  Play tennis, pickleball, or racquetball.  Climb stairs.  Even some household chores can be aerobic. Just do them at a faster pace. Raking or mowing the lawn, sweeping the garage, and vacuuming and cleaning your home all can help get your heart rate up.  Strengthen your muscles during the week. You don't have to lift heavy weights or grow big, bulky muscles to get stronger. Doing a few simple activities that make your muscles work against, or \"resist,\" something can help you get stronger. Aim for at least twice a week.  For example, you can:  Do push-ups or sit-ups, which use your own body weight as resistance.  Lift weights or dumbbells or use stretch bands at home or in a gym or community center.  Stretch your muscles often. Stretching will help you as you become more active. It can help you stay flexible and loosen tight muscles. It can also help improve your " "balance and posture and can be a great way to relax.  Be sure to stretch the muscles you'll be using when you work out. It's best to warm your muscles slightly before you stretch them. Walk or do some other light aerobic activity for a few minutes. Then start stretching.  When you stretch your muscles:  Do it slowly. Stretching is not about going fast or making sudden movements.  Don't push or bounce during a stretch.  Hold each stretch for at least 15 to 30 seconds, if you can. You should feel a stretch in the muscle, but not pain.  Breathe out as you do the stretch. Then breathe in as you hold the stretch. Don't hold your breath.  If you're worried about how more activity might affect your health, have a checkup before you start. Follow any special advice your doctor gives you for getting a smart start.  Where can you learn more?  Go to https://www.[employer].Spock/patiented  Enter W332 in the search box to learn more about \"Learning About Being Physically Active.\"  Current as of: June 6, 2023               Content Version: 13.8    3736-8728 [employer].   Care instructions adapted under license by your healthcare professional. If you have questions about a medical condition or this instruction, always ask your healthcare professional. [employer] disclaims any warranty or liability for your use of this information.      Learning About Dietary Guidelines  What are the Dietary Guidelines for Americans?     Dietary Guidelines for Americans provide tips for eating well and staying healthy. This helps reduce the risk for long-term (chronic) diseases.  These guidelines recommend that you:  Eat and drink the right amount for you. The [place] government's food guide is called MyPlate. It can help you make your own well-balanced eating plan.  Try to balance your eating with your activity. This helps you stay at a healthy weight.  Drink alcohol in moderation, if at all.  Limit foods high in salt, " "saturated fat, trans fat, and added sugar.  These guidelines are from the U.S. Department of Agriculture and the U.S. Department of Health and Human Services. They are updated every 5 years.  What is MyPlate?  MyPlate is the [place] government's food guide. It can help you make your own well-balanced eating plan. A balanced eating plan means that you eat enough, but not too much, and that your food gives you the nutrients you need to stay healthy.  MyPlate focuses on eating plenty of whole grains, fruits, and vegetables, and on limiting fat and sugar. It is available online at www.ChooseMyPlate.gov.  How can you get started?  If you're trying to eat healthier, you can slowly change your eating habits over time. You don't have to make big changes all at once. Start by adding one or two healthy foods to your meals each day.  Grains  Choose whole-grain breads and cereals and whole-wheat pasta and whole-grain crackers.  Vegetables  Eat a variety of vegetables every day. They have lots of nutrients and are part of a heart-healthy diet.  Fruits  Eat a variety of fruits every day. Fruits contain lots of nutrients. Choose fresh fruit instead of fruit juice.  Protein foods  Choose fish and lean poultry more often. Eat red meat and fried meats less often. Dried beans, tofu, and nuts are also good sources of protein.  Dairy  Choose low-fat or fat-free products from this food group. If you have problems digesting milk, try eating cheese or yogurt instead.  Fats and oils  Limit fats and oils if you're trying to cut calories. Choose healthy fats when you cook. These include canola oil and olive oil.  Where can you learn more?  Go to https://www.[employer].net/patiented  Enter D676 in the search box to learn more about \"Learning About Dietary Guidelines.\"  Current as of: February 28, 2023               Content Version: 13.8    7828-4200 [employer].   Care instructions adapted under license by your healthcare " professional. If you have questions about a medical condition or this instruction, always ask your healthcare professional. [employer] disclaims any warranty or liability for your use of this information.      Activities of Daily Living    Your Health Risk Assessment indicates you have difficulties with activities of daily living such as housework, bathing, preparing meals, taking medication, etc. Please make a follow up appointment for us to address this issue in more detail.

## 2024-01-11 LAB — PSA SERPL DL<=0.01 NG/ML-MCNC: 1 NG/ML (ref 0–6.5)

## 2024-01-23 ENCOUNTER — TELEPHONE (OUTPATIENT)
Dept: NEPHROLOGY | Facility: CLINIC | Age: 69
End: 2024-01-23
Payer: COMMERCIAL

## 2024-02-02 ENCOUNTER — OFFICE VISIT (OUTPATIENT)
Dept: CARDIOLOGY | Facility: CLINIC | Age: 69
End: 2024-02-02
Payer: COMMERCIAL

## 2024-02-02 VITALS
RESPIRATION RATE: 16 BRPM | HEIGHT: 64 IN | OXYGEN SATURATION: 95 % | SYSTOLIC BLOOD PRESSURE: 166 MMHG | HEART RATE: 82 BPM | WEIGHT: 152.1 LBS | BODY MASS INDEX: 25.97 KG/M2 | DIASTOLIC BLOOD PRESSURE: 88 MMHG

## 2024-02-02 DIAGNOSIS — E78.5 HYPERLIPIDEMIA LDL GOAL <70: Primary | ICD-10-CM

## 2024-02-02 DIAGNOSIS — I10 ACCELERATED HYPERTENSION: ICD-10-CM

## 2024-02-02 DIAGNOSIS — I15.1 HYPERTENSION SECONDARY TO OTHER RENAL DISORDERS: ICD-10-CM

## 2024-02-02 DIAGNOSIS — E78.00 HYPERCHOLESTEREMIA: ICD-10-CM

## 2024-02-02 DIAGNOSIS — N18.30 STAGE 3 CHRONIC KIDNEY DISEASE, UNSPECIFIED WHETHER STAGE 3A OR 3B CKD (H): ICD-10-CM

## 2024-02-02 PROCEDURE — 93000 ELECTROCARDIOGRAM COMPLETE: CPT | Performed by: INTERNAL MEDICINE

## 2024-02-02 PROCEDURE — 99214 OFFICE O/P EST MOD 30 MIN: CPT | Performed by: INTERNAL MEDICINE

## 2024-02-02 RX ORDER — AMLODIPINE BESYLATE 10 MG/1
10 TABLET ORAL AT BEDTIME
Qty: 90 TABLET | Refills: 11 | Status: SHIPPED | OUTPATIENT
Start: 2024-02-02

## 2024-02-02 RX ORDER — EZETIMIBE 10 MG/1
10 TABLET ORAL DAILY
Qty: 90 TABLET | Refills: 11 | Status: SHIPPED | OUTPATIENT
Start: 2024-02-02

## 2024-02-02 RX ORDER — EZETIMIBE 10 MG/1
10 TABLET ORAL DAILY
Qty: 90 TABLET | Refills: 11 | Status: SHIPPED | OUTPATIENT
Start: 2024-02-02 | End: 2024-02-02

## 2024-02-02 RX ORDER — ASPIRIN 81 MG/1
81 TABLET ORAL DAILY
Qty: 90 TABLET | Refills: 11 | Status: SHIPPED | OUTPATIENT
Start: 2024-02-02

## 2024-02-02 RX ORDER — ATORVASTATIN CALCIUM 80 MG/1
80 TABLET, FILM COATED ORAL EVERY EVENING
Qty: 90 TABLET | Refills: 11 | Status: SHIPPED | OUTPATIENT
Start: 2024-02-02

## 2024-02-02 RX ORDER — CARVEDILOL 25 MG/1
50 TABLET ORAL 2 TIMES DAILY WITH MEALS
Qty: 360 TABLET | Refills: 11 | Status: SHIPPED | OUTPATIENT
Start: 2024-02-02

## 2024-02-02 RX ORDER — SPIRONOLACTONE 25 MG/1
25 TABLET ORAL DAILY
Qty: 90 TABLET | Refills: 11 | Status: SHIPPED | OUTPATIENT
Start: 2024-02-02 | End: 2024-02-28

## 2024-02-02 NOTE — PROGRESS NOTES
"  Thank you,  No ref. provider found, for asking the [facility] team to see Mr. Moriah Hinojosa to evaluate       Assessment/Recommendations   Assessment/Plan:  CV prevention - LDL still high, will reorder atorvastatin 80mg at night and add zetia 10mg daily, check fasting lipids in 2 mo, add asp 81mg daily.  ECG today NSR no ST/T wave abn.    HTN - improved compared to visit to nephrology 10/23, will reorder medication and check BMP in 2 mo    Follow up 1 year     History of Present Illness/Subjective    Mr. Moriah Hinojosa is a 69 year old male with HT, DM, hyperlipdiemia, CKD (1.49) here for follow up.    No chest pain/pressure, no dyspnea on exertion, no syncopal events, no PND/orthopnea, edema, last  on .   He denies issues with taking his medication.   He has been taking atorvastatin for a long time, noted on visit to neprhology /120.  Has HA sometimes, BP at home 130-140 systolic.      Med: atorvastatin 80mg, amlodipine 10mg, micardis 80mg, spironolactone 25mg, carvedilol 50mg bid     Physical Examination Review of Systems   BP (!) 166/88 (BP Location: Left arm, Patient Position: Sitting, Cuff Size: Adult Regular)   Pulse 82   Resp 16   Ht 1.615 m (5' 3.58\")   Wt 69 kg (152 lb 1.6 oz)   SpO2 95%   BMI 26.45 kg/m    Body mass index is 26.45 kg/m .  Wt Readings from Last 3 Encounters:   02/02/24 69 kg (152 lb 1.6 oz)   01/10/24 69.4 kg (153 lb 0.6 oz)   10/25/23 68.6 kg (151 lb 4.8 oz)     [unfilled]  General Appearance:   no distress, normal body habitus   ENT/Mouth: membranes moist, no oral lesions or bleeding gums.      EYES:  no scleral icterus, normal conjunctivae   Neck: no carotid bruits or thyromegaly   Chest/Lungs:   lungs are clear to auscultation, no rales or wheezing,  sternal scar, equal chest wall expansion    Cardiovascular:   Regular. Normal first and second heart sounds with no murmurs, rubs, or gallops; the carotid, radial and posterior tibial pulses are intact, " Jugular venous pressure , edema bilaterally    Abdomen:  no organomegaly, masses, bruits, or tenderness; bowel sounds are present   Extremities: no cyanosis or clubbing   Skin: no xanthelasma, warm.    Neurologic: normal  bilateral, no tremors     Psychiatric: alert and oriented x3, calm     Review of Systems - 12 points nega other than above      Medical History  Surgical History Family History Social History   Past Medical History:   Diagnosis Date    Chronic pain of both knees 2019    COVID-19 2020    Hypercholesteremia 2018    Hypertension 2018    Past Surgical History:   Procedure Laterality Date    LAPAROSCOPIC NEPHRECTOMY Left 2021    Procedure: NEPHRECTOMY, TOTAL, LAPAROSCOPIC;  Surgeon: Catracho Wright MD;  Location: [facility] OR    Family History   Problem Relation Age of Onset    Anesthesia Reaction No family hx of     Deep Vein Thrombosis (DVT) No family hx of     Social History     Socioeconomic History    Marital status:      Spouse name: Not on file    Number of children: Not on file    Years of education: Not on file    Highest education level: Not on file   Occupational History    Not on file   Tobacco Use    Smoking status: Former     Packs/day: .5     Types: Cigarettes     Quit date: 3/6/2016     Years since quittin.9     Passive exposure: Never    Smokeless tobacco: Never    Tobacco comments:     no passive exposure   Vaping Use    Vaping Use: Never used   Substance and Sexual Activity    Alcohol use: No    Drug use: Never    Sexual activity: Not Currently     Partners: Female   Other Topics Concern    Not on file   Social History Narrative    Not on file     Social Determinants of Health     Financial Resource Strain: Low Risk  (1/10/2024)    Financial Resource Strain     Within the past 12 months, have you or your family members you live with been unable to get utilities (heat, electricity) when it was really needed?: No   Food Insecurity: Low Risk   (1/10/2024)    Food Insecurity     Within the past 12 months, did you worry that your food would run out before you got money to buy more?: No     Within the past 12 months, did the food you bought just not last and you didn t have money to get more?: No   Transportation Needs: Low Risk  (1/10/2024)    Transportation Needs     Within the past 12 months, has lack of transportation kept you from medical appointments, getting your medicines, non-medical meetings or appointments, work, or from getting things that you need?: No   Physical Activity: Not on file   Stress: Not on file   Social Connections: Not on file   Interpersonal Safety: Low Risk  (1/10/2024)    Interpersonal Safety     Do you feel physically and emotionally safe where you currently live?: Yes     Within the past 12 months, have you been hit, slapped, kicked or otherwise physically hurt by someone?: No     Within the past 12 months, have you been humiliated or emotionally abused in other ways by your partner or ex-partner?: No   Housing Stability: Low Risk  (1/10/2024)    Housing Stability     Do you have housing? : Yes     Are you worried about losing your housing?: No          Medications  Allergies   Scheduled Meds:  Continuous Infusions:  PRN Meds:. No Known Allergies      Lab Results    Chemistry/lipid CBC Cardiac Enzymes/BNP/TSH/INR   Lab Results   Component Value Date    CHOL 272 (H) 10/25/2023    HDL 66 10/25/2023    TRIG 139 10/25/2023    BUN 17.1 10/25/2023     10/25/2023    CO2 29 10/25/2023    Lab Results   Component Value Date    WBC 7.3 11/07/2022    HGB 15.6 10/25/2023    HCT 47.8 11/07/2022    MCV 90 11/07/2022     11/07/2022    Lab Results   Component Value Date    TROPONINI 0.03 12/13/2020     (H) 12/13/2020    TSH 1.14 08/08/2022              Matias Chavarria MD  Interventional Cardiology  [facility]

## 2024-02-02 NOTE — PATIENT INSTRUCTIONS
Start ezetimibe 10mg daily   Continue atorvastatin 80mg at night    Start aspirin 81mg daily    Check fasting blood test to check cholesterol and kidney function in 2 months (First week of April)     Send to [facility] [address] [place] [address] in [place]

## 2024-02-02 NOTE — LETTER
"2/2/2024    Yassine Larsen MD  [address]  [place] [address]    RE: Moriah Micaela       Dear Colleague,     I had the pleasure of seeing Pwo Micaela in the [facility].    Thank you, Dr. Cruz ref. provider found, for asking the [facility] team to see Mr. Moriah Hinojosa to evaluate       Assessment/Recommendations   Assessment/Plan:  CV prevention - LDL still high, will reorder atorvastatin 80mg at night and add zetia 10mg daily, check fasting lipids in 2 mo, add asp 81mg daily.  ECG today NSR no ST/T wave abn.    HTN - improved compared to visit to nephrology 10/23, will reorder medication and check BMP in 2 mo    Follow up 1 year     History of Present Illness/Subjective    Mr. Moriah Hinojosa is a 69 year old male with HT, DM, hyperlipdiemia, CKD (1.49) here for follow up.    No chest pain/pressure, no dyspnea on exertion, no syncopal events, no PND/orthopnea, edema, last  on .   He denies issues with taking his medication.   He has been taking atorvastatin for a long time, noted on visit to nepology /120.  Has HA sometimes, BP at home 130-140 systolic.      Med: atorvastatin 80mg, amlodipine 10mg, micardis 80mg, spironolactone 25mg, carvedilol 50mg bid     Physical Examination Review of Systems   BP (!) 166/88 (BP Location: Left arm, Patient Position: Sitting, Cuff Size: Adult Regular)   Pulse 82   Resp 16   Ht 1.615 m (5' 3.58\")   Wt 69 kg (152 lb 1.6 oz)   SpO2 95%   BMI 26.45 kg/m    Body mass index is 26.45 kg/m .  Wt Readings from Last 3 Encounters:   02/02/24 69 kg (152 lb 1.6 oz)   01/10/24 69.4 kg (153 lb 0.6 oz)   10/25/23 68.6 kg (151 lb 4.8 oz)     [unfilled]  General Appearance:   no distress, normal body habitus   ENT/Mouth: membranes moist, no oral lesions or bleeding gums.      EYES:  no scleral icterus, normal conjunctivae   Neck: no carotid bruits or thyromegaly   Chest/Lungs:   lungs are clear to auscultation, no rales or wheezing,  sternal " scar, equal chest wall expansion    Cardiovascular:   Regular. Normal first and second heart sounds with no murmurs, rubs, or gallops; the carotid, radial and posterior tibial pulses are intact, Jugular venous pressure , edema bilaterally    Abdomen:  no organomegaly, masses, bruits, or tenderness; bowel sounds are present   Extremities: no cyanosis or clubbing   Skin: no xanthelasma, warm.    Neurologic: normal  bilateral, no tremors     Psychiatric: alert and oriented x3, calm     Review of Systems - 12 points nega other than above      Medical History  Surgical History Family History Social History   Past Medical History:   Diagnosis Date    Chronic pain of both knees 2019    COVID-19 2020    Hypercholesteremia 2018    Hypertension 2018    Past Surgical History:   Procedure Laterality Date    LAPAROSCOPIC NEPHRECTOMY Left 2021    Procedure: NEPHRECTOMY, TOTAL, LAPAROSCOPIC;  Surgeon: Catracho Wright MD;  Location: [facility] OR    Family History   Problem Relation Age of Onset    Anesthesia Reaction No family hx of     Deep Vein Thrombosis (DVT) No family hx of     Social History     Socioeconomic History    Marital status:      Spouse name: Not on file    Number of children: Not on file    Years of education: Not on file    Highest education level: Not on file   Occupational History    Not on file   Tobacco Use    Smoking status: Former     Packs/day: .5     Types: Cigarettes     Quit date: 3/6/2016     Years since quittin.9     Passive exposure: Never    Smokeless tobacco: Never    Tobacco comments:     no passive exposure   Vaping Use    Vaping Use: Never used   Substance and Sexual Activity    Alcohol use: No    Drug use: Never    Sexual activity: Not Currently     Partners: Female   Other Topics Concern    Not on file   Social History Narrative    Not on file     Social Determinants of Health     Financial Resource Strain: Low Risk  (1/10/2024)    Financial  Resource Strain     Within the past 12 months, have you or your family members you live with been unable to get utilities (heat, electricity) when it was really needed?: No   Food Insecurity: Low Risk  (1/10/2024)    Food Insecurity     Within the past 12 months, did you worry that your food would run out before you got money to buy more?: No     Within the past 12 months, did the food you bought just not last and you didn t have money to get more?: No   Transportation Needs: Low Risk  (1/10/2024)    Transportation Needs     Within the past 12 months, has lack of transportation kept you from medical appointments, getting your medicines, non-medical meetings or appointments, work, or from getting things that you need?: No   Physical Activity: Not on file   Stress: Not on file   Social Connections: Not on file   Interpersonal Safety: Low Risk  (1/10/2024)    Interpersonal Safety     Do you feel physically and emotionally safe where you currently live?: Yes     Within the past 12 months, have you been hit, slapped, kicked or otherwise physically hurt by someone?: No     Within the past 12 months, have you been humiliated or emotionally abused in other ways by your partner or ex-partner?: No   Housing Stability: Low Risk  (1/10/2024)    Housing Stability     Do you have housing? : Yes     Are you worried about losing your housing?: No          Medications  Allergies   Scheduled Meds:  Continuous Infusions:  PRN Meds:. No Known Allergies      Lab Results    Chemistry/lipid CBC Cardiac Enzymes/BNP/TSH/INR   Lab Results   Component Value Date    CHOL 272 (H) 10/25/2023    HDL 66 10/25/2023    TRIG 139 10/25/2023    BUN 17.1 10/25/2023     10/25/2023    CO2 29 10/25/2023    Lab Results   Component Value Date    WBC 7.3 11/07/2022    HGB 15.6 10/25/2023    HCT 47.8 11/07/2022    MCV 90 11/07/2022     11/07/2022    Lab Results   Component Value Date    TROPONINI 0.03 12/13/2020     (H) 12/13/2020    TSH  1.14 08/08/2022              Matias Chavarria MD  Interventional Cardiology  [facility]              Thank you for allowing me to participate in the care of your patient.      Sincerely,     Matias Chavarria MD     [facility]  cc:   No referring provider defined for this encounter.

## 2024-02-05 LAB
ATRIAL RATE - MUSE: 79 BPM
DIASTOLIC BLOOD PRESSURE - MUSE: NORMAL MMHG
INTERPRETATION ECG - MUSE: NORMAL
P AXIS - MUSE: 62 DEGREES
PR INTERVAL - MUSE: 158 MS
QRS DURATION - MUSE: 84 MS
QT - MUSE: 362 MS
QTC - MUSE: 415 MS
R AXIS - MUSE: 66 DEGREES
SYSTOLIC BLOOD PRESSURE - MUSE: NORMAL MMHG
T AXIS - MUSE: 55 DEGREES
VENTRICULAR RATE- MUSE: 79 BPM

## 2024-02-22 NOTE — PROGRESS NOTES
[facility] Care Coordination Contact      [facility] Six-Month Telephone Assessment    6 month telephone assessment completed on 07/09/21.    ER visits: No  Hospitalizations: No  TCU stays: No  Significant health status changes: none  Falls/Injuries: No  ADL/IADL changes: No  Changes in services: No    Caregiver Assessment follow up:  N/A    Goals: See POC in chart for goal progress documentation.  Continues to work on blood pressure management.    Will see member in 6 months for an annual health risk assessment.   Encouraged member to call CC with any questions or concerns in the meantime.     Terra Hale RN  [facility]  [phone]              MEDICATIONS  (STANDING):  benztropine 1 milliGRAM(s) Oral two times a day  bisacodyl 10 milliGRAM(s) Oral at bedtime  cholecalciferol 1000 Unit(s) Oral at bedtime  ferrous    sulfate 325 milliGRAM(s) Oral daily    MEDICATIONS  (PRN):  acetaminophen     Tablet .. 650 milliGRAM(s) Oral every 4 hours PRN Mild Pain (1 - 3), Moderate Pain (4 - 6)  OLANZapine Disintegrating Tablet 5 milliGRAM(s) Oral every 4 hours PRN agitation  OLANZapine Injectable 5 milliGRAM(s) IntraMuscular once PRN Agitation

## 2024-02-28 ENCOUNTER — OFFICE VISIT (OUTPATIENT)
Dept: NEPHROLOGY | Facility: CLINIC | Age: 69
End: 2024-02-28
Attending: INTERNAL MEDICINE
Payer: COMMERCIAL

## 2024-02-28 ENCOUNTER — LAB (OUTPATIENT)
Dept: LAB | Facility: CLINIC | Age: 69
End: 2024-02-28
Attending: INTERNAL MEDICINE
Payer: COMMERCIAL

## 2024-02-28 VITALS
RESPIRATION RATE: 18 BRPM | HEIGHT: 64 IN | SYSTOLIC BLOOD PRESSURE: 193 MMHG | BODY MASS INDEX: 26.43 KG/M2 | OXYGEN SATURATION: 99 % | DIASTOLIC BLOOD PRESSURE: 98 MMHG | WEIGHT: 154.8 LBS | TEMPERATURE: 98.3 F | HEART RATE: 97 BPM

## 2024-02-28 DIAGNOSIS — I10 ACCELERATED HYPERTENSION: ICD-10-CM

## 2024-02-28 DIAGNOSIS — E11.29 TYPE 2 DIABETES MELLITUS WITH OTHER DIABETIC KIDNEY COMPLICATION, WITHOUT LONG-TERM CURRENT USE OF INSULIN (H): ICD-10-CM

## 2024-02-28 DIAGNOSIS — N18.31 STAGE 3A CHRONIC KIDNEY DISEASE (H): Primary | ICD-10-CM

## 2024-02-28 DIAGNOSIS — N18.30 STAGE 3 CHRONIC KIDNEY DISEASE, UNSPECIFIED WHETHER STAGE 3A OR 3B CKD (H): ICD-10-CM

## 2024-02-28 LAB
ALBUMIN MFR UR ELPH: 6.3 MG/DL
ALBUMIN SERPL BCG-MCNC: 4.2 G/DL (ref 3.5–5.2)
ALBUMIN UR-MCNC: NEGATIVE MG/DL
ALP SERPL-CCNC: 76 U/L (ref 40–150)
ALT SERPL W P-5'-P-CCNC: 23 U/L (ref 0–70)
ANION GAP SERPL CALCULATED.3IONS-SCNC: 10 MMOL/L (ref 7–15)
APPEARANCE UR: CLEAR
AST SERPL W P-5'-P-CCNC: 25 U/L (ref 0–45)
BASOPHILS # BLD AUTO: 0.1 10E3/UL (ref 0–0.2)
BASOPHILS NFR BLD AUTO: 1 %
BILIRUB SERPL-MCNC: 0.7 MG/DL
BILIRUB UR QL STRIP: NEGATIVE
BUN SERPL-MCNC: 16.6 MG/DL (ref 8–23)
CALCIUM SERPL-MCNC: 9 MG/DL (ref 8.8–10.2)
CHLORIDE SERPL-SCNC: 104 MMOL/L (ref 98–107)
COLOR UR AUTO: ABNORMAL
CREAT SERPL-MCNC: 1.22 MG/DL (ref 0.67–1.17)
CREAT UR-MCNC: 41.9 MG/DL
DEPRECATED HCO3 PLAS-SCNC: 26 MMOL/L (ref 22–29)
EGFRCR SERPLBLD CKD-EPI 2021: 63 ML/MIN/1.73M2
EOSINOPHIL # BLD AUTO: 0.5 10E3/UL (ref 0–0.7)
EOSINOPHIL NFR BLD AUTO: 6 %
ERYTHROCYTE [DISTWIDTH] IN BLOOD BY AUTOMATED COUNT: 13.1 % (ref 10–15)
GLUCOSE SERPL-MCNC: 222 MG/DL (ref 70–99)
GLUCOSE UR STRIP-MCNC: 300 MG/DL
HCT VFR BLD AUTO: 44.3 % (ref 40–53)
HGB BLD-MCNC: 14.8 G/DL (ref 13.3–17.7)
HGB UR QL STRIP: NEGATIVE
IMM GRANULOCYTES # BLD: 0 10E3/UL
IMM GRANULOCYTES NFR BLD: 0 %
KETONES UR STRIP-MCNC: NEGATIVE MG/DL
LEUKOCYTE ESTERASE UR QL STRIP: NEGATIVE
LYMPHOCYTES # BLD AUTO: 1.8 10E3/UL (ref 0.8–5.3)
LYMPHOCYTES NFR BLD AUTO: 22 %
MCH RBC QN AUTO: 29.3 PG (ref 26.5–33)
MCHC RBC AUTO-ENTMCNC: 33.4 G/DL (ref 31.5–36.5)
MCV RBC AUTO: 88 FL (ref 78–100)
MONOCYTES # BLD AUTO: 0.7 10E3/UL (ref 0–1.3)
MONOCYTES NFR BLD AUTO: 8 %
NEUTROPHILS # BLD AUTO: 5.1 10E3/UL (ref 1.6–8.3)
NEUTROPHILS NFR BLD AUTO: 63 %
NITRATE UR QL: NEGATIVE
NRBC # BLD AUTO: 0 10E3/UL
NRBC BLD AUTO-RTO: 0 /100
PH UR STRIP: 6.5 [PH] (ref 5–7)
PLATELET # BLD AUTO: 189 10E3/UL (ref 150–450)
POTASSIUM SERPL-SCNC: 3.6 MMOL/L (ref 3.4–5.3)
PROT SERPL-MCNC: 7 G/DL (ref 6.4–8.3)
PROT/CREAT 24H UR: 0.15 MG/MG CR (ref 0–0.2)
RBC # BLD AUTO: 5.05 10E6/UL (ref 4.4–5.9)
SODIUM SERPL-SCNC: 140 MMOL/L (ref 135–145)
SP GR UR STRIP: 1.01 (ref 1–1.03)
UROBILINOGEN UR STRIP-MCNC: NORMAL MG/DL
WBC # BLD AUTO: 8.2 10E3/UL (ref 4–11)

## 2024-02-28 PROCEDURE — 82043 UR ALBUMIN QUANTITATIVE: CPT | Performed by: INTERNAL MEDICINE

## 2024-02-28 PROCEDURE — 99000 SPECIMEN HANDLING OFFICE-LAB: CPT | Performed by: PATHOLOGY

## 2024-02-28 PROCEDURE — G0463 HOSPITAL OUTPT CLINIC VISIT: HCPCS | Performed by: INTERNAL MEDICINE

## 2024-02-28 PROCEDURE — 81003 URINALYSIS AUTO W/O SCOPE: CPT | Performed by: PATHOLOGY

## 2024-02-28 PROCEDURE — 84156 ASSAY OF PROTEIN URINE: CPT | Performed by: PATHOLOGY

## 2024-02-28 PROCEDURE — 85025 COMPLETE CBC W/AUTO DIFF WBC: CPT | Performed by: PATHOLOGY

## 2024-02-28 PROCEDURE — 80053 COMPREHEN METABOLIC PANEL: CPT | Performed by: PATHOLOGY

## 2024-02-28 PROCEDURE — 36415 COLL VENOUS BLD VENIPUNCTURE: CPT | Performed by: PATHOLOGY

## 2024-02-28 PROCEDURE — 99214 OFFICE O/P EST MOD 30 MIN: CPT | Performed by: INTERNAL MEDICINE

## 2024-02-28 RX ORDER — SPIRONOLACTONE 25 MG/1
25 TABLET ORAL DAILY
Qty: 90 TABLET | Refills: 3 | Status: SHIPPED | OUTPATIENT
Start: 2024-02-28 | End: 2025-02-22

## 2024-02-28 ASSESSMENT — PAIN SCALES - GENERAL: PAINLEVEL: NO PAIN (0)

## 2024-02-28 NOTE — NURSING NOTE
"Chief Complaint   Patient presents with    RECHECK     CKD     Vital signs:  Temp: 98.3  F (36.8  C) Temp src: Oral BP: (!) 193/98 (average of 3 BP) Pulse: 97   Resp: 18 SpO2: 99 %     Height: 161.5 cm (5' 3.58\") Weight: 70.2 kg (154 lb 12.8 oz)  Estimated body mass index is 26.92 kg/m  as calculated from the following:    Height as of this encounter: 1.615 m (5' 3.58\").    Weight as of this encounter: 70.2 kg (154 lb 12.8 oz).      Mary Beckett, [facility]  2/28/2024 4:28 PM    "

## 2024-02-28 NOTE — PATIENT INSTRUCTIONS
-Please restart spironolactone 25 mg daily  -Please increase metformin to 1000 mg twice daily  -Please return to clinic in 6 months with repeat labs

## 2024-02-28 NOTE — PROGRESS NOTES
Nephrology Clinic    Moriah Hinojosa MRN:[id number] YOB: 1955  Date of Service: 02/28/2024  Primary care provider: Yassine Larsen  Requesting physician: Yassine Larsen      REASON FOR CONSULT: Hypertension and CKD    HISTORY OF PRESENT ILLNESS:   Moriah Hinojosa is a 68 year old male who first  presented for evaluation of uncontrolled hypertension and  CKD stage 3 in August 2022.  The past medical history is significant for longstanding hypertension and type 2 diabetes for more than 10 years, chronic severe left-sided hydronephrosis and proximal left hydroureter secondary to a proximal left ureteral stone s/p nephrectomy in August 2021 in an attempt to control his hypertension, and CKD. His blood pressure had been difficult to control and he had had multiple changes done by both his cardiologist and his PCP. He was on amlodipine 10 mg daily, hydrochlorothiazide 25 mg daily, losartan 100 mg daily and carvedilol 25 mg bid. He was however hypertensive at 225/120, tachycardic at 97 and was also complaining of headache.  His diabetes is well controlled on metformin 750 mg twice daily  Urine studies done  showed a UPCR at 0.16 g/g Cr, however his potassium level was low 3.2 and his creatinine level was 1.3 mg/dL. It was 1.05 in August 2021 prior to his nephrectomy.  Renal imaging done in March 2021 shows an unremarkable right kidney and no evidence of renal artery stenosis on the right.  The patient had losartan changed to telmisartan, the hydrochlorothiazide stopped and replaced with spironolactone 25 mg daily and his BP improved but remained suboptimal. Therefore chlorthalidone 25 mg was added but the BP remained suboptimal. Subsequently spironolactone was increased to 50 mg daily and carvedilol was increased to 50 mg twice daily. He however never increased them and also didn't fill chlorthalidone. He has also been seen by a pharmacist for MTM and she has deemed that he had a compliance issue with pseudo-resistant  hypertension rather than truly resistant hypertension.   His current medications are the following: spironolactone 25 mg daily, telmisartan 80 mg daily, metformin 750 mg twice daily, amlodipine 10 mg at bedtime and carvedilol 25 mg twice daily . However he hasn't been taking spironolactone since December 2023 as he ran out of it and never asked for a refill. His blood pressure and his glycemia remain suboptimal    The patient denies any dysuria, any pollakiuria, any nocturia, any LE edema, any dyspnea on exertion .    The following portions of the patient's history were reviewed and updated as appropriate: allergies, current medications, past family history, past medical history, past social history, past surgical history and problem list.    PAST MEDICAL HISTORY:  Past Medical History:   Diagnosis Date    Chronic pain of both knees 2/11/2019    COVID-19 5/7/2020    Hypercholesteremia 6/18/2018    Hypertension 5/7/2018     PAST SURGICAL HISTORY:  Past Surgical History:   Procedure Laterality Date    LAPAROSCOPIC NEPHRECTOMY Left 8/24/2021    Procedure: NEPHRECTOMY, TOTAL, LAPAROSCOPIC;  Surgeon: Catracho Wright MD;  Location:  OR     MEDICATIONS:  Prescription Medications as of 2/28/2024         Rx Number Disp Refills Start End Last Dispensed Date Next Fill Date Owning Pharmacy    acetaminophen (TYLENOL) 325 MG tablet  120 tablet 0 8/26/2021 --   [place], [place] - [address] [place] [address]    Sig: Take 2 tablets (650 mg) by mouth every 6 hours as needed for pain    Class: E-Prescribe    Route: Oral    amLODIPine (NORVASC) 10 MG tablet  90 tablet 11 2/2/2024 --   [facility] - [place], [place] - [address]    Sig: Take 1 tablet (10 mg) by mouth at bedtime Need appt for further refills.    Class: E-Prescribe    Route: Oral    aspirin 81 MG EC tablet  90 tablet 11 2/2/2024 --   [facility] - [place], [place] - [address]    Sig: Take 1 tablet  (81 mg) by mouth daily    Class: E-Prescribe    Route: Oral    atorvastatin (LIPITOR) 80 MG tablet  90 tablet 11 2/2/2024 --   [facility] - [place], [place] - [address]    Sig: Take 1 tablet (80 mg) by mouth every evening    Class: E-Prescribe    Route: Oral    blood glucose (NO BRAND SPECIFIED) test strip  100 strip 6 5/31/2023 --   [facility] - [place], [place] - [address]    Sig: Use to test blood sugar one times daily or as directed. To accompany: Blood Glucose Monitor Brands: per insurance.    Class: E-Prescribe    Notes to Pharmacy: Use to test blood sugar 1 times daily or as directed. Preferred blood glucose meter OR supplies to accompany: Blood Glucose Monitor Brands: per insurance.    Cosign for Ordering: Accepted by Yassine Larsen MD on 5/31/2023  2:42 PM    blood glucose monitoring (NO BRAND SPECIFIED) meter device kit  1 kit 0 5/31/2023 --   [facility] - [place], [place] - [address]    Sig: Use to test blood sugar 1 times daily or as directed. Preferred blood glucose meter OR supplies to accompany: Blood Glucose Monitor Brands: per insurance.    Class: E-Prescribe    Cosign for Ordering: Accepted by Yassine Larsen MD on 5/31/2023  2:42 PM    carvedilol (COREG) 25 MG tablet  360 tablet 11 2/2/2024 --   [facility] - [place], [place] - [address]    Sig: Take 2 tablets (50 mg) by mouth 2 times daily (with meals)    Class: E-Prescribe    Route: Oral    ezetimibe (ZETIA) 10 MG tablet  90 tablet 11 2/2/2024 --   [facility] - [place], [place] - [address]    Sig: Take 1 tablet (10 mg) by mouth daily    Class: E-Prescribe    Route: Oral    metFORMIN (GLUCOPHAGE-XR) 750 MG 24 hr tablet  180 tablet 3 7/27/2023 --   [facility] - [place], [place] - [address]    Sig: Take 1 tablet (750 mg) by mouth 2 times daily (with meals)    Class: E-Prescribe    Route: Oral    Polyvinyl Alcohol-Povidone (ARTIFICIAL TEARS)  5-6 MG/ML SOLN  30 mL 11 7/5/2023 --   [facility] - [place], [place] - [address]    Sig: Apply 1 drop to eye 3 times daily as needed (both eyes)    Class: E-Prescribe    Route: Ophthalmic    spironolactone (ALDACTONE) 25 MG tablet  90 tablet 11 2/2/2024 --   [facility] - [place], [place] - [address]    Sig: Take 1 tablet (25 mg) by mouth daily    Class: E-Prescribe    Route: Oral    telmisartan (MICARDIS) 80 MG tablet  90 tablet 3 1/10/2024 --   [facility] - [place], [place] - [address]    Sig: Take 1 tablet (80 mg) by mouth daily    Class: E-Prescribe    Route: Oral    thin (NO BRAND SPECIFIED) lancets  100 each 6 5/31/2023 --   [facility] - [place], [place] - [address]    Sig: Use with lanceting device. To accompany: Blood Glucose Monitor Brands: per insurance.    Class: E-Prescribe    Notes to Pharmacy: Use to test blood sugar 1 times daily or as directed. Preferred blood glucose meter OR supplies to accompany: Blood Glucose Monitor Brands: per insurance.    Cosign for Ordering: Accepted by Yassine Larsen MD on 5/31/2023  2:42 PM           ALLERGIES:    No Known Allergies  REVIEW OF SYSTEMS:  Review Of Systems  Skin: negative for, pigmentation, acne, rash, scaling, itching, bruising  Eyes: negative for, visual blurring, double vision, glaucoma, cataracts, eye pain, color blindness  Ears/Nose/Throat: negative for, nasal congestion, sneezing, postnasal drainage, hearing loss, deafness, tinnitus, vertigo  Respiratory: No shortness of breath, dyspnea on exertion, cough, or hemoptysis  Cardiovascular: negative for, palpitations, tachycardia, irregular heart beat, chest pain and exertional chest pain or pressure  Gastrointestinal: negative for, poor appetite, dysphagia, nausea, vomiting, heartburn and dyspepsia  Genitourinary: negative for, nocturia, dysuria, frequency, urgency, hesitancy and hematuria  Musculoskeletal: negative for, fracture,  back pain, neck pain, arthritis, joint pain and joint swelling  Neurologic: negative for, headaches, syncope, stroke, seizures, paralysis and local weakness    A comprehensive review of systems was performed and found to be negative except as described here or above.  SOCIAL HISTORY:   Social History     Socioeconomic History    Marital status:      Spouse name: Not on file    Number of children: Not on file    Years of education: Not on file    Highest education level: Not on file   Occupational History    Not on file   Tobacco Use    Smoking status: Former     Packs/day: .5     Types: Cigarettes     Quit date: 3/6/2016     Years since quittin.9     Passive exposure: Never    Smokeless tobacco: Never    Tobacco comments:     no passive exposure   Vaping Use    Vaping Use: Never used   Substance and Sexual Activity    Alcohol use: No    Drug use: Never    Sexual activity: Not Currently     Partners: Female   Other Topics Concern    Not on file   Social History Narrative    Not on file     Social Determinants of Health     Financial Resource Strain: Low Risk  (1/10/2024)    Financial Resource Strain     Within the past 12 months, have you or your family members you live with been unable to get utilities (heat, electricity) when it was really needed?: No   Food Insecurity: Low Risk  (1/10/2024)    Food Insecurity     Within the past 12 months, did you worry that your food would run out before you got money to buy more?: No     Within the past 12 months, did the food you bought just not last and you didn t have money to get more?: No   Transportation Needs: Low Risk  (1/10/2024)    Transportation Needs     Within the past 12 months, has lack of transportation kept you from medical appointments, getting your medicines, non-medical meetings or appointments, work, or from getting things that you need?: No   Physical Activity: Not on file   Stress: Not on file   Social Connections: Not on file   Interpersonal  Safety: Low Risk  (1/10/2024)    Interpersonal Safety     Do you feel physically and emotionally safe where you currently live?: Yes     Within the past 12 months, have you been hit, slapped, kicked or otherwise physically hurt by someone?: No     Within the past 12 months, have you been humiliated or emotionally abused in other ways by your partner or ex-partner?: No   Housing Stability: Low Risk  (1/10/2024)    Housing Stability     Do you have housing? : Yes     Are you worried about losing your housing?: No     FAMILY MEDICAL HISTORY:   Family History   Problem Relation Age of Onset    Anesthesia Reaction No family hx of     Deep Vein Thrombosis (DVT) No family hx of      PHYSICAL EXAM:   There were no vitals taken for this visit.  GENERAL APPEARANCE: alert and no distress  EYES: nonicteric  HENT: mouth without ulcers or lesions  NECK: supple, no adenopathy  RESP: lungs clear to auscultation   CV: regular rhythm, normal rate, no rub  ABDOMEN: soft, nontender, normal bowel sounds, no HSM   Extremities: no clubbing, cyanosis, or edema  MS: no evidence of inflammation in joints, no muscle tenderness  SKIN: no rash  NEURO: mentation intact and speech normal  PSYCH: affect normal/bright   LABS:   Recent Results (from the past 672 hour(s))   ECG 12-LEAD WITH MUSE ([facility])    Collection Time: 02/02/24  2:35 PM   Result Value Ref Range    Systolic Blood Pressure  mmHg    Diastolic Blood Pressure  mmHg    Ventricular Rate 79 BPM    Atrial Rate 79 BPM    WY Interval 158 ms    QRS Duration 84 ms     ms    QTc 415 ms    P Axis 62 degrees    R AXIS 66 degrees    T Axis 55 degrees    Interpretation ECG       Sinus rhythm  Normal ECG  When compared with ECG of 26-JUL-2022 11:24,  No significant change was found  Confirmed by JOSUÉ EDOUARD, KEIRY LOC:DOLLY ([id number]) on 2/5/2024 9:47:42 AM     Comprehensive metabolic panel    Collection Time: 02/28/24  2:46 PM   Result Value Ref Range    Sodium 140 135 - 145 mmol/L    Potassium 3.6  3.4 - 5.3 mmol/L    Carbon Dioxide (CO2) 26 22 - 29 mmol/L    Anion Gap 10 7 - 15 mmol/L    Urea Nitrogen 16.6 8.0 - 23.0 mg/dL    Creatinine 1.22 (H) 0.67 - 1.17 mg/dL    GFR Estimate 63 >60 mL/min/1.73m2    Calcium 9.0 8.8 - 10.2 mg/dL    Chloride 104 98 - 107 mmol/L    Glucose 222 (H) 70 - 99 mg/dL    Alkaline Phosphatase 76 40 - 150 U/L    AST 25 0 - 45 U/L    ALT 23 0 - 70 U/L    Protein Total 7.0 6.4 - 8.3 g/dL    Albumin 4.2 3.5 - 5.2 g/dL    Bilirubin Total 0.7 <=1.2 mg/dL   CBC with platelets and differential    Collection Time: 02/28/24  2:46 PM   Result Value Ref Range    WBC Count 8.2 4.0 - 11.0 10e3/uL    RBC Count 5.05 4.40 - 5.90 10e6/uL    Hemoglobin 14.8 13.3 - 17.7 g/dL    Hematocrit 44.3 40.0 - 53.0 %    MCV 88 78 - 100 fL    MCH 29.3 26.5 - 33.0 pg    MCHC 33.4 31.5 - 36.5 g/dL    RDW 13.1 10.0 - 15.0 %    Platelet Count 189 150 - 450 10e3/uL    % Neutrophils 63 %    % Lymphocytes 22 %    % Monocytes 8 %    % Eosinophils 6 %    % Basophils 1 %    % Immature Granulocytes 0 %    NRBCs per 100 WBC 0 <1 /100    Absolute Neutrophils 5.1 1.6 - 8.3 10e3/uL    Absolute Lymphocytes 1.8 0.8 - 5.3 10e3/uL    Absolute Monocytes 0.7 0.0 - 1.3 10e3/uL    Absolute Eosinophils 0.5 0.0 - 0.7 10e3/uL    Absolute Basophils 0.1 0.0 - 0.2 10e3/uL    Absolute Immature Granulocytes 0.0 <=0.4 10e3/uL    Absolute NRBCs 0.0 10e3/uL   UA reflex to Microscopic    Collection Time: 02/28/24  2:51 PM   Result Value Ref Range    Color Urine Straw Colorless, Straw, Light Yellow, Yellow    Appearance Urine Clear Clear    Glucose Urine 300 (A) Negative mg/dL    Bilirubin Urine Negative Negative    Ketones Urine Negative Negative mg/dL    Specific Gravity Urine 1.007 1.003 - 1.035    Blood Urine Negative Negative    pH Urine 6.5 5.0 - 7.0    Protein Albumin Urine Negative Negative mg/dL    Urobilinogen Urine Normal Normal, 2.0 mg/dL    Nitrite Urine Negative Negative    Leukocyte Esterase Urine Negative Negative     CMP  Recent  Labs   Lab Test 02/28/24  1446 10/25/23  1517 07/05/23  1526 04/12/23  1257 12/19/22  1021 12/05/22  1013 11/07/22  1627 10/12/22  1049 09/12/22  0948 08/29/22  0951 08/08/22  0908 08/03/21  1437 12/23/20  1215 12/16/20  0702 12/15/20  0439 12/14/20  0442    140 140 138 139 139 139   < > 139 136 142   < > 138 141 138 140   POTASSIUM 3.6 4.7 4.8 3.9 4.0 4.2 4.9   < > 4.7 4.3 3.2*   < > 3.2* 3.7 3.7 3.8   CHLORIDE 104 103 104 103 103 101 102   < > 101 102 107   < > 98 105 106 108*   CO2 26 29 26 22 28 28 26   < > 29 28 24   < > 30 26 21* 22   ANIONGAP 10 8 10 13 8 10 11   < > 9 6 11   < > 10 10 11 10   * 191* 147* 178* 207* 154* 124*   < > 146* 254* 109*   < > 267* 117 185* 142*   BUN 16.6 17.1 13.6 24.1* 20.3 19.4 18.4   < > 18.8 16 18   < > 17 38* 20 14   CR 1.22* 1.61* 1.49* 1.40* 1.28* 1.18* 1.21*   < > 1.15 1.21 1.31*   < > 1.34* 1.80* 1.29 1.29   GFRESTIMATED 63 46* 50* 54* 61 67 65   < > 69 65 59*   < > 53* 38* 56* 56*   GFRESTBLACK  --   --   --   --   --   --   --   --   --   --   --   --  >60 46* >60 >60   LENNOX 9.0 9.7 10.0 9.1 9.3 9.7 10.1   < > 9.8 9.1 9.9   < > 9.6 9.2 8.9 9.0   MAG  --   --   --   --   --   --   --   --   --  2.2  --   --   --  2.0 2.1 1.6*   PHOS  --  2.5  --   --   --   --  3.4  --  2.6  --  2.6  --   --   --  2.4* 2.7   PROTTOTAL 7.0  --   --  6.7 6.7 7.4 7.6   < >  --  7.8  --    < >  --   --   --   --    ALBUMIN 4.2 4.5  --  4.1 4.1 4.5 4.6   < > 4.2 3.7 4.7   < >  --   --  3.7 3.7   BILITOTAL 0.7  --   --  0.6 0.7 0.7 0.6   < >  --  0.9  --    < >  --   --   --   --    ALKPHOS 76  --   --  69 65 73 76   < >  --  83  --    < >  --   --   --   --    AST 25  --   --  17 21 23 23   < >  --  20  --    < >  --   --   --   --    ALT 23  --   --  22 17 14 24   < >  --  29  --    < >  --   --   --   --     < > = values in this interval not displayed.     CBC  Recent Labs   Lab Test 02/28/24  1446 10/25/23  1517 11/07/22  1627 08/08/22  0908 07/20/22  1540   HGB 14.8 15.6 15.5  16.6 15.5   WBC 8.2  --  7.3 9.3 9.5   RBC 5.05  --  5.33 5.67 5.25   HCT 44.3  --  47.8 49.2 45.7   MCV 88  --  90 87 87   MCH 29.3  --  29.1 29.3 29.5   MCHC 33.4  --  32.4 33.7 33.9   RDW 13.1  --  12.6 11.9 12.6     --  191 188 232     INRNo lab results found.  ABGNo lab results found.   URINE STUDIES  Recent Labs   Lab Test 02/28/24  1451 10/25/23  1522 08/08/22  0915 07/20/22  1608 12/13/20  1208   COLOR Straw Light Yellow Yellow Yellow Yellow   APPEARANCE Clear Clear Clear Clear Clear   URINEGLC 300* 500* Negative Negative Negative   URINEBILI Negative Negative Negative Negative Negative   URINEKETONE Negative Negative 40* Negative Negative   SG 1.007 1.023 1.020 <=1.005 1.006   UBLD Negative Negative Negative Negative Negative   URINEPH 6.5 7.0 6.5 6.0 6.5   PROTEIN Negative 10* Negative Negative Trace*   UROBILINOGEN  --   --   --  0.2 <2.0 E.U./dL   NITRITE Negative Negative Negative Negative Negative   LEUKEST Negative Negative Negative Negative Negative   RBCU  --  <1 1  --  0-2   WBCU  --  1 <1  --  0-5     Recent Labs   Lab Test 08/08/22  0915   [facility] 0.16       ASSESSMENT AND PLAN:   #CKD stage 3a with a UPCR at 0.16 g/g Cr on solitary kidney  Deterioration in the renal function likely secondary to accelerated hypertension and nephrectomy  Renal imaging of the remaining kidney is unremarkable  No documented intake of NSAIDs or other nephrotoxic medications. Also recent urine toxicology screen is negative. The progression is tributary of BP and glycemia control  The patient was instructed to keep the sodium intake around 2400 mg /day, follow a plant-based diet and to avoid NSAIDs     #HTN  Primary and secondary to CKD. Remains suboptimal on amlodipine 10 mg daily, telmisartan 80 mg daily, carvedilol 50 mg bid and spironolactone 25 mg. There is no evidence of renal artery stenosis and his nephrectomy didn't improve his BP. A TSH level is wnl. I refilled spironolactone that he hasn't been taking.  Compliance was reenforced and the patient was also instructed to keep on monitoring his BP.     #Type 2 DM   Hba1c is 7 in October 2023. I will increase metformin to 1000 mg twice daily     #CVD/CAD dyslipidemia  On atorvastatin. Management per cardiology     #Blood count  Hemoglobin 16 -> 15.6. Should also be screened for sleep apnea     #Acid-base status  CO2 level 28 -> 29. No acute issues     #Electrolytes  K 4.2 -> 4.7-> 3.6 The patient is currently off potassium supplementation      #BMD  Ca  9.9        P 2.6 Alb 4.7  iPTH 49  Vitamin D level 25 Will start him on vitamin D at next visit       The total time of this encounter amounted to 30 minutes on the day of the encounter. This time included time spent with the patient, reviewing records, ordering tests, and performing post visit documentation.     The patient will return to follow up in 6 months    Dian Cox MD  Division of Renal Diseases and Hypertension

## 2024-02-28 NOTE — LETTER
2/28/2024       RE: Moriah Hinojosa  [address] [place] [address]     Dear Colleague,    Thank you for referring your patient, Moriah Hinojosa, to the [facility] NEPHROLOG[facility] [place] at [facility]. Please see a copy of my visit note below.    Nephrology Clinic    Moriah Hinojosa MRN:[id number] YOB: 1955  Date of Service: 02/28/2024  Primary care provider: Yassine Larsen  Requesting physician: Yassine Larsen      REASON FOR CONSULT: Hypertension and CKD    HISTORY OF PRESENT ILLNESS:   Moriah Hinojosa is a 68 year old male who first  presented for evaluation of uncontrolled hypertension and  CKD stage 3 in August 2022.  The past medical history is significant for longstanding hypertension and type 2 diabetes for more than 10 years, chronic severe left-sided hydronephrosis and proximal left hydroureter secondary to a proximal left ureteral stone s/p nephrectomy in August 2021 in an attempt to control his hypertension, and CKD. His blood pressure had been difficult to control and he had had multiple changes done by both his cardiologist and his PCP. He was on amlodipine 10 mg daily, hydrochlorothiazide 25 mg daily, losartan 100 mg daily and carvedilol 25 mg bid. He was however hypertensive at 225/120, tachycardic at 97 and was also complaining of headache.  His diabetes is well controlled on metformin 750 mg twice daily  Urine studies done  showed a UPCR at 0.16 g/g Cr, however his potassium level was low 3.2 and his creatinine level was 1.3 mg/dL. It was 1.05 in August 2021 prior to his nephrectomy.  Renal imaging done in March 2021 shows an unremarkable right kidney and no evidence of renal artery stenosis on the right.  The patient had losartan changed to telmisartan, the hydrochlorothiazide stopped and replaced with spironolactone 25 mg daily and his BP improved but remained suboptimal. Therefore chlorthalidone 25 mg was added but the BP remained suboptimal.  Subsequently spironolactone was increased to 50 mg daily and carvedilol was increased to 50 mg twice daily. He however never increased them and also didn't fill chlorthalidone. He has also been seen by a pharmacist for MTM and she has deemed that he had a compliance issue with pseudo-resistant hypertension rather than truly resistant hypertension.   His current medications are the following: spironolactone 25 mg daily, telmisartan 80 mg daily, metformin 750 mg twice daily, amlodipine 10 mg at bedtime and carvedilol 25 mg twice daily . However he hasn't been taking spironolactone since December 2023 as he ran out of it and never asked for a refill. His blood pressure and his glycemia remain suboptimal    The patient denies any dysuria, any pollakiuria, any nocturia, any LE edema, any dyspnea on exertion .    The following portions of the patient's history were reviewed and updated as appropriate: allergies, current medications, past family history, past medical history, past social history, past surgical history and problem list.    PAST MEDICAL HISTORY:  Past Medical History:   Diagnosis Date    Chronic pain of both knees 2/11/2019    COVID-19 5/7/2020    Hypercholesteremia 6/18/2018    Hypertension 5/7/2018     PAST SURGICAL HISTORY:  Past Surgical History:   Procedure Laterality Date    LAPAROSCOPIC NEPHRECTOMY Left 8/24/2021    Procedure: NEPHRECTOMY, TOTAL, LAPAROSCOPIC;  Surgeon: Catracho Wright MD;  Location:  OR     MEDICATIONS:  Prescription Medications as of 2/28/2024         Rx Number Disp Refills Start End Last Dispensed Date Next Fill Date Owning Pharmacy    acetaminophen (TYLENOL) 325 MG tablet  120 tablet 0 8/26/2021 --   [facility] - [place], [place] - [address]    Sig: Take 2 tablets (650 mg) by mouth every 6 hours as needed for pain    Class: E-Prescribe    Route: Oral    amLODIPine (NORVASC) 10 MG tablet  90 tablet 11 2/2/2024 --   [facility]  - [place], [place] - [address]    Sig: Take 1 tablet (10 mg) by mouth at bedtime Need appt for further refills.    Class: E-Prescribe    Route: Oral    aspirin 81 MG EC tablet  90 tablet 11 2/2/2024 --   [facility] - [place], [place] - [address]    Sig: Take 1 tablet (81 mg) by mouth daily    Class: E-Prescribe    Route: Oral    atorvastatin (LIPITOR) 80 MG tablet  90 tablet 11 2/2/2024 --   [facility] - [place], [place] - [address]    Sig: Take 1 tablet (80 mg) by mouth every evening    Class: E-Prescribe    Route: Oral    blood glucose (NO BRAND SPECIFIED) test strip  100 strip 6 5/31/2023 --   [facility] - [place], [place] - [address]    Sig: Use to test blood sugar one times daily or as directed. To accompany: Blood Glucose Monitor Brands: per insurance.    Class: E-Prescribe    Notes to Pharmacy: Use to test blood sugar 1 times daily or as directed. Preferred blood glucose meter OR supplies to accompany: Blood Glucose Monitor Brands: per insurance.    Cosign for Ordering: Accepted by Yassine Larsen MD on 5/31/2023  2:42 PM    blood glucose monitoring (NO BRAND SPECIFIED) meter device kit  1 kit 0 5/31/2023 --   [facility] - [place], [place] - [address]    Sig: Use to test blood sugar 1 times daily or as directed. Preferred blood glucose meter OR supplies to accompany: Blood Glucose Monitor Brands: per insurance.    Class: E-Prescribe    Cosign for Ordering: Accepted by Yassine Larsen MD on 5/31/2023  2:42 PM    carvedilol (COREG) 25 MG tablet  360 tablet 11 2/2/2024 --   [facility] - [place], [place] - [address]    Sig: Take 2 tablets (50 mg) by mouth 2 times daily (with meals)    Class: E-Prescribe    Route: Oral    ezetimibe (ZETIA) 10 MG tablet  90 tablet 11 2/2/2024 --   [facility] - [place], [place] - [address]    Sig: Take 1 tablet (10 mg) by mouth daily    Class: E-Prescribe    Route: Oral     metFORMIN (GLUCOPHAGE-XR) 750 MG 24 hr tablet  180 tablet 3 7/27/2023 --   [facility] - [place], [place] - [address]    Sig: Take 1 tablet (750 mg) by mouth 2 times daily (with meals)    Class: E-Prescribe    Route: Oral    Polyvinyl Alcohol-Povidone (ARTIFICIAL TEARS) 5-6 MG/ML SOLN  30 mL 11 7/5/2023 --   [facility] - [place], [place] - [address]    Sig: Apply 1 drop to eye 3 times daily as needed (both eyes)    Class: E-Prescribe    Route: Ophthalmic    spironolactone (ALDACTONE) 25 MG tablet  90 tablet 11 2/2/2024 --   [facility] - [place], [place] - [address]    Sig: Take 1 tablet (25 mg) by mouth daily    Class: E-Prescribe    Route: Oral    telmisartan (MICARDIS) 80 MG tablet  90 tablet 3 1/10/2024 --   [facility] - [place] - [address]    Sig: Take 1 tablet (80 mg) by mouth daily    Class: E-Prescribe    Route: Oral    thin (NO BRAND SPECIFIED) lancets  100 each 6 5/31/2023 --   [facility] - [place], [place] - [address]    Sig: Use with lanceting device. To accompany: Blood Glucose Monitor Brands: per insurance.    Class: E-Prescribe    Notes to Pharmacy: Use to test blood sugar 1 times daily or as directed. Preferred blood glucose meter OR supplies to accompany: Blood Glucose Monitor Brands: per insurance.    Cosign for Ordering: Accepted by Yassine Larsen MD on 5/31/2023  2:42 PM           ALLERGIES:    No Known Allergies  REVIEW OF SYSTEMS:  Review Of Systems  Skin: negative for, pigmentation, acne, rash, scaling, itching, bruising  Eyes: negative for, visual blurring, double vision, glaucoma, cataracts, eye pain, color blindness  Ears/Nose/Throat: negative for, nasal congestion, sneezing, postnasal drainage, hearing loss, deafness, tinnitus, vertigo  Respiratory: No shortness of breath, dyspnea on exertion, cough, or hemoptysis  Cardiovascular: negative for, palpitations, tachycardia, irregular heart  beat, chest pain and exertional chest pain or pressure  Gastrointestinal: negative for, poor appetite, dysphagia, nausea, vomiting, heartburn and dyspepsia  Genitourinary: negative for, nocturia, dysuria, frequency, urgency, hesitancy and hematuria  Musculoskeletal: negative for, fracture, back pain, neck pain, arthritis, joint pain and joint swelling  Neurologic: negative for, headaches, syncope, stroke, seizures, paralysis and local weakness    A comprehensive review of systems was performed and found to be negative except as described here or above.  SOCIAL HISTORY:   Social History     Socioeconomic History    Marital status:      Spouse name: Not on file    Number of children: Not on file    Years of education: Not on file    Highest education level: Not on file   Occupational History    Not on file   Tobacco Use    Smoking status: Former     Packs/day: .5     Types: Cigarettes     Quit date: 3/6/2016     Years since quittin.9     Passive exposure: Never    Smokeless tobacco: Never    Tobacco comments:     no passive exposure   Vaping Use    Vaping Use: Never used   Substance and Sexual Activity    Alcohol use: No    Drug use: Never    Sexual activity: Not Currently     Partners: Female   Other Topics Concern    Not on file   Social History Narrative    Not on file     Social Determinants of Health     Financial Resource Strain: Low Risk  (1/10/2024)    Financial Resource Strain     Within the past 12 months, have you or your family members you live with been unable to get utilities (heat, electricity) when it was really needed?: No   Food Insecurity: Low Risk  (1/10/2024)    Food Insecurity     Within the past 12 months, did you worry that your food would run out before you got money to buy more?: No     Within the past 12 months, did the food you bought just not last and you didn t have money to get more?: No   Transportation Needs: Low Risk  (1/10/2024)    Transportation Needs     Within the past  12 months, has lack of transportation kept you from medical appointments, getting your medicines, non-medical meetings or appointments, work, or from getting things that you need?: No   Physical Activity: Not on file   Stress: Not on file   Social Connections: Not on file   Interpersonal Safety: Low Risk  (1/10/2024)    Interpersonal Safety     Do you feel physically and emotionally safe where you currently live?: Yes     Within the past 12 months, have you been hit, slapped, kicked or otherwise physically hurt by someone?: No     Within the past 12 months, have you been humiliated or emotionally abused in other ways by your partner or ex-partner?: No   Housing Stability: Low Risk  (1/10/2024)    Housing Stability     Do you have housing? : Yes     Are you worried about losing your housing?: No     FAMILY MEDICAL HISTORY:   Family History   Problem Relation Age of Onset    Anesthesia Reaction No family hx of     Deep Vein Thrombosis (DVT) No family hx of      PHYSICAL EXAM:   There were no vitals taken for this visit.  GENERAL APPEARANCE: alert and no distress  EYES: nonicteric  HENT: mouth without ulcers or lesions  NECK: supple, no adenopathy  RESP: lungs clear to auscultation   CV: regular rhythm, normal rate, no rub  ABDOMEN: soft, nontender, normal bowel sounds, no HSM   Extremities: no clubbing, cyanosis, or edema  MS: no evidence of inflammation in joints, no muscle tenderness  SKIN: no rash  NEURO: mentation intact and speech normal  PSYCH: affect normal/bright   LABS:   Recent Results (from the past 672 hour(s))   ECG 12-LEAD WITH MUSE ([facility])    Collection Time: 02/02/24  2:35 PM   Result Value Ref Range    Systolic Blood Pressure  mmHg    Diastolic Blood Pressure  mmHg    Ventricular Rate 79 BPM    Atrial Rate 79 BPM    IA Interval 158 ms    QRS Duration 84 ms     ms    QTc 415 ms    P Axis 62 degrees    R AXIS 66 degrees    T Axis 55 degrees    Interpretation ECG       Sinus rhythm  Normal ECG  When  compared with ECG of 26-JUL-2022 11:24,  No significant change was found  Confirmed by KEIRY MOSES MD LOC: ([id number]) on 2/5/2024 9:47:42 AM     Comprehensive metabolic panel    Collection Time: 02/28/24  2:46 PM   Result Value Ref Range    Sodium 140 135 - 145 mmol/L    Potassium 3.6 3.4 - 5.3 mmol/L    Carbon Dioxide (CO2) 26 22 - 29 mmol/L    Anion Gap 10 7 - 15 mmol/L    Urea Nitrogen 16.6 8.0 - 23.0 mg/dL    Creatinine 1.22 (H) 0.67 - 1.17 mg/dL    GFR Estimate 63 >60 mL/min/1.73m2    Calcium 9.0 8.8 - 10.2 mg/dL    Chloride 104 98 - 107 mmol/L    Glucose 222 (H) 70 - 99 mg/dL    Alkaline Phosphatase 76 40 - 150 U/L    AST 25 0 - 45 U/L    ALT 23 0 - 70 U/L    Protein Total 7.0 6.4 - 8.3 g/dL    Albumin 4.2 3.5 - 5.2 g/dL    Bilirubin Total 0.7 <=1.2 mg/dL   CBC with platelets and differential    Collection Time: 02/28/24  2:46 PM   Result Value Ref Range    WBC Count 8.2 4.0 - 11.0 10e3/uL    RBC Count 5.05 4.40 - 5.90 10e6/uL    Hemoglobin 14.8 13.3 - 17.7 g/dL    Hematocrit 44.3 40.0 - 53.0 %    MCV 88 78 - 100 fL    MCH 29.3 26.5 - 33.0 pg    MCHC 33.4 31.5 - 36.5 g/dL    RDW 13.1 10.0 - 15.0 %    Platelet Count 189 150 - 450 10e3/uL    % Neutrophils 63 %    % Lymphocytes 22 %    % Monocytes 8 %    % Eosinophils 6 %    % Basophils 1 %    % Immature Granulocytes 0 %    NRBCs per 100 WBC 0 <1 /100    Absolute Neutrophils 5.1 1.6 - 8.3 10e3/uL    Absolute Lymphocytes 1.8 0.8 - 5.3 10e3/uL    Absolute Monocytes 0.7 0.0 - 1.3 10e3/uL    Absolute Eosinophils 0.5 0.0 - 0.7 10e3/uL    Absolute Basophils 0.1 0.0 - 0.2 10e3/uL    Absolute Immature Granulocytes 0.0 <=0.4 10e3/uL    Absolute NRBCs 0.0 10e3/uL   UA reflex to Microscopic    Collection Time: 02/28/24  2:51 PM   Result Value Ref Range    Color Urine Straw Colorless, Straw, Light Yellow, Yellow    Appearance Urine Clear Clear    Glucose Urine 300 (A) Negative mg/dL    Bilirubin Urine Negative Negative    Ketones Urine Negative Negative mg/dL    Specific  Gravity Urine 1.007 1.003 - 1.035    Blood Urine Negative Negative    pH Urine 6.5 5.0 - 7.0    Protein Albumin Urine Negative Negative mg/dL    Urobilinogen Urine Normal Normal, 2.0 mg/dL    Nitrite Urine Negative Negative    Leukocyte Esterase Urine Negative Negative     CMP  Recent Labs   Lab Test 02/28/24  1446 10/25/23  1517 07/05/23  1526 04/12/23  1257 12/19/22  1021 12/05/22  1013 11/07/22  1627 10/12/22  1049 09/12/22  0948 08/29/22  0951 08/08/22  0908 08/03/21  1437 12/23/20  1215 12/16/20  0702 12/15/20  0439 12/14/20  0442    140 140 138 139 139 139   < > 139 136 142   < > 138 141 138 140   POTASSIUM 3.6 4.7 4.8 3.9 4.0 4.2 4.9   < > 4.7 4.3 3.2*   < > 3.2* 3.7 3.7 3.8   CHLORIDE 104 103 104 103 103 101 102   < > 101 102 107   < > 98 105 106 108*   CO2 26 29 26 22 28 28 26   < > 29 28 24   < > 30 26 21* 22   ANIONGAP 10 8 10 13 8 10 11   < > 9 6 11   < > 10 10 11 10   * 191* 147* 178* 207* 154* 124*   < > 146* 254* 109*   < > 267* 117 185* 142*   BUN 16.6 17.1 13.6 24.1* 20.3 19.4 18.4   < > 18.8 16 18   < > 17 38* 20 14   CR 1.22* 1.61* 1.49* 1.40* 1.28* 1.18* 1.21*   < > 1.15 1.21 1.31*   < > 1.34* 1.80* 1.29 1.29   GFRESTIMATED 63 46* 50* 54* 61 67 65   < > 69 65 59*   < > 53* 38* 56* 56*   GFRESTBLACK  --   --   --   --   --   --   --   --   --   --   --   --  >60 46* >60 >60   LENNOX 9.0 9.7 10.0 9.1 9.3 9.7 10.1   < > 9.8 9.1 9.9   < > 9.6 9.2 8.9 9.0   MAG  --   --   --   --   --   --   --   --   --  2.2  --   --   --  2.0 2.1 1.6*   PHOS  --  2.5  --   --   --   --  3.4  --  2.6  --  2.6  --   --   --  2.4* 2.7   PROTTOTAL 7.0  --   --  6.7 6.7 7.4 7.6   < >  --  7.8  --    < >  --   --   --   --    ALBUMIN 4.2 4.5  --  4.1 4.1 4.5 4.6   < > 4.2 3.7 4.7   < >  --   --  3.7 3.7   BILITOTAL 0.7  --   --  0.6 0.7 0.7 0.6   < >  --  0.9  --    < >  --   --   --   --    ALKPHOS 76  --   --  69 65 73 76   < >  --  83  --    < >  --   --   --   --    AST 25  --   --  17 21 23 23   < >  --  20   --    < >  --   --   --   --    ALT 23  --   --  22 17 14 24   < >  --  29  --    < >  --   --   --   --     < > = values in this interval not displayed.     CBC  Recent Labs   Lab Test 02/28/24  1446 10/25/23  1517 11/07/22  1627 08/08/22  0908 07/20/22  1540   HGB 14.8 15.6 15.5 16.6 15.5   WBC 8.2  --  7.3 9.3 9.5   RBC 5.05  --  5.33 5.67 5.25   HCT 44.3  --  47.8 49.2 45.7   MCV 88  --  90 87 87   MCH 29.3  --  29.1 29.3 29.5   MCHC 33.4  --  32.4 33.7 33.9   RDW 13.1  --  12.6 11.9 12.6     --  191 188 232     INRNo lab results found.  ABGNo lab results found.   URINE STUDIES  Recent Labs   Lab Test 02/28/24  1451 10/25/23  1522 08/08/22  0915 07/20/22  1608 12/13/20  1208   COLOR Straw Light Yellow Yellow Yellow Yellow   APPEARANCE Clear Clear Clear Clear Clear   URINEGLC 300* 500* Negative Negative Negative   URINEBILI Negative Negative Negative Negative Negative   URINEKETONE Negative Negative 40* Negative Negative   SG 1.007 1.023 1.020 <=1.005 1.006   UBLD Negative Negative Negative Negative Negative   URINEPH 6.5 7.0 6.5 6.0 6.5   PROTEIN Negative 10* Negative Negative Trace*   UROBILINOGEN  --   --   --  0.2 <2.0 E.U./dL   NITRITE Negative Negative Negative Negative Negative   LEUKEST Negative Negative Negative Negative Negative   RBCU  --  <1 1  --  0-2   WBCU  --  1 <1  --  0-5     Recent Labs   Lab Test 08/08/22  0915   UTPG 0.16       ASSESSMENT AND PLAN:   #CKD stage 3a with a UPCR at 0.16 g/g Cr on solitary kidney  Deterioration in the renal function likely secondary to accelerated hypertension and nephrectomy  Renal imaging of the remaining kidney is unremarkable  No documented intake of NSAIDs or other nephrotoxic medications. Also recent urine toxicology screen is negative. The progression is tributary of BP and glycemia control  The patient was instructed to keep the sodium intake around 2400 mg /day, follow a plant-based diet and to avoid NSAIDs     #HTN  Primary and secondary to CKD.  Remains suboptimal on amlodipine 10 mg daily, telmisartan 80 mg daily, carvedilol 50 mg bid and spironolactone 25 mg. There is no evidence of renal artery stenosis and his nephrectomy didn't improve his BP. A TSH level is wnl. I refilled spironolactone that he hasn't been taking. Compliance was reenforced and the patient was also instructed to keep on monitoring his BP.     #Type 2 DM   Hba1c is 7 in October 2023. I will increase metformin to 1000 mg twice daily     #CVD/CAD dyslipidemia  On atorvastatin. Management per cardiology     #Blood count  Hemoglobin 16 -> 15.6. Should also be screened for sleep apnea     #Acid-base status  CO2 level 28 -> 29. No acute issues     #Electrolytes  K 4.2 -> 4.7-> 3.6 The patient is currently off potassium supplementation      #BMD  Ca  9.9        P 2.6 Alb 4.7  iPTH 49  Vitamin D level 25 Will start him on vitamin D at next visit       The total time of this encounter amounted to 30 minutes on the day of the encounter. This time included time spent with the patient, reviewing records, ordering tests, and performing post visit documentation.     The patient will return to follow up in 6 months    Dian Cox MD  Division of Renal Diseases and Hypertension

## 2024-02-29 LAB
CREAT UR-MCNC: 41.8 MG/DL
MICROALBUMIN UR-MCNC: <12 MG/L
MICROALBUMIN/CREAT UR: NORMAL MG/G{CREAT}

## 2024-04-02 ENCOUNTER — LAB (OUTPATIENT)
Dept: CARDIOLOGY | Facility: CLINIC | Age: 69
End: 2024-04-02
Payer: COMMERCIAL

## 2024-04-02 DIAGNOSIS — I10 ACCELERATED HYPERTENSION: ICD-10-CM

## 2024-04-02 DIAGNOSIS — E78.5 HYPERLIPIDEMIA LDL GOAL <70: ICD-10-CM

## 2024-04-02 LAB
ANION GAP SERPL CALCULATED.3IONS-SCNC: 12 MMOL/L (ref 7–15)
BUN SERPL-MCNC: 21 MG/DL (ref 8–23)
CALCIUM SERPL-MCNC: 9.5 MG/DL (ref 8.8–10.2)
CHLORIDE SERPL-SCNC: 101 MMOL/L (ref 98–107)
CHOLEST SERPL-MCNC: 217 MG/DL
CREAT SERPL-MCNC: 1.09 MG/DL (ref 0.67–1.17)
DEPRECATED HCO3 PLAS-SCNC: 28 MMOL/L (ref 22–29)
EGFRCR SERPLBLD CKD-EPI 2021: 73 ML/MIN/1.73M2
FASTING STATUS PATIENT QL REPORTED: YES
GLUCOSE SERPL-MCNC: 152 MG/DL (ref 70–99)
HDLC SERPL-MCNC: 67 MG/DL
LDLC SERPL CALC-MCNC: 131 MG/DL
NONHDLC SERPL-MCNC: 150 MG/DL
POTASSIUM SERPL-SCNC: 3.6 MMOL/L (ref 3.4–5.3)
SODIUM SERPL-SCNC: 141 MMOL/L (ref 135–145)
TRIGL SERPL-MCNC: 95 MG/DL

## 2024-04-02 PROCEDURE — 36415 COLL VENOUS BLD VENIPUNCTURE: CPT

## 2024-04-02 PROCEDURE — 80048 BASIC METABOLIC PNL TOTAL CA: CPT

## 2024-04-02 PROCEDURE — 80061 LIPID PANEL: CPT

## 2024-04-15 ENCOUNTER — OFFICE VISIT (OUTPATIENT)
Dept: FAMILY MEDICINE | Facility: CLINIC | Age: 69
End: 2024-04-15
Payer: COMMERCIAL

## 2024-04-15 VITALS
HEIGHT: 63 IN | TEMPERATURE: 98 F | WEIGHT: 154.08 LBS | HEART RATE: 68 BPM | BODY MASS INDEX: 27.3 KG/M2 | RESPIRATION RATE: 18 BRPM | DIASTOLIC BLOOD PRESSURE: 73 MMHG | OXYGEN SATURATION: 98 % | SYSTOLIC BLOOD PRESSURE: 131 MMHG

## 2024-04-15 DIAGNOSIS — G89.29 CHRONIC PAIN OF BOTH KNEES: ICD-10-CM

## 2024-04-15 DIAGNOSIS — I15.1 HYPERTENSION SECONDARY TO OTHER RENAL DISORDERS: ICD-10-CM

## 2024-04-15 DIAGNOSIS — R51.9 NONINTRACTABLE EPISODIC HEADACHE, UNSPECIFIED HEADACHE TYPE: ICD-10-CM

## 2024-04-15 DIAGNOSIS — E11.29 TYPE 2 DIABETES MELLITUS WITH OTHER DIABETIC KIDNEY COMPLICATION, WITHOUT LONG-TERM CURRENT USE OF INSULIN (H): Primary | ICD-10-CM

## 2024-04-15 DIAGNOSIS — M25.561 CHRONIC PAIN OF BOTH KNEES: ICD-10-CM

## 2024-04-15 DIAGNOSIS — L30.9 DERMATITIS: ICD-10-CM

## 2024-04-15 DIAGNOSIS — M25.562 CHRONIC PAIN OF BOTH KNEES: ICD-10-CM

## 2024-04-15 LAB — HBA1C MFR BLD: 7.8 % (ref 0–5.6)

## 2024-04-15 PROCEDURE — 83036 HEMOGLOBIN GLYCOSYLATED A1C: CPT | Performed by: FAMILY MEDICINE

## 2024-04-15 PROCEDURE — 36415 COLL VENOUS BLD VENIPUNCTURE: CPT | Performed by: FAMILY MEDICINE

## 2024-04-15 PROCEDURE — 99214 OFFICE O/P EST MOD 30 MIN: CPT | Performed by: FAMILY MEDICINE

## 2024-04-15 RX ORDER — HYDROXYZINE HYDROCHLORIDE 25 MG/1
25-50 TABLET, FILM COATED ORAL
Qty: 60 TABLET | Refills: 1 | Status: SHIPPED | OUTPATIENT
Start: 2024-04-15

## 2024-04-15 RX ORDER — RESPIRATORY SYNCYTIAL VIRUS VACCINE 120MCG/0.5
0.5 KIT INTRAMUSCULAR ONCE
Qty: 1 EACH | Refills: 0 | Status: CANCELLED | OUTPATIENT
Start: 2024-04-15 | End: 2024-04-15

## 2024-04-15 RX ORDER — TRIAMCINOLONE ACETONIDE 1 MG/G
CREAM TOPICAL 2 TIMES DAILY
Qty: 80 G | Refills: 3 | Status: SHIPPED | OUTPATIENT
Start: 2024-04-15 | End: 2024-07-09

## 2024-04-15 NOTE — PROGRESS NOTES
Assessment & Plan     Type 2 diabetes mellitus with other diabetic kidney complication, without long-term current use of insulin (H)  - HEMOGLOBIN A1C    Hypertension secondary to other renal disorders  Excellent improvement in his blood pressure, continue current plan.    Dermatitis  Patient would like something for itching.  Will treat the rash topically and the itching with hydroxyzine as prescribed below:  - hydrOXYzine HCl (ATARAX) 25 MG tablet; Take 1-2 tablets (25-50 mg) by mouth nightly as needed for itching  - triamcinolone (KENALOG) 0.1 % external cream; Apply topically 2 times daily    Nonintractable episodic headache, unspecified headache type  Good response to acetaminophen.  Continue this treatment plan.    Chronic pain of both knees  Likely osteoarthritis.  Counseling done with the patient on the use of acetaminophen, icing, modified activity.  We could consider a steroid injection in the future if not getting adequate improvement.            Subjective   Pwo is a 69 year old, presenting for the following health issues:  Diabetes, High Cholesterol <9>, Blood Pressure Check, Migraine (Headaches), and rash all over body      4/15/2024     3:02 PM   Additional Questions   Roomed by ani kerns   Accompanied by grand daughter     History of Present Illness       Diabetes:   He presents for follow up of diabetes.  He is checking home blood glucose two times daily.   He checks blood glucose before and after meals.  Blood glucose is sometimes over 200 and never under 70. He is aware of hypoglycemia symptoms including weakness, blurred vision and confusion.   He is concerned about frequent infections.   He is having blurry vision.            Hyperlipidemia:  He presents for follow up of hyperlipidemia.   He is taking medication to lower cholesterol. He is having myalgia or other side effects to statin medications.    Hypertension: He presents for follow up of hypertension.  He does check blood pressure   "regularly outside of the clinic. Outside blood pressures have been over 140/90. He follows a low salt diet.     Headaches:   Since the patient's last clinic visit, headaches are: worsened  The patient is getting headaches:  Always  He is not able to do normal daily activities when he has a migraine.  The patient is taking the following rescue/relief medications:  Ibuprofen (Advil, Motrin) and Tylenol   Patient states \"I get some relief\" from the rescue/relief medications.   The patient is taking the following medications to prevent migraines:  No medications to prevent migraines  In the past 4 weeks, the patient has gone to an Urgent Care or Emergency Room 0 times times due to headaches.    He eats 2-3 servings of fruits and vegetables daily.He consumes 1 sweetened beverage(s) daily.He exercises with enough effort to increase his heart rate 9 or less minutes per day.  He exercises with enough effort to increase his heart rate 3 or less days per week.   He is taking medications regularly.       Patient reports knee pain and associated stiffness bilaterally that bothers him intermittently.  It has been going on for many months, he is not sure exactly how long.  No injury recently that he can think of.  Also a very itchy rash on the trunk and extremities.  It started about a month ago, he cannot think of any new medications or new foods or anything new to his system around that time.  The itchiness disrupts his sleep at night.        Objective    /73   Pulse 68   Temp 98  F (36.7  C) (Oral)   Resp 18   Ht 1.6 m (5' 3\")   Wt 69.9 kg (154 lb 1.3 oz)   SpO2 98%   BMI 27.29 kg/m    Body mass index is 27.29 kg/m .  Physical Exam   Cardiac-regular rate and rhythm   respiratory-lungs clear to auscultation  Skin-maculopapular rash over the trunk and arms bilaterally.  No ulcers or vesicles.  Musculoskeletal-no knee effusions.  Some crepitus on exam.  Negative Gabriel's.  Good range of motion.          Signed " Electronically by: Yassine Larsen MD

## 2024-07-01 ENCOUNTER — ANESTHESIA (OUTPATIENT)
Dept: SURGERY | Facility: HOSPITAL | Age: 69
End: 2024-07-01
Payer: COMMERCIAL

## 2024-07-01 ENCOUNTER — HOSPITAL ENCOUNTER (OUTPATIENT)
Facility: HOSPITAL | Age: 69
Setting detail: OBSERVATION
Discharge: HOME | End: 2024-07-02
Attending: EMERGENCY MEDICINE | Admitting: STUDENT IN AN ORGANIZED HEALTH CARE EDUCATION/TRAINING PROGRAM
Payer: COMMERCIAL

## 2024-07-01 ENCOUNTER — APPOINTMENT (OUTPATIENT)
Dept: RADIOLOGY | Facility: HOSPITAL | Age: 69
End: 2024-07-01
Attending: UROLOGY
Payer: COMMERCIAL

## 2024-07-01 ENCOUNTER — APPOINTMENT (OUTPATIENT)
Dept: CT IMAGING | Facility: HOSPITAL | Age: 69
End: 2024-07-01
Attending: EMERGENCY MEDICINE
Payer: COMMERCIAL

## 2024-07-01 ENCOUNTER — ANESTHESIA EVENT (OUTPATIENT)
Dept: SURGERY | Facility: HOSPITAL | Age: 69
End: 2024-07-01
Payer: COMMERCIAL

## 2024-07-01 DIAGNOSIS — N20.1 RIGHT URETERAL STONE: Primary | ICD-10-CM

## 2024-07-01 DIAGNOSIS — Z90.5 SOLITARY KIDNEY, ACQUIRED: ICD-10-CM

## 2024-07-01 DIAGNOSIS — N20.0 NEPHROLITHIASIS: Primary | ICD-10-CM

## 2024-07-01 DIAGNOSIS — N20.1 URETEROLITHIASIS: ICD-10-CM

## 2024-07-01 LAB
ALBUMIN SERPL BCG-MCNC: 4.3 G/DL (ref 3.5–5.2)
ALBUMIN UR-MCNC: NEGATIVE MG/DL
ALP SERPL-CCNC: 89 U/L (ref 40–150)
ALT SERPL W P-5'-P-CCNC: 17 U/L (ref 0–70)
ANION GAP SERPL CALCULATED.3IONS-SCNC: 12 MMOL/L (ref 7–15)
APPEARANCE UR: CLEAR
AST SERPL W P-5'-P-CCNC: 27 U/L (ref 0–45)
BASOPHILS # BLD AUTO: 0.1 10E3/UL (ref 0–0.2)
BASOPHILS NFR BLD AUTO: 1 %
BILIRUB SERPL-MCNC: 1.1 MG/DL
BILIRUB UR QL STRIP: NEGATIVE
BUN SERPL-MCNC: 20.2 MG/DL (ref 8–23)
CALCIUM SERPL-MCNC: 9.4 MG/DL (ref 8.8–10.2)
CHLORIDE SERPL-SCNC: 102 MMOL/L (ref 98–107)
COLOR UR AUTO: ABNORMAL
CREAT SERPL-MCNC: 1.69 MG/DL (ref 0.67–1.17)
DEPRECATED HCO3 PLAS-SCNC: 24 MMOL/L (ref 22–29)
EGFRCR SERPLBLD CKD-EPI 2021: 43 ML/MIN/1.73M2
EOSINOPHIL # BLD AUTO: 0.3 10E3/UL (ref 0–0.7)
EOSINOPHIL NFR BLD AUTO: 2 %
ERYTHROCYTE [DISTWIDTH] IN BLOOD BY AUTOMATED COUNT: 12.9 % (ref 10–15)
GLUCOSE BLDC GLUCOMTR-MCNC: 153 MG/DL (ref 70–99)
GLUCOSE BLDC GLUCOMTR-MCNC: 158 MG/DL (ref 70–99)
GLUCOSE BLDC GLUCOMTR-MCNC: 166 MG/DL (ref 70–99)
GLUCOSE BLDC GLUCOMTR-MCNC: 187 MG/DL (ref 70–99)
GLUCOSE BLDC GLUCOMTR-MCNC: 198 MG/DL (ref 70–99)
GLUCOSE SERPL-MCNC: 179 MG/DL (ref 70–99)
GLUCOSE UR STRIP-MCNC: NEGATIVE MG/DL
HCT VFR BLD AUTO: 46.9 % (ref 40–53)
HGB BLD-MCNC: 15.6 G/DL (ref 13.3–17.7)
HGB UR QL STRIP: ABNORMAL
IMM GRANULOCYTES # BLD: 0 10E3/UL
IMM GRANULOCYTES NFR BLD: 0 %
KETONES UR STRIP-MCNC: ABNORMAL MG/DL
LACTATE SERPL-SCNC: 1.3 MMOL/L (ref 0.7–2)
LEUKOCYTE ESTERASE UR QL STRIP: ABNORMAL
LIPASE SERPL-CCNC: 20 U/L (ref 13–60)
LYMPHOCYTES # BLD AUTO: 1.5 10E3/UL (ref 0.8–5.3)
LYMPHOCYTES NFR BLD AUTO: 10 %
MCH RBC QN AUTO: 28.5 PG (ref 26.5–33)
MCHC RBC AUTO-ENTMCNC: 33.3 G/DL (ref 31.5–36.5)
MCV RBC AUTO: 86 FL (ref 78–100)
MONOCYTES # BLD AUTO: 1 10E3/UL (ref 0–1.3)
MONOCYTES NFR BLD AUTO: 7 %
MUCOUS THREADS #/AREA URNS LPF: PRESENT /LPF
NEUTROPHILS # BLD AUTO: 11.9 10E3/UL (ref 1.6–8.3)
NEUTROPHILS NFR BLD AUTO: 80 %
NITRATE UR QL: NEGATIVE
NRBC # BLD AUTO: 0 10E3/UL
NRBC BLD AUTO-RTO: 0 /100
PH UR STRIP: 6 [PH] (ref 5–7)
PLATELET # BLD AUTO: 191 10E3/UL (ref 150–450)
POTASSIUM SERPL-SCNC: 3.7 MMOL/L (ref 3.4–5.3)
PROT SERPL-MCNC: 7.6 G/DL (ref 6.4–8.3)
RBC # BLD AUTO: 5.47 10E6/UL (ref 4.4–5.9)
RBC URINE: 66 /HPF
SODIUM SERPL-SCNC: 138 MMOL/L (ref 135–145)
SP GR UR STRIP: 1.01 (ref 1–1.03)
SQUAMOUS EPITHELIAL: <1 /HPF
TRANSITIONAL EPI: <1 /HPF
UROBILINOGEN UR STRIP-MCNC: <2 MG/DL
WBC # BLD AUTO: 14.8 10E3/UL (ref 4–11)
WBC URINE: 9 /HPF

## 2024-07-01 PROCEDURE — 83605 ASSAY OF LACTIC ACID: CPT | Performed by: EMERGENCY MEDICINE

## 2024-07-01 PROCEDURE — 83690 ASSAY OF LIPASE: CPT | Performed by: EMERGENCY MEDICINE

## 2024-07-01 PROCEDURE — 87086 URINE CULTURE/COLONY COUNT: CPT | Performed by: UROLOGY

## 2024-07-01 PROCEDURE — 255N000002 HC RX 255 OP 636: Performed by: UROLOGY

## 2024-07-01 PROCEDURE — 96361 HYDRATE IV INFUSION ADD-ON: CPT

## 2024-07-01 PROCEDURE — 74176 CT ABD & PELVIS W/O CONTRAST: CPT

## 2024-07-01 PROCEDURE — 85025 COMPLETE CBC W/AUTO DIFF WBC: CPT | Performed by: EMERGENCY MEDICINE

## 2024-07-01 PROCEDURE — 250N000011 HC RX IP 250 OP 636: Performed by: EMERGENCY MEDICINE

## 2024-07-01 PROCEDURE — 258N000001 HC RX 258: Performed by: UROLOGY

## 2024-07-01 PROCEDURE — 272N000001 HC OR GENERAL SUPPLY STERILE: Performed by: UROLOGY

## 2024-07-01 PROCEDURE — 250N000011 HC RX IP 250 OP 636: Performed by: ANESTHESIOLOGY

## 2024-07-01 PROCEDURE — 52332 CYSTOSCOPY AND TREATMENT: CPT | Performed by: NURSE ANESTHETIST, CERTIFIED REGISTERED

## 2024-07-01 PROCEDURE — 999N000180 XR SURGERY CARM FLUORO LESS THAN 5 MIN: Mod: TC

## 2024-07-01 PROCEDURE — 80053 COMPREHEN METABOLIC PANEL: CPT | Performed by: EMERGENCY MEDICINE

## 2024-07-01 PROCEDURE — 250N000013 HC RX MED GY IP 250 OP 250 PS 637: Performed by: UROLOGY

## 2024-07-01 PROCEDURE — 81001 URINALYSIS AUTO W/SCOPE: CPT | Performed by: EMERGENCY MEDICINE

## 2024-07-01 PROCEDURE — 96375 TX/PRO/DX INJ NEW DRUG ADDON: CPT

## 2024-07-01 PROCEDURE — 999N000141 HC STATISTIC PRE-PROCEDURE NURSING ASSESSMENT: Performed by: UROLOGY

## 2024-07-01 PROCEDURE — 99285 EMERGENCY DEPT VISIT HI MDM: CPT | Mod: 25

## 2024-07-01 PROCEDURE — 99100 ANES PT EXTEME AGE<1 YR&>70: CPT | Performed by: NURSE ANESTHETIST, CERTIFIED REGISTERED

## 2024-07-01 PROCEDURE — 710N000012 HC RECOVERY PHASE 2, PER MINUTE: Performed by: UROLOGY

## 2024-07-01 PROCEDURE — 99223 1ST HOSP IP/OBS HIGH 75: CPT | Performed by: STUDENT IN AN ORGANIZED HEALTH CARE EDUCATION/TRAINING PROGRAM

## 2024-07-01 PROCEDURE — 710N000009 HC RECOVERY PHASE 1, LEVEL 1, PER MIN: Performed by: UROLOGY

## 2024-07-01 PROCEDURE — 258N000003 HC RX IP 258 OP 636: Performed by: STUDENT IN AN ORGANIZED HEALTH CARE EDUCATION/TRAINING PROGRAM

## 2024-07-01 PROCEDURE — 120N000001 HC R&B MED SURG/OB

## 2024-07-01 PROCEDURE — 96374 THER/PROPH/DIAG INJ IV PUSH: CPT

## 2024-07-01 PROCEDURE — 250N000013 HC RX MED GY IP 250 OP 250 PS 637: Performed by: STUDENT IN AN ORGANIZED HEALTH CARE EDUCATION/TRAINING PROGRAM

## 2024-07-01 PROCEDURE — 52332 CYSTOSCOPY AND TREATMENT: CPT | Mod: RT | Performed by: UROLOGY

## 2024-07-01 PROCEDURE — 258N000003 HC RX IP 258 OP 636: Performed by: ANESTHESIOLOGY

## 2024-07-01 PROCEDURE — C1758 CATHETER, URETERAL: HCPCS | Performed by: UROLOGY

## 2024-07-01 PROCEDURE — 360N000082 HC SURGERY LEVEL 2 W/ FLUORO, PER MIN: Performed by: UROLOGY

## 2024-07-01 PROCEDURE — 36415 COLL VENOUS BLD VENIPUNCTURE: CPT | Performed by: EMERGENCY MEDICINE

## 2024-07-01 PROCEDURE — 250N000009 HC RX 250: Performed by: ANESTHESIOLOGY

## 2024-07-01 PROCEDURE — C1769 GUIDE WIRE: HCPCS | Performed by: UROLOGY

## 2024-07-01 PROCEDURE — 99255 IP/OBS CONSLTJ NEW/EST HI 80: CPT | Mod: 25 | Performed by: UROLOGY

## 2024-07-01 PROCEDURE — 250N000012 HC RX MED GY IP 250 OP 636 PS 637: Performed by: STUDENT IN AN ORGANIZED HEALTH CARE EDUCATION/TRAINING PROGRAM

## 2024-07-01 PROCEDURE — C2617 STENT, NON-COR, TEM W/O DEL: HCPCS | Performed by: UROLOGY

## 2024-07-01 PROCEDURE — 96361 HYDRATE IV INFUSION ADD-ON: CPT | Mod: 59

## 2024-07-01 PROCEDURE — 258N000003 HC RX IP 258 OP 636: Performed by: EMERGENCY MEDICINE

## 2024-07-01 PROCEDURE — 370N000017 HC ANESTHESIA TECHNICAL FEE, PER MIN: Performed by: UROLOGY

## 2024-07-01 PROCEDURE — 74420 UROGRAPHY RTRGR +-KUB: CPT | Mod: 26 | Performed by: UROLOGY

## 2024-07-01 PROCEDURE — 82962 GLUCOSE BLOOD TEST: CPT

## 2024-07-01 DEVICE — URETERAL STENT
Type: IMPLANTABLE DEVICE | Site: URETER | Status: NON-FUNCTIONAL
Brand: PERCUFLEX™ PLUS
Removed: 2024-07-22

## 2024-07-01 RX ORDER — AMLODIPINE BESYLATE 5 MG/1
10 TABLET ORAL AT BEDTIME
Status: DISCONTINUED | OUTPATIENT
Start: 2024-07-01 | End: 2024-07-02 | Stop reason: HOSPADM

## 2024-07-01 RX ORDER — FENTANYL CITRATE 50 UG/ML
INJECTION, SOLUTION INTRAMUSCULAR; INTRAVENOUS PRN
Status: DISCONTINUED | OUTPATIENT
Start: 2024-07-01 | End: 2024-07-01

## 2024-07-01 RX ORDER — HYDROMORPHONE HCL IN WATER/PF 6 MG/30 ML
0.2 PATIENT CONTROLLED ANALGESIA SYRINGE INTRAVENOUS
Status: DISCONTINUED | OUTPATIENT
Start: 2024-07-01 | End: 2024-07-02 | Stop reason: HOSPADM

## 2024-07-01 RX ORDER — HYDROMORPHONE HCL IN WATER/PF 6 MG/30 ML
0.2 PATIENT CONTROLLED ANALGESIA SYRINGE INTRAVENOUS EVERY 5 MIN PRN
Status: DISCONTINUED | OUTPATIENT
Start: 2024-07-01 | End: 2024-07-01 | Stop reason: HOSPADM

## 2024-07-01 RX ORDER — TAMSULOSIN HYDROCHLORIDE 0.4 MG/1
0.4 CAPSULE ORAL DAILY
Status: DISCONTINUED | OUTPATIENT
Start: 2024-07-01 | End: 2024-07-01

## 2024-07-01 RX ORDER — NICOTINE POLACRILEX 4 MG
15-30 LOZENGE BUCCAL
Status: DISCONTINUED | OUTPATIENT
Start: 2024-07-01 | End: 2024-07-02 | Stop reason: HOSPADM

## 2024-07-01 RX ORDER — LIDOCAINE 40 MG/G
CREAM TOPICAL
Status: DISCONTINUED | OUTPATIENT
Start: 2024-07-01 | End: 2024-07-01 | Stop reason: HOSPADM

## 2024-07-01 RX ORDER — ONDANSETRON 4 MG/1
4 TABLET, ORALLY DISINTEGRATING ORAL EVERY 6 HOURS PRN
Status: DISCONTINUED | OUTPATIENT
Start: 2024-07-01 | End: 2024-07-02 | Stop reason: HOSPADM

## 2024-07-01 RX ORDER — ONDANSETRON 2 MG/ML
4 INJECTION INTRAMUSCULAR; INTRAVENOUS EVERY 30 MIN PRN
Status: DISCONTINUED | OUTPATIENT
Start: 2024-07-01 | End: 2024-07-01 | Stop reason: HOSPADM

## 2024-07-01 RX ORDER — CARVEDILOL 12.5 MG/1
50 TABLET ORAL 2 TIMES DAILY WITH MEALS
Status: DISCONTINUED | OUTPATIENT
Start: 2024-07-01 | End: 2024-07-02 | Stop reason: HOSPADM

## 2024-07-01 RX ORDER — HYDROMORPHONE HCL IN WATER/PF 6 MG/30 ML
0.5 PATIENT CONTROLLED ANALGESIA SYRINGE INTRAVENOUS EVERY 30 MIN PRN
Status: DISCONTINUED | OUTPATIENT
Start: 2024-07-01 | End: 2024-07-01

## 2024-07-01 RX ORDER — PROCHLORPERAZINE MALEATE 5 MG
5 TABLET ORAL EVERY 6 HOURS PRN
Status: DISCONTINUED | OUTPATIENT
Start: 2024-07-01 | End: 2024-07-02 | Stop reason: HOSPADM

## 2024-07-01 RX ORDER — SODIUM CHLORIDE, SODIUM LACTATE, POTASSIUM CHLORIDE, CALCIUM CHLORIDE 600; 310; 30; 20 MG/100ML; MG/100ML; MG/100ML; MG/100ML
INJECTION, SOLUTION INTRAVENOUS CONTINUOUS
Status: DISCONTINUED | OUTPATIENT
Start: 2024-07-01 | End: 2024-07-01 | Stop reason: HOSPADM

## 2024-07-01 RX ORDER — TOLTERODINE 2 MG/1
2 CAPSULE, EXTENDED RELEASE ORAL DAILY PRN
Status: DISCONTINUED | OUTPATIENT
Start: 2024-07-01 | End: 2024-07-02 | Stop reason: HOSPADM

## 2024-07-01 RX ORDER — NALOXONE HYDROCHLORIDE 0.4 MG/ML
0.2 INJECTION, SOLUTION INTRAMUSCULAR; INTRAVENOUS; SUBCUTANEOUS
Status: DISCONTINUED | OUTPATIENT
Start: 2024-07-01 | End: 2024-07-02 | Stop reason: HOSPADM

## 2024-07-01 RX ORDER — ONDANSETRON 2 MG/ML
INJECTION INTRAMUSCULAR; INTRAVENOUS PRN
Status: DISCONTINUED | OUTPATIENT
Start: 2024-07-01 | End: 2024-07-01

## 2024-07-01 RX ORDER — ONDANSETRON 2 MG/ML
4 INJECTION INTRAMUSCULAR; INTRAVENOUS EVERY 30 MIN PRN
Status: DISCONTINUED | OUTPATIENT
Start: 2024-07-01 | End: 2024-07-01

## 2024-07-01 RX ORDER — SPIRONOLACTONE 25 MG/1
25 TABLET ORAL DAILY
Status: DISCONTINUED | OUTPATIENT
Start: 2024-07-01 | End: 2024-07-02 | Stop reason: HOSPADM

## 2024-07-01 RX ORDER — OXYCODONE HYDROCHLORIDE 5 MG/1
5 TABLET ORAL
Status: DISCONTINUED | OUTPATIENT
Start: 2024-07-01 | End: 2024-07-01 | Stop reason: HOSPADM

## 2024-07-01 RX ORDER — DEXAMETHASONE SODIUM PHOSPHATE 10 MG/ML
4 INJECTION, SOLUTION INTRAMUSCULAR; INTRAVENOUS
Status: DISCONTINUED | OUTPATIENT
Start: 2024-07-01 | End: 2024-07-01 | Stop reason: HOSPADM

## 2024-07-01 RX ORDER — TAMSULOSIN HYDROCHLORIDE 0.4 MG/1
0.4 CAPSULE ORAL DAILY
Status: DISCONTINUED | OUTPATIENT
Start: 2024-07-01 | End: 2024-07-02 | Stop reason: HOSPADM

## 2024-07-01 RX ORDER — DEXAMETHASONE SODIUM PHOSPHATE 4 MG/ML
4 INJECTION, SOLUTION INTRA-ARTICULAR; INTRALESIONAL; INTRAMUSCULAR; INTRAVENOUS; SOFT TISSUE
Status: DISCONTINUED | OUTPATIENT
Start: 2024-07-01 | End: 2024-07-01 | Stop reason: HOSPADM

## 2024-07-01 RX ORDER — LANOLIN ALCOHOL/MO/W.PET/CERES
3 CREAM (GRAM) TOPICAL
Status: DISCONTINUED | OUTPATIENT
Start: 2024-07-01 | End: 2024-07-02 | Stop reason: HOSPADM

## 2024-07-01 RX ORDER — AMOXICILLIN 250 MG
1 CAPSULE ORAL 2 TIMES DAILY PRN
Status: DISCONTINUED | OUTPATIENT
Start: 2024-07-01 | End: 2024-07-02 | Stop reason: HOSPADM

## 2024-07-01 RX ORDER — ONDANSETRON 4 MG/1
4 TABLET, ORALLY DISINTEGRATING ORAL EVERY 30 MIN PRN
Status: DISCONTINUED | OUTPATIENT
Start: 2024-07-01 | End: 2024-07-01 | Stop reason: HOSPADM

## 2024-07-01 RX ORDER — LOSARTAN POTASSIUM 50 MG/1
100 TABLET ORAL DAILY
Status: DISCONTINUED | OUTPATIENT
Start: 2024-07-01 | End: 2024-07-02 | Stop reason: HOSPADM

## 2024-07-01 RX ORDER — DEXTROSE MONOHYDRATE 25 G/50ML
25-50 INJECTION, SOLUTION INTRAVENOUS
Status: DISCONTINUED | OUTPATIENT
Start: 2024-07-01 | End: 2024-07-02 | Stop reason: HOSPADM

## 2024-07-01 RX ORDER — HYDRALAZINE HYDROCHLORIDE 20 MG/ML
10 INJECTION INTRAMUSCULAR; INTRAVENOUS EVERY 6 HOURS PRN
Status: DISCONTINUED | OUTPATIENT
Start: 2024-07-01 | End: 2024-07-02 | Stop reason: HOSPADM

## 2024-07-01 RX ORDER — CEFAZOLIN SODIUM 1 G/3ML
INJECTION, POWDER, FOR SOLUTION INTRAMUSCULAR; INTRAVENOUS PRN
Status: DISCONTINUED | OUTPATIENT
Start: 2024-07-01 | End: 2024-07-01

## 2024-07-01 RX ORDER — KETOROLAC TROMETHAMINE 15 MG/ML
15 INJECTION, SOLUTION INTRAMUSCULAR; INTRAVENOUS ONCE
Status: COMPLETED | OUTPATIENT
Start: 2024-07-01 | End: 2024-07-01

## 2024-07-01 RX ORDER — POLYETHYLENE GLYCOL 3350 17 G/17G
17 POWDER, FOR SOLUTION ORAL 2 TIMES DAILY PRN
Status: DISCONTINUED | OUTPATIENT
Start: 2024-07-01 | End: 2024-07-02 | Stop reason: HOSPADM

## 2024-07-01 RX ORDER — TRIAMCINOLONE ACETONIDE 1 MG/G
CREAM TOPICAL 2 TIMES DAILY PRN
Status: DISCONTINUED | OUTPATIENT
Start: 2024-07-01 | End: 2024-07-02 | Stop reason: HOSPADM

## 2024-07-01 RX ORDER — FENTANYL CITRATE 50 UG/ML
50 INJECTION, SOLUTION INTRAMUSCULAR; INTRAVENOUS EVERY 5 MIN PRN
Status: DISCONTINUED | OUTPATIENT
Start: 2024-07-01 | End: 2024-07-01 | Stop reason: HOSPADM

## 2024-07-01 RX ORDER — LIDOCAINE HYDROCHLORIDE 10 MG/ML
INJECTION, SOLUTION INFILTRATION; PERINEURAL PRN
Status: DISCONTINUED | OUTPATIENT
Start: 2024-07-01 | End: 2024-07-01

## 2024-07-01 RX ORDER — NALOXONE HYDROCHLORIDE 0.4 MG/ML
0.1 INJECTION, SOLUTION INTRAMUSCULAR; INTRAVENOUS; SUBCUTANEOUS
Status: DISCONTINUED | OUTPATIENT
Start: 2024-07-01 | End: 2024-07-01 | Stop reason: HOSPADM

## 2024-07-01 RX ORDER — DEXAMETHASONE SODIUM PHOSPHATE 4 MG/ML
INJECTION, SOLUTION INTRA-ARTICULAR; INTRALESIONAL; INTRAMUSCULAR; INTRAVENOUS; SOFT TISSUE PRN
Status: DISCONTINUED | OUTPATIENT
Start: 2024-07-01 | End: 2024-07-01

## 2024-07-01 RX ORDER — NALOXONE HYDROCHLORIDE 0.4 MG/ML
0.4 INJECTION, SOLUTION INTRAMUSCULAR; INTRAVENOUS; SUBCUTANEOUS
Status: DISCONTINUED | OUTPATIENT
Start: 2024-07-01 | End: 2024-07-02 | Stop reason: HOSPADM

## 2024-07-01 RX ORDER — AMOXICILLIN 250 MG
2 CAPSULE ORAL 2 TIMES DAILY PRN
Status: DISCONTINUED | OUTPATIENT
Start: 2024-07-01 | End: 2024-07-02 | Stop reason: HOSPADM

## 2024-07-01 RX ORDER — CALCIUM CARBONATE 500 MG/1
1000 TABLET, CHEWABLE ORAL 4 TIMES DAILY PRN
Status: DISCONTINUED | OUTPATIENT
Start: 2024-07-01 | End: 2024-07-02 | Stop reason: HOSPADM

## 2024-07-01 RX ORDER — SODIUM CHLORIDE, SODIUM LACTATE, POTASSIUM CHLORIDE, CALCIUM CHLORIDE 600; 310; 30; 20 MG/100ML; MG/100ML; MG/100ML; MG/100ML
INJECTION, SOLUTION INTRAVENOUS CONTINUOUS
Status: DISCONTINUED | OUTPATIENT
Start: 2024-07-01 | End: 2024-07-02 | Stop reason: HOSPADM

## 2024-07-01 RX ORDER — FENTANYL CITRATE 50 UG/ML
25 INJECTION, SOLUTION INTRAMUSCULAR; INTRAVENOUS EVERY 5 MIN PRN
Status: DISCONTINUED | OUTPATIENT
Start: 2024-07-01 | End: 2024-07-01 | Stop reason: HOSPADM

## 2024-07-01 RX ORDER — PROPOFOL 10 MG/ML
INJECTION, EMULSION INTRAVENOUS CONTINUOUS PRN
Status: DISCONTINUED | OUTPATIENT
Start: 2024-07-01 | End: 2024-07-01

## 2024-07-01 RX ORDER — ATORVASTATIN CALCIUM 40 MG/1
80 TABLET, FILM COATED ORAL EVERY EVENING
Status: DISCONTINUED | OUTPATIENT
Start: 2024-07-01 | End: 2024-07-02 | Stop reason: HOSPADM

## 2024-07-01 RX ORDER — LIDOCAINE 40 MG/G
CREAM TOPICAL
Status: DISCONTINUED | OUTPATIENT
Start: 2024-07-01 | End: 2024-07-02 | Stop reason: HOSPADM

## 2024-07-01 RX ORDER — ONDANSETRON 2 MG/ML
4 INJECTION INTRAMUSCULAR; INTRAVENOUS EVERY 6 HOURS PRN
Status: DISCONTINUED | OUTPATIENT
Start: 2024-07-01 | End: 2024-07-02 | Stop reason: HOSPADM

## 2024-07-01 RX ORDER — HYDROMORPHONE HCL IN WATER/PF 6 MG/30 ML
0.4 PATIENT CONTROLLED ANALGESIA SYRINGE INTRAVENOUS
Status: DISCONTINUED | OUTPATIENT
Start: 2024-07-01 | End: 2024-07-02 | Stop reason: HOSPADM

## 2024-07-01 RX ORDER — ACETAMINOPHEN 325 MG/1
975 TABLET ORAL 3 TIMES DAILY
Status: DISCONTINUED | OUTPATIENT
Start: 2024-07-01 | End: 2024-07-02 | Stop reason: HOSPADM

## 2024-07-01 RX ORDER — ASPIRIN 81 MG/1
81 TABLET ORAL DAILY
Status: DISCONTINUED | OUTPATIENT
Start: 2024-07-01 | End: 2024-07-02 | Stop reason: HOSPADM

## 2024-07-01 RX ORDER — PROCHLORPERAZINE 25 MG
12.5 SUPPOSITORY, RECTAL RECTAL EVERY 12 HOURS PRN
Status: DISCONTINUED | OUTPATIENT
Start: 2024-07-01 | End: 2024-07-02 | Stop reason: HOSPADM

## 2024-07-01 RX ORDER — EZETIMIBE 10 MG/1
10 TABLET ORAL DAILY
Status: DISCONTINUED | OUTPATIENT
Start: 2024-07-01 | End: 2024-07-02 | Stop reason: HOSPADM

## 2024-07-01 RX ORDER — OXYCODONE HYDROCHLORIDE 5 MG/1
5 TABLET ORAL EVERY 4 HOURS PRN
Status: DISCONTINUED | OUTPATIENT
Start: 2024-07-01 | End: 2024-07-02 | Stop reason: HOSPADM

## 2024-07-01 RX ORDER — HYDROMORPHONE HCL IN WATER/PF 6 MG/30 ML
0.4 PATIENT CONTROLLED ANALGESIA SYRINGE INTRAVENOUS EVERY 5 MIN PRN
Status: DISCONTINUED | OUTPATIENT
Start: 2024-07-01 | End: 2024-07-01 | Stop reason: HOSPADM

## 2024-07-01 RX ORDER — OXYCODONE HYDROCHLORIDE 5 MG/1
10 TABLET ORAL
Status: DISCONTINUED | OUTPATIENT
Start: 2024-07-01 | End: 2024-07-01 | Stop reason: HOSPADM

## 2024-07-01 RX ADMIN — INSULIN ASPART 1 UNITS: 100 INJECTION, SOLUTION INTRAVENOUS; SUBCUTANEOUS at 08:48

## 2024-07-01 RX ADMIN — OXYCODONE HYDROCHLORIDE 5 MG: 5 TABLET ORAL at 16:29

## 2024-07-01 RX ADMIN — SODIUM CHLORIDE, POTASSIUM CHLORIDE, SODIUM LACTATE AND CALCIUM CHLORIDE: 600; 310; 30; 20 INJECTION, SOLUTION INTRAVENOUS at 08:40

## 2024-07-01 RX ADMIN — OXYCODONE HYDROCHLORIDE 2.5 MG: 5 TABLET ORAL at 12:32

## 2024-07-01 RX ADMIN — ACETAMINOPHEN 975 MG: 325 TABLET ORAL at 21:20

## 2024-07-01 RX ADMIN — CARVEDILOL 50 MG: 12.5 TABLET, FILM COATED ORAL at 18:19

## 2024-07-01 RX ADMIN — ACETAMINOPHEN 975 MG: 325 TABLET ORAL at 08:43

## 2024-07-01 RX ADMIN — ATORVASTATIN CALCIUM 80 MG: 40 TABLET, FILM COATED ORAL at 21:20

## 2024-07-01 RX ADMIN — PROPOFOL 150 MCG/KG/MIN: 10 INJECTION, EMULSION INTRAVENOUS at 11:26

## 2024-07-01 RX ADMIN — LIDOCAINE HYDROCHLORIDE 50 MG: 10 INJECTION, SOLUTION INFILTRATION; PERINEURAL at 11:23

## 2024-07-01 RX ADMIN — CARVEDILOL 50 MG: 12.5 TABLET, FILM COATED ORAL at 08:42

## 2024-07-01 RX ADMIN — HYDROMORPHONE HYDROCHLORIDE 0.4 MG: 0.2 INJECTION, SOLUTION INTRAMUSCULAR; INTRAVENOUS; SUBCUTANEOUS at 13:00

## 2024-07-01 RX ADMIN — HYDROMORPHONE HYDROCHLORIDE 0.5 MG: 0.2 INJECTION, SOLUTION INTRAMUSCULAR; INTRAVENOUS; SUBCUTANEOUS at 05:42

## 2024-07-01 RX ADMIN — CEFAZOLIN 2 G: 1 INJECTION, POWDER, FOR SOLUTION INTRAMUSCULAR; INTRAVENOUS at 11:20

## 2024-07-01 RX ADMIN — OXYCODONE HYDROCHLORIDE 5 MG: 5 TABLET ORAL at 21:21

## 2024-07-01 RX ADMIN — EZETIMIBE 10 MG: 10 TABLET ORAL at 08:42

## 2024-07-01 RX ADMIN — TAMSULOSIN HYDROCHLORIDE 0.4 MG: 0.4 CAPSULE ORAL at 08:42

## 2024-07-01 RX ADMIN — AMLODIPINE BESYLATE 10 MG: 5 TABLET ORAL at 21:20

## 2024-07-01 RX ADMIN — DEXAMETHASONE SODIUM PHOSPHATE 4 MG: 4 INJECTION, SOLUTION INTRA-ARTICULAR; INTRALESIONAL; INTRAMUSCULAR; INTRAVENOUS; SOFT TISSUE at 11:23

## 2024-07-01 RX ADMIN — TOLTERODINE 2 MG: 2 CAPSULE, EXTENDED RELEASE ORAL at 12:54

## 2024-07-01 RX ADMIN — INSULIN ASPART 1 UNITS: 100 INJECTION, SOLUTION INTRAVENOUS; SUBCUTANEOUS at 17:44

## 2024-07-01 RX ADMIN — SODIUM CHLORIDE, POTASSIUM CHLORIDE, SODIUM LACTATE AND CALCIUM CHLORIDE: 600; 310; 30; 20 INJECTION, SOLUTION INTRAVENOUS at 10:35

## 2024-07-01 RX ADMIN — FENTANYL CITRATE 50 MCG: 50 INJECTION INTRAMUSCULAR; INTRAVENOUS at 11:23

## 2024-07-01 RX ADMIN — FENTANYL CITRATE 50 MCG: 50 INJECTION INTRAMUSCULAR; INTRAVENOUS at 11:10

## 2024-07-01 RX ADMIN — KETOROLAC TROMETHAMINE 15 MG: 15 INJECTION, SOLUTION INTRAMUSCULAR; INTRAVENOUS at 05:46

## 2024-07-01 RX ADMIN — ONDANSETRON 4 MG: 2 INJECTION INTRAMUSCULAR; INTRAVENOUS at 05:44

## 2024-07-01 RX ADMIN — SODIUM CHLORIDE 1000 ML: 9 INJECTION, SOLUTION INTRAVENOUS at 05:42

## 2024-07-01 RX ADMIN — SODIUM CHLORIDE, POTASSIUM CHLORIDE, SODIUM LACTATE AND CALCIUM CHLORIDE: 600; 310; 30; 20 INJECTION, SOLUTION INTRAVENOUS at 12:44

## 2024-07-01 RX ADMIN — ONDANSETRON 4 MG: 2 INJECTION INTRAMUSCULAR; INTRAVENOUS at 11:41

## 2024-07-01 RX ADMIN — SODIUM CHLORIDE, POTASSIUM CHLORIDE, SODIUM LACTATE AND CALCIUM CHLORIDE: 600; 310; 30; 20 INJECTION, SOLUTION INTRAVENOUS at 10:22

## 2024-07-01 RX ADMIN — FENTANYL CITRATE 25 MCG: 50 INJECTION, SOLUTION INTRAMUSCULAR; INTRAVENOUS at 12:22

## 2024-07-01 ASSESSMENT — ACTIVITIES OF DAILY LIVING (ADL)
ADLS_ACUITY_SCORE: 38
DEPENDENT_IADLS:: CLEANING;COOKING;LAUNDRY;SHOPPING;MEAL PREPARATION;TRANSPORTATION;MEDICATION MANAGEMENT;MONEY MANAGEMENT
ADLS_ACUITY_SCORE: 38
ADLS_ACUITY_SCORE: 23
ADLS_ACUITY_SCORE: 38
WEAR_GLASSES_OR_BLIND: YES
ADLS_ACUITY_SCORE: 23
CONCENTRATING,_REMEMBERING_OR_MAKING_DECISIONS_DIFFICULTY: NO
ADLS_ACUITY_SCORE: 38
DIFFICULTY_EATING/SWALLOWING: NO
WALKING_OR_CLIMBING_STAIRS_DIFFICULTY: NO
ADLS_ACUITY_SCORE: 23
EQUIPMENT_CURRENTLY_USED_AT_HOME: CANE, STRAIGHT
ADLS_ACUITY_SCORE: 38
DRESSING/BATHING_DIFFICULTY: NO
ADLS_ACUITY_SCORE: 38
TOILETING_ISSUES: NO
CHANGE_IN_FUNCTIONAL_STATUS_SINCE_ONSET_OF_CURRENT_ILLNESS/INJURY: YES
ADLS_ACUITY_SCORE: 38
ADLS_ACUITY_SCORE: 23
ADLS_ACUITY_SCORE: 23
ADLS_ACUITY_SCORE: 38
ADLS_ACUITY_SCORE: 23
ADLS_ACUITY_SCORE: 23
DOING_ERRANDS_INDEPENDENTLY_DIFFICULTY: NO

## 2024-07-01 NOTE — ED TRIAGE NOTES
Patient has had two days of right lower quadrant pain that radiates to the right flank. Patient reports nausea but denies emesis or diarrhea. Denies hematuria, dysuria.      Triage Assessment (Adult)       Row Name 07/01/24 0518          Triage Assessment    Airway WDL WDL        Respiratory WDL    Respiratory WDL WDL        Skin Circulation/Temperature WDL    Skin Circulation/Temperature WDL WDL        Cardiac WDL    Cardiac WDL WDL        Peripheral/Neurovascular WDL    Peripheral Neurovascular WDL WDL        Cognitive/Neuro/Behavioral WDL    Cognitive/Neuro/Behavioral WDL WDL

## 2024-07-01 NOTE — ED PROVIDER NOTES
EMERGENCY DEPARTMENT ENCOUNTER      NAME: Moriah Hinojosa  AGE: 69 year old male  YOB: 1955  MRN: [id number]  EVALUATION DATE & TIME: 7/1/2024  5:19 AM    PCP: Yassine Larsen    ED PROVIDER: Aguila Matias M.D.      Chief Complaint   Patient presents with    Abdominal Pain    Nausea         FINAL IMPRESSION:  No diagnosis found.      ED COURSE & MEDICAL DECISION MAKING:    Pertinent Labs & Imaging studies reviewed. (See chart for details)  69 year old male presents to the Emergency Department for evaluation of abdominal pain.  Has had worsening flank pain.  Is hypertensive here.  On examination no palpable mass.  I do not think this is AAA.  Does have a solitary kidney and history of kidney stones.  I am concerned this may be the cause.  CT scan is done and does show a 4 mm right ureteral calculus with moderate right hydronephrosis.  This is his only kidney.  Creatinine is elevated to 1.69.  White count up to 14.8.  Patient feeling better after IV fluids IV Toradol and IV Dilaudid.  Will admit to the hospital service.  Will discuss with urology as he likely will need emergent intervention for this.    5:20 AM I met with the patient to gather history and to perform my initial exam. I discussed the plan for care while in the Emergency Department.       At the conclusion of the encounter I discussed the results of all of the tests and the disposition. The questions were answered. The patient or family acknowledged understanding and was agreeable with the care plan.     Medical Decision Making  Obtained supplemental history:Supplemental history obtained?: Documented in chart and Family Member/Significant Other  Reviewed external records: External records reviewed?: No  Care impacted by chronic illness:Chronic Kidney Disease  Care significantly affected by social determinants of health:N/A  Did you consider but not order tests?: Work up considered but not performed and documented in chart, if applicable  Did you  interpret images independently?: Independent interpretation of ECG and images noted in documentation, when applicable.  Consultation discussion with other provider:Did you involve another provider (consultant, , pharmacy, etc.)?: I discussed the care with another health care provider, see documentation for details.  Admit.         MEDICATIONS GIVEN IN THE EMERGENCY:  Medications - No data to display    NEW PRESCRIPTIONS STARTED AT TODAY'S ER VISIT  New Prescriptions    No medications on file          =================================================================    HPI    Patient information was obtained from: Patient and granddaughter    Use of   Family member interpreted at patient's request.        Moriah Hinojosa is a 69 year old male with a pertinent history of hypertension, kidney stone, solitary kidney, chronic kidney disease who presents to this ED for evaluation of flank pain.  Has had right-sided flank pain going on over the weekend.  Pain started on Friday initially got better on Saturday but then got worse again yesterday.  Progressively worse.  Has not been nauseated has not thrown up.  No hematuria.  No dysuria.  No difficulty with bowel and bladder.  Has had 1 kidney removed.  Granddaughter states this was due to a kidney stone.  Unclear the circumstances surrounding this.  No fever but did have chills last night        PAST MEDICAL HISTORY:  Past Medical History:   Diagnosis Date    Chronic pain of both knees 2/11/2019    COVID-19 5/7/2020    Hypercholesteremia 6/18/2018    Hypertension 5/7/2018       PAST SURGICAL HISTORY:  Past Surgical History:   Procedure Laterality Date    LAPAROSCOPIC NEPHRECTOMY Left 8/24/2021    Procedure: NEPHRECTOMY, TOTAL, LAPAROSCOPIC;  Surgeon: Catracho Wright MD;  Location: [facility] OR           CURRENT MEDICATIONS:    No current facility-administered medications for this encounter.     Current Outpatient Medications   Medication Sig Dispense Refill     acetaminophen (TYLENOL) 325 MG tablet Take 2 tablets (650 mg) by mouth every 6 hours as needed for pain 120 tablet 0    amLODIPine (NORVASC) 10 MG tablet Take 1 tablet (10 mg) by mouth at bedtime Need appt for further refills. 90 tablet 11    aspirin 81 MG EC tablet Take 1 tablet (81 mg) by mouth daily 90 tablet 11    atorvastatin (LIPITOR) 80 MG tablet Take 1 tablet (80 mg) by mouth every evening (Patient not taking: Reported on 4/15/2024) 90 tablet 11    blood glucose (NO BRAND SPECIFIED) test strip Use to test blood sugar one times daily or as directed. To accompany: Blood Glucose Monitor Brands: per insurance. (Patient not taking: Reported on 4/15/2024) 100 strip 6    blood glucose monitoring (NO BRAND SPECIFIED) meter device kit Use to test blood sugar 1 times daily or as directed. Preferred blood glucose meter OR supplies to accompany: Blood Glucose Monitor Brands: per insurance. (Patient not taking: Reported on 4/15/2024) 1 kit 0    carvedilol (COREG) 25 MG tablet Take 2 tablets (50 mg) by mouth 2 times daily (with meals) 360 tablet 11    ezetimibe (ZETIA) 10 MG tablet Take 1 tablet (10 mg) by mouth daily 90 tablet 11    hydrOXYzine HCl (ATARAX) 25 MG tablet Take 1-2 tablets (25-50 mg) by mouth nightly as needed for itching 60 tablet 1    metFORMIN (GLUCOPHAGE) 1000 MG tablet Take 1 tablet (1,000 mg) by mouth 2 times daily (with meals) for 360 days 180 tablet 3    Polyvinyl Alcohol-Povidone (ARTIFICIAL TEARS) 5-6 MG/ML SOLN Apply 1 drop to eye 3 times daily as needed (both eyes) (Patient not taking: Reported on 1/10/2024) 30 mL 11    spironolactone (ALDACTONE) 25 MG tablet Take 1 tablet (25 mg) by mouth daily for 360 days 90 tablet 3    telmisartan (MICARDIS) 80 MG tablet Take 1 tablet (80 mg) by mouth daily 90 tablet 3    thin (NO BRAND SPECIFIED) lancets Use with lanceting device. To accompany: Blood Glucose Monitor Brands: per insurance. (Patient not taking: Reported on 4/15/2024) 100 each 6     triamcinolone (KENALOG) 0.1 % external cream Apply topically 2 times daily 80 g 3         ALLERGIES:  No Known Allergies    FAMILY HISTORY:  Family History   Problem Relation Age of Onset    Anesthesia Reaction No family hx of     Deep Vein Thrombosis (DVT) No family hx of        SOCIAL HISTORY:   Social History     Socioeconomic History    Marital status:    Tobacco Use    Smoking status: Former     Current packs/day: 0.00     Types: Cigarettes     Quit date: 3/6/2016     Years since quittin.3     Passive exposure: Never    Smokeless tobacco: Never    Tobacco comments:     no passive exposure   Vaping Use    Vaping status: Never Used   Substance and Sexual Activity    Alcohol use: No    Drug use: Never    Sexual activity: Not Currently     Partners: Female     Social Determinants of Health     Financial Resource Strain: Low Risk  (1/10/2024)    Financial Resource Strain     Within the past 12 months, have you or your family members you live with been unable to get utilities (heat, electricity) when it was really needed?: No   Food Insecurity: Low Risk  (1/10/2024)    Food Insecurity     Within the past 12 months, did you worry that your food would run out before you got money to buy more?: No     Within the past 12 months, did the food you bought just not last and you didn t have money to get more?: No   Transportation Needs: Low Risk  (1/10/2024)    Transportation Needs     Within the past 12 months, has lack of transportation kept you from medical appointments, getting your medicines, non-medical meetings or appointments, work, or from getting things that you need?: No   Interpersonal Safety: Low Risk  (1/10/2024)    Interpersonal Safety     Do you feel physically and emotionally safe where you currently live?: Yes     Within the past 12 months, have you been hit, slapped, kicked or otherwise physically hurt by someone?: No     Within the past 12 months, have you been humiliated or emotionally abused  "in other ways by your partner or ex-partner?: No   Housing Stability: Low Risk  (1/10/2024)    Housing Stability     Do you have housing? : Yes     Are you worried about losing your housing?: No       VITALS:  BP (!) 206/118   Pulse 91   Temp 97.8  F (36.6  C) (Oral)   Resp 22   Ht 1.6 m (5' 3\")   Wt 67.9 kg (149 lb 9.6 oz)   SpO2 97%   BMI 26.50 kg/m      PHYSICAL EXAM    Physical Exam  Vitals and nursing note reviewed.   Constitutional:       General: He is not in acute distress.     Appearance: He is not diaphoretic.   HENT:      Head: Atraumatic.   Eyes:      General: No scleral icterus.     Pupils: Pupils are equal, round, and reactive to light.   Cardiovascular:      Rate and Rhythm: Normal rate and regular rhythm.      Heart sounds: Normal heart sounds.   Pulmonary:      Effort: No respiratory distress.      Breath sounds: Normal breath sounds.   Abdominal:      Palpations: Abdomen is soft.      Tenderness: There is abdominal tenderness in the right upper quadrant.   Musculoskeletal:         General: No tenderness.   Lymphadenopathy:      Cervical: No cervical adenopathy.   Skin:     General: Skin is warm.      Findings: No rash.           LAB:  All pertinent labs reviewed and interpreted.  Labs Ordered and Resulted from Time of ED Arrival to Time of ED Departure - No data to display    RADIOLOGY:  Reviewed all pertinent imaging. Please see official radiology report.  No orders to display           Aguila Matias M.D.  Emergency Medicine  [facility] EMERGENCY DEPARTMENT  [address] [place] [id number] [phone]  Dept: [phone]       Aguila Matias MD  07/01/24 0734    "

## 2024-07-01 NOTE — PHARMACY-ADMISSION MEDICATION HISTORY
Pharmacist Admission Medication History    Admission medication history is complete. The information provided in this note is only as accurate as the sources available at the time of the update.    Information Source(s): Patient, Family member, Clinic records, and CareEverywhere/SureScripts via in-person    Pertinent Information: he frequently omits atorvastatin due to myalgia     Changes made to PTA medication list:  Added: None  Deleted: None  Changed: None    Allergies reviewed with patient and updates made in EHR: yes    Medication History Completed By: Steven Diaz [facility] 7/1/2024 7:34 AM    PTA Med List   Medication Sig Note Last Dose    acetaminophen (TYLENOL) 325 MG tablet Take 2 tablets (650 mg) by mouth every 6 hours as needed for pain      amLODIPine (NORVASC) 10 MG tablet Take 1 tablet (10 mg) by mouth at bedtime Need appt for further refills.  6/30/2024 at pm    aspirin 81 MG EC tablet Take 1 tablet (81 mg) by mouth daily  6/30/2024 at am    atorvastatin (LIPITOR) 80 MG tablet Take 1 tablet (80 mg) by mouth every evening  Past Week at pm    carvedilol (COREG) 25 MG tablet Take 2 tablets (50 mg) by mouth 2 times daily (with meals)  6/30/2024 at pm    ezetimibe (ZETIA) 10 MG tablet Take 1 tablet (10 mg) by mouth daily  6/30/2024 at am    hydrOXYzine HCl (ATARAX) 25 MG tablet Take 1-2 tablets (25-50 mg) by mouth nightly as needed for itching      metFORMIN (GLUCOPHAGE) 1000 MG tablet Take 1 tablet (1,000 mg) by mouth 2 times daily (with meals) for 360 days  6/30/2024 at pm    Polyvinyl Alcohol-Povidone (ARTIFICIAL TEARS) 5-6 MG/ML SOLN Apply 1 drop to eye 3 times daily as needed (both eyes)      spironolactone (ALDACTONE) 25 MG tablet Take 1 tablet (25 mg) by mouth daily for 360 days  6/30/2024 at am    telmisartan (MICARDIS) 80 MG tablet Take 1 tablet (80 mg) by mouth daily  6/30/2024 at am    triamcinolone (KENALOG) 0.1 % external cream Apply topically 2 times daily 7/1/2024: Itching over all of his  body  6/30/2024 at pm

## 2024-07-01 NOTE — ANESTHESIA POSTPROCEDURE EVALUATION
Patient: Pwo Micaela    Procedure: Procedure(s):  CYSTOSCOPY, WITH RETROGRADE PYELOGRAM AND URETERAL STENT INSERTION       Anesthesia Type:  General    Note:  Disposition: Inpatient   Postop Pain Control: Uneventful            Sign Out: Well controlled pain   PONV: No   Neuro/Psych: Uneventful            Sign Out: Acceptable/Baseline neuro status   Airway/Respiratory: Uneventful            Sign Out: Acceptable/Baseline resp. status   CV/Hemodynamics: Uneventful            Sign Out: Acceptable CV status; No obvious hypovolemia; No obvious fluid overload   Other NRE:    DID A NON-ROUTINE EVENT OCCUR?        Last vitals:  Vitals Value Taken Time   /93 07/01/24 1317   Temp 36.4  C (97.5  F) 07/01/24 1315   Pulse 69 07/01/24 1320   Resp 15 07/01/24 1320   SpO2 96 % 07/01/24 1324   Vitals shown include unfiled device data.    Electronically Signed By: Freddy Canales MD  July 1, 2024  3:10 PM

## 2024-07-01 NOTE — PROGRESS NOTES
[facility] consulted for this 69 yr old male with obstructing ureteral stone. Chart review shows this patient follows with [facility] (Dr. Wright, previously Dr. Reyes). Updated ED HUC and she will notify [facility] provider.     [facility] will sign off.    Jesus Cristina, DON, CNP

## 2024-07-01 NOTE — ANESTHESIA PREPROCEDURE EVALUATION
Anesthesia Pre-Procedure Evaluation    Patient: Moriah Hinojosa   MRN: [id number] : 1955        Procedure : Procedure(s):  CYSTOSCOPY, WITH RETROGRADE PYELOGRAM AND URETERAL STENT INSERTION  CYSTOURETEROSCOPY, WITH RETROGRADE PYELOGRAM, HOLMIUM LASER LITHOTRIPSY OF URETERAL CALCULUS, AND STENT INSERTION          Past Medical History:   Diagnosis Date     Chronic pain of both knees 2019     COVID-19 2020     Hypercholesteremia 2018     Hypertension 2018      Past Surgical History:   Procedure Laterality Date     LAPAROSCOPIC NEPHRECTOMY Left 2021    Procedure: NEPHRECTOMY, TOTAL, LAPAROSCOPIC;  Surgeon: Catracho Wright MD;  Location: [facility]      No Known Allergies   Social History     Tobacco Use     Smoking status: Former     Current packs/day: 0.00     Types: Cigarettes     Quit date: 3/6/2016     Years since quittin.3     Passive exposure: Never     Smokeless tobacco: Never     Tobacco comments:     no passive exposure   Substance Use Topics     Alcohol use: No      Wt Readings from Last 1 Encounters:   24 67.9 kg (149 lb 9.6 oz)        Anesthesia Evaluation   Pt has had prior anesthetic.         ROS/MED HX  ENT/Pulmonary:  - neg pulmonary ROS     Neurologic:  - neg neurologic ROS     Cardiovascular:     (+)  hypertension (historically a challenge to control)- -   -  - -                                      METS/Exercise Tolerance: >4 METS    Hematologic:  - neg hematologic  ROS     Musculoskeletal:  - neg musculoskeletal ROS     GI/Hepatic:  - neg GI/hepatic ROS     Renal/Genitourinary: Comment: s/p left nephrectomy 2021    (+) renal disease (Cr=1.69), type: CRI,         : 7.8.   Endo:     (+)  type II DM,                    Psychiatric/Substance Use:  - neg psychiatric ROS     Infectious Disease:  - neg infectious disease ROS     Malignancy:  - neg malignancy ROS     Other:  - neg other ROS          Physical Exam    Airway  airway exam normal      Mallampati: II    "    Respiratory Devices and Support         Dental  no notable dental history     (+) Multiple visibly decayed, broken teeth      Cardiovascular   cardiovascular exam normal       Rhythm and rate: regular and normal     Pulmonary   pulmonary exam normal        breath sounds clear to auscultation         OUTSIDE LABS:  CBC:   Lab Results   Component Value Date    WBC 14.8 (H) 07/01/2024    WBC 8.2 02/28/2024    HGB 15.6 07/01/2024    HGB 14.8 02/28/2024    HCT 46.9 07/01/2024    HCT 44.3 02/28/2024     07/01/2024     02/28/2024     BMP:   Lab Results   Component Value Date     07/01/2024     04/02/2024    POTASSIUM 3.7 07/01/2024    POTASSIUM 3.6 04/02/2024    CHLORIDE 102 07/01/2024    CHLORIDE 101 04/02/2024    CO2 24 07/01/2024    CO2 28 04/02/2024    BUN 20.2 07/01/2024    BUN 21.0 04/02/2024    CR 1.69 (H) 07/01/2024    CR 1.09 04/02/2024     (H) 07/01/2024     (H) 07/01/2024     COAGS: No results found for: \"PTT\", \"INR\", \"FIBR\"  POC: No results found for: \"BGM\", \"HCG\", \"HCGS\"  HEPATIC:   Lab Results   Component Value Date    ALBUMIN 4.3 07/01/2024    PROTTOTAL 7.6 07/01/2024    ALT 17 07/01/2024    AST 27 07/01/2024    ALKPHOS 89 07/01/2024    BILITOTAL 1.1 07/01/2024     OTHER:   Lab Results   Component Value Date    LACT 1.3 07/01/2024    A1C 7.8 (H) 04/15/2024    LENNOX 9.4 07/01/2024    PHOS 2.5 10/25/2023    MAG 2.2 08/29/2022    LIPASE 20 07/01/2024    TSH 1.14 08/08/2022       Anesthesia Plan    ASA Status:  3       Anesthesia Type: General.   Induction: Intravenous, Propofol.   Maintenance: TIVA.        Consents    Anesthesia Plan(s) and associated risks, benefits, and realistic alternatives discussed. Questions answered and patient/representative(s) expressed understanding.     - Discussed:     - Discussed with:  Patient,       - Extended Intubation/Ventilatory Support Discussed: No.      - Patient is DNR/DNI Status: No     Use of blood products " "discussed: No .     Postoperative Care       PONV prophylaxis: Ondansetron (or other 5HT-3), Dexamethasone or Solumedrol     Comments:    Other Comments: GALMA  Antiemetics - zofran 4mg/decadron 8mg (4mg if patient is diabetic)  No toradol Cr=1.69           Freddy Canales MD    I have reviewed the pertinent notes and labs in the chart from the past 30 days and (re)examined the patient.  Any updates or changes from those notes are reflected in this note.             # Drug Induced Platelet Defect: home medication list includes an antiplatelet medication  # DMII: A1C = 7.8 % (Ref range: 0.0 - 5.6 %) within past 6 months  # Overweight: Estimated body mass index is 26.5 kg/m  as calculated from the following:    Height as of this encounter: 1.6 m (5' 3\").    Weight as of this encounter: 67.9 kg (149 lb 9.6 oz).      "

## 2024-07-01 NOTE — BRIEF OP NOTE
[facility] Brief Operative Note    Pre-operative diagnosis: Ureterolithiasis [N20.1]   Post-operative diagnosis right stone in solitary kidney   Procedure: Procedure(s):  CYSTOSCOPY, WITH RETROGRADE PYELOGRAM AND URETERAL STENT INSERTION   Surgeon: David Arreaga MD   Assistants(s): None   Estimated blood loss: 1 ml    Specimens: Right kidney urine for culture   Findings: No obvious UTI  Right distal ureter tight  6 fr x 24 cm stent placed in RIGHT ureter without string

## 2024-07-01 NOTE — DISCHARGE INSTRUCTIONS
"  Post-Operative Symptom Control    While you recover from your procedure, you can take steps to ease your recovery.    Diet and Fluids:    Eating your normal diet is fine.  Drink a little more than normal but you don not have to \"flush\" your system.    Activity:    There are no activity restrictions after stone surgery.  Some people find bending twisting movements cause discomfort or increased blood in urine.  Activity may irritating but you will not hurt yourself.    Medications: (That may be suggested or prescribed)    Ibuprofen (Advil/Motrin)-Available over the counter.  Take 2 (200mg) tablets at bedtime and every six hour for base pain management.      Dramamine (brand name drowsy version)-Is available over the counter.  Take 50 mg at bedtime and every 6 hours as needed.  This medication can be helpful by:   Decreasing nausea   Decrease acute pain   Decrease recurrence of pain for 24 hours  *This medication my cause increased drowsiness, do not drive or operate machinery within 6 hours.    Flomax (Tamsulosin)-After surgery Flomax has several potential benefits   Decreased stent discomfort   Increased likelihood passage of small stone fragments   Take daily with food until your stent is removed  *This may cause nasal congestion or light-headedness    Detrol (Tolterodine)-After surgery Detrol may decrease stent irritation and pelvic pain   Take as prescribed  *This medication may cause dry mouth, constipation or blurry vision.    Narcotics (oxycodone)   Take as prescribed for severe pain   Narcotics have significant side effects and only \"cover-up\" pain.  They have no effect on cause of pain.     Common side effects  Confusion, disorientation and sedation-DO NOT DRIVE OR OPERATE MACHINERY WITH IN 24 HOURS OF TAKING NARCOTICS    Nausea-take Dramamine or Zofran to help control  Constipation  Sleep Disturbances    Call the Clinic Immediately If You Have Any Of The Following Symptoms:   Nausea/vomiting that is " "uncontrolled with medications   You have a fever over 100.0   Chills   Are not able to urinate for 8 hours    Increasing back pain that is not relieved with pain medications   Large amounts of blood in urine or large clots    We can respond to your questions or concerns 24 hours a day at [phone].   For after hours phone calls please wait on call to be connected with the \"care connection\" service for after hours care.     Going Home With a Ureteral Stent    What is it?  A stent is a soft, plastic tube that helps urine (pee) drain into the bladder.  During the surgery, it is placed in the ureter the tube that connects the kidney to the bladder.  A thin curl at each end of the stent keeps one end in your kidney and the other in your bladder.  The stent can not be seen from outside of the body.    Why Do I Have It?  Some sort of blockage is not letting pee drain into your bladder.  This could be from a stone, certain surgeries or kidney infection.    What Should I Expect?   Stents often cause some discomfort.  You may have:    The need to pee suddenly    Pain when you pee    A dull backache, which may get worse when you pee  Blood in your pee (color of fruit punch) and some clots, which may increase with physical activity.     What Can I Do To Feel Better?    Drink a little more fluids than usual.  You can eat your normal diet.    Enjoy a warm bath.  Decrease your activity.  Some people find bending or twisting movement cause discomfort or increased blood in the urine.  Even so, you will not harm yourself.    Follow up:  You will be contacted to schedule a procedure to remove your stone.  This will occur in next 4 weeks.  "

## 2024-07-01 NOTE — ANESTHESIA CARE TRANSFER NOTE
Patient: Pwo Micaela    Procedure: Procedure(s):  CYSTOSCOPY, WITH RETROGRADE PYELOGRAM AND URETERAL STENT INSERTION       Diagnosis: Ureterolithiasis [N20.1]  Diagnosis Additional Information: No value filed.    Anesthesia Type:   General     Note:      Level of Consciousness: drowsy  Oxygen Supplementation: face mask  Level of Supplemental Oxygen (L/min / FiO2): 6  Independent Airway: airway patency satisfactory and stable    Vital Signs Stable: post-procedure vital signs reviewed and stable  Report to RN Given: handoff report given  Patient transferred to: PACU    Handoff Report: Identifed the Patient, Identified the Reponsible Provider, Reviewed the pertinent medical history, Discussed the surgical course, Reviewed Intra-OP anesthesia mangement and issues during anesthesia, Set expectations for post-procedure period and Allowed opportunity for questions and acknowledgement of understanding      Vitals:  Vitals Value Taken Time   /79 07/01/24 1156   Temp 36.2  C (97.2  F) 07/01/24 1155   Pulse 61 07/01/24 1158   Resp 14 07/01/24 1158   SpO2 100 % 07/01/24 1158   Vitals shown include unfiled device data.    Electronically Signed By: DON Rogers CRNA  July 1, 2024  12:00 PM   .

## 2024-07-01 NOTE — CONSULTS
[facility] [place] Consult Note    Name: Moriah Hinojosa   MRN: [id number]  YOB: 1955    Assessment and Plan:  69 year old male with an obstructing right ureteral stone in solitary kidney.  Possible UTI.  Some CLAUDETTE.     right ureteral stone  Obstruction of Solitary right kidney  S/p left nephrectomy  Acute kidney injury, (Cr 1.69 from baseline of 73)  Possible UTI  Language barrier requiring Rochelle     Discussed that in the setting of a acute obstructing stone and urinary tract infection, we placed a ureteral stent and defer treatment of the stone until the infection is treated (at least 1 to 2 weeks after), as it is not safe to treat a stone in the setting of an active infection.. We discussed the benefits and risks of ureteroscopy, including but not limited to, bleeding, worsening of infection, need for stent/stent related symptoms, injury to ureter, need for second procedure to treat the stones.  Also discussed possible need for indwelling urethral catheter temporarily to help drain the infection.    Aware he may be admitted for 1 to 2 days until the cultures finalized and his clinical condition improves.    If no infection present when doing case, may proceed with primary ureteroscopy. We discussed the benefits and risks of right ureteroscopy, including but not limited to, bleeding, infection, need for stent/stent related symptoms, injury to ureter, need for second procedure if unable to reach stone or residual fragments.     Patient and his granddaughter expressed understanding and agreement with plan.     Plan:  -OR urgently today for right stent vs primary ureteroscopy    David Arreaga MD  July 1, 2024         Chief Complaint: Right obstructing ureteral stone in solitary right kidney    History of Present Illness:  Moriah Hinojosa is a 69 year old male seen in consultation from Dr. Tejada for discussion of above issues.      The current episode was first found due to  patient having right flank pain, nausea and chills.  Started Friday and got better, but worsened yesterday and came to ER.  Had a left nephrectomy in 2021 due to a large, chronically obstructing left ureteral stone.  No hematuria, vomiting or fevers at home.    I personally reviewed CT abd/pel from 7/1/2024 which demonstrated a 4 mm right proximal ureteral stone with upstream hydronephrosis and some stranding. Surgically absent left kidney.     I reviewed internal labs, of which pertinent ones include:    Latest Reference Range & Units 07/01/24 05:35 07/01/24 06:20   Sodium 135 - 145 mmol/L 138    Potassium 3.4 - 5.3 mmol/L 3.7    Chloride 98 - 107 mmol/L 102    Carbon Dioxide (CO2) 22 - 29 mmol/L 24    Urea Nitrogen 8.0 - 23.0 mg/dL 20.2    Creatinine 0.67 - 1.17 mg/dL 1.69 (H)    GFR Estimate >60 mL/min/1.73m2 43 (L)    Calcium 8.8 - 10.2 mg/dL 9.4    Anion Gap 7 - 15 mmol/L 12    Albumin 3.5 - 5.2 g/dL 4.3    Protein Total 6.4 - 8.3 g/dL 7.6    Alkaline Phosphatase 40 - 150 U/L 89    ALT 0 - 70 U/L 17    AST 0 - 45 U/L 27    Bilirubin Total <=1.2 mg/dL 1.1    Glucose 70 - 99 mg/dL 179 (H)    Lactic Acid 0.7 - 2.0 mmol/L 1.3    Lipase 13 - 60 U/L 20    WBC 4.0 - 11.0 10e3/uL 14.8 (H)    Hemoglobin 13.3 - 17.7 g/dL 15.6    Hematocrit 40.0 - 53.0 % 46.9    Platelet Count 150 - 450 10e3/uL 191    RBC Count 4.40 - 5.90 10e6/uL 5.47    MCV 78 - 100 fL 86    MCH 26.5 - 33.0 pg 28.5    MCHC 31.5 - 36.5 g/dL 33.3    RDW 10.0 - 15.0 % 12.9    % Neutrophils % 80    % Lymphocytes % 10    % Monocytes % 7    % Eosinophils % 2    % Basophils % 1    Absolute Basophils 0.0 - 0.2 10e3/uL 0.1    Absolute Eosinophils 0.0 - 0.7 10e3/uL 0.3    Absolute Immature Granulocytes <=0.4 10e3/uL 0.0    Absolute Lymphocytes 0.8 - 5.3 10e3/uL 1.5    Absolute Monocytes 0.0 - 1.3 10e3/uL 1.0    % Immature Granulocytes % 0    Absolute Neutrophils 1.6 - 8.3 10e3/uL 11.9 (H)    Absolute NRBCs 10e3/uL 0.0    NRBCs per 100 WBC <1 /100 0    Color Urine  Colorless, Straw, Light Yellow, Yellow   Light Yellow   Appearance Urine Clear   Clear   Glucose Urine Negative mg/dL  Negative   Bilirubin Urine Negative   Negative   Ketones Urine Negative mg/dL  Trace !   Specific Gravity Urine 1.001 - 1.030   1.010   pH Urine 5.0 - 7.0   6.0   Protein Albumin Urine Negative mg/dL  Negative   Urobilinogen mg/dL <2.0 mg/dL  <2.0   Nitrite Urine Negative   Negative   Blood Urine Negative   0.5 mg/dL !   Leukocyte Esterase Urine Negative   25 Marie/uL !   WBC Urine <=5 /HPF  9 (H)   RBC Urine <=2 /HPF  66 (H)   Squamous Epithelial /HPF Urine <=1 /HPF  <1   Transitional Epi <=1 /HPF  <1   Mucus Urine None Seen /LPF  Present !   (H): Data is abnormally high  (L): Data is abnormally low  !: Data is abnormal    I reviewed internal records which in summarized above.         Past Medical History:  Past Medical History:   Diagnosis Date    Chronic pain of both knees 2019    COVID-19 2020    Hypercholesteremia 2018    Hypertension 2018            Past Surgical History:  Past Surgical History:   Procedure Laterality Date    LAPAROSCOPIC NEPHRECTOMY Left 2021    Procedure: NEPHRECTOMY, TOTAL, LAPAROSCOPIC;  Surgeon: Catracho Wright MD;  Location:  OR            Social History:  Social History     Tobacco Use    Smoking status: Former     Current packs/day: 0.00     Types: Cigarettes     Quit date: 3/6/2016     Years since quittin.3     Passive exposure: Never    Smokeless tobacco: Never    Tobacco comments:     no passive exposure   Vaping Use    Vaping status: Never Used   Substance Use Topics    Alcohol use: No    Drug use: Never            Family History:  Family History   Problem Relation Age of Onset    Anesthesia Reaction No family hx of     Deep Vein Thrombosis (DVT) No family hx of             Allergies:  No Known Allergies         Medications:  Current Facility-Administered Medications   Medication Dose Route Frequency Provider Last Rate Last  Admin    acetaminophen (TYLENOL) tablet 975 mg  975 mg Oral TID Aryan Tejada MD        amLODIPine (NORVASC) tablet 10 mg  10 mg Oral At Bedtime Aryan Tejada MD        [Held by provider] aspirin EC tablet 81 mg  81 mg Oral Daily Aryan Tejada MD        atorvastatin (LIPITOR) tablet 80 mg  80 mg Oral QPM Aryan Tejada MD        calcium carbonate (TUMS) chewable tablet 1,000 mg  1,000 mg Oral 4x Daily PRN Aryan Tejada MD        carvedilol (COREG) tablet 50 mg  50 mg Oral BID w/meals Aryan Tejada MD        glucose gel 15-30 g  15-30 g Oral Q15 Min PRN Aryan Tejada MD        Or    dextrose 50 % injection 25-50 mL  25-50 mL Intravenous Q15 Min PRN Aryan Tejada MD        Or    glucagon injection 1 mg  1 mg Subcutaneous Q15 Min PRN Aryan Tejada MD        ezetimibe (ZETIA) tablet 10 mg  10 mg Oral Daily Aryan Tejada MD        HYDROmorphone (DILAUDID) injection 0.2 mg  0.2 mg Intravenous Q2H PRN Aryan Tejada MD        HYDROmorphone (DILAUDID) injection 0.4 mg  0.4 mg Intravenous Q2H PRN Aryan Tejada MD        insulin aspart (NovoLOG) injection (RAPID ACTING)  1-7 Units Subcutaneous TID AC Aryan Tejada MD        insulin aspart (NovoLOG) injection (RAPID ACTING)  1-5 Units Subcutaneous At Bedtime Aryan Tejada MD        lactated ringers infusion   Intravenous Continuous Aryan Tejada MD        lidocaine (LMX4) cream   Topical Q1H PRN Aryan Tejada MD        lidocaine 1 % 0.1-1 mL  0.1-1 mL Other Q1H PRN Aryan Tejada MD        [Held by provider] losartan (COZAAR) tablet 100 mg  100 mg Oral Daily Aryan Tejada MD        melatonin tablet 3 mg  3 mg Oral At Bedtime PRN Aryan Tejada MD        naloxone (NARCAN) injection 0.2 mg  0.2 mg Intravenous Q2 Min PRN Aryan Tejada MD        Or    naloxone (NARCAN) injection 0.4 mg  0.4 mg Intravenous Q2 Min PRN Aryan Tejada MD        Or    naloxone  (NARCAN) injection 0.2 mg  0.2 mg Intramuscular Q2 Min PRN Aryan Tejada MD        Or    naloxone (NARCAN) injection 0.4 mg  0.4 mg Intramuscular Q2 Min PRN Aryan Tejada MD        ondansetron (ZOFRAN ODT) ODT tab 4 mg  4 mg Oral Q6H PRN Aryan Tejada MD        Or    ondansetron (ZOFRAN) injection 4 mg  4 mg Intravenous Q6H PRN Aryan Tejada MD        oxyCODONE (ROXICODONE) tablet 5 mg  5 mg Oral Q4H PRN Aryan Tejada MD        oxyCODONE IR (ROXICODONE) half-tab 2.5 mg  2.5 mg Oral Q4H PRN Aryan Tejada MD        polyethylene glycol (MIRALAX) Packet 17 g  17 g Oral BID PRN Aryan Tejada MD        prochlorperazine (COMPAZINE) injection 5 mg  5 mg Intravenous Q6H PRN Aryan Tejada MD        Or    prochlorperazine (COMPAZINE) tablet 5 mg  5 mg Oral Q6H PRN Aryan Tejada MD        Or    prochlorperazine (COMPAZINE) suppository 12.5 mg  12.5 mg Rectal Q12H PRN Aryan Tejada MD        senna-docusate (SENOKOT-S/PERICOLACE) 8.6-50 MG per tablet 1 tablet  1 tablet Oral BID PRN Aryan Tejada MD        Or    senna-docusate (SENOKOT-S/PERICOLACE) 8.6-50 MG per tablet 2 tablet  2 tablet Oral BID PRN Aryan Tejada MD        sodium chloride (PF) 0.9% PF flush 3 mL  3 mL Intracatheter Q8H Aryan Tejada MD        sodium chloride (PF) 0.9% PF flush 3 mL  3 mL Intracatheter q1 min prn Aryan Tejada MD        [Held by provider] spironolactone (ALDACTONE) tablet 25 mg  25 mg Oral Daily Aryan Tejada MD        tamsulosin (FLOMAX) capsule 0.4 mg  0.4 mg Oral Daily Aryan Tejada MD        triamcinolone (KENALOG) 0.1 % cream   Topical BID PRN Aryan Tejada MD         Current Outpatient Medications   Medication Sig Dispense Refill    acetaminophen (TYLENOL) 325 MG tablet Take 2 tablets (650 mg) by mouth every 6 hours as needed for pain 120 tablet 0    amLODIPine (NORVASC) 10 MG tablet Take 1 tablet (10 mg) by mouth at bedtime Need appt for  "further refills. 90 tablet 11    aspirin 81 MG EC tablet Take 1 tablet (81 mg) by mouth daily 90 tablet 11    atorvastatin (LIPITOR) 80 MG tablet Take 1 tablet (80 mg) by mouth every evening 90 tablet 11    carvedilol (COREG) 25 MG tablet Take 2 tablets (50 mg) by mouth 2 times daily (with meals) 360 tablet 11    ezetimibe (ZETIA) 10 MG tablet Take 1 tablet (10 mg) by mouth daily 90 tablet 11    hydrOXYzine HCl (ATARAX) 25 MG tablet Take 1-2 tablets (25-50 mg) by mouth nightly as needed for itching 60 tablet 1    metFORMIN (GLUCOPHAGE) 1000 MG tablet Take 1 tablet (1,000 mg) by mouth 2 times daily (with meals) for 360 days 180 tablet 3    Polyvinyl Alcohol-Povidone (ARTIFICIAL TEARS) 5-6 MG/ML SOLN Apply 1 drop to eye 3 times daily as needed (both eyes) 30 mL 11    spironolactone (ALDACTONE) 25 MG tablet Take 1 tablet (25 mg) by mouth daily for 360 days 90 tablet 3    telmisartan (MICARDIS) 80 MG tablet Take 1 tablet (80 mg) by mouth daily 90 tablet 3    triamcinolone (KENALOG) 0.1 % external cream Apply topically 2 times daily 80 g 3    blood glucose (NO BRAND SPECIFIED) test strip Use to test blood sugar one times daily or as directed. To accompany: Blood Glucose Monitor Brands: per insurance. 100 strip 6    blood glucose monitoring (NO BRAND SPECIFIED) meter device kit Use to test blood sugar 1 times daily or as directed. Preferred blood glucose meter OR supplies to accompany: Blood Glucose Monitor Brands: per insurance. 1 kit 0    thin (NO BRAND SPECIFIED) lancets Use with lanceting device. To accompany: Blood Glucose Monitor Brands: per insurance. 100 each 6       Physical Exam:  B/P: 171/95, T: 97.8, P: 100, R: 22  Estimated body mass index is 26.5 kg/m  as calculated from the following:    Height as of this encounter: 1.6 m (5' 3\").    Weight as of this encounter: 67.9 kg (149 lb 9.6 oz).  General Appearance Adult: Alert, no acute distress, oriented  Lungs: no respiratory distress, or pursed lip " breathing  Neuro: Alert, oriented, speech and mentation normal  Psych: affect and mood normal    Outside records:   I spent 0 minutes reviewing outside records.

## 2024-07-01 NOTE — ED PROVIDER NOTES
EMERGENCY DEPARTMENT SIGN OUT NOTE        ED COURSE AND MEDICAL DECISION MAKING  Patient was signed out to me by Dr Aguila Matias at 7:34 AM.  8:23 AM Discussed patient with [facility], Dr. Arreaga.  Adding on to OR at 11:00 AM.  Patient already admitted to the Hospitalist    In brief, Moriah Hinojosa is a 69 year old male who initially presented with worsening abdominal pain and right-sided flank pain.      At time of sign out, disposition was pending urology consult.     FINAL IMPRESSION    1. Ureterolithiasis    2. Solitary kidney, acquired        ED MEDS  Medications   ondansetron (ZOFRAN) injection 4 mg (4 mg Intravenous $Given 7/1/24 8444)   HYDROmorphone (DILAUDID) injection 0.5 mg (0.5 mg Intravenous $Given 7/1/24 0542)   sodium chloride 0.9% BOLUS 1,000 mL (1,000 mLs Intravenous $New Bag 7/1/24 0542)   ketorolac (TORADOL) injection 15 mg (15 mg Intravenous $Given 7/1/24 8212)       LAB  Labs Ordered and Resulted from Time of ED Arrival to Time of ED Departure   COMPREHENSIVE METABOLIC PANEL - Abnormal       Result Value    Sodium 138      Potassium 3.7      Carbon Dioxide (CO2) 24      Anion Gap 12      Urea Nitrogen 20.2      Creatinine 1.69 (*)     GFR Estimate 43 (*)     Calcium 9.4      Chloride 102      Glucose 179 (*)     Alkaline Phosphatase 89      AST 27      ALT 17      Protein Total 7.6      Albumin 4.3      Bilirubin Total 1.1     ROUTINE UA WITH MICROSCOPIC REFLEX TO CULTURE - Abnormal    Color Urine Light Yellow      Appearance Urine Clear      Glucose Urine Negative      Bilirubin Urine Negative      Ketones Urine Trace (*)     Specific Gravity Urine 1.010      Blood Urine 0.5 mg/dL (*)     pH Urine 6.0      Protein Albumin Urine Negative      Urobilinogen Urine <2.0      Nitrite Urine Negative      Leukocyte Esterase Urine 25 Marie/uL (*)     Mucus Urine Present (*)     RBC Urine 66 (*)     WBC Urine 9 (*)     Squamous Epithelials Urine <1      Transitional Epithelials Urine <1     CBC WITH PLATELETS AND  DIFFERENTIAL - Abnormal    WBC Count 14.8 (*)     RBC Count 5.47      Hemoglobin 15.6      Hematocrit 46.9      MCV 86      MCH 28.5      MCHC 33.3      RDW 12.9      Platelet Count 191      % Neutrophils 80      % Lymphocytes 10      % Monocytes 7      % Eosinophils 2      % Basophils 1      % Immature Granulocytes 0      NRBCs per 100 WBC 0      Absolute Neutrophils 11.9 (*)     Absolute Lymphocytes 1.5      Absolute Monocytes 1.0      Absolute Eosinophils 0.3      Absolute Basophils 0.1      Absolute Immature Granulocytes 0.0      Absolute NRBCs 0.0     LIPASE - Normal    Lipase 20     LACTIC ACID WHOLE BLOOD WITH 1X REPEAT IN 2 HR WHEN >2 - Normal    Lactic Acid, Initial 1.3         RADIOLOGY    CT Abdomen Pelvis w/o Contrast   Final Result   IMPRESSION:    1.  A 4 mm mid right ureteral calculus with moderate right hydroureteronephrosis.   2.  Left nephrectomy.             I, Tejinder Goff, am serving as a scribe to document services personally performed by Guerda Rodarte DO, based on my observations and the provider's statements to me.  I, Guerda Rodarte DO, attest that Tejinder Goff is acting in a scribe capacity, has observed my performance of the services and has documented them in accordance with my direction.    Guerda Rodarte DO  [facility] EMERGENCY DEPARTMENT  [facility] [facility] [address] [phone]     Guerda Rodarte DO  07/01/24 [id number]

## 2024-07-01 NOTE — OP NOTE
OPERATIVE REPORT    DATE AND TIME OF OP NOTE/SURGERY: July 1, 2024    PREOPERATIVE DIAGNOSIS: Right ureteral stone with obstruction of solitary kidney    POSTOPERATIVE DIAGNOSIS:  Same    PROCEDURES PERFORMED:   1. Cystourethroscopy with right retrograde pyelography  2. Placement of right ureteral stent.  3. Intraoperative interpretation of fluoroscopic imaging.     STAFF SURGEON: David Arreaga MD    RESIDENT: none    ANESTHESIA: General    ESTIMATED BLOOD LOSS: 1 mL.     OPERATIVE INDICATIONS:   Moriah Hinojosa is a 69 year old male who presented with a right obstructing ureteral stone in a solitary kidney.  The patient was counseled on the alternatives, risks, and benefits and elected to proceed with the above stated procedure.    DESCRIPTION OF PROCEDURE:    After informed consent was obtained, the patient was taken to the operating room, and moved to the operating table.  After adequate anesthesia was induced, the patient was repositioned in dorsal lithotomy position and prepped and draped in the usual sterile fashion. A timeout was taken to confirm correct patient, procedure and laterality.     A 22-Belarusian cystoscope was inserted into a well lubricated urethra. The urethra was unremarkable, and the prostate with moderate lateral lobe hypertrophy.  The bladder was free of tumors, stones or diverticuli.  The media was clear.  Bilateral ureteral orifices were orthotopic.  A Sensor guidewire was advanced into the right renal pelvis with the aid of a 5-Belarusian open ended ureteral catheter.  Stone was visible in mid right ureter and wire had moderate resistance to passing around the stone.  A retrograde pyelogram demonstrated severe hydronephrosis and no filling defects.  Culture was sent from the right kidney urine.  The wire was replaced and a 6 fr x 24 cm was advanced over the guidewire under fluoroscopic guidance with a good curl in the renal pelvis and bladder.  The stent passed up easily with no appreciable  resistance.  The bladder was then drained.    The patient tolerated the procedure well.  There were no complications.         PLAN:   - Admit overnight for monitoring  - Follow-up in 1-2 weeks for definitive stone management   - Discharge on 1 week of antibiotics pending urine culture results  - Will coordinate outpatient stone treatment  - Should discharge with tamsulosin 0.4 mg daily and either oxybutynin or tolterodine for bladder spasms with the stent

## 2024-07-01 NOTE — CONSULTS
Care Management Initial Consult    General Information  Assessment completed with: Other (Grandchild), Marky [facility]  Type of CM/SW Visit: Initial Assessment    Primary Care Provider verified and updated as needed: Yes   Readmission within the last 30 days: no previous admission in last 30 days         Advance Care Planning: Advance Care Planning Reviewed: other (see comments) (No ACP documents)          Communication Assessment  Patient's communication style: spoken language (non-English)             Cognitive  Cognitive/Neuro/Behavioral: WDL                      Living Environment:   People in home: spouse, child(jena), adult  Son Cindy, Wife Cherelle  Current living Arrangements: house      Able to return to prior arrangements: yes       Family/Social Support:  Care provided by: self, other (see comments) (PCA services through [facility])  Provides care for: no one, unable/limited ability to care for self  Marital Status:   Wife  Cherelle       Description of Support System: Supportive         Current Resources:   Patient receiving home care services: No     Community Resources: PCA  Equipment currently used at home: cane, straight  Supplies currently used at home:      Employment/Financial:  Employment Status: retired        Financial Concerns:             Does the patient's insurance plan have a 3 day qualifying hospital stay waiver?  No    Lifestyle & Psychosocial Needs:  Social Determinants of Health     Food Insecurity: Low Risk  (1/10/2024)    Food Insecurity     Within the past 12 months, did you worry that your food would run out before you got money to buy more?: No     Within the past 12 months, did the food you bought just not last and you didn t have money to get more?: No   Depression: Not at risk (1/10/2024)    PHQ-2     PHQ-2 Score: 0   Housing Stability: Low Risk  (1/10/2024)    Housing Stability     Do you have housing? : Yes     Are you worried about losing your housing?: No    Tobacco Use: Medium Risk (4/15/2024)    Patient History     Smoking Tobacco Use: Former     Smokeless Tobacco Use: Never     Passive Exposure: Never   Financial Resource Strain: Low Risk  (1/10/2024)    Financial Resource Strain     Within the past 12 months, have you or your family members you live with been unable to get utilities (heat, electricity) when it was really needed?: No   Alcohol Use: Not on file   Transportation Needs: Low Risk  (1/10/2024)    Transportation Needs     Within the past 12 months, has lack of transportation kept you from medical appointments, getting your medicines, non-medical meetings or appointments, work, or from getting things that you need?: No   Physical Activity: Not on file   Interpersonal Safety: Low Risk  (1/10/2024)    Interpersonal Safety     Do you feel physically and emotionally safe where you currently live?: Yes     Within the past 12 months, have you been hit, slapped, kicked or otherwise physically hurt by someone?: No     Within the past 12 months, have you been humiliated or emotionally abused in other ways by your partner or ex-partner?: No   Stress: Not on file   Social Connections: Not on file   Health Literacy: Not on file       Functional Status:  Prior to admission patient needed assistance:   Dependent ADLs:: Ambulation-cane, family assists with bathing  Dependent IADLs:: Cleaning, Cooking, Laundry, Shopping, Meal Preparation, Transportation, Medication Management, Money Management       Mental Health Status:  Mental Health Status: No Current Concerns       Chemical Dependency Status:  Chemical Dependency Status: No Current Concerns             Values/Beliefs:  Spiritual, Cultural Beliefs, Scientologist Practices, Values that affect care:                 Additional Information:  Assessed. RNCM introduced self and CM role to pt and pt's grandchild Marky. Pt lives in a house with son Cindy, and wife Cherelle. Pt gets assist with all ADLs and IADLS, pt usually a STBA  and family assists and PCA. Pt uses a cane. Pt gets PCA services through [facility] 3 hours a day, and 3 hours a week of homemaking. Pt is retired. Has established PCP. Pt not on dialysis per [facility] has one kidney. Family to transport.         CM will continue to follow plan of care,review recommendations, and assist with any discharge needs anticipated.       Loulou Dodd RN

## 2024-07-01 NOTE — ANESTHESIA PROCEDURE NOTES
Airway    Staff -        CRNA: Loulou Escobar APRN CRNA       Performed By: CRNAIndications and Patient Condition       Indications for airway management: cleo-procedural       Induction type:intravenous       Mask difficulty assessment: 1 - vent by mask    Final Airway Details       Final airway type: supraglottic airway    Supraglottic Airway Details        Type: LMA       Brand: Ambu AuraGain       LMA size: 4    Post intubation assessment        Placement verified by: capnometry, equal breath sounds and chest rise        Number of attempts at approach: 1       Number of other approaches attempted: 0       Secured with: tape       Ease of procedure: easy       Dentition: Intact

## 2024-07-01 NOTE — H&P
[facility]    History and Physical - Hospitalist Service       Date of Admission:  7/1/2024    Assessment & Plan   Pwo Micaela is a 69 year old male with PMHx of CKD, HTN, DM2, HLD who presents with acute RLQ pain radiating to his right flank. Found to have ureteral stone.    #Acute right ureterolithiasis with hydroureteronephrosis  #Post-renal CLAUDETTE on CKD 3a  #Unilateral right kidney s/p left nephrectomy  History of HTN, DM2, and severe left-sided hydronephrosis secondary to a ureteral stone s/p left nephrectomy 8/2021 in an attempt to improve HTN control. Presented with RLQ pain that radiates to his flank that started 6/28. Found to have 4mm right ureteral stone on CT imaging with hydroureteronephrosis. Cr 1.69 from baseline of ~1.1-1.2 or so.  - Urology consulted, follows with [facility]  - NPO; to OR today for possible stent vs ureteroscopy  - IVF, 125mL/hr for now  - Start Flomax 0.4mg daily    #Abnormal UA  #Leukocytosis  WBC 14.8 on admission, UA with LE, 9 WBC, and blood. Consistent with kidney stone. The patient denies dysuria, frequency, and urgency. No fevers or chills. Typically wouldn't treat, but given patient's leukocytosis and unilateral kidney, fine to treat if Urology believes this is the safest course.  - In OR; consider starting empiric treatment post-op  - Urine culture was added    #Primary and secondary HTN  Secondary to CKD. Historically difficult to control HTN, partially responsible for his CKD. Follows with nephrology for this.  - Continue home amlodipine 10mg at bedtime  - Continue home Coreg 50mg BID  - Hold home telmisartan 80mg daily and spironolactone 25mg daily for CLAUDETTE    #Type 2 DM without long-term insulin use  A1C 7.8 4/2024. Home regimen only metformin.  - Hold home metformin  - Start medium sliding scale insulin    #Hyperlipidemia  Follows with cardiology  - Continue home atrovastatin 80mg daily and Zetia 10mg daily  - Hold home ASA 81mg daily, which  "seems to be for primary CV prevention            Diet: NPO per Anesthesia Guidelines for Procedure/Surgery Except for: Meds    DVT Prophylaxis: Pneumatic Compression Devices  Briones Catheter: Not present  Lines: None     Cardiac Monitoring: None  Code Status: Full Code      Clinically Significant Risk Factors Present on Admission                # Drug Induced Platelet Defect: home medication list includes an antiplatelet medication   # Hypertension: Noted on problem list            # DMII: A1C = 7.8 % (Ref range: 0.0 - 5.6 %) within past 6 months    # Overweight: Estimated body mass index is 26.5 kg/m  as calculated from the following:    Height as of this encounter: 1.6 m (5' 3\").    Weight as of this encounter: 67.9 kg (149 lb 9.6 oz).              Disposition Plan     Medically Ready for Discharge: Anticipated Tomorrow           MARGARET TO MD  Hospitalist Service  [facility]  Securely message with [employer] (more info)  Text page via [employer] Paging/Directory     ______________________________________________________________________    Chief Complaint   RLQ pain    History is obtained from the patient, chart    History of Present Illness   Pwjohn Hinojosa is a 69 year old male with PMHx of CKD, HTN, DM2, HLD who presents with acute RLQ pain radiating to his right flank. He is accompanied by his grand-daughter who assists with the history. He developed acute RLQ pain that started 6/28, waxed and waned initially then got worse early on 7/1. The pain feels sharp and radiate to his right flank. He has some nausea with this, no vomiting. Not made better or worse by movement. No fevers or chills. Not made better or worse by eating.    In the ED the patient was hypertensive, afebrile. On exam he has some RLQ TTP without rebound or guarding. Small CLAUDETTE on lab work with leukocytosis to 14. CT A/P showed a 4mm right ureteral stone with associated hydro.      Past Medical History    Past Medical History: "   Diagnosis Date    Chronic pain of both knees 2/11/2019    COVID-19 5/7/2020    Hypercholesteremia 6/18/2018    Hypertension 5/7/2018       Past Surgical History   Past Surgical History:   Procedure Laterality Date    LAPAROSCOPIC NEPHRECTOMY Left 8/24/2021    Procedure: NEPHRECTOMY, TOTAL, LAPAROSCOPIC;  Surgeon: Catracho Wright MD;  Location: [facility] OR       Prior to Admission Medications   Prior to Admission Medications   Prescriptions Last Dose Informant Patient Reported? Taking?   Polyvinyl Alcohol-Povidone (ARTIFICIAL TEARS) 5-6 MG/ML SOLN   No Yes   Sig: Apply 1 drop to eye 3 times daily as needed (both eyes)   acetaminophen (TYLENOL) 325 MG tablet   No Yes   Sig: Take 2 tablets (650 mg) by mouth every 6 hours as needed for pain   amLODIPine (NORVASC) 10 MG tablet 6/30/2024 at pm  No Yes   Sig: Take 1 tablet (10 mg) by mouth at bedtime Need appt for further refills.   aspirin 81 MG EC tablet 6/30/2024 at am  No Yes   Sig: Take 1 tablet (81 mg) by mouth daily   atorvastatin (LIPITOR) 80 MG tablet Past Week at pm  No Yes   Sig: Take 1 tablet (80 mg) by mouth every evening   blood glucose (NO BRAND SPECIFIED) test strip   No No   Sig: Use to test blood sugar one times daily or as directed. To accompany: Blood Glucose Monitor Brands: per insurance.   blood glucose monitoring (NO BRAND SPECIFIED) meter device kit   No No   Sig: Use to test blood sugar 1 times daily or as directed. Preferred blood glucose meter OR supplies to accompany: Blood Glucose Monitor Brands: per insurance.   carvedilol (COREG) 25 MG tablet 6/30/2024 at pm  No Yes   Sig: Take 2 tablets (50 mg) by mouth 2 times daily (with meals)   ezetimibe (ZETIA) 10 MG tablet 6/30/2024 at am  No Yes   Sig: Take 1 tablet (10 mg) by mouth daily   hydrOXYzine HCl (ATARAX) 25 MG tablet   No Yes   Sig: Take 1-2 tablets (25-50 mg) by mouth nightly as needed for itching   metFORMIN (GLUCOPHAGE) 1000 MG tablet 6/30/2024 at pm  No Yes   Sig: Take 1  tablet (1,000 mg) by mouth 2 times daily (with meals) for 360 days   spironolactone (ALDACTONE) 25 MG tablet 6/30/2024 at am  No Yes   Sig: Take 1 tablet (25 mg) by mouth daily for 360 days   telmisartan (MICARDIS) 80 MG tablet 6/30/2024 at am  No Yes   Sig: Take 1 tablet (80 mg) by mouth daily   thin (NO BRAND SPECIFIED) lancets   No No   Sig: Use with lanceting device. To accompany: Blood Glucose Monitor Brands: per insurance.   triamcinolone (KENALOG) 0.1 % external cream 6/30/2024 at pm  No Yes   Sig: Apply topically 2 times daily      Facility-Administered Medications: None           Physical Exam   Vital Signs: Temp: 97.8  F (36.6  C) Temp src: Oral BP: (!) 171/95 Pulse: 100   Resp: 22 SpO2: 97 % O2 Device: None (Room air)    Weight: 149 lbs 9.6 oz    General: No overt distress, conversational, non-toxic appearing  HEENT: MMM  Pulmonary: CTAB; no crackles, wheezing, or rhonchi, normal effort  Cardiac: RRR. No m/r/g  Abdomen: Soft. ND. TTP of the RLQ without rebound or guarding, rest of abdomen non-tender  Extremities: No bilateral LE edema  Neuro: alert and awake, Ox4      Medical Decision Making       80 MINUTES SPENT BY ME on the date of service doing chart review, history, exam, documentation & further activities per the note.      Data     I have personally reviewed the following data over the past 24 hrs:    14.8 (H)  \   15.6   / 191     138 102 20.2 /  179 (H)   3.7 24 1.69 (H) \     ALT: 17 AST: 27 AP: 89 TBILI: 1.1   ALB: 4.3 TOT PROTEIN: 7.6 LIPASE: 20     Procal: N/A CRP: N/A Lactic Acid: 1.3         Imaging results reviewed over the past 24 hrs:   Recent Results (from the past 24 hour(s))   CT Abdomen Pelvis w/o Contrast    Narrative    EXAM: CT ABDOMEN PELVIS W/O CONTRAST  LOCATION: [facility]  DATE: 7/1/2024    INDICATION: right flank pain  COMPARISON: CT abdomen pelvis 12/3/2020  TECHNIQUE: CT scan of the abdomen and pelvis was performed without IV contrast. Multiplanar  reformats were obtained. Dose reduction techniques were used.  CONTRAST: None.    FINDINGS:   LOWER CHEST: 3 mm scarlike nodule posterior right middle lobe (3/17). Pulmonary nodules <6 mm incidentally detected on incomplete thoracic imaging do not typically require routine imaging follow-up. Coronary artery atherosclerosis.    HEPATOBILIARY: Normal noncontrast appearance of the liver. No calcified gallstones.    PANCREAS: No peripancreatic inflammation.    SPLEEN: Normal size.    ADRENAL GLANDS: No significant nodules.    KIDNEYS/BLADDER: A 4 x 3 x 4 mm calculus in the mid right ureter with moderate right hydroureteronephrosis. Right perinephric and periureteral edema. No additional urinary calculi. Left nephrectomy.    BOWEL: No obstruction or inflammatory change. Normal appendix.    LYMPH NODES: No lymphadenopathy.    VASCULATURE: Normal caliber abdominal aorta. Aortoiliac atheromatous disease.    PELVIC ORGANS: Mild prostate enlargement.    MUSCULOSKELETAL: Scattered skeletal degenerative changes. Tiny fat-containing periumbilical hernia.      Impression    IMPRESSION:   1.  A 4 mm mid right ureteral calculus with moderate right hydroureteronephrosis.  2.  Left nephrectomy.

## 2024-07-01 NOTE — PROGRESS NOTES
Patient admitted to room 5 at approximately 1600 via wheel chair from CAT.  Reason for Admission:   Report received from:   Patient was accompanied by Family members.  Discharge transportation provided by:  Patient ambulated/transferred:  with one assist. self.  Patient is alert and orientated x 4  Outpatient Observation education provided to: patient, and family  MDRO Education done if applicable (MRSA, VRE, etc)  Safety risks were identified during admission:  none.   Yellow risk/fall band applied:  No  Home meds sent home: No  Home meds sent to pharmacy:No IF YES add some kind of note to chart for discharging nurse (see 1/2 sheet of laminated paper in HUC drawer)  Detailed Belongings: Clothing, wallet, glasses and shoe.

## 2024-07-02 VITALS
OXYGEN SATURATION: 94 % | TEMPERATURE: 98.2 F | HEIGHT: 63 IN | BODY MASS INDEX: 26.51 KG/M2 | HEART RATE: 85 BPM | DIASTOLIC BLOOD PRESSURE: 77 MMHG | SYSTOLIC BLOOD PRESSURE: 164 MMHG | WEIGHT: 149.6 LBS | RESPIRATION RATE: 16 BRPM

## 2024-07-02 LAB
ANION GAP SERPL CALCULATED.3IONS-SCNC: 9 MMOL/L (ref 7–15)
BUN SERPL-MCNC: 18.3 MG/DL (ref 8–23)
CALCIUM SERPL-MCNC: 9.1 MG/DL (ref 8.8–10.2)
CHLORIDE SERPL-SCNC: 104 MMOL/L (ref 98–107)
CREAT SERPL-MCNC: 1.26 MG/DL (ref 0.67–1.17)
DEPRECATED HCO3 PLAS-SCNC: 25 MMOL/L (ref 22–29)
EGFRCR SERPLBLD CKD-EPI 2021: 61 ML/MIN/1.73M2
ERYTHROCYTE [DISTWIDTH] IN BLOOD BY AUTOMATED COUNT: 12.8 % (ref 10–15)
GLUCOSE BLDC GLUCOMTR-MCNC: 155 MG/DL (ref 70–99)
GLUCOSE BLDC GLUCOMTR-MCNC: 171 MG/DL (ref 70–99)
GLUCOSE BLDC GLUCOMTR-MCNC: 215 MG/DL (ref 70–99)
GLUCOSE SERPL-MCNC: 167 MG/DL (ref 70–99)
HCT VFR BLD AUTO: 43.9 % (ref 40–53)
HGB BLD-MCNC: 14.5 G/DL (ref 13.3–17.7)
MCH RBC QN AUTO: 28.8 PG (ref 26.5–33)
MCHC RBC AUTO-ENTMCNC: 33 G/DL (ref 31.5–36.5)
MCV RBC AUTO: 87 FL (ref 78–100)
PLATELET # BLD AUTO: 177 10E3/UL (ref 150–450)
POTASSIUM SERPL-SCNC: 4.9 MMOL/L (ref 3.4–5.3)
RBC # BLD AUTO: 5.03 10E6/UL (ref 4.4–5.9)
SODIUM SERPL-SCNC: 138 MMOL/L (ref 135–145)
WBC # BLD AUTO: 10.2 10E3/UL (ref 4–11)

## 2024-07-02 PROCEDURE — 85027 COMPLETE CBC AUTOMATED: CPT | Performed by: STUDENT IN AN ORGANIZED HEALTH CARE EDUCATION/TRAINING PROGRAM

## 2024-07-02 PROCEDURE — 258N000003 HC RX IP 258 OP 636: Performed by: STUDENT IN AN ORGANIZED HEALTH CARE EDUCATION/TRAINING PROGRAM

## 2024-07-02 PROCEDURE — 250N000013 HC RX MED GY IP 250 OP 250 PS 637: Performed by: STUDENT IN AN ORGANIZED HEALTH CARE EDUCATION/TRAINING PROGRAM

## 2024-07-02 PROCEDURE — 36415 COLL VENOUS BLD VENIPUNCTURE: CPT | Performed by: STUDENT IN AN ORGANIZED HEALTH CARE EDUCATION/TRAINING PROGRAM

## 2024-07-02 PROCEDURE — 99207 PR APP CREDIT; MD BILLING SHARED VISIT: CPT

## 2024-07-02 PROCEDURE — 96361 HYDRATE IV INFUSION ADD-ON: CPT

## 2024-07-02 PROCEDURE — 80048 BASIC METABOLIC PNL TOTAL CA: CPT | Performed by: STUDENT IN AN ORGANIZED HEALTH CARE EDUCATION/TRAINING PROGRAM

## 2024-07-02 PROCEDURE — 99239 HOSP IP/OBS DSCHRG MGMT >30: CPT | Mod: FS | Performed by: HOSPITALIST

## 2024-07-02 RX ORDER — OXYCODONE HYDROCHLORIDE 5 MG/1
5 TABLET ORAL EVERY 4 HOURS PRN
Qty: 12 TABLET | Refills: 0 | Status: SHIPPED | OUTPATIENT
Start: 2024-07-02 | End: 2024-07-22

## 2024-07-02 RX ORDER — TAMSULOSIN HYDROCHLORIDE 0.4 MG/1
0.4 CAPSULE ORAL DAILY
Qty: 14 CAPSULE | Refills: 0 | Status: SHIPPED | OUTPATIENT
Start: 2024-07-03 | End: 2024-07-22

## 2024-07-02 RX ORDER — TOLTERODINE 2 MG/1
2 CAPSULE, EXTENDED RELEASE ORAL DAILY PRN
Qty: 14 CAPSULE | Refills: 0 | Status: SHIPPED | OUTPATIENT
Start: 2024-07-02 | End: 2024-07-22

## 2024-07-02 RX ADMIN — EZETIMIBE 10 MG: 10 TABLET ORAL at 08:20

## 2024-07-02 RX ADMIN — ACETAMINOPHEN 975 MG: 325 TABLET ORAL at 08:20

## 2024-07-02 RX ADMIN — TAMSULOSIN HYDROCHLORIDE 0.4 MG: 0.4 CAPSULE ORAL at 08:21

## 2024-07-02 RX ADMIN — CARVEDILOL 50 MG: 12.5 TABLET, FILM COATED ORAL at 08:20

## 2024-07-02 RX ADMIN — SODIUM CHLORIDE, POTASSIUM CHLORIDE, SODIUM LACTATE AND CALCIUM CHLORIDE: 600; 310; 30; 20 INJECTION, SOLUTION INTRAVENOUS at 10:17

## 2024-07-02 RX ADMIN — SODIUM CHLORIDE, POTASSIUM CHLORIDE, SODIUM LACTATE AND CALCIUM CHLORIDE: 600; 310; 30; 20 INJECTION, SOLUTION INTRAVENOUS at 02:14

## 2024-07-02 RX ADMIN — INSULIN ASPART 2 UNITS: 100 INJECTION, SOLUTION INTRAVENOUS; SUBCUTANEOUS at 11:53

## 2024-07-02 RX ADMIN — INSULIN ASPART 1 UNITS: 100 INJECTION, SOLUTION INTRAVENOUS; SUBCUTANEOUS at 08:20

## 2024-07-02 RX ADMIN — OXYCODONE HYDROCHLORIDE 5 MG: 5 TABLET ORAL at 08:25

## 2024-07-02 ASSESSMENT — ACTIVITIES OF DAILY LIVING (ADL)
ADLS_ACUITY_SCORE: 23

## 2024-07-02 NOTE — PROGRESS NOTES
PRIMARY DIAGNOSIS: ACUTE PAIN  OUTPATIENT/OBSERVATION GOALS TO BE MET BEFORE DISCHARGE:  1. Pain Status: Improved-controlled with oral pain medications.    2. Return to near baseline physical activity: Yes    3. Cleared for discharge by consultants (if involved): Yes    Discharge Planner Nurse   Safe discharge environment identified: Yes  Barriers to discharge: Yes       Entered by: Mildred Campos RN 07/02/2024 11:26 AM  Pt is A/Ox4 and VSS. Tolerating PO medications and food well. Voiding w/o difficulty in urinal, clear urine.   Please review provider order for any additional goals.   Nurse to notify provider when observation goals have been met and patient is ready for discharge.

## 2024-07-02 NOTE — PROGRESS NOTES
Patient discharged to home at 1204 via Private Car.  Accompanied by grand daughter and staff.  Discharge instructions were reviewed with patient and grand daughter , opportunity offered to ask questions.    Prescription sent to pharmacy.  Access discontinued: Yes  Care plan and education discontinued: Yes  Home meds retrieved from pharmacy: No  Belongings were sent home with patient/family:  Clothing: Shirt(s): t-shirt and Pants: pant, Contacts/Glasses: glasses, Purse/Wallet: wallet, and Shoes: shoes .

## 2024-07-02 NOTE — PLAN OF CARE
Problem: Pain Acute  Goal: Optimal Pain Control and Function  Outcome: Progressing  Intervention: Prevent or Manage Pain  Recent Flowsheet Documentation  Taken 7/1/2024 2100 by Melany Geller RN  Medication Review/Management: medications reviewed   Goal Outcome Evaluation:         Neuro: A&Ox4.   Cardiac: Afebrile, VSS.   Respiratory: RA  GI/: Voiding spontaneously. No BM this shift.  Diet/appetite: Tolerating moderate consistent carb diet. Denies nausea.  Activity: Up with assist x1   Pain: complains of pain while peeing, gave prn oxycodone  Skin: No new deficits noted.  Lines: LR @125ml/hr  Drains: NO  Replacements: NO

## 2024-07-02 NOTE — UTILIZATION REVIEW
"Admission Status; Secondary Review Determination     Under the authority of the Utilization Management Committee, the utilization review process indicated a secondary review on Moriah Hinojosa.  The review outcome is based on review of the medical records, discussions with staff, and applying clinical experience noted on the date of the review.     (x) Observation Status Appropriate - This patient does not meet hospital inpatient criteria and is placed in observation status. If this patient's primary payer is Medicare and was admitted as an inpatient, Condition Code 44 should be used and patient status changed to \"observation\".     RATIONALE FOR DETERMINATION   Moriah Hinojosa is a 69 yr old male with obesity and DM who presented with right ureteral stone with obstruction in setting of solitary kidney.  Patient taken to OR with ureteral stent placed and discharging after one night.    The severity of illness, intensity of service provided, expected LOS and risk for adverse outcome make the care appropriate for further observation; however, doesn't meet criteria for hospital inpatient admission. Dr Marquez/Iraj Miranda CNP notified of this determination and agrees with downgrade of status.      The information on this document is developed by the utilization review team in order for the business office to ensure compliance.  This only denotes the appropriateness of proper admission status and does not reflect the quality of care rendered.         The definitions of Inpatient Status and Observation Status used in making the determination above are those provided in the CMS Coverage Manual, Chapter 1 and Chapter 6, section 70.4.      Sincerely,  Piedad Curtis MD  Utilization Review  Physician Advisor  [facility]  "

## 2024-07-02 NOTE — PROGRESS NOTES
Care Management Discharge Note    Discharge Date: 07/03/2024       Discharge Disposition:  home no needs  Discharge Services:  PCA  Discharge DME:  n/a    Discharge Transportation: family or friend will provide    Education Provided on the Discharge Plan: yes    Persons Notified of Discharge Plans: yes  Patient/Family in Agreement with the Plan:  yes    Handoff Referral Completed: y    Additional Information:  No needs anticipated from CM at discharge per pt; independent at baseline with assistance from family for PCA services. Pt to follow up with PCP post discharge if needs arise. Family to transport home.  11:58 AM    Brenna Kjellberg, BSW LSW  7/2/2024

## 2024-07-02 NOTE — PLAN OF CARE
Pt c/o pain w/ urination and taking scheduled Tylenol and PRN oxy w/ relief. States pain today is more manageable than yesterday when voiding. VSS and A/Ox4. Voiding without difficulty.      Discharge education and summary reviewed w/ and provided to patient & grand daughter. Questions answered as appropriate.      Problem: Adult Inpatient Plan of Care  Goal: Absence of Hospital-Acquired Illness or Injury  7/2/2024 1145 by Mildred Campos RN  Outcome: Adequate for Care Transition  7/2/2024 1122 by Mildred Campos RN  Outcome: Progressing  Intervention: Prevent Skin Injury  Recent Flowsheet Documentation  Taken 7/2/2024 0820 by Mildred Campos RN  Body Position: position changed independently  Intervention: Prevent and Manage VTE (Venous Thromboembolism) Risk  Recent Flowsheet Documentation  Taken 7/2/2024 0820 by Mildred Campos RN  VTE Prevention/Management: (pt declined) --     Problem: Adult Inpatient Plan of Care  Goal: Optimal Comfort and Wellbeing  7/2/2024 1145 by Mildred Campos RN  Outcome: Adequate for Care Transition  7/2/2024 1122 by Mildred Campos RN  Outcome: Progressing  Intervention: Monitor Pain and Promote Comfort  Recent Flowsheet Documentation  Taken 7/2/2024 0922 by Mildred Campos RN  Pain Management Interventions: rest  Taken 7/2/2024 0825 by Mildred Campos RN  Pain Management Interventions:   medication (see MAR)   rest  Taken 7/2/2024 0820 by Mildred Campos RN  Pain Management Interventions:   medication (see MAR)   rest

## 2024-07-02 NOTE — DISCHARGE SUMMARY
"[facility]  Hospitalist Discharge Summary      Date of Admission:  7/1/2024  Date of Discharge:  7/2/2024  Discharging Provider: Samira Miranda NP  Discharge Service: Hospitalist Service    Discharge Diagnoses   Right ureteral stone s/p stent placement  Leukocytosis    Clinically Significant Risk Factors     # DMII: A1C = 7.8 % (Ref range: 0.0 - 5.6 %) within past 6 months  # Overweight: Estimated body mass index is 26.5 kg/m  as calculated from the following:    Height as of this encounter: 1.6 m (5' 3\").    Weight as of this encounter: 67.9 kg (149 lb 9.6 oz).       Follow-ups Needed After Discharge   Follow-up Appointments     Follow-up and recommended labs and tests       Follow up with primary care provider, Yassine Larsen, within 7 days for   hospital follow- up.  No follow up labs or test are needed.            Unresulted Labs Ordered in the Past 30 Days of this Admission       Date and Time Order Name Status Description    7/1/2024 11:41 AM Urine Culture Preliminary         These results will be followed up by PCP    Discharge Disposition   Discharged to home  Condition at discharge: Stable    Hospital Course   Moriah Hinojosa is a 69 year old male with PMHx of CKD, HTN, DM2, HLD who presents with acute RLQ pain radiating to his right flank. Found to have ureteral stone.    Acute right ureterolithiasis with hydroureteronephrosis  Post-renal CLAUDETTE on CKD 3a  Unilateral right kidney s/p left nephrectomy  History of HTN, DM2, and severe left-sided hydronephrosis secondary to a ureteral stone s/p left nephrectomy 8/2021 in an attempt to improve HTN control. Presented with RLQ pain that radiates to his flank that started 6/28. Found to have 4mm right ureteral stone on CT imaging with hydroureteronephrosis. Cr 1.69 from baseline of ~1.1-1.2 or so.  - Urology consulted, follows with [facility]  - OR today for ureteral stent placement  - IVF, 125mL/hr-discontinued  - Start Flomax 0.4mg daily " and at Detrol 2 mg daily  -Oxycodone for pain    Abnormal UA  Leukocytosis  WBC 14.8 on admission- now 10.2,   -UA with LE, 9 WBC, and blood. Consistent with kidney stone. The patient denies dysuria, frequency, and urgency. No fevers or chills. Typically wouldn't treat, but given patient's leukocytosis and unilateral kidney, fine to treat if Urology believes this is the safest course.  Per urology no obvious UTI.  Preliminary urine culture-no growth    Post renal CLAUDETTE on CKD  -Creatinine on admission-1.69, now 1.26    Primary and secondary HTN  Secondary to CKD. Historically difficult to control HTN, partially responsible for his CKD. Follows with nephrology for this.  - Continue home amlodipine 10mg at bedtime  - Continue home Coreg 50mg BID  - Held home telmisartan 80mg daily and spironolactone 25mg daily for CLAUDETTE.  Resume at discharge    #Type 2 DM without long-term insulin use  A1C 7.8 4/2024. Home regimen only metformin.  - Hold home metformin.  Resume at discharge  - Start medium sliding scale insulin.  Discontinue at discharge    #Hyperlipidemia  Follows with cardiology  - Continue home atrovastatin 80mg daily and Zetia 10mg daily  - Hold home ASA 81mg daily, which seems to be for primary CV prevention.  Resume at discharge      Consultations This Hospital Stay   UROLOGY IP CONSULT  UROLOGY IP CONSULT  CARE MANAGEMENT / SOCIAL WORK IP CONSULT    Code Status   Full Code    Time Spent on this Encounter   ISamira NP, personally saw the patient today and spent greater than 30 minutes discharging this patient.       Samira Miranda NP  [facility] EXTENDED RECOVERY AND SHORT STAY  [address] [place] [address]  Phone: [phone]  Fax: [phone]  ______________________________________________________________________    Physical Exam   Vital Signs: Temp: 98.2  F (36.8  C) Temp src: Oral BP: (!) 164/77 Pulse: 85   Resp: 16 SpO2: 94 % O2 Device: None (Room air)  Oxygen Delivery: 2 LPM  Weight: 149 lbs 9.6 oz  Constitutional: awake, alert, cooperative, no apparent distress, and appears stated age  Respiratory: No increased work of breathing, good air exchange, clear to auscultation bilaterally, no crackles or wheezing  Cardiovascular: Normal apical impulse, regular rate and rhythm, normal S1 and S2, no S3 or S4, and no murmur noted  GI: No scars, normal bowel sounds, soft, non-distended, non-tender, no masses palpated, no hepatosplenomegally       Primary Care Physician   Yassine Larsen    Discharge Orders      Reason for your hospital stay    Right ureteral stone     Follow-up and recommended labs and tests     Follow up with primary care provider, Yassine Larsen, within 7 days for hospital follow- up.  No follow up labs or test are needed.     Activity    Your activity upon discharge: activity as tolerated     Diet    Follow this diet upon discharge: Orders Placed This Encounter      Moderate Consistent Carb (60 g CHO per Meal) Diet       Significant Results and Procedures   Results for orders placed or performed during the hospital encounter of 07/01/24   CT Abdomen Pelvis w/o Contrast    Narrative    EXAM: CT ABDOMEN PELVIS W/O CONTRAST  LOCATION: [facility]  DATE: 7/1/2024    INDICATION: right flank pain  COMPARISON: CT abdomen pelvis 12/3/2020  TECHNIQUE: CT scan of the abdomen and pelvis was performed without IV contrast. Multiplanar reformats were obtained. Dose reduction techniques were used.  CONTRAST: None.    FINDINGS:   LOWER CHEST: 3 mm scarlike nodule posterior right middle lobe (3/17). Pulmonary nodules <6 mm incidentally detected on incomplete thoracic imaging do not typically require routine imaging follow-up. Coronary artery atherosclerosis.    HEPATOBILIARY: Normal noncontrast appearance of the liver. No calcified gallstones.    PANCREAS: No peripancreatic inflammation.    SPLEEN: Normal size.    ADRENAL GLANDS: No significant  nodules.    KIDNEYS/BLADDER: A 4 x 3 x 4 mm calculus in the mid right ureter with moderate right hydroureteronephrosis. Right perinephric and periureteral edema. No additional urinary calculi. Left nephrectomy.    BOWEL: No obstruction or inflammatory change. Normal appendix.    LYMPH NODES: No lymphadenopathy.    VASCULATURE: Normal caliber abdominal aorta. Aortoiliac atheromatous disease.    PELVIC ORGANS: Mild prostate enlargement.    MUSCULOSKELETAL: Scattered skeletal degenerative changes. Tiny fat-containing periumbilical hernia.      Impression    IMPRESSION:   1.  A 4 mm mid right ureteral calculus with moderate right hydroureteronephrosis.  2.  Left nephrectomy.     XR Surgery CYN Fluoro L/T 5 Min    Narrative    This exam was marked as non-reportable because it will not be read by a   radiologist or a [facility] non-radiologist provider.             Discharge Medications   Current Discharge Medication List        START taking these medications    Details   oxyCODONE (ROXICODONE) 5 MG tablet Take 1 tablet (5 mg) by mouth every 4 hours as needed for severe pain (IF pain not managed with non-pharmacological and non-opioid interventions)  Qty: 12 tablet, Refills: 0    Associated Diagnoses: Nephrolithiasis      tamsulosin (FLOMAX) 0.4 MG capsule Take 1 capsule (0.4 mg) by mouth daily  Qty: 14 capsule, Refills: 0    Associated Diagnoses: Nephrolithiasis      tolterodine ER (DETROL LA) 2 MG 24 hr capsule Take 1 capsule (2 mg) by mouth daily as needed (bladder spasms)  Qty: 14 capsule, Refills: 0    Associated Diagnoses: Nephrolithiasis           CONTINUE these medications which have NOT CHANGED    Details   acetaminophen (TYLENOL) 325 MG tablet Take 2 tablets (650 mg) by mouth every 6 hours as needed for pain  Qty: 120 tablet, Refills: 0    Associated Diagnoses: Postoperative pain      amLODIPine (NORVASC) 10 MG tablet Take 1 tablet (10 mg) by mouth at bedtime Need appt for further refills.  Qty: 90 tablet,  Refills: 11    Associated Diagnoses: Hypertension secondary to other renal disorders      aspirin 81 MG EC tablet Take 1 tablet (81 mg) by mouth daily  Qty: 90 tablet, Refills: 11    Associated Diagnoses: Hyperlipidemia LDL goal <70      atorvastatin (LIPITOR) 80 MG tablet Take 1 tablet (80 mg) by mouth every evening  Qty: 90 tablet, Refills: 11    Associated Diagnoses: Hypercholesteremia      carvedilol (COREG) 25 MG tablet Take 2 tablets (50 mg) by mouth 2 times daily (with meals)  Qty: 360 tablet, Refills: 11    Associated Diagnoses: Accelerated hypertension; Stage 3 chronic kidney disease, unspecified whether stage 3a or 3b CKD (H)      ezetimibe (ZETIA) 10 MG tablet Take 1 tablet (10 mg) by mouth daily  Qty: 90 tablet, Refills: 11    Associated Diagnoses: Hyperlipidemia LDL goal <70      hydrOXYzine HCl (ATARAX) 25 MG tablet Take 1-2 tablets (25-50 mg) by mouth nightly as needed for itching  Qty: 60 tablet, Refills: 1    Associated Diagnoses: Dermatitis      metFORMIN (GLUCOPHAGE) 1000 MG tablet Take 1 tablet (1,000 mg) by mouth 2 times daily (with meals) for 360 days  Qty: 180 tablet, Refills: 3    Associated Diagnoses: Type 2 diabetes mellitus with other diabetic kidney complication, without long-term current use of insulin (H)      Polyvinyl Alcohol-Povidone (ARTIFICIAL TEARS) 5-6 MG/ML SOLN Apply 1 drop to eye 3 times daily as needed (both eyes)  Qty: 30 mL, Refills: 11    Associated Diagnoses: Irritation of left eye      spironolactone (ALDACTONE) 25 MG tablet Take 1 tablet (25 mg) by mouth daily for 360 days  Qty: 90 tablet, Refills: 3    Associated Diagnoses: Accelerated hypertension      telmisartan (MICARDIS) 80 MG tablet Take 1 tablet (80 mg) by mouth daily  Qty: 90 tablet, Refills: 3    Associated Diagnoses: Hypertension secondary to other renal disorders      triamcinolone (KENALOG) 0.1 % external cream Apply topically 2 times daily  Qty: 80 g, Refills: 3    Associated Diagnoses: Dermatitis       blood glucose (NO BRAND SPECIFIED) test strip Use to test blood sugar one times daily or as directed. To accompany: Blood Glucose Monitor Brands: per insurance.  Qty: 100 strip, Refills: 6    Comments: Use to test blood sugar 1 times daily or as directed. Preferred blood glucose meter OR supplies to accompany: Blood Glucose Monitor Brands: per insurance.  Associated Diagnoses: Diabetes mellitus (H)      blood glucose monitoring (NO BRAND SPECIFIED) meter device kit Use to test blood sugar 1 times daily or as directed. Preferred blood glucose meter OR supplies to accompany: Blood Glucose Monitor Brands: per insurance.  Qty: 1 kit, Refills: 0    Associated Diagnoses: Diabetes mellitus (H)      thin (NO BRAND SPECIFIED) lancets Use with lanceting device. To accompany: Blood Glucose Monitor Brands: per insurance.  Qty: 100 each, Refills: 6    Comments: Use to test blood sugar 1 times daily or as directed. Preferred blood glucose meter OR supplies to accompany: Blood Glucose Monitor Brands: per insurance.  Associated Diagnoses: Diabetes mellitus (H)           Allergies   No Known Allergies

## 2024-07-03 ENCOUNTER — TELEPHONE (OUTPATIENT)
Dept: UROLOGY | Facility: CLINIC | Age: 69
End: 2024-07-03
Payer: COMMERCIAL

## 2024-07-03 ENCOUNTER — PATIENT OUTREACH (OUTPATIENT)
Dept: CARE COORDINATION | Facility: CLINIC | Age: 69
End: 2024-07-03
Payer: COMMERCIAL

## 2024-07-03 PROBLEM — N20.1 RIGHT URETERAL STONE: Status: ACTIVE | Noted: 2024-07-01

## 2024-07-03 LAB — BACTERIA UR CULT: NO GROWTH

## 2024-07-03 NOTE — PROGRESS NOTES
"Clinic Care Coordination Contact  Transitions of Care Outreach  Chief Complaint   Patient presents with    Hospital F/U       Most Recent Admission Date: 7/1/2024   Most Recent Admission Diagnosis: Ureterolithiasis - N20.1  Solitary kidney, acquired - Z90.5     Most Recent Discharge Date: 7/2/2024   Most Recent Discharge Diagnosis: Ureterolithiasis - N20.1  Solitary kidney, acquired - Z90.5  Nephrolithiasis - N20.0     Transitions of Care Assessment    Discharge Assessment  How are you doing now that you are home?: \"feeling better\"  How are your symptoms? (Red Flag symptoms escalate to triage hotline per guidelines): Improved  Do you know how to contact your clinic care team if you have future questions or changes to your health status? : Yes  Does the patient have their discharge instructions? : Yes  Does the patient have questions regarding their discharge instructions? : No  Were you started on any new medications or were there changes to any of your previous medications? : Yes  Does the patient have all of their medications?: Yes  Do you have questions regarding any of your medications? : No  Do you have all of your needed medical supplies or equipment (DME)?  (i.e. oxygen tank, CPAP, cane, etc.): Yes         Post-op (Clinicians Only)  Fever: No  Chills: No  Eating & Drinking: eating and drinking without complaints/concerns  PO Intake: clear liquids  Bowel Function: normal  Date of last BM: 07/03/24  Urinary Status: voiding without complaint/concerns    Caregiver declined care coordination. Grand daughter speaks fluent English and assisting patient with follow ups. Patient is scheduled to follow up with PCP on 7/9 for hospital discharge follow up. States had a fever last night but resolved this morning.     Follow up Plan     Discharge Follow-Up  Discharge follow up appointment scheduled in alignment with recommended follow up timeframe or Transitions of Risk Category? (Low = within 30 days; Moderate= within 14 " days; High= within 7 days): Yes  Discharge Follow Up Appointment Date: 07/09/24  Discharge Follow Up Appointment Scheduled with?: Primary Care Provider    Future Appointments   Date Time Provider Department Center   7/9/2024 10:00 AM Yassine Larsen MD [facility]   8/19/2024  3:00 PM Yassine Larsen MD [facility]   8/28/2024 12:30 PM [facility]   8/28/2024  1:30 PM Dian Cox MD [facility]       Outpatient Plan as outlined on AVS reviewed with patient.    For any urgent concerns, please contact our 24 hour nurse triage line: [phone] ([phone])       Nyla Bacon RN

## 2024-07-03 NOTE — TELEPHONE ENCOUNTER
Spoke with: Patients Grand daughter Marky who speaks English [phone]      Date of surgery: Monday July 22 2024 with Dr Arreaga       Location: [facility]      Informed patient they will need a adult : YES      Pre op with provider: Buffy is [facility]      H&P Scheduled in PAC- NA         Pre procedure covid :Not req       Additional imaging: NA        Surgery Packet :Mailed to patient       Additional comments: Please call for surgery teaching

## 2024-07-08 ENCOUNTER — TELEPHONE (OUTPATIENT)
Dept: UROLOGY | Facility: CLINIC | Age: 69
End: 2024-07-08
Payer: COMMERCIAL

## 2024-07-08 DIAGNOSIS — N20.0 NEPHROLITHIASIS: Primary | ICD-10-CM

## 2024-07-08 NOTE — TELEPHONE ENCOUNTER
Spoke with grand daughter regarding preop appointment scheduled for tomorrow at [facility].  They will do a urine culture at that appointment, order is in chart.  Yue Nair RN

## 2024-07-09 ENCOUNTER — OFFICE VISIT (OUTPATIENT)
Dept: FAMILY MEDICINE | Facility: CLINIC | Age: 69
End: 2024-07-09
Payer: COMMERCIAL

## 2024-07-09 VITALS
BODY MASS INDEX: 24.75 KG/M2 | OXYGEN SATURATION: 97 % | WEIGHT: 145 LBS | DIASTOLIC BLOOD PRESSURE: 90 MMHG | HEART RATE: 76 BPM | SYSTOLIC BLOOD PRESSURE: 158 MMHG | RESPIRATION RATE: 20 BRPM | HEIGHT: 64 IN | TEMPERATURE: 98 F

## 2024-07-09 DIAGNOSIS — H57.89 IRRITATION OF LEFT EYE: ICD-10-CM

## 2024-07-09 DIAGNOSIS — N20.0 NEPHROLITHIASIS: ICD-10-CM

## 2024-07-09 DIAGNOSIS — L30.9 DERMATITIS: ICD-10-CM

## 2024-07-09 DIAGNOSIS — Z01.818 PREOPERATIVE EXAMINATION: Primary | ICD-10-CM

## 2024-07-09 DIAGNOSIS — E11.29 TYPE 2 DIABETES MELLITUS WITH OTHER DIABETIC KIDNEY COMPLICATION, WITHOUT LONG-TERM CURRENT USE OF INSULIN (H): ICD-10-CM

## 2024-07-09 DIAGNOSIS — I1A.0 RESISTANT HYPERTENSION: ICD-10-CM

## 2024-07-09 DIAGNOSIS — N20.1 RIGHT URETERAL STONE: ICD-10-CM

## 2024-07-09 DIAGNOSIS — Z90.5 SOLITARY KIDNEY, ACQUIRED: ICD-10-CM

## 2024-07-09 PROCEDURE — 87086 URINE CULTURE/COLONY COUNT: CPT | Performed by: FAMILY MEDICINE

## 2024-07-09 PROCEDURE — 99214 OFFICE O/P EST MOD 30 MIN: CPT | Performed by: FAMILY MEDICINE

## 2024-07-09 PROCEDURE — 93005 ELECTROCARDIOGRAM TRACING: CPT | Performed by: FAMILY MEDICINE

## 2024-07-09 PROCEDURE — G2211 COMPLEX E/M VISIT ADD ON: HCPCS | Performed by: FAMILY MEDICINE

## 2024-07-09 PROCEDURE — 93010 ELECTROCARDIOGRAM REPORT: CPT | Performed by: INTERNAL MEDICINE

## 2024-07-09 RX ORDER — RESPIRATORY SYNCYTIAL VIRUS VACCINE 120MCG/0.5
0.5 KIT INTRAMUSCULAR ONCE
Qty: 1 EACH | Refills: 0 | Status: CANCELLED | OUTPATIENT
Start: 2024-07-09 | End: 2024-07-09

## 2024-07-09 RX ORDER — ACETAMINOPHEN 325 MG/1
650 TABLET ORAL EVERY 6 HOURS PRN
Qty: 120 TABLET | Refills: 3 | Status: SHIPPED | OUTPATIENT
Start: 2024-07-09 | End: 2024-07-22

## 2024-07-09 RX ORDER — TRIAMCINOLONE ACETONIDE 1 MG/G
CREAM TOPICAL 2 TIMES DAILY
Qty: 80 G | Refills: 3 | Status: SHIPPED | OUTPATIENT
Start: 2024-07-09

## 2024-07-09 RX ORDER — POLYVINYL ALCOHOL, POVIDONE .5; .6 G/100ML; G/100ML
1 LIQUID OPHTHALMIC 3 TIMES DAILY PRN
Qty: 30 ML | Refills: 11 | Status: SHIPPED | OUTPATIENT
Start: 2024-07-09

## 2024-07-09 NOTE — H&P (VIEW-ONLY)
Preoperative Evaluation  [address]  [place] [address]  Phone: [phone]  Fax: [phone]  Primary Provider: Yassine Larsen MD  Pre-op Performing Provider: Yassine Larsen MD  Jul 9, 2024 7/9/2024   Surgical Information   What procedure is being done? Cystoscopy, Right   Facility or Hospital where procedure/surgery will be performed: [facility]   Who is doing the procedure / surgery? David Arreaga MD   Date of surgery / procedure: 07/22/2024   Time of surgery / procedure: 8:30am   Where do you plan to recover after surgery? at home with family        Fax number for surgical facility: Note does not need to be faxed, will be available electronically in Epic.    Assessment & Plan     The proposed surgical procedure is considered LOW risk.    Preoperative examination - clear to proceed with procedure with any appropriate anesthesia pending urine culture results  - EKG 12-lead, tracing only  - Urine Culture Aerobic Bacterial [[id number]] -ordered by urology, collected today in lab    Medication modifications: Patient will hold aspirin starting 7 days prior to procedure.  Patient will not take metformin on the day of procedure.    Right ureteral stone  Plan for urology intervention and follow-up.  - EKG 12-lead, tracing only    Solitary kidney, acquired  Stable.    Resistant hypertension  Borderline control.  Continue current medications.  - EKG 12-lead, tracing only    Dermatitis  Stable, refill:  - triamcinolone (KENALOG) 0.1 % external cream; Apply topically 2 times daily    Irritation of left eye  Stable, refill:  - Polyvinyl Alcohol-Povidone (ARTIFICIAL TEARS) 5-6 MG/ML SOLN; Apply 1 drop to eye 3 times daily as needed (both eyes)    Type 2 diabetes mellitus with other diabetic kidney complication, without long-term current use of insulin (H)  Last A1c was 7.8 showing fair control.  Continue current treatment plan and to follow-up with me  regularly in the clinic.    Nephrolithiasis  - Urine Culture Aerobic Bacterial [[id number]] -ordered by urology collected today in lab          - No identified additional risk factors other than previously addressed     The longitudinal plan of care for the diagnosis(es)/condition(s) as documented were addressed during this visit. Due to the added complexity in care, I will continue to support Pwo in the subsequent management and with ongoing continuity of care.      Recommendation  Approval given to proceed with proposed procedure, without further diagnostic evaluation.    Subjective   Pwo is a 69 year old, presenting for the following:  Pre-Op Exam          7/9/2024     9:57 AM   Additional Questions   Roomed by graciela galdamez   Accompanied by Grand-daughter     HPI related to upcoming procedure: Patient was recently hospitalized with an obstructive right ureter stone-see recent discharge summary for details.  He underwent a urologic procedure during that hospitalization and is now scheduled for a follow-up procedure as an outpatient.  Since discharge she is continue to have some mild burning discomfort with urination, no visible blood in the urine, no fevers, no vomiting.        7/9/2024   Pre-Op Questionnaire   Have you ever had a heart attack or stroke? No   Have you ever had surgery on your heart or blood vessels, such as a stent placement, a coronary artery bypass, or surgery on an artery in your head, neck, heart, or legs? No   Do you have chest pain with activity? No   Do you have a history of heart failure? No   Do you currently have a cold, bronchitis or symptoms of other infection? No   Do you have a cough, shortness of breath, or wheezing? (!) YES - mild cough, productive of scant white mucus, no shortness of breath or fever   Do you or anyone in your family have previous history of blood clots? No   Do you or does anyone in your family have a serious bleeding problem such as prolonged bleeding following  surgeries or cuts? No   Have you ever had problems with anemia or been told to take iron pills? No   Have you had any abnormal blood loss such as black, tarry or bloody stools? No   Have you ever had a blood transfusion? No   Are you willing to have a blood transfusion if it is medically needed before, during, or after your surgery? Yes   Have you or any of your relatives ever had problems with anesthesia? No   Do you have sleep apnea, excessive snoring or daytime drowsiness? No   Do you have any artifical heart valves or other implanted medical devices like a pacemaker, defibrillator, or continuous glucose monitor? No   Do you have artificial joints? No   Are you allergic to latex? No      Patient Active Problem List    Diagnosis Date Noted    Ureterolithiasis 07/01/2024     Priority: Medium    Right ureteral stone 07/01/2024     Priority: Medium    Nonintractable episodic headache, unspecified headache type 04/15/2024     Priority: Medium    Solitary kidney, acquired 07/05/2023     Priority: Medium    Posterior vitreous detachment, right 03/06/2023     Priority: Medium    Macular edema 03/06/2023     Priority: Medium    Hypertension secondary to other renal disorders 03/06/2023     Priority: Medium    History of nephrectomy 04/18/2022     Priority: Medium    Type 2 diabetes mellitus with other diabetic kidney complication, without long-term current use of insulin (H) 02/15/2022     Priority: Medium    Decreased visual acuity 01/26/2021     Priority: Medium    Corneal scarring 01/26/2021     Priority: Medium    Poor dentition 01/26/2021     Priority: Medium    Tachycardia 12/13/2020     Priority: Medium    Accelerated hypertension 12/13/2020     Priority: Medium    Nephrolithiasis 12/13/2020     Priority: Medium    SOB (shortness of breath) 12/13/2020     Priority: Medium    COVID-19 05/07/2020     Priority: Medium    Allergic conjunctivitis, bilateral 06/11/2019     Priority: Medium    CKD (chronic kidney disease)  stage 3, GFR 30-59 ml/min 05/17/2019     Priority: Medium    Chronic pain of both knees 02/11/2019     Priority: Medium    Cataract 06/18/2018     Priority: Medium    Hypercholesteremia 06/18/2018     Priority: Medium    Resistant hypertension 05/07/2018     Priority: Medium      Past Medical History:   Diagnosis Date    Chronic pain of both knees 2/11/2019    COVID-19 5/7/2020    Hypercholesteremia 6/18/2018    Hypertension 5/7/2018     Past Surgical History:   Procedure Laterality Date    CYSTOSCOPY, RETROGRADES, INSERT STENT URETER(S), COMBINED Right 7/1/2024    Procedure: CYSTOSCOPY, WITH RETROGRADE PYELOGRAM AND URETERAL STENT INSERTION;  Surgeon: David Arreaga MD;  Location: [facility] OR    LAPAROSCOPIC NEPHRECTOMY Left 8/24/2021    Procedure: NEPHRECTOMY, TOTAL, LAPAROSCOPIC;  Surgeon: Catracho Wright MD;  Location: [facility] OR     Current Outpatient Medications   Medication Sig Dispense Refill    acetaminophen (TYLENOL) 325 MG tablet Take 2 tablets (650 mg) by mouth every 6 hours as needed for pain 120 tablet 0    amLODIPine (NORVASC) 10 MG tablet Take 1 tablet (10 mg) by mouth at bedtime Need appt for further refills. 90 tablet 11    aspirin 81 MG EC tablet Take 1 tablet (81 mg) by mouth daily 90 tablet 11    atorvastatin (LIPITOR) 80 MG tablet Take 1 tablet (80 mg) by mouth every evening 90 tablet 11    blood glucose (NO BRAND SPECIFIED) test strip Use to test blood sugar one times daily or as directed. To accompany: Blood Glucose Monitor Brands: per insurance. 100 strip 6    blood glucose monitoring (NO BRAND SPECIFIED) meter device kit Use to test blood sugar 1 times daily or as directed. Preferred blood glucose meter OR supplies to accompany: Blood Glucose Monitor Brands: per insurance. 1 kit 0    carvedilol (COREG) 25 MG tablet Take 2 tablets (50 mg) by mouth 2 times daily (with meals) 360 tablet 11    ezetimibe (ZETIA) 10 MG tablet Take 1 tablet (10 mg) by mouth daily 90 tablet 11     hydrOXYzine HCl (ATARAX) 25 MG tablet Take 1-2 tablets (25-50 mg) by mouth nightly as needed for itching 60 tablet 1    metFORMIN (GLUCOPHAGE) 1000 MG tablet Take 1 tablet (1,000 mg) by mouth 2 times daily (with meals) for 360 days 180 tablet 3    oxyCODONE (ROXICODONE) 5 MG tablet Take 1 tablet (5 mg) by mouth every 4 hours as needed for severe pain (IF pain not managed with non-pharmacological and non-opioid interventions) 12 tablet 0    Polyvinyl Alcohol-Povidone (ARTIFICIAL TEARS) 5-6 MG/ML SOLN Apply 1 drop to eye 3 times daily as needed (both eyes) 30 mL 11    spironolactone (ALDACTONE) 25 MG tablet Take 1 tablet (25 mg) by mouth daily for 360 days 90 tablet 3    tamsulosin (FLOMAX) 0.4 MG capsule Take 1 capsule (0.4 mg) by mouth daily 14 capsule 0    telmisartan (MICARDIS) 80 MG tablet Take 1 tablet (80 mg) by mouth daily 90 tablet 3    thin (NO BRAND SPECIFIED) lancets Use with lanceting device. To accompany: Blood Glucose Monitor Brands: per insurance. 100 each 6    tolterodine ER (DETROL LA) 2 MG 24 hr capsule Take 1 capsule (2 mg) by mouth daily as needed (bladder spasms) 14 capsule 0    triamcinolone (KENALOG) 0.1 % external cream Apply topically 2 times daily 80 g 3       No Known Allergies     Social History     Tobacco Use    Smoking status: Former     Current packs/day: 0.00     Types: Cigarettes     Quit date: 3/6/2016     Years since quittin.3     Passive exposure: Never    Smokeless tobacco: Never    Tobacco comments:     no passive exposure   Substance Use Topics    Alcohol use: No       Review of systems: No chest pain, some mild cough but no shortness of breath.  No fever.  Cough has been productive of scant amounts of white sputum.  Patient has some chronic left eye irritation which responds well to artificial tears, needs refills.  No recent blood in the urine or blood in the stool.  Remainder of review of systems is as above or negative.    Objective    BP (!) 158/90 (BP Location: Left  "arm, Patient Position: Sitting, Cuff Size: Adult Regular)   Pulse 76   Temp 98  F (36.7  C) (Oral)   Resp 20   Ht 1.621 m (5' 3.82\")   Wt 65.8 kg (145 lb)   SpO2 97%   BMI 25.03 kg/m      Estimated body mass index is 25.03 kg/m  as calculated from the following:    Height as of this encounter: 1.621 m (5' 3.82\").    Weight as of this encounter: 65.8 kg (145 lb).  Physical Exam  Gen - alert, orientated, NAD  Eyes - fundascopic exam limited by the undialated pupil but looks symmetric  ENT - oropharynx clear, TMs clear  Neck - supple, no palpable mass or lymphadenopathy  CV - RRR, no murmur  Resp - lungs CTA  Ab - soft, nontender, no palpable mass or organomegaly   - normal appearance to the external genetalia, normal testicular exam bilaterally, no hernia  Extrem - warm, no edema  Neuro - CN II-XII intact  Skin - no rash, no atypical appearing lesions seen.       Recent Labs   Lab Test 07/02/24  0601 07/01/24  0535 04/15/24  1615 02/28/24  1446 10/25/23  1517   HGB 14.5 15.6  --    < > 15.6    191  --    < >  --     138  --    < > 140   POTASSIUM 4.9 3.7  --    < > 4.7   CR 1.26* 1.69*  --    < > 1.61*   A1C  --   --  7.8*  --  7.0*    < > = values in this interval not displayed.        Diagnostics  Recent Results (from the past 24 hour(s))   EKG 12-lead, tracing only    Collection Time: 07/09/24 11:51 AM   Result Value Ref Range    Systolic Blood Pressure  mmHg    Diastolic Blood Pressure  mmHg    Ventricular Rate 78 BPM    Atrial Rate 78 BPM    [place] Interval 166 ms    QRS Duration 82 ms     ms    QTc 414 ms    P Axis 67 degrees    R AXIS 67 degrees    T Axis 57 degrees    Interpretation ECG       Sinus rhythm  Possible Left atrial enlargement  Borderline ECG  When compared with ECG of 02-FEB-2024 14:35,  No significant change was found            Revised Cardiac Risk Index (RCRI)  The patient has the following serious cardiovascular risks for perioperative complications:   - No serious " cardiac risks = 0 points     RCRI Interpretation: 0 points: Class I (very low risk - 0.4% complication rate)         Signed Electronically by: Yassine Larsen MD  Copy of this evaluation report is provided to requesting physician.

## 2024-07-09 NOTE — PROGRESS NOTES
Preoperative Evaluation  [address]  [place] [address]  Phone: [phone]  Fax: [phone]  Primary Provider: Yassine Larsen MD  Pre-op Performing Provider: Yassine Larsen MD  Jul 9, 2024 7/9/2024   Surgical Information   What procedure is being done? Cystoscopy, Right   Facility or Hospital where procedure/surgery will be performed: [facility]   Who is doing the procedure / surgery? David Arreaga MD   Date of surgery / procedure: 07/22/2024   Time of surgery / procedure: 8:30am   Where do you plan to recover after surgery? at home with family        Fax number for surgical facility: Note does not need to be faxed, will be available electronically in Epic.    Assessment & Plan     The proposed surgical procedure is considered LOW risk.    Preoperative examination - clear to proceed with procedure with any appropriate anesthesia pending urine culture results  - EKG 12-lead, tracing only  - Urine Culture Aerobic Bacterial [[id number]] -ordered by urology, collected today in lab    Medication modifications: Patient will hold aspirin starting 7 days prior to procedure.  Patient will not take metformin on the day of procedure.    Right ureteral stone  Plan for urology intervention and follow-up.  - EKG 12-lead, tracing only    Solitary kidney, acquired  Stable.    Resistant hypertension  Borderline control.  Continue current medications.  - EKG 12-lead, tracing only    Dermatitis  Stable, refill:  - triamcinolone (KENALOG) 0.1 % external cream; Apply topically 2 times daily    Irritation of left eye  Stable, refill:  - Polyvinyl Alcohol-Povidone (ARTIFICIAL TEARS) 5-6 MG/ML SOLN; Apply 1 drop to eye 3 times daily as needed (both eyes)    Type 2 diabetes mellitus with other diabetic kidney complication, without long-term current use of insulin (H)  Last A1c was 7.8 showing fair control.  Continue current treatment plan and to follow-up with me  regularly in the clinic.    Nephrolithiasis  - Urine Culture Aerobic Bacterial [[id number]] -ordered by urology collected today in lab          - No identified additional risk factors other than previously addressed     The longitudinal plan of care for the diagnosis(es)/condition(s) as documented were addressed during this visit. Due to the added complexity in care, I will continue to support Pwo in the subsequent management and with ongoing continuity of care.      Recommendation  Approval given to proceed with proposed procedure, without further diagnostic evaluation.    Subjective   Pwo is a 69 year old, presenting for the following:  Pre-Op Exam          7/9/2024     9:57 AM   Additional Questions   Roomed by graciela galdamez   Accompanied by Grand-daughter     HPI related to upcoming procedure: Patient was recently hospitalized with an obstructive right ureter stone-see recent discharge summary for details.  He underwent a urologic procedure during that hospitalization and is now scheduled for a follow-up procedure as an outpatient.  Since discharge she is continue to have some mild burning discomfort with urination, no visible blood in the urine, no fevers, no vomiting.        7/9/2024   Pre-Op Questionnaire   Have you ever had a heart attack or stroke? No   Have you ever had surgery on your heart or blood vessels, such as a stent placement, a coronary artery bypass, or surgery on an artery in your head, neck, heart, or legs? No   Do you have chest pain with activity? No   Do you have a history of heart failure? No   Do you currently have a cold, bronchitis or symptoms of other infection? No   Do you have a cough, shortness of breath, or wheezing? (!) YES - mild cough, productive of scant white mucus, no shortness of breath or fever   Do you or anyone in your family have previous history of blood clots? No   Do you or does anyone in your family have a serious bleeding problem such as prolonged bleeding following  surgeries or cuts? No   Have you ever had problems with anemia or been told to take iron pills? No   Have you had any abnormal blood loss such as black, tarry or bloody stools? No   Have you ever had a blood transfusion? No   Are you willing to have a blood transfusion if it is medically needed before, during, or after your surgery? Yes   Have you or any of your relatives ever had problems with anesthesia? No   Do you have sleep apnea, excessive snoring or daytime drowsiness? No   Do you have any artifical heart valves or other implanted medical devices like a pacemaker, defibrillator, or continuous glucose monitor? No   Do you have artificial joints? No   Are you allergic to latex? No      Patient Active Problem List    Diagnosis Date Noted    Ureterolithiasis 07/01/2024     Priority: Medium    Right ureteral stone 07/01/2024     Priority: Medium    Nonintractable episodic headache, unspecified headache type 04/15/2024     Priority: Medium    Solitary kidney, acquired 07/05/2023     Priority: Medium    Posterior vitreous detachment, right 03/06/2023     Priority: Medium    Macular edema 03/06/2023     Priority: Medium    Hypertension secondary to other renal disorders 03/06/2023     Priority: Medium    History of nephrectomy 04/18/2022     Priority: Medium    Type 2 diabetes mellitus with other diabetic kidney complication, without long-term current use of insulin (H) 02/15/2022     Priority: Medium    Decreased visual acuity 01/26/2021     Priority: Medium    Corneal scarring 01/26/2021     Priority: Medium    Poor dentition 01/26/2021     Priority: Medium    Tachycardia 12/13/2020     Priority: Medium    Accelerated hypertension 12/13/2020     Priority: Medium    Nephrolithiasis 12/13/2020     Priority: Medium    SOB (shortness of breath) 12/13/2020     Priority: Medium    COVID-19 05/07/2020     Priority: Medium    Allergic conjunctivitis, bilateral 06/11/2019     Priority: Medium    CKD (chronic kidney disease)  stage 3, GFR 30-59 ml/min 05/17/2019     Priority: Medium    Chronic pain of both knees 02/11/2019     Priority: Medium    Cataract 06/18/2018     Priority: Medium    Hypercholesteremia 06/18/2018     Priority: Medium    Resistant hypertension 05/07/2018     Priority: Medium      Past Medical History:   Diagnosis Date    Chronic pain of both knees 2/11/2019    COVID-19 5/7/2020    Hypercholesteremia 6/18/2018    Hypertension 5/7/2018     Past Surgical History:   Procedure Laterality Date    CYSTOSCOPY, RETROGRADES, INSERT STENT URETER(S), COMBINED Right 7/1/2024    Procedure: CYSTOSCOPY, WITH RETROGRADE PYELOGRAM AND URETERAL STENT INSERTION;  Surgeon: David Arreaga MD;  Location: [facility] OR    LAPAROSCOPIC NEPHRECTOMY Left 8/24/2021    Procedure: NEPHRECTOMY, TOTAL, LAPAROSCOPIC;  Surgeon: Catracho Wright MD;  Location: [facility] OR     Current Outpatient Medications   Medication Sig Dispense Refill    acetaminophen (TYLENOL) 325 MG tablet Take 2 tablets (650 mg) by mouth every 6 hours as needed for pain 120 tablet 0    amLODIPine (NORVASC) 10 MG tablet Take 1 tablet (10 mg) by mouth at bedtime Need appt for further refills. 90 tablet 11    aspirin 81 MG EC tablet Take 1 tablet (81 mg) by mouth daily 90 tablet 11    atorvastatin (LIPITOR) 80 MG tablet Take 1 tablet (80 mg) by mouth every evening 90 tablet 11    blood glucose (NO BRAND SPECIFIED) test strip Use to test blood sugar one times daily or as directed. To accompany: Blood Glucose Monitor Brands: per insurance. 100 strip 6    blood glucose monitoring (NO BRAND SPECIFIED) meter device kit Use to test blood sugar 1 times daily or as directed. Preferred blood glucose meter OR supplies to accompany: Blood Glucose Monitor Brands: per insurance. 1 kit 0    carvedilol (COREG) 25 MG tablet Take 2 tablets (50 mg) by mouth 2 times daily (with meals) 360 tablet 11    ezetimibe (ZETIA) 10 MG tablet Take 1 tablet (10 mg) by mouth daily 90 tablet 11     hydrOXYzine HCl (ATARAX) 25 MG tablet Take 1-2 tablets (25-50 mg) by mouth nightly as needed for itching 60 tablet 1    metFORMIN (GLUCOPHAGE) 1000 MG tablet Take 1 tablet (1,000 mg) by mouth 2 times daily (with meals) for 360 days 180 tablet 3    oxyCODONE (ROXICODONE) 5 MG tablet Take 1 tablet (5 mg) by mouth every 4 hours as needed for severe pain (IF pain not managed with non-pharmacological and non-opioid interventions) 12 tablet 0    Polyvinyl Alcohol-Povidone (ARTIFICIAL TEARS) 5-6 MG/ML SOLN Apply 1 drop to eye 3 times daily as needed (both eyes) 30 mL 11    spironolactone (ALDACTONE) 25 MG tablet Take 1 tablet (25 mg) by mouth daily for 360 days 90 tablet 3    tamsulosin (FLOMAX) 0.4 MG capsule Take 1 capsule (0.4 mg) by mouth daily 14 capsule 0    telmisartan (MICARDIS) 80 MG tablet Take 1 tablet (80 mg) by mouth daily 90 tablet 3    thin (NO BRAND SPECIFIED) lancets Use with lanceting device. To accompany: Blood Glucose Monitor Brands: per insurance. 100 each 6    tolterodine ER (DETROL LA) 2 MG 24 hr capsule Take 1 capsule (2 mg) by mouth daily as needed (bladder spasms) 14 capsule 0    triamcinolone (KENALOG) 0.1 % external cream Apply topically 2 times daily 80 g 3       No Known Allergies     Social History     Tobacco Use    Smoking status: Former     Current packs/day: 0.00     Types: Cigarettes     Quit date: 3/6/2016     Years since quittin.3     Passive exposure: Never    Smokeless tobacco: Never    Tobacco comments:     no passive exposure   Substance Use Topics    Alcohol use: No       Review of systems: No chest pain, some mild cough but no shortness of breath.  No fever.  Cough has been productive of scant amounts of white sputum.  Patient has some chronic left eye irritation which responds well to artificial tears, needs refills.  No recent blood in the urine or blood in the stool.  Remainder of review of systems is as above or negative.    Objective    BP (!) 158/90 (BP Location: Left  "arm, Patient Position: Sitting, Cuff Size: Adult Regular)   Pulse 76   Temp 98  F (36.7  C) (Oral)   Resp 20   Ht 1.621 m (5' 3.82\")   Wt 65.8 kg (145 lb)   SpO2 97%   BMI 25.03 kg/m      Estimated body mass index is 25.03 kg/m  as calculated from the following:    Height as of this encounter: 1.621 m (5' 3.82\").    Weight as of this encounter: 65.8 kg (145 lb).  Physical Exam  Gen - alert, orientated, NAD  Eyes - fundascopic exam limited by the undialated pupil but looks symmetric  ENT - oropharynx clear, TMs clear  Neck - supple, no palpable mass or lymphadenopathy  CV - RRR, no murmur  Resp - lungs CTA  Ab - soft, nontender, no palpable mass or organomegaly   - normal appearance to the external genetalia, normal testicular exam bilaterally, no hernia  Extrem - warm, no edema  Neuro - CN II-XII intact  Skin - no rash, no atypical appearing lesions seen.       Recent Labs   Lab Test 07/02/24  0601 07/01/24  0535 04/15/24  1615 02/28/24  1446 10/25/23  1517   HGB 14.5 15.6  --    < > 15.6    191  --    < >  --     138  --    < > 140   POTASSIUM 4.9 3.7  --    < > 4.7   CR 1.26* 1.69*  --    < > 1.61*   A1C  --   --  7.8*  --  7.0*    < > = values in this interval not displayed.        Diagnostics  Recent Results (from the past 24 hour(s))   EKG 12-lead, tracing only    Collection Time: 07/09/24 11:51 AM   Result Value Ref Range    Systolic Blood Pressure  mmHg    Diastolic Blood Pressure  mmHg    Ventricular Rate 78 BPM    Atrial Rate 78 BPM    WA Interval 166 ms    QRS Duration 82 ms     ms    QTc 414 ms    P Axis 67 degrees    R AXIS 67 degrees    T Axis 57 degrees    Interpretation ECG       Sinus rhythm  Possible Left atrial enlargement  Borderline ECG  When compared with ECG of 02-FEB-2024 14:35,  No significant change was found            Revised Cardiac Risk Index (RCRI)  The patient has the following serious cardiovascular risks for perioperative complications:   - No serious " cardiac risks = 0 points     RCRI Interpretation: 0 points: Class I (very low risk - 0.4% complication rate)         Signed Electronically by: Yassine Larsen MD  Copy of this evaluation report is provided to requesting physician.

## 2024-07-10 LAB — BACTERIA UR CULT: NO GROWTH

## 2024-07-11 ENCOUNTER — TELEPHONE (OUTPATIENT)
Dept: UROLOGY | Facility: CLINIC | Age: 69
End: 2024-07-11
Payer: COMMERCIAL

## 2024-07-19 ENCOUNTER — APPOINTMENT (OUTPATIENT)
Dept: INTERPRETER SERVICES | Facility: CLINIC | Age: 69
End: 2024-07-19
Payer: COMMERCIAL

## 2024-07-19 ENCOUNTER — ANESTHESIA EVENT (OUTPATIENT)
Dept: SURGERY | Facility: AMBULATORY SURGERY CENTER | Age: 69
End: 2024-07-19
Payer: COMMERCIAL

## 2024-07-22 ENCOUNTER — HOSPITAL ENCOUNTER (OUTPATIENT)
Facility: AMBULATORY SURGERY CENTER | Age: 69
Discharge: HOME | End: 2024-07-22
Attending: UROLOGY
Payer: COMMERCIAL

## 2024-07-22 ENCOUNTER — ANESTHESIA (OUTPATIENT)
Dept: SURGERY | Facility: AMBULATORY SURGERY CENTER | Age: 69
End: 2024-07-22
Payer: COMMERCIAL

## 2024-07-22 VITALS
HEART RATE: 87 BPM | TEMPERATURE: 97.3 F | OXYGEN SATURATION: 100 % | DIASTOLIC BLOOD PRESSURE: 100 MMHG | SYSTOLIC BLOOD PRESSURE: 196 MMHG | RESPIRATION RATE: 16 BRPM

## 2024-07-22 DIAGNOSIS — N20.1 URETEROLITHIASIS: Primary | ICD-10-CM

## 2024-07-22 DIAGNOSIS — N20.1 RIGHT URETERAL STONE: ICD-10-CM

## 2024-07-22 DIAGNOSIS — N20.0 NEPHROLITHIASIS: ICD-10-CM

## 2024-07-22 LAB — GLUCOSE BLDC GLUCOMTR-MCNC: 181 MG/DL (ref 70–99)

## 2024-07-22 PROCEDURE — 52356 CYSTO/URETERO W/LITHOTRIPSY: CPT | Mod: RT | Performed by: UROLOGY

## 2024-07-22 PROCEDURE — 74420 UROGRAPHY RTRGR +-KUB: CPT | Mod: 26 | Performed by: UROLOGY

## 2024-07-22 PROCEDURE — 99000 SPECIMEN HANDLING OFFICE-LAB: CPT | Performed by: INTERNAL MEDICINE

## 2024-07-22 PROCEDURE — 82365 CALCULUS SPECTROSCOPY: CPT | Mod: 90 | Performed by: INTERNAL MEDICINE

## 2024-07-22 DEVICE — IMPLANTABLE DEVICE: Type: IMPLANTABLE DEVICE | Site: URETER | Status: FUNCTIONAL

## 2024-07-22 RX ORDER — HYDROMORPHONE HCL IN WATER/PF 6 MG/30 ML
0.4 PATIENT CONTROLLED ANALGESIA SYRINGE INTRAVENOUS EVERY 5 MIN PRN
Status: DISCONTINUED | OUTPATIENT
Start: 2024-07-22 | End: 2024-07-23 | Stop reason: HOSPADM

## 2024-07-22 RX ORDER — FENTANYL CITRATE 0.05 MG/ML
50 INJECTION, SOLUTION INTRAMUSCULAR; INTRAVENOUS EVERY 5 MIN PRN
Status: DISCONTINUED | OUTPATIENT
Start: 2024-07-22 | End: 2024-07-23 | Stop reason: HOSPADM

## 2024-07-22 RX ORDER — FENTANYL CITRATE 0.05 MG/ML
25 INJECTION, SOLUTION INTRAMUSCULAR; INTRAVENOUS EVERY 5 MIN PRN
Status: DISCONTINUED | OUTPATIENT
Start: 2024-07-22 | End: 2024-07-23 | Stop reason: HOSPADM

## 2024-07-22 RX ORDER — DEXAMETHASONE SODIUM PHOSPHATE 4 MG/ML
4 INJECTION, SOLUTION INTRA-ARTICULAR; INTRALESIONAL; INTRAMUSCULAR; INTRAVENOUS; SOFT TISSUE
Status: DISCONTINUED | OUTPATIENT
Start: 2024-07-22 | End: 2024-07-23 | Stop reason: HOSPADM

## 2024-07-22 RX ORDER — PROPOFOL 10 MG/ML
INJECTION, EMULSION INTRAVENOUS PRN
Status: DISCONTINUED | OUTPATIENT
Start: 2024-07-22 | End: 2024-07-22

## 2024-07-22 RX ORDER — SODIUM CHLORIDE, SODIUM LACTATE, POTASSIUM CHLORIDE, CALCIUM CHLORIDE 600; 310; 30; 20 MG/100ML; MG/100ML; MG/100ML; MG/100ML
INJECTION, SOLUTION INTRAVENOUS CONTINUOUS
Status: DISCONTINUED | OUTPATIENT
Start: 2024-07-22 | End: 2024-07-23 | Stop reason: HOSPADM

## 2024-07-22 RX ORDER — ONDANSETRON 2 MG/ML
4 INJECTION INTRAMUSCULAR; INTRAVENOUS EVERY 30 MIN PRN
Status: DISCONTINUED | OUTPATIENT
Start: 2024-07-22 | End: 2024-07-23 | Stop reason: HOSPADM

## 2024-07-22 RX ORDER — LABETALOL 20 MG/4 ML (5 MG/ML) INTRAVENOUS SYRINGE
10
Status: COMPLETED | OUTPATIENT
Start: 2024-07-22 | End: 2024-07-22

## 2024-07-22 RX ORDER — HYDROMORPHONE HCL IN WATER/PF 6 MG/30 ML
0.2 PATIENT CONTROLLED ANALGESIA SYRINGE INTRAVENOUS EVERY 5 MIN PRN
Status: DISCONTINUED | OUTPATIENT
Start: 2024-07-22 | End: 2024-07-23 | Stop reason: HOSPADM

## 2024-07-22 RX ORDER — ACETAMINOPHEN 325 MG/1
650 TABLET ORAL EVERY 6 HOURS PRN
Qty: 120 TABLET | Refills: 3 | Status: SHIPPED | OUTPATIENT
Start: 2024-07-22

## 2024-07-22 RX ORDER — LIDOCAINE 40 MG/G
CREAM TOPICAL
Status: DISCONTINUED | OUTPATIENT
Start: 2024-07-22 | End: 2024-07-23 | Stop reason: HOSPADM

## 2024-07-22 RX ORDER — TAMSULOSIN HYDROCHLORIDE 0.4 MG/1
0.4 CAPSULE ORAL DAILY
Qty: 7 CAPSULE | Refills: 0 | Status: SHIPPED | OUTPATIENT
Start: 2024-07-22

## 2024-07-22 RX ORDER — OXYCODONE HYDROCHLORIDE 5 MG/1
5 TABLET ORAL
Status: COMPLETED | OUTPATIENT
Start: 2024-07-22 | End: 2024-07-22

## 2024-07-22 RX ORDER — ONDANSETRON 2 MG/ML
INJECTION INTRAMUSCULAR; INTRAVENOUS PRN
Status: DISCONTINUED | OUTPATIENT
Start: 2024-07-22 | End: 2024-07-22

## 2024-07-22 RX ORDER — LIDOCAINE HYDROCHLORIDE 20 MG/ML
JELLY TOPICAL PRN
Status: DISCONTINUED | OUTPATIENT
Start: 2024-07-22 | End: 2024-07-22 | Stop reason: HOSPADM

## 2024-07-22 RX ORDER — NALOXONE HYDROCHLORIDE 0.4 MG/ML
0.1 INJECTION, SOLUTION INTRAMUSCULAR; INTRAVENOUS; SUBCUTANEOUS
Status: DISCONTINUED | OUTPATIENT
Start: 2024-07-22 | End: 2024-07-23 | Stop reason: HOSPADM

## 2024-07-22 RX ORDER — CEFAZOLIN SODIUM 2 G/100ML
2 INJECTION, SOLUTION INTRAVENOUS SEE ADMIN INSTRUCTIONS
Status: DISCONTINUED | OUTPATIENT
Start: 2024-07-22 | End: 2024-07-23 | Stop reason: HOSPADM

## 2024-07-22 RX ORDER — ONDANSETRON 4 MG/1
4 TABLET, ORALLY DISINTEGRATING ORAL EVERY 30 MIN PRN
Status: DISCONTINUED | OUTPATIENT
Start: 2024-07-22 | End: 2024-07-23 | Stop reason: HOSPADM

## 2024-07-22 RX ORDER — LIDOCAINE HYDROCHLORIDE 20 MG/ML
INJECTION, SOLUTION INFILTRATION; PERINEURAL PRN
Status: DISCONTINUED | OUTPATIENT
Start: 2024-07-22 | End: 2024-07-22

## 2024-07-22 RX ORDER — TOLTERODINE 2 MG/1
2 CAPSULE, EXTENDED RELEASE ORAL DAILY PRN
Qty: 7 CAPSULE | Refills: 0 | Status: SHIPPED | OUTPATIENT
Start: 2024-07-22

## 2024-07-22 RX ORDER — DEXAMETHASONE SODIUM PHOSPHATE 10 MG/ML
INJECTION, SOLUTION INTRAMUSCULAR; INTRAVENOUS PRN
Status: DISCONTINUED | OUTPATIENT
Start: 2024-07-22 | End: 2024-07-22

## 2024-07-22 RX ORDER — PROPOFOL 10 MG/ML
INJECTION, EMULSION INTRAVENOUS CONTINUOUS PRN
Status: DISCONTINUED | OUTPATIENT
Start: 2024-07-22 | End: 2024-07-22

## 2024-07-22 RX ORDER — CEFAZOLIN SODIUM 2 G/100ML
2 INJECTION, SOLUTION INTRAVENOUS
Status: COMPLETED | OUTPATIENT
Start: 2024-07-22 | End: 2024-07-22

## 2024-07-22 RX ORDER — OXYCODONE HYDROCHLORIDE 5 MG/1
5 TABLET ORAL EVERY 4 HOURS PRN
Qty: 6 TABLET | Refills: 0 | Status: SHIPPED | OUTPATIENT
Start: 2024-07-22

## 2024-07-22 RX ORDER — SODIUM CHLORIDE, SODIUM LACTATE, POTASSIUM CHLORIDE, CALCIUM CHLORIDE 600; 310; 30; 20 MG/100ML; MG/100ML; MG/100ML; MG/100ML
INJECTION, SOLUTION INTRAVENOUS CONTINUOUS PRN
Status: DISCONTINUED | OUTPATIENT
Start: 2024-07-22 | End: 2024-07-22

## 2024-07-22 RX ORDER — FENTANYL CITRATE 50 UG/ML
INJECTION, SOLUTION INTRAMUSCULAR; INTRAVENOUS PRN
Status: DISCONTINUED | OUTPATIENT
Start: 2024-07-22 | End: 2024-07-22

## 2024-07-22 RX ORDER — ACETAMINOPHEN 325 MG/1
975 TABLET ORAL ONCE
Status: COMPLETED | OUTPATIENT
Start: 2024-07-22 | End: 2024-07-22

## 2024-07-22 RX ORDER — OXYBUTYNIN CHLORIDE 5 MG/1
5 TABLET ORAL
Status: DISCONTINUED | OUTPATIENT
Start: 2024-07-22 | End: 2024-07-23 | Stop reason: HOSPADM

## 2024-07-22 RX ADMIN — CEFAZOLIN SODIUM 2 G: 2 INJECTION, SOLUTION INTRAVENOUS at 09:42

## 2024-07-22 RX ADMIN — LABETALOL 20 MG/4 ML (5 MG/ML) INTRAVENOUS SYRINGE 10 MG: at 11:44

## 2024-07-22 RX ADMIN — DEXAMETHASONE SODIUM PHOSPHATE 4 MG: 10 INJECTION, SOLUTION INTRAMUSCULAR; INTRAVENOUS at 10:00

## 2024-07-22 RX ADMIN — PROPOFOL 150 MCG/KG/MIN: 10 INJECTION, EMULSION INTRAVENOUS at 09:48

## 2024-07-22 RX ADMIN — OXYCODONE HYDROCHLORIDE 5 MG: 5 TABLET ORAL at 11:21

## 2024-07-22 RX ADMIN — ACETAMINOPHEN 975 MG: 325 TABLET ORAL at 08:29

## 2024-07-22 RX ADMIN — PROPOFOL 50 MG: 10 INJECTION, EMULSION INTRAVENOUS at 09:48

## 2024-07-22 RX ADMIN — SODIUM CHLORIDE, SODIUM LACTATE, POTASSIUM CHLORIDE, CALCIUM CHLORIDE: 600; 310; 30; 20 INJECTION, SOLUTION INTRAVENOUS at 09:34

## 2024-07-22 RX ADMIN — FENTANYL CITRATE 50 MCG: 50 INJECTION, SOLUTION INTRAMUSCULAR; INTRAVENOUS at 09:55

## 2024-07-22 RX ADMIN — ONDANSETRON 4 MG: 2 INJECTION INTRAMUSCULAR; INTRAVENOUS at 10:00

## 2024-07-22 RX ADMIN — PROPOFOL 150 MG: 10 INJECTION, EMULSION INTRAVENOUS at 09:44

## 2024-07-22 RX ADMIN — LIDOCAINE HYDROCHLORIDE 3 ML: 20 INJECTION, SOLUTION INFILTRATION; PERINEURAL at 09:44

## 2024-07-22 NOTE — ANESTHESIA POSTPROCEDURE EVALUATION
Patient: Pwo Micaela    Procedure: Procedure(s):  Cystoscopy, Right Ureteroscopy, Right Holmium Laser Lithotripsy, Right Retrograde Pyelogram, Right Ureteral Stent Exchange, Stone Basket Extraction       Anesthesia Type:  General    Note:  Disposition: Outpatient   Postop Pain Control: Uneventful            Sign Out: Well controlled pain   PONV: No   Neuro/Psych: Uneventful            Sign Out: Acceptable/Baseline neuro status   Airway/Respiratory: Uneventful            Sign Out: Acceptable/Baseline resp. status   CV/Hemodynamics: Uneventful            Sign Out: Acceptable CV status; No obvious hypovolemia; No obvious fluid overload   Other NRE: NONE   DID A NON-ROUTINE EVENT OCCUR? No           Last vitals:  Vitals Value Taken Time   /103 07/22/24 1108   Temp 97  F (36.1  C) 07/22/24 1108   Pulse 87 07/22/24 1109   Resp 16 07/22/24 1108   SpO2 99 % 07/22/24 1109   Vitals shown include unfiled device data.    Electronically Signed By: Leonardo Woodard MD  July 22, 2024  1:34 PM

## 2024-07-22 NOTE — ANESTHESIA PREPROCEDURE EVALUATION
Anesthesia Pre-Procedure Evaluation    Patient: Moriah Hinojosa   MRN: [id number] : 1955        Procedure : Procedure(s):  Cystoscopy, Right Ureteroscopy, Right Holmium Laser Lithotripsy, Right Retrograde Pyelogram, Possible Right Ureteral Stent Placement          Past Medical History:   Diagnosis Date    Chronic pain of both knees 2019    COVID-19 2020    Diabetes (H)     Dyspnea on exertion     Hypercholesteremia 2018    Hypertension 2018    Renal disease       Past Surgical History:   Procedure Laterality Date    CYSTOSCOPY, RETROGRADES, INSERT STENT URETER(S), COMBINED Right 2024    Procedure: CYSTOSCOPY, WITH RETROGRADE PYELOGRAM AND URETERAL STENT INSERTION;  Surgeon: David Arreaga MD;  Location: [facility] [place], [place] OR    LAPAROSCOPIC NEPHRECTOMY Left 2021    Procedure: NEPHRECTOMY, TOTAL, LAPAROSCOPIC;  Surgeon: Catracho Wright MD;  Location: [facility] OR      No Known Allergies   Social History     Tobacco Use    Smoking status: Former     Current packs/day: 0.00     Types: Cigarettes     Quit date: 3/6/2016     Years since quittin.3     Passive exposure: Never    Smokeless tobacco: Never    Tobacco comments:     no passive exposure   Substance Use Topics    Alcohol use: No      Wt Readings from Last 1 Encounters:   24 65.8 kg (145 lb)        Anesthesia Evaluation   Pt has had prior anesthetic.         ROS/MED HX  ENT/Pulmonary:  - neg pulmonary ROS     Neurologic:  - neg neurologic ROS     Cardiovascular:     (+)  hypertension (historically a challenge to control)- -   -  - -                                   (-) murmur   METS/Exercise Tolerance: >4 METS    Hematologic:  - neg hematologic  ROS     Musculoskeletal:  - neg musculoskeletal ROS     GI/Hepatic:  - neg GI/hepatic ROS     Renal/Genitourinary: Comment: s/p left nephrectomy 2021    (+) renal disease (Cr=1.69), type: CRI,     Nephrolithiasis  (7.8),       Endo:     (+)  type II DM,                   "  Psychiatric/Substance Use:  - neg psychiatric ROS     Infectious Disease:  - neg infectious disease ROS     Malignancy:  - neg malignancy ROS     Other:  - neg other ROS          Physical Exam    Airway  airway exam normal      Mallampati: II   TM distance: > 3 FB   Neck ROM: full   Mouth opening: > 3 cm    Respiratory Devices and Support         Dental       (+) Multiple visibly decayed, broken teeth    B=Bridge, C=Chipped, L=Loose, M=Missing    Cardiovascular   cardiovascular exam normal       Rhythm and rate: regular and normal (-) no murmur    Pulmonary   pulmonary exam normal        breath sounds clear to auscultation           OUTSIDE LABS:  CBC:   Lab Results   Component Value Date    WBC 10.2 07/02/2024    WBC 14.8 (H) 07/01/2024    HGB 14.5 07/02/2024    HGB 15.6 07/01/2024    HCT 43.9 07/02/2024    HCT 46.9 07/01/2024     07/02/2024     07/01/2024     BMP:   Lab Results   Component Value Date     07/02/2024     07/01/2024    POTASSIUM 4.9 07/02/2024    POTASSIUM 3.7 07/01/2024    CHLORIDE 104 07/02/2024    CHLORIDE 102 07/01/2024    CO2 25 07/02/2024    CO2 24 07/01/2024    BUN 18.3 07/02/2024    BUN 20.2 07/01/2024    CR 1.26 (H) 07/02/2024    CR 1.69 (H) 07/01/2024     (H) 07/02/2024     (H) 07/02/2024     COAGS: No results found for: \"PTT\", \"INR\", \"FIBR\"  POC: No results found for: \"BGM\", \"HCG\", \"HCGS\"  HEPATIC:   Lab Results   Component Value Date    ALBUMIN 4.3 07/01/2024    PROTTOTAL 7.6 07/01/2024    ALT 17 07/01/2024    AST 27 07/01/2024    ALKPHOS 89 07/01/2024    BILITOTAL 1.1 07/01/2024     OTHER:   Lab Results   Component Value Date    LACT 1.3 07/01/2024    A1C 7.8 (H) 04/15/2024    LENNOX 9.1 07/02/2024    PHOS 2.5 10/25/2023    MAG 2.2 08/29/2022    LIPASE 20 07/01/2024    TSH 1.14 08/08/2022       Anesthesia Plan    ASA Status:  3    NPO Status:  NPO Appropriate    Anesthesia Type: General.   Induction: Intravenous.   Maintenance: Balanced.    " "    Consents    Anesthesia Plan(s) and associated risks, benefits, and realistic alternatives discussed. Questions answered and patient/representative(s) expressed understanding.     - Discussed: Risks, Benefits and Alternatives for BOTH SEDATION and the PROCEDURE were discussed     - Discussed with:             Postoperative Care    Pain management: IV analgesics, Oral pain medications.   PONV prophylaxis: Ondansetron (or other 5HT-3), Dexamethasone or Solumedrol, Background Propofol Infusion     Comments:               Leonardo Woodard MD    I have reviewed the pertinent notes and labs in the chart from the past 30 days and (re)examined the patient.  Any updates or changes from those notes are reflected in this note.             # Drug Induced Platelet Defect: home medication list includes an antiplatelet medication  # DMII: A1C = N/A within past 6 months  # Overweight: Estimated body mass index is 25.03 kg/m  as calculated from the following:    Height as of 7/9/24: 1.621 m (5' 3.82\").    Weight as of 7/9/24: 65.8 kg (145 lb).      "

## 2024-07-22 NOTE — OP NOTE
OPERATIVE REPORT    PREOPERATIVE DIAGNOSIS:  Right ureteral stone, right solitary kidney  POSTOPERATIVE DIAGNOSIS: Same    PROCEDURES PERFORMED:   Cystoscopy  right retrograde pyelogram  right ureteroscopy with  laser lithotripsy and stone basket extraction  right ureteral stent exchange  Interpretation of fluoroscopic images <60 min    STAFF SURGEON: David Arreaga MD  ASSISTANT(S): None  ANESTHESIA: General  ESTIMATED BLOOD LOSS: 10 ml  COMPLICATIONS: None.   SPECIMEN: right ureteral stone for analysis    SIGNIFICANT FINDINGS:   Right ureteral stone lasered and basketed  Severe right hydronephrosis  6 fr x 24 cm olympus stent placed ON string    BRIEF OPERATIVE INDICATIONS: Moriah Hinojosa  is a(n) 69 year old year old male  who presented with an obstructing right ureteral stone and a solitary kidney.  After a discussion of all risks, benefits, and alternatives, the patient elected to proceed with right ureteroscopy.    DESCRIPTION OF PROCEDURE:  After informed consent was obtained, the patient was transported to the operating room & placed supine on the table. After adequate anesthesia was induced, the patient was placed in lithotomy and prepped and draped in the usual sterile fashion. A timeout was taken to confirm correct patient, procedure and laterality. Pre-operative IV antibiotics were administered.     Cystoscopy: Rigid cystoscopy is performed. Anterior and posterior urethra are normal. Prostate demonstrates mild adenopathy. Bladder is normal..     Stent Extraction: No stent encrustation is observed. Indwelling right ureteral stent is secured and the distal end brought out to urethral meatus.      Ureteral Access: Right ureteral access is initiated with Bentson wire. Guidewire is placed to kidney through lumen of ureteral stent.        imaging demonstrates radio-opaque mid ureteric stone consistent with pre-operative imaging.    Flexible Ureteroscopy: Flexible ureteroscope is passed to right mid ureteric  stone.   Irrigation with saline under  100 mm H2O peak pressure. Stone is mildly impacted. In situ laser lithotripsy is performed with a 200  m thulium fiber laser at setting of 1 J and 5 Hz and 0.2 J and 40 Hz. Fragments are cleared from the ureter.  Pyeloscopy of the kidney was unremarkable.      Retrograde pyelogram demonstrated severe hydronephrosis    Ureteral Stent Insertion: Right 6F 24 cm stent is inserted with good coil in kidney and bladder under fluoroscopic guidance.  A single string tether was left.    They were awakened from anesthesia and transferred to the PACU.     POSTOP PLAN:  Right stent removal on Friday or later   follow-up in 8 weeks with renal US and metabolic testing

## 2024-07-22 NOTE — ANESTHESIA CARE TRANSFER NOTE
Patient: Pwo Micaela    Procedure: Procedure(s):  Cystoscopy, Right Ureteroscopy, Right Holmium Laser Lithotripsy, Right Retrograde Pyelogram, Right Ureteral Stent Exchange, Stone Basket Extraction       Diagnosis: Right ureteral stone [N20.1]  Diagnosis Additional Information: No value filed.    Anesthesia Type:   General     Note:    Oropharynx: oropharynx clear of all foreign objects and spontaneously breathing  Level of Consciousness: drowsy  Oxygen Supplementation: face mask  Level of Supplemental Oxygen (L/min / FiO2): 8  Independent Airway: airway patency satisfactory and stable  Dentition: dentition unchanged  Vital Signs Stable: post-procedure vital signs reviewed and stable  Report to RN Given: handoff report given  Patient transferred to: PACU    Handoff Report: Identifed the Patient, Identified the Reponsible Provider, Reviewed the pertinent medical history, Discussed the surgical course, Reviewed Intra-OP anesthesia mangement and issues during anesthesia, Set expectations for post-procedure period and Allowed opportunity for questions and acknowledgement of understanding    Vitals:  Vitals Value Taken Time   /105 07/22/24 1043   Temp     Pulse 86 07/22/24 1043   Resp     SpO2 100 % 07/22/24 1043   Vitals shown include unfiled device data.    Electronically Signed By: DON Nixon CRNA  July 22, 2024  10:46 AM

## 2024-07-22 NOTE — INTERVAL H&P NOTE
The History and Physical has been reviewed, the patient has been examined and no changes have occurred in the patient's condition since the H & P was completed.     We discussed the benefits and risks of right ureteroscopy, including but not limited to, bleeding, infection, need for stent/stent related symptoms, injury to ureter, need for second procedure if unable to reach stone or residual fragments.

## 2024-07-22 NOTE — DISCHARGE INSTRUCTIONS
If you have any questions or concerns regarding your procedure, please contact Dr. Arreaga, his office number is [phone].     You have received 975 mg of Acetaminophen (Tylenol) at 0830. Please do not take an additional dose of Tylenol until after 2:30PM.     Do not exceed 4,000 mg of acetaminophen during a 24 hour period and keep in mind that acetaminophen can also be found in many over-the-counter cold medications as well as narcotics that may be given for pain.     [facility]   Adult Discharge Orders & Instructions     For 24 hours after surgery    Get plenty of rest.  A responsible adult must stay with you for at least 24 hours after you leave the hospital.   Do not drive or use heavy equipment.  If you have weakness or tingling, don't drive or use heavy equipment until this feeling goes away.  Do not drink alcohol.  Avoid strenuous or risky activities.  Ask for help when climbing stairs.   You may feel lightheaded.  IF so, sit for a few minutes before standing.  Have someone help you get up.   If you have nausea (feel sick to your stomach): Drink only clear liquids such as apple juice, ginger ale, broth or 7-Up.  Rest may also help.  Be sure to drink enough fluids.  Move to a regular diet as you feel able.  You may have a slight fever. Call the doctor if your fever is over 100 F (37.7 C) (taken under the tongue) or lasts longer than 24 hours.  You may have a dry mouth, a sore throat, muscle aches or trouble sleeping.  These should go away after 24 hours.  Do not make important or legal decisions.   Call your doctor for any of the followin.  Signs of infection (fever, growing tenderness at the surgery site, a large amount of drainage or bleeding, severe pain, foul-smelling drainage, redness, swelling).    2. It has been over 8 to 10 hours since surgery and you are still not able to urinate (pass water).    3.  Headache for over 24 hours.        Post-Operative Symptom Control    While  "you recover from your procedure, you can take steps to ease your recovery.    Diet and Fluids:    Eating your normal diet is fine.  Drink a little more than normal but you don not have to \"flush\" your system.    Activity:    There are no activity restrictions after stone surgery.  Some people find bending twisting movements cause discomfort or increased blood in urine.  Activity may irritating but you will not hurt yourself.    Medications: (That may be suggested or prescribed)    Dramamine (brand name drowsy version)-Is available over the counter.  Take 50 mg at bedtime and every 6 hours as needed.  This medication can be helpful by:   Decreasing nausea   Decrease acute pain   Decrease recurrence of pain for 24 hours  *This medication my cause increased drowsiness, do not drive or operate machinery within 6 hours.    Flomax (Tamsulosin)-After surgery Flomax has several potential benefits   Decreased stent discomfort   Increased likelihood passage of small stone fragments   Take daily with food until your stent is removed  *This may cause nasal congestion or light-headedness    Detrol (Tolterodine)-After surgery Detrol may decrease stent irritation and pelvic pain   Take as prescribed  *This medication may cause dry mouth, constipation or blurry vision.    Narcotics (oxycodone)   Take as prescribed for severe pain   Narcotics have significant side effects and only \"cover-up\" pain.  They have no effect on cause of pain.     Common side effects  Confusion, disorientation and sedation-DO NOT DRIVE OR OPERATE MACHINERY WITH IN 24 HOURS OF TAKING NARCOTICS    Nausea-take Dramamine or Zofran to help control  Constipation  Sleep Disturbances    Call the Clinic Immediately If You Have Any Of The Following Symptoms:   Nausea/vomiting that is uncontrolled with medications   You have a fever over 100.0   Chills   Are not able to urinate for 8 hours    Increasing back pain that is not relieved with pain medications   Large " "amounts of blood in urine or large clots    We can respond to your questions or concerns 24 hours a day at [phone].   For after hours phone calls please wait on call to be connected with the \"care connection\" service for after hours care.     Going Home With a Ureteral Stent    What is it?  A stent is a soft, plastic tube that helps urine (pee) drain into the bladder.  During the surgery, it is placed in the ureter the tube that connects the kidney to the bladder.  A thin curl at each end of the stent keeps one end in your kidney and the other in your bladder.  The stent can not be seen from outside of the body.    Why Do I Have It?  Some sort of blockage is not letting pee drain into your bladder.  This could be from a stone, certain surgeries or kidney infection.    What Should I Expect?   Stents often cause some discomfort.  You may have:    The need to pee suddenly    Pain when you pee    A dull backache, which may get worse when you pee  Blood in your pee (color of fruit punch) and some clots, which may increase with physical activity.     What Can I Do To Feel Better?    Drink a little more fluids than usual.  You can eat your normal diet.    Enjoy a warm bath.  Decrease your activity.  Some people find bending or twisting movement cause discomfort or increased blood in the urine.  Even so, you will not harm yourself.    Follow up:  You will be contacted for a clinic appointment for removal of your stent (tube that is in your kidney) later this week.    We will have you follow-up in clinic in 2 to 3 months after kidney renal ultrasound.     Please complete your lab testing and 24 hr urine testing around the same time, at least 2 weeks prior to your follow up appointment.    "

## 2024-07-22 NOTE — ANESTHESIA PROCEDURE NOTES
Airway       Patient location during procedure: OR  Staff -        CRNA: Kendell Mae APRN CRNA       Performed By: CRNA  Consent for Airway        Urgency: elective  Indications and Patient Condition       Indications for airway management: cleo-procedural       Induction type:intravenous       Mask difficulty assessment: 1 - vent by mask    Final Airway Details       Final airway type: supraglottic airway    Supraglottic Airway Details        Type: LMA       Brand: Ambu AuraGain       LMA size: 5    Post intubation assessment        Placement verified by: capnometry, equal breath sounds and chest rise        Number of attempts at approach: 1       Secured with: silk tape       Ease of procedure: easy       Dentition: Intact and Unchanged

## 2024-07-24 LAB
APPEARANCE STONE: NORMAL
COMPN STONE: NORMAL
SPECIMEN WT: 26 MG

## 2024-07-26 ENCOUNTER — LAB (OUTPATIENT)
Dept: LAB | Facility: CLINIC | Age: 69
End: 2024-07-26
Payer: COMMERCIAL

## 2024-07-26 ENCOUNTER — TELEPHONE (OUTPATIENT)
Dept: UROLOGY | Facility: CLINIC | Age: 69
End: 2024-07-26

## 2024-07-26 ENCOUNTER — ALLIED HEALTH/NURSE VISIT (OUTPATIENT)
Dept: UROLOGY | Facility: CLINIC | Age: 69
End: 2024-07-26
Payer: COMMERCIAL

## 2024-07-26 DIAGNOSIS — N20.1 RIGHT URETERAL STONE: ICD-10-CM

## 2024-07-26 DIAGNOSIS — N20.0 NEPHROLITHIASIS: ICD-10-CM

## 2024-07-26 DIAGNOSIS — N20.0 NEPHROLITHIASIS: Primary | ICD-10-CM

## 2024-07-26 PROCEDURE — 99211 OFF/OP EST MAY X REQ PHY/QHP: CPT

## 2024-07-26 NOTE — TELEPHONE ENCOUNTER
Message left for granddaughter regarding nurse visit today.  Direct number given for nurse to reschedule or to see if patient is still coming.  Yue Nair RN

## 2024-07-26 NOTE — PROCEDURES
Stent on a string was removed intact without issue and patient tolerated procedure.  He is set up for his ultrasound and 24 hour urine testing. Yue Nair RN

## 2024-08-19 ENCOUNTER — OFFICE VISIT (OUTPATIENT)
Dept: FAMILY MEDICINE | Facility: CLINIC | Age: 69
End: 2024-08-19
Payer: COMMERCIAL

## 2024-08-19 VITALS
OXYGEN SATURATION: 98 % | SYSTOLIC BLOOD PRESSURE: 143 MMHG | DIASTOLIC BLOOD PRESSURE: 87 MMHG | HEART RATE: 107 BPM | RESPIRATION RATE: 16 BRPM | BODY MASS INDEX: 24.5 KG/M2 | WEIGHT: 143.5 LBS | HEIGHT: 64 IN | TEMPERATURE: 98.3 F

## 2024-08-19 DIAGNOSIS — N20.0 CALCIUM OXALATE KIDNEY STONES: ICD-10-CM

## 2024-08-19 DIAGNOSIS — E11.29 TYPE 2 DIABETES MELLITUS WITH OTHER DIABETIC KIDNEY COMPLICATION, WITHOUT LONG-TERM CURRENT USE OF INSULIN (H): Primary | ICD-10-CM

## 2024-08-19 DIAGNOSIS — I1A.0 RESISTANT HYPERTENSION: ICD-10-CM

## 2024-08-19 LAB — HBA1C MFR BLD: 6.7 % (ref 0–5.6)

## 2024-08-19 PROCEDURE — G2211 COMPLEX E/M VISIT ADD ON: HCPCS | Performed by: FAMILY MEDICINE

## 2024-08-19 PROCEDURE — 36415 COLL VENOUS BLD VENIPUNCTURE: CPT | Performed by: FAMILY MEDICINE

## 2024-08-19 PROCEDURE — 99213 OFFICE O/P EST LOW 20 MIN: CPT | Performed by: FAMILY MEDICINE

## 2024-08-19 PROCEDURE — 83036 HEMOGLOBIN GLYCOSYLATED A1C: CPT | Performed by: FAMILY MEDICINE

## 2024-08-19 PROCEDURE — 80048 BASIC METABOLIC PNL TOTAL CA: CPT | Performed by: FAMILY MEDICINE

## 2024-08-19 NOTE — PROGRESS NOTES
8/19/2024     2:34 PM   Additional Questions   Roomed by RENU Michaud   Accompanied by Grand daughter : Keven Hinojosa     ASSESMENT AND PLAN:  Diagnoses and all orders for this visit:  Type 2 diabetes mellitus with other diabetic kidney complication, without long-term current use of insulin (H)  -     HEMOGLOBIN A1C done today in clinic does show good improvement trend and is now well-controlled, continue current plan and recheck again in 6 months.  Calcium oxalate kidney stones  Reviewed lab results on stone analysis with the patient and granddaughter with the help of a professional .  Aggressive water hydration.  I gave printed up to date patient information that highlighted the recommended diet changes for this type of stone.  Reviewed that with the patient with the help of a professional , he will follow-up with specialty care as directed.  Resistant hypertension  Dramatically improved compared to last visit.  Continue current medications.  -     Basic metabolic panel  (Ca, Cl, CO2, Creat, Gluc, K, Na, BUN); Future      Reviewed the risks and benefits of the treatment plan with the patient and/or caregiver and we discussed indications for routine and emergent follow-up.        SUBJECTIVE: Patient very happy with his recovery from his surgery.  He successfully had kidney stone removal-see those notes for details.  Abdominal pain, flank pain, gross hematuria, and dysuria have all resolved completely.  He is feeling completely back to normal.  He is drinking fluids well.  He is taking his medications as prescribed.    Past Medical History:   Diagnosis Date    Chronic pain of both knees 02/11/2019    COVID-19 05/07/2020    Diabetes (H)     Dyspnea on exertion     Hypercholesteremia 06/18/2018    Hypertension 05/07/2018    Renal disease      Patient Active Problem List   Diagnosis    Resistant hypertension    Cataract    Hypercholesteremia    Chronic pain of both knees    CKD (chronic  kidney disease) stage 3, GFR 30-59 ml/min    Allergic conjunctivitis, bilateral    COVID-19    Tachycardia    Accelerated hypertension    Nephrolithiasis    SOB (shortness of breath)    Decreased visual acuity    Corneal scarring    Poor dentition    Type 2 diabetes mellitus with other diabetic kidney complication, without long-term current use of insulin (H)    History of nephrectomy    Posterior vitreous detachment, right    Macular edema    Hypertension secondary to other renal disorders    Solitary kidney, acquired    Nonintractable episodic headache, unspecified headache type    Ureterolithiasis    Right ureteral stone    Calcium oxalate kidney stones     Current Outpatient Medications   Medication Sig Dispense Refill    acetaminophen (TYLENOL) 325 MG tablet Take 2 tablets (650 mg) by mouth every 6 hours as needed for pain 120 tablet 3    amLODIPine (NORVASC) 10 MG tablet Take 1 tablet (10 mg) by mouth at bedtime Need appt for further refills. 90 tablet 11    aspirin 81 MG EC tablet Take 1 tablet (81 mg) by mouth daily 90 tablet 11    atorvastatin (LIPITOR) 80 MG tablet Take 1 tablet (80 mg) by mouth every evening 90 tablet 11    blood glucose (NO BRAND SPECIFIED) test strip Use to test blood sugar one times daily or as directed. To accompany: Blood Glucose Monitor Brands: per insurance. 100 strip 6    blood glucose monitoring (NO BRAND SPECIFIED) meter device kit Use to test blood sugar 1 times daily or as directed. Preferred blood glucose meter OR supplies to accompany: Blood Glucose Monitor Brands: per insurance. 1 kit 0    carvedilol (COREG) 25 MG tablet Take 2 tablets (50 mg) by mouth 2 times daily (with meals) 360 tablet 11    ezetimibe (ZETIA) 10 MG tablet Take 1 tablet (10 mg) by mouth daily 90 tablet 11    hydrOXYzine HCl (ATARAX) 25 MG tablet Take 1-2 tablets (25-50 mg) by mouth nightly as needed for itching 60 tablet 1    metFORMIN (GLUCOPHAGE) 1000 MG tablet Take 1 tablet (1,000 mg) by mouth 2 times  "daily (with meals) for 360 days 180 tablet 3    oxyCODONE (ROXICODONE) 5 MG tablet Take 1 tablet (5 mg) by mouth every 4 hours as needed for severe pain (IF pain not managed with non-pharmacological and non-opioid interventions) 6 tablet 0    Polyvinyl Alcohol-Povidone (ARTIFICIAL TEARS) 5-6 MG/ML SOLN Apply 1 drop to eye 3 times daily as needed (both eyes) 30 mL 11    spironolactone (ALDACTONE) 25 MG tablet Take 1 tablet (25 mg) by mouth daily for 360 days 90 tablet 3    tamsulosin (FLOMAX) 0.4 MG capsule Take 1 capsule (0.4 mg) by mouth daily 7 capsule 0    telmisartan (MICARDIS) 80 MG tablet Take 1 tablet (80 mg) by mouth daily 90 tablet 3    thin (NO BRAND SPECIFIED) lancets Use with lanceting device. To accompany: Blood Glucose Monitor Brands: per insurance. 100 each 6    tolterodine ER (DETROL LA) 2 MG 24 hr capsule Take 1 capsule (2 mg) by mouth daily as needed (bladder spasms) 7 capsule 0    triamcinolone (KENALOG) 0.1 % external cream Apply topically 2 times daily 80 g 3     History   Smoking Status    Former    Types: Cigarettes   Smokeless Tobacco    Never       OBJECTICE: BP (!) 143/87   Pulse 107   Temp 98.3  F (36.8  C) (Oral)   Resp 16   Ht 1.621 m (5' 3.82\")   Wt 65.1 kg (143 lb 8 oz)   SpO2 98%   BMI 24.77 kg/m       Recent Results (from the past 24 hour(s))   HEMOGLOBIN A1C    Collection Time: 08/19/24  3:01 PM   Result Value Ref Range    Hemoglobin A1C 6.7 (H) 0.0 - 5.6 %        GEN-alert, appropriate, in no apparent distress   CV-regular rate and rhythm with no murmur   RESP-lungs clear to auscultation   ABDOMINAL-soft, nontender, no palpable mass, no CVA tenderness to percussion   EXTREM-no pitting edema of the ankles           Signed Electronically by: Yassine Larsen MD    "

## 2024-08-20 LAB
ANION GAP SERPL CALCULATED.3IONS-SCNC: 12 MMOL/L (ref 7–15)
BUN SERPL-MCNC: 15.6 MG/DL (ref 8–23)
CALCIUM SERPL-MCNC: 9.8 MG/DL (ref 8.8–10.4)
CHLORIDE SERPL-SCNC: 104 MMOL/L (ref 98–107)
CREAT SERPL-MCNC: 1.32 MG/DL (ref 0.67–1.17)
EGFRCR SERPLBLD CKD-EPI 2021: 58 ML/MIN/1.73M2
GLUCOSE SERPL-MCNC: 140 MG/DL (ref 70–99)
HCO3 SERPL-SCNC: 26 MMOL/L (ref 22–29)
POTASSIUM SERPL-SCNC: 4.1 MMOL/L (ref 3.4–5.3)
SODIUM SERPL-SCNC: 142 MMOL/L (ref 135–145)

## 2024-08-27 LAB
ATRIAL RATE - MUSE: 78 BPM
DIASTOLIC BLOOD PRESSURE - MUSE: NORMAL MMHG
INTERPRETATION ECG - MUSE: NORMAL
P AXIS - MUSE: 67 DEGREES
PR INTERVAL - MUSE: 166 MS
QRS DURATION - MUSE: 82 MS
QT - MUSE: 364 MS
QTC - MUSE: 414 MS
R AXIS - MUSE: 67 DEGREES
SYSTOLIC BLOOD PRESSURE - MUSE: NORMAL MMHG
T AXIS - MUSE: 57 DEGREES
VENTRICULAR RATE- MUSE: 78 BPM

## 2024-09-06 ENCOUNTER — TELEPHONE (OUTPATIENT)
Dept: FAMILY MEDICINE | Facility: CLINIC | Age: 69
End: 2024-09-06
Payer: COMMERCIAL

## 2024-09-06 NOTE — TELEPHONE ENCOUNTER
Forms/Letter Request    Type of form/letter: DME    Type of DME requested: [employer] S/M 28-40 Mens 20pk    Do we have the form/letter: Yes: Incoming RightFax Bin for     Who is the form from? [employer] (if other please explain)    Where did/will the form come from? form was faxed in    When is form/letter needed by: ASAP     How would you like the form/letter returned: Fax : [phone]

## 2024-09-11 ENCOUNTER — MEDICAL CORRESPONDENCE (OUTPATIENT)
Dept: HEALTH INFORMATION MANAGEMENT | Facility: CLINIC | Age: 69
End: 2024-09-11
Payer: COMMERCIAL

## 2024-09-11 NOTE — TELEPHONE ENCOUNTER
[facility] received referral but Moraima is gone for the day and I left her a voice message to call me back if she needs paperwork.    [Alert] : alert [No Respiratory Distress] : no respiratory distress [No Acc Muscle Use] : no accessory muscle use [Respiration, Rhythm And Depth] : normal respiratory rhythm and effort [Heart Rate And Rhythm] : heart rate was normal and rhythm regular [Normal S1, S2] : normal S1 and S2 [Bowel Sounds] : normal bowel sounds [No Masses] : no abdominal mass palpated [Abdomen Soft] : soft [] : no hepatosplenomegaly [de-identified] : mild tenderness with palpation of RUQ.

## 2024-09-19 ENCOUNTER — HOSPITAL ENCOUNTER (OUTPATIENT)
Dept: ULTRASOUND IMAGING | Facility: HOSPITAL | Age: 69
Discharge: HOME | End: 2024-09-19
Attending: UROLOGY | Admitting: UROLOGY
Payer: COMMERCIAL

## 2024-09-19 DIAGNOSIS — N20.1 RIGHT URETERAL STONE: ICD-10-CM

## 2024-09-19 DIAGNOSIS — N20.0 NEPHROLITHIASIS: ICD-10-CM

## 2024-09-19 PROCEDURE — 76775 US EXAM ABDO BACK WALL LIM: CPT

## 2024-09-22 PROCEDURE — 82507 ASSAY OF CITRATE: CPT | Mod: 90

## 2024-09-22 PROCEDURE — 83945 ASSAY OF OXALATE: CPT | Mod: 90

## 2024-09-22 PROCEDURE — 84540 ASSAY OF URINE/UREA-N: CPT | Mod: 90

## 2024-09-22 PROCEDURE — 83735 ASSAY OF MAGNESIUM: CPT | Mod: 90

## 2024-09-22 PROCEDURE — 84560 ASSAY OF URINE/URIC ACID: CPT | Mod: 90

## 2024-09-22 PROCEDURE — 82436 ASSAY OF URINE CHLORIDE: CPT | Mod: 90

## 2024-09-22 PROCEDURE — 84133 ASSAY OF URINE POTASSIUM: CPT | Mod: 90

## 2024-09-22 PROCEDURE — 84392 ASSAY OF URINE SULFATE: CPT | Mod: 90

## 2024-09-22 PROCEDURE — 83986 ASSAY PH BODY FLUID NOS: CPT | Mod: 90

## 2024-09-22 PROCEDURE — 82570 ASSAY OF URINE CREATININE: CPT | Mod: 90

## 2024-09-22 PROCEDURE — 82340 ASSAY OF CALCIUM IN URINE: CPT | Mod: 90

## 2024-09-22 PROCEDURE — 84105 ASSAY OF URINE PHOSPHORUS: CPT | Mod: 90

## 2024-09-22 PROCEDURE — 84300 ASSAY OF URINE SODIUM: CPT | Mod: 90

## 2024-09-22 PROCEDURE — 83935 ASSAY OF URINE OSMOLALITY: CPT | Mod: 90

## 2024-09-22 PROCEDURE — 82140 ASSAY OF AMMONIA: CPT | Mod: 90

## 2024-09-22 PROCEDURE — 99000 SPECIMEN HANDLING OFFICE-LAB: CPT

## 2024-09-23 ENCOUNTER — VIRTUAL VISIT (OUTPATIENT)
Dept: UROLOGY | Facility: CLINIC | Age: 69
End: 2024-09-23
Payer: COMMERCIAL

## 2024-09-23 ENCOUNTER — APPOINTMENT (OUTPATIENT)
Dept: LAB | Facility: HOSPITAL | Age: 69
End: 2024-09-23
Payer: COMMERCIAL

## 2024-09-23 DIAGNOSIS — Z90.5 SOLITARY KIDNEY, ACQUIRED: ICD-10-CM

## 2024-09-23 DIAGNOSIS — N20.0 NEPHROLITHIASIS: Primary | ICD-10-CM

## 2024-09-23 PROCEDURE — 99213 OFFICE O/P EST LOW 20 MIN: CPT | Performed by: UROLOGY

## 2024-09-23 NOTE — PROGRESS NOTES
Virtual Visit Details    Type of service:  Telephone Visit via [facility]    Phone call duration: 6 minutes   Originating Location (pt. Location): Home    Distant Location (provider location):  On-site    Name: Moriah Hinojosa   MRN: [id number]  YOB: 1955    Assessment and Plan:  69 year old male with a hx of a right COM stone s/p right Ureteroscopy that was without complication.  Patient does have a solitary right kidney due to a left nephrectomy from stone disease.    COM stone disease  Solitary right kidney    No radiographic or blood tests abnormalities.  Cr back to baseline.    Will follow-up results of 24 hr urine study.  Likely need yearly follow-up to ensure no new stones in solitary kidney.    David Arreaga MD  September 23, 2024         Chief Complaint: Stone follow-up    History of Present Illness:  Moriah Hinojosa is a 69 year old male seen after right ureteroscopy on 7/22/2024.    Postoperatively:  Stent was removed  without issue. Since surgery, they did not have an unplanned clinic visit/ED visits/admissions.    Currently, they are experiencing no flank pain, no hematuria, or no dysuria.      Imaging (right renal US on 9/19/2024, personally reviewed), demonstrated: no hydronephrosis or stones    Metabolic:  Stone analysis:   Stone Composition   Date Value Ref Range Status   07/22/2024 See Note  Final     Comment:     Calculi composed primarily of  calcium oxalate monohydrate.  INTERPRETIVE INFORMATION: Calculi (Stone) analysis    Calculi are the products of physiological processes that   yield crystalline compounds in a matrix of biological   compounds and blood.  Matrix components are not reported.    The clinically significant crystalline components   identified in calculi specimens are reported.  Gross   description may not be consistent with composition   determined by FTIR analysis.  Performed By: [facility]  [address] [place] [id number]  :  Niranjan Bourne MD, PhD  [facility] Number: [id number]     Blood work:  Potassium   Date Value Ref Range Status   08/19/2024 4.1 3.4 - 5.3 mmol/L Final   08/29/2022 4.3 3.4 - 5.3 mmol/L Final     Calcium   Date Value Ref Range Status   08/19/2024 9.8 8.8 - 10.4 mg/dL Final     Comment:     Reference intervals for this test were updated on 7/16/2024 to reflect our healthy population more accurately. There may be differences in the flagging of prior results with similar values performed with this method. Those prior results can be interpreted in the context of the updated reference intervals.     Phosphorus   Date Value Ref Range Status   10/25/2023 2.5 2.5 - 4.5 mg/dL Final     Magnesium   Date Value Ref Range Status   08/29/2022 2.2 1.6 - 2.3 mg/dL Final     Creatinine   Date Value Ref Range Status   08/19/2024 1.32 (H) 0.67 - 1.17 mg/dL Final     Vitamin D, Total (25-Hydroxy)   Date Value Ref Range Status   08/08/2022 25 20 - 75 ug/L Final         24 hr urine study results pending from 9/22/2024           Allergies:  No Known Allergies         Medications:  Current Outpatient Medications   Medication Sig Dispense Refill    acetaminophen (TYLENOL) 325 MG tablet Take 2 tablets (650 mg) by mouth every 6 hours as needed for pain 120 tablet 3    amLODIPine (NORVASC) 10 MG tablet Take 1 tablet (10 mg) by mouth at bedtime Need appt for further refills. 90 tablet 11    aspirin 81 MG EC tablet Take 1 tablet (81 mg) by mouth daily 90 tablet 11    atorvastatin (LIPITOR) 80 MG tablet Take 1 tablet (80 mg) by mouth every evening 90 tablet 11    blood glucose (NO BRAND SPECIFIED) test strip Use to test blood sugar one times daily or as directed. To accompany: Blood Glucose Monitor Brands: per insurance. 100 strip 6    blood glucose monitoring (NO BRAND SPECIFIED) meter device kit Use to test blood sugar 1 times daily or as directed. Preferred blood glucose meter OR supplies to accompany: Blood Glucose Monitor Brands: per  insurance. 1 kit 0    carvedilol (COREG) 25 MG tablet Take 2 tablets (50 mg) by mouth 2 times daily (with meals) 360 tablet 11    ezetimibe (ZETIA) 10 MG tablet Take 1 tablet (10 mg) by mouth daily 90 tablet 11    hydrOXYzine HCl (ATARAX) 25 MG tablet Take 1-2 tablets (25-50 mg) by mouth nightly as needed for itching 60 tablet 1    metFORMIN (GLUCOPHAGE) 1000 MG tablet Take 1 tablet (1,000 mg) by mouth 2 times daily (with meals) for 360 days 180 tablet 3    oxyCODONE (ROXICODONE) 5 MG tablet Take 1 tablet (5 mg) by mouth every 4 hours as needed for severe pain (IF pain not managed with non-pharmacological and non-opioid interventions) 6 tablet 0    Polyvinyl Alcohol-Povidone (ARTIFICIAL TEARS) 5-6 MG/ML SOLN Apply 1 drop to eye 3 times daily as needed (both eyes) 30 mL 11    spironolactone (ALDACTONE) 25 MG tablet Take 1 tablet (25 mg) by mouth daily for 360 days 90 tablet 3    tamsulosin (FLOMAX) 0.4 MG capsule Take 1 capsule (0.4 mg) by mouth daily 7 capsule 0    telmisartan (MICARDIS) 80 MG tablet Take 1 tablet (80 mg) by mouth daily 90 tablet 3    thin (NO BRAND SPECIFIED) lancets Use with lanceting device. To accompany: Blood Glucose Monitor Brands: per insurance. 100 each 6    tolterodine ER (DETROL LA) 2 MG 24 hr capsule Take 1 capsule (2 mg) by mouth daily as needed (bladder spasms) 7 capsule 0    triamcinolone (KENALOG) 0.1 % external cream Apply topically 2 times daily 80 g 3     No current facility-administered medications for this visit.

## 2024-09-23 NOTE — NURSING NOTE
Current patient location: Home    Is the patient currently in the state of [place]? YES    Visit mode:TELEPHONE    If the visit is dropped, the patient can be reconnected by: Phone- [phone]    Will anyone else be joining the visit? Yes, grandchild Marky. Marky requested to interpret for patient today.  was on standby for rooming process if needed.  (If patient encounters technical issues they should call [phone]    How would you like to obtain your AVS? MyChart    Are changes needed to the allergy or medication list? Pt stated no changes to allergies and Pt stated no med changes per Marky.     Are refills needed on medications prescribed by this physician? YES    Rooming Documentation:  Patient report on and off headaches.     Reason for visit: RECHECK    Montse MARSH

## 2024-09-25 LAB
AMMONIA 24H UR-SRATE: 10 MMOL/24 H (ref 15–56)
BRUSHITE CRY 24H SATFR UR: -2.04 DG
BSA: ABNORMAL 1.73M(2)
CALCIUM 24H UR-MRATE: 40 MG/24 H
CAOX 24H ENGDIFF UR: 1.87 DG
CHLORIDE 24H UR-SRATE: 56 MMOL/24 H (ref 34–286)
CITRATE 24H UR-MRATE: 106 MG/24 H
COLLECT DURATION TIME UR: 24 H
CREAT 24H UR-MRATE: 450 MG/24 H (ref 930–2955)
HYDROXYAPATITE 24H ENGDIFF UR: 1.64 DG
MAGNESIUM 24H UR-MRATE: 30 MG/24 H (ref 51–269)
OSMOLALITY 24H UR: 267 MOSM/KG (ref 150–1150)
OXALATE 24H UR-MRATE: 21.1 MG/24 H (ref 9.7–40.5)
OXALATE 24H UR-SRATE: 0.24 MMOL/24 H (ref 0.11–0.46)
PH 24H UR: 5.8 [PH] (ref 4.5–8)
PHOSPHATE 24H UR-MRATE: 240 MG/24 H (ref 226–1797)
POTASSIUM 24H UR-SRATE: 10 MMOL/24 H (ref 16–105)
PROTEIN CATABOLIC RATE 24H UR-MRATE: 41 G/24 H (ref 56–125)
SODIUM 24H UR-SRATE: 63 MMOL/24 H (ref 22–328)
SPECIMEN VOL 24H UR: 1000 ML
SULFATE 24H UR-SRATE: 3 MMOL/24 H (ref 7–47)
URATE 24H UR-MRATE: 180 MG/24 H (ref 200–1000)
URATE CRY 24H SATFR UR: 0.12 DG
URINALYSIS SPECIALIST REVIEW: ABNORMAL
UUN 24H UR-MRATE: 2.5 G/24 H (ref 7–42)

## 2024-09-30 ENCOUNTER — TELEPHONE (OUTPATIENT)
Dept: FAMILY MEDICINE | Facility: CLINIC | Age: 69
End: 2024-09-30
Payer: COMMERCIAL

## 2024-09-30 NOTE — TELEPHONE ENCOUNTER
Patient Quality Outreach    Patient is due for the following:   Diabetes -  Foot Exam      Topic Date Due    Zoster (Shingles) Vaccine (2 of 2) 03/25/2021    Flu Vaccine (1) 09/01/2024    COVID-19 Vaccine (3 - 2024-25 season) 09/01/2024       Next Steps:   Patient has upcoming appointment, these items will be addressed at that time.    Type of outreach:    Chart review performed, no outreach needed.      Questions for provider review:    None           Sid Hathaway MA  Chart routed to Care Team.

## 2024-10-03 ENCOUNTER — TELEPHONE (OUTPATIENT)
Dept: UROLOGY | Facility: CLINIC | Age: 69
End: 2024-10-03
Payer: COMMERCIAL

## 2025-01-10 DIAGNOSIS — I15.1 HYPERTENSION SECONDARY TO OTHER RENAL DISORDERS: ICD-10-CM

## 2025-01-13 RX ORDER — TELMISARTAN 80 MG/1
80 TABLET ORAL DAILY
Qty: 90 TABLET | Refills: 3 | Status: SHIPPED | OUTPATIENT
Start: 2025-01-13

## 2025-02-16 DIAGNOSIS — E78.5 HYPERLIPIDEMIA LDL GOAL <70: ICD-10-CM

## 2025-02-18 RX ORDER — ASPIRIN 81 MG/1
81 TABLET ORAL DAILY
Qty: 30 TABLET | Refills: 1 | Status: SHIPPED | OUTPATIENT
Start: 2025-02-18

## 2025-02-24 ENCOUNTER — OFFICE VISIT (OUTPATIENT)
Dept: FAMILY MEDICINE | Facility: CLINIC | Age: 70
End: 2025-02-24
Payer: COMMERCIAL

## 2025-02-24 VITALS
RESPIRATION RATE: 20 BRPM | SYSTOLIC BLOOD PRESSURE: 188 MMHG | OXYGEN SATURATION: 98 % | BODY MASS INDEX: 25.61 KG/M2 | DIASTOLIC BLOOD PRESSURE: 121 MMHG | HEIGHT: 64 IN | HEART RATE: 110 BPM | WEIGHT: 150 LBS | TEMPERATURE: 98.3 F

## 2025-02-24 DIAGNOSIS — N20.1 URETEROLITHIASIS: ICD-10-CM

## 2025-02-24 DIAGNOSIS — Z23 NEED FOR VACCINATION: ICD-10-CM

## 2025-02-24 DIAGNOSIS — I15.1 HYPERTENSION SECONDARY TO OTHER RENAL DISORDERS: ICD-10-CM

## 2025-02-24 DIAGNOSIS — N18.30 STAGE 3 CHRONIC KIDNEY DISEASE, UNSPECIFIED WHETHER STAGE 3A OR 3B CKD (H): ICD-10-CM

## 2025-02-24 DIAGNOSIS — E78.00 HYPERCHOLESTEREMIA: ICD-10-CM

## 2025-02-24 DIAGNOSIS — N18.31 STAGE 3A CHRONIC KIDNEY DISEASE (H): ICD-10-CM

## 2025-02-24 DIAGNOSIS — E11.29 TYPE 2 DIABETES MELLITUS WITH OTHER DIABETIC KIDNEY COMPLICATION, WITHOUT LONG-TERM CURRENT USE OF INSULIN (H): ICD-10-CM

## 2025-02-24 DIAGNOSIS — I10 ACCELERATED HYPERTENSION: ICD-10-CM

## 2025-02-24 DIAGNOSIS — I1A.0 RESISTANT HYPERTENSION: Primary | ICD-10-CM

## 2025-02-24 LAB
ERYTHROCYTE [DISTWIDTH] IN BLOOD BY AUTOMATED COUNT: 12.8 % (ref 10–15)
EST. AVERAGE GLUCOSE BLD GHB EST-MCNC: 143 MG/DL
HBA1C MFR BLD: 6.6 % (ref 0–5.6)
HCT VFR BLD AUTO: 47.7 % (ref 40–53)
HGB BLD-MCNC: 16.2 G/DL (ref 13.3–17.7)
MCH RBC QN AUTO: 29.6 PG (ref 26.5–33)
MCHC RBC AUTO-ENTMCNC: 34 G/DL (ref 31.5–36.5)
MCV RBC AUTO: 87 FL (ref 78–100)
PLATELET # BLD AUTO: 205 10E3/UL (ref 150–450)
RBC # BLD AUTO: 5.47 10E6/UL (ref 4.4–5.9)
WBC # BLD AUTO: 9.3 10E3/UL (ref 4–11)

## 2025-02-24 PROCEDURE — 83036 HEMOGLOBIN GLYCOSYLATED A1C: CPT | Performed by: FAMILY MEDICINE

## 2025-02-24 PROCEDURE — G2211 COMPLEX E/M VISIT ADD ON: HCPCS | Performed by: FAMILY MEDICINE

## 2025-02-24 PROCEDURE — 90662 IIV NO PRSV INCREASED AG IM: CPT | Performed by: FAMILY MEDICINE

## 2025-02-24 PROCEDURE — 36415 COLL VENOUS BLD VENIPUNCTURE: CPT | Performed by: FAMILY MEDICINE

## 2025-02-24 PROCEDURE — 80053 COMPREHEN METABOLIC PANEL: CPT | Performed by: FAMILY MEDICINE

## 2025-02-24 PROCEDURE — G0008 ADMIN INFLUENZA VIRUS VAC: HCPCS | Performed by: FAMILY MEDICINE

## 2025-02-24 PROCEDURE — 85027 COMPLETE CBC AUTOMATED: CPT | Performed by: FAMILY MEDICINE

## 2025-02-24 PROCEDURE — 99214 OFFICE O/P EST MOD 30 MIN: CPT | Mod: 25 | Performed by: FAMILY MEDICINE

## 2025-02-24 PROCEDURE — T1013 SIGN LANG/ORAL INTERPRETER: HCPCS

## 2025-02-24 RX ORDER — ATORVASTATIN CALCIUM 80 MG/1
80 TABLET, FILM COATED ORAL EVERY EVENING
Qty: 90 TABLET | Refills: 3 | Status: SHIPPED | OUTPATIENT
Start: 2025-02-24

## 2025-02-24 RX ORDER — CARVEDILOL 25 MG/1
50 TABLET ORAL 2 TIMES DAILY WITH MEALS
Qty: 360 TABLET | Refills: 3 | Status: SHIPPED | OUTPATIENT
Start: 2025-02-24

## 2025-02-24 RX ORDER — AMLODIPINE BESYLATE 10 MG/1
10 TABLET ORAL AT BEDTIME
Qty: 90 TABLET | Refills: 3 | Status: SHIPPED | OUTPATIENT
Start: 2025-02-24

## 2025-02-24 NOTE — PROGRESS NOTES
ASSESMENT AND PLAN:  Diagnoses and all orders for this visit:  Accelerated hypertension/Resistant hypertension  Out-of-control, off multiple medications.  Restart Coreg and amlodipine.  Close follow-up here in the clinic in 2 weeks for recheck.  -     carvedilol (COREG) 25 MG tablet; Take 2 tablets (50 mg) by mouth 2 times daily (with meals).  Type 2 diabetes mellitus with other diabetic kidney complication, without long-term current use of insulin (H)  -     HEMOGLOBIN A1C; Future  -     Albumin Random Urine Quantitative with Creat Ratio; Future  -     metFORMIN (GLUCOPHAGE) 1000 MG tablet; Take 1 tablet (1,000 mg) by mouth 2 times daily (with meals).  Ureterolithiasis  Reviewed most recent urology consultation note, patient will be due for urology follow-up again in September.  Stage 3 chronic kidney disease, unspecified whether stage 3a or 3b CKD (H), Hypertension secondary to other renal disorders  -     amLODIPine (NORVASC) 10 MG tablet; Take 1 tablet (10 mg) by mouth at bedtime. Need appt for further refills.  Hypercholesteremia  -     atorvastatin (LIPITOR) 80 MG tablet; Take 1 tablet (80 mg) by mouth every evening.  Need for vaccination  -     INFLUENZA HIGH DOSE, TRIVALENT, PF (FLUZONE)  Stage 3a chronic kidney disease (H)  -     CBC with platelets  -     Comprehensive metabolic panel (BMP + Alb, Alk Phos, ALT, AST, Total. Bili, TP)  -     UA Macroscopic with reflex to Microscopic and Culture - Lab Collect  -     Protein  random urine  -     Albumin Random Urine Quantitative with Creat Ratio      Reviewed the risks and benefits of the treatment plan with the patient and/or caregiver and we discussed indications for routine and emergent follow-up.  The longitudinal plan of care for the diagnosis(es)/condition(s) as documented were addressed during this visit. Due to the added complexity in care, I will continue to support Pwo in the subsequent management and with ongoing continuity of  care.        SUBJECTIVE: Patient in for follow-up on his chronic conditions.  His blood pressure is extremely elevated today and on medication review it turns out that he is only taking 3 of his prescribed medications and apparently ran out of all of the other medications, is unclear how long ago.  He is not currently having any out of the ordinary headaches, no chest pain, no shortness of breath, no ankle edema.    Past Medical History:   Diagnosis Date    Chronic pain of both knees 02/11/2019    COVID-19 05/07/2020    Diabetes (H)     Dyspnea on exertion     Hypercholesteremia 06/18/2018    Hypertension 05/07/2018    Renal disease      Patient Active Problem List   Diagnosis    Resistant hypertension    Cataract    Hypercholesteremia    Chronic pain of both knees    CKD (chronic kidney disease) stage 3, GFR 30-59 ml/min    Allergic conjunctivitis, bilateral    COVID-19    Tachycardia    Accelerated hypertension    Nephrolithiasis    SOB (shortness of breath)    Decreased visual acuity    Corneal scarring    Poor dentition    Type 2 diabetes mellitus with other diabetic kidney complication, without long-term current use of insulin (H)    History of nephrectomy    Posterior vitreous detachment, right    Macular edema    Hypertension secondary to other renal disorders    Solitary kidney, acquired    Nonintractable episodic headache, unspecified headache type    Ureterolithiasis    Right ureteral stone    Calcium oxalate kidney stones     Current Outpatient Medications   Medication Sig Dispense Refill    amLODIPine (NORVASC) 10 MG tablet Take 1 tablet (10 mg) by mouth at bedtime. Need appt for further refills. 90 tablet 3    aspirin (ASPIRIN LOW DOSE) 81 MG EC tablet Take 1 tablet (81 mg) by mouth daily. ---Due for follow up in cardiology before any further refills--- 30 tablet 1    atorvastatin (LIPITOR) 80 MG tablet Take 1 tablet (80 mg) by mouth every evening. 90 tablet 3    carvedilol (COREG) 25 MG tablet Take 2  tablets (50 mg) by mouth 2 times daily (with meals). 360 tablet 3    metFORMIN (GLUCOPHAGE) 1000 MG tablet Take 1 tablet (1,000 mg) by mouth 2 times daily (with meals). 180 tablet 3    Polyvinyl Alcohol-Povidone (ARTIFICIAL TEARS) 5-6 MG/ML SOLN Apply 1 drop to eye 3 times daily as needed (both eyes) 30 mL 11    telmisartan (MICARDIS) 80 MG tablet TAKE 1 TABLET (80 MG) BY MOUTH DAILY 90 tablet 3    acetaminophen (TYLENOL) 325 MG tablet Take 2 tablets (650 mg) by mouth every 6 hours as needed for pain 120 tablet 3    blood glucose (NO BRAND SPECIFIED) test strip Use to test blood sugar one times daily or as directed. To accompany: Blood Glucose Monitor Brands: per insurance. 100 strip 6    blood glucose monitoring (NO BRAND SPECIFIED) meter device kit Use to test blood sugar 1 times daily or as directed. Preferred blood glucose meter OR supplies to accompany: Blood Glucose Monitor Brands: per insurance. 1 kit 0    ezetimibe (ZETIA) 10 MG tablet Take 1 tablet (10 mg) by mouth daily 90 tablet 11    hydrOXYzine HCl (ATARAX) 25 MG tablet Take 1-2 tablets (25-50 mg) by mouth nightly as needed for itching 60 tablet 1    oxyCODONE (ROXICODONE) 5 MG tablet Take 1 tablet (5 mg) by mouth every 4 hours as needed for severe pain (IF pain not managed with non-pharmacological and non-opioid interventions) 6 tablet 0    spironolactone (ALDACTONE) 25 MG tablet Take 1 tablet (25 mg) by mouth daily for 360 days 90 tablet 3    tamsulosin (FLOMAX) 0.4 MG capsule Take 1 capsule (0.4 mg) by mouth daily 7 capsule 0    thin (NO BRAND SPECIFIED) lancets Use with lanceting device. To accompany: Blood Glucose Monitor Brands: per insurance. 100 each 6    tolterodine ER (DETROL LA) 2 MG 24 hr capsule Take 1 capsule (2 mg) by mouth daily as needed (bladder spasms) 7 capsule 0    triamcinolone (KENALOG) 0.1 % external cream Apply topically 2 times daily 80 g 3     History   Smoking Status    Former    Types: Cigarettes   Smokeless Tobacco    Never  "      OBJECTICE: BP (!) 188/121   Pulse 110   Temp 98.3  F (36.8  C) (Oral)   Resp 20   Ht 1.621 m (5' 3.82\")   Wt 68 kg (150 lb)   SpO2 98%   BMI 25.89 kg/m       Recent Results (from the past 24 hours)   CBC with platelets    Collection Time: 02/24/25  3:24 PM   Result Value Ref Range    WBC Count 9.3 4.0 - 11.0 10e3/uL    RBC Count 5.47 4.40 - 5.90 10e6/uL    Hemoglobin 16.2 13.3 - 17.7 g/dL    Hematocrit 47.7 40.0 - 53.0 %    MCV 87 78 - 100 fL    MCH 29.6 26.5 - 33.0 pg    MCHC 34.0 31.5 - 36.5 g/dL    RDW 12.8 10.0 - 15.0 %    Platelet Count 205 150 - 450 10e3/uL   HEMOGLOBIN A1C    Collection Time: 02/24/25  3:24 PM   Result Value Ref Range    Estimated Average Glucose 143 (H) <117 mg/dL    Hemoglobin A1C 6.6 (H) 0.0 - 5.6 %        GEN-alert, appropriate, in no apparent distress   Neurologic-cranial nerves II through XII are intact, gait is normal   CV-regular rate and rhythm with no murmur   RESP-lungs clear to auscultation   EXTREM-no pitting edema of the ankles         Signed Electronically by: Yassine Larsen MD    "

## 2025-02-25 LAB
ALBUMIN SERPL BCG-MCNC: 4.6 G/DL (ref 3.5–5.2)
ALP SERPL-CCNC: 89 U/L (ref 40–150)
ALT SERPL W P-5'-P-CCNC: 23 U/L (ref 0–70)
ANION GAP SERPL CALCULATED.3IONS-SCNC: 15 MMOL/L (ref 7–15)
AST SERPL W P-5'-P-CCNC: 32 U/L (ref 0–45)
BILIRUB SERPL-MCNC: 0.5 MG/DL
BUN SERPL-MCNC: 17 MG/DL (ref 8–23)
CALCIUM SERPL-MCNC: 10.2 MG/DL (ref 8.8–10.4)
CHLORIDE SERPL-SCNC: 100 MMOL/L (ref 98–107)
CREAT SERPL-MCNC: 1.22 MG/DL (ref 0.67–1.17)
EGFRCR SERPLBLD CKD-EPI 2021: 63 ML/MIN/1.73M2
GLUCOSE SERPL-MCNC: 131 MG/DL (ref 70–99)
HCO3 SERPL-SCNC: 27 MMOL/L (ref 22–29)
POTASSIUM SERPL-SCNC: 4.2 MMOL/L (ref 3.4–5.3)
PROT SERPL-MCNC: 7.7 G/DL (ref 6.4–8.3)
SODIUM SERPL-SCNC: 142 MMOL/L (ref 135–145)

## 2025-03-11 ENCOUNTER — OFFICE VISIT (OUTPATIENT)
Dept: FAMILY MEDICINE | Facility: CLINIC | Age: 70
End: 2025-03-11
Payer: COMMERCIAL

## 2025-03-11 VITALS
HEIGHT: 64 IN | TEMPERATURE: 98.2 F | DIASTOLIC BLOOD PRESSURE: 106 MMHG | SYSTOLIC BLOOD PRESSURE: 174 MMHG | HEART RATE: 67 BPM | WEIGHT: 151 LBS | BODY MASS INDEX: 25.78 KG/M2 | RESPIRATION RATE: 20 BRPM | OXYGEN SATURATION: 100 %

## 2025-03-11 DIAGNOSIS — I1A.0 RESISTANT HYPERTENSION: Primary | ICD-10-CM

## 2025-03-11 DIAGNOSIS — E11.29 TYPE 2 DIABETES MELLITUS WITH OTHER DIABETIC KIDNEY COMPLICATION, WITHOUT LONG-TERM CURRENT USE OF INSULIN (H): ICD-10-CM

## 2025-03-11 DIAGNOSIS — E78.00 HYPERCHOLESTEREMIA: ICD-10-CM

## 2025-03-11 LAB
ALBUMIN UR-MCNC: NEGATIVE MG/DL
APPEARANCE UR: CLEAR
BILIRUB UR QL STRIP: NEGATIVE
COLOR UR AUTO: NORMAL
GLUCOSE UR STRIP-MCNC: NEGATIVE MG/DL
HGB UR QL STRIP: NEGATIVE
KETONES UR STRIP-MCNC: NEGATIVE MG/DL
LEUKOCYTE ESTERASE UR QL STRIP: NEGATIVE
NITRATE UR QL: NEGATIVE
PH UR STRIP: 6 [PH] (ref 5–7)
SP GR UR STRIP: <=1.005 (ref 1–1.03)
UROBILINOGEN UR STRIP-ACNC: 0.2 E.U./DL

## 2025-03-11 PROCEDURE — 83835 ASSAY OF METANEPHRINES: CPT | Mod: 90 | Performed by: FAMILY MEDICINE

## 2025-03-11 PROCEDURE — 36415 COLL VENOUS BLD VENIPUNCTURE: CPT | Performed by: FAMILY MEDICINE

## 2025-03-11 PROCEDURE — 99000 SPECIMEN HANDLING OFFICE-LAB: CPT | Performed by: FAMILY MEDICINE

## 2025-03-11 PROCEDURE — 80061 LIPID PANEL: CPT | Performed by: FAMILY MEDICINE

## 2025-03-11 PROCEDURE — G2211 COMPLEX E/M VISIT ADD ON: HCPCS | Performed by: FAMILY MEDICINE

## 2025-03-11 PROCEDURE — 99214 OFFICE O/P EST MOD 30 MIN: CPT | Performed by: FAMILY MEDICINE

## 2025-03-11 RX ORDER — METFORMIN HYDROCHLORIDE 750 MG/1
750 TABLET, EXTENDED RELEASE ORAL 2 TIMES DAILY WITH MEALS
Qty: 60 TABLET | Refills: 11 | Status: SHIPPED | OUTPATIENT
Start: 2025-03-11

## 2025-03-11 RX ORDER — SPIRONOLACTONE 25 MG/1
25 TABLET ORAL DAILY
Qty: 90 TABLET | Refills: 3 | Status: SHIPPED | OUTPATIENT
Start: 2025-03-11

## 2025-03-11 NOTE — PROGRESS NOTES
ASSESMENT AND PLAN:  Diagnoses and all orders for this visit:  Resistant hypertension  Improving compared to last visit but still not under good control.  Patient had borderline elevated plasma metanephrines in the past, we are going to recheck that.  His renal Doppler study in the past did not show any renal artery stenosis.  On medication review today the patient is out of spironolactone.  Will restart spironolactone.  -     spironolactone (ALDACTONE) 25 MG tablet; Take 1 tablet (25 mg) by mouth daily.  -     Metanephrines Plasma Free; Future  Type 2 diabetes mellitus with other diabetic kidney complication, without long-term current use of insulin (H)  GI intolerance of 1000 mg regular release metformin twice daily.  Will switch over to the 750 extended release.  -     metFORMIN (GLUCOPHAGE-XR) 750 MG 24 hr tablet; Take 1 tablet (750 mg) by mouth 2 times daily (with meals).  Hypercholesteremia  Counseled on healthy eating and regular exercise, continue atorvastatin.  -     Lipid panel reflex to direct LDL Non-fasting; Future      Reviewed the risks and benefits of the treatment plan with the patient and/or caregiver and we discussed indications for routine and emergent follow-up.  The longitudinal plan of care for the diagnosis(es)/condition(s) as documented were addressed during this visit. Due to the added complexity in care, I will continue to support Pwo in the subsequent management and with ongoing continuity of care.        SUBJECTIVE: Patient notes that he has been missing some doses of his metformin because it is causing him to get upset stomach and nausea.  He usually manages to take the medication but it makes him feel uncomfortable and he would like to consider switching to something different.  He brings in all his medication bottles today and he is back on his amlodipine and Coreg.  However, his spironolactone bottle is empty.  Blood pressure has improved compared to last visit but is still  significantly elevated.  He has been getting some mild headaches.  No chest pain, no out of the ordinary shortness of breath.    Past Medical History:   Diagnosis Date    Chronic pain of both knees 02/11/2019    COVID-19 05/07/2020    Diabetes (H)     Dyspnea on exertion     Hypercholesteremia 06/18/2018    Hypertension 05/07/2018    Renal disease      Patient Active Problem List   Diagnosis    Resistant hypertension    Cataract    Hypercholesteremia    Chronic pain of both knees    CKD (chronic kidney disease) stage 3, GFR 30-59 ml/min    Allergic conjunctivitis, bilateral    COVID-19    Tachycardia    Accelerated hypertension    Nephrolithiasis    SOB (shortness of breath)    Decreased visual acuity    Corneal scarring    Poor dentition    Type 2 diabetes mellitus with other diabetic kidney complication, without long-term current use of insulin (H)    History of nephrectomy    Posterior vitreous detachment, right    Macular edema    Hypertension secondary to other renal disorders    Solitary kidney, acquired    Nonintractable episodic headache, unspecified headache type    Ureterolithiasis    Right ureteral stone    Calcium oxalate kidney stones     Current Outpatient Medications   Medication Sig Dispense Refill    amLODIPine (NORVASC) 10 MG tablet Take 1 tablet (10 mg) by mouth at bedtime. Need appt for further refills. 90 tablet 3    aspirin (ASPIRIN LOW DOSE) 81 MG EC tablet Take 1 tablet (81 mg) by mouth daily. ---Due for follow up in cardiology before any further refills--- 30 tablet 1    atorvastatin (LIPITOR) 80 MG tablet Take 1 tablet (80 mg) by mouth every evening. 90 tablet 3    carvedilol (COREG) 25 MG tablet Take 2 tablets (50 mg) by mouth 2 times daily (with meals). 360 tablet 3    ezetimibe (ZETIA) 10 MG tablet Take 1 tablet (10 mg) by mouth daily 90 tablet 11    metFORMIN (GLUCOPHAGE-XR) 750 MG 24 hr tablet Take 1 tablet (750 mg) by mouth 2 times daily (with meals). 60 tablet 11    spironolactone  "(ALDACTONE) 25 MG tablet Take 1 tablet (25 mg) by mouth daily. 90 tablet 3    telmisartan (MICARDIS) 80 MG tablet TAKE 1 TABLET (80 MG) BY MOUTH DAILY 90 tablet 3    acetaminophen (TYLENOL) 325 MG tablet Take 2 tablets (650 mg) by mouth every 6 hours as needed for pain 120 tablet 3    blood glucose (NO BRAND SPECIFIED) test strip Use to test blood sugar one times daily or as directed. To accompany: Blood Glucose Monitor Brands: per insurance. 100 strip 6    blood glucose monitoring (NO BRAND SPECIFIED) meter device kit Use to test blood sugar 1 times daily or as directed. Preferred blood glucose meter OR supplies to accompany: Blood Glucose Monitor Brands: per insurance. 1 kit 0    hydrOXYzine HCl (ATARAX) 25 MG tablet Take 1-2 tablets (25-50 mg) by mouth nightly as needed for itching 60 tablet 1    oxyCODONE (ROXICODONE) 5 MG tablet Take 1 tablet (5 mg) by mouth every 4 hours as needed for severe pain (IF pain not managed with non-pharmacological and non-opioid interventions) 6 tablet 0    Polyvinyl Alcohol-Povidone (ARTIFICIAL TEARS) 5-6 MG/ML SOLN Apply 1 drop to eye 3 times daily as needed (both eyes) 30 mL 11    tamsulosin (FLOMAX) 0.4 MG capsule Take 1 capsule (0.4 mg) by mouth daily 7 capsule 0    thin (NO BRAND SPECIFIED) lancets Use with lanceting device. To accompany: Blood Glucose Monitor Brands: per insurance. 100 each 6    tolterodine ER (DETROL LA) 2 MG 24 hr capsule Take 1 capsule (2 mg) by mouth daily as needed (bladder spasms) 7 capsule 0    triamcinolone (KENALOG) 0.1 % external cream Apply topically 2 times daily 80 g 3     History   Smoking Status    Former    Types: Cigarettes   Smokeless Tobacco    Never       OBJECTICE: BP (!) 174/106   Pulse 67   Temp 98.2  F (36.8  C) (Oral)   Resp 20   Ht 1.621 m (5' 3.82\")   Wt 68.5 kg (151 lb)   SpO2 100%   BMI 26.07 kg/m       Recent Results (from the past 24 hours)   UA Macroscopic with reflex to Microscopic and Culture - Lab Collect    " Collection Time: 03/11/25  3:23 PM    Specimen: Urine, NOS   Result Value Ref Range    Color Urine Light Yellow Colorless, Straw, Light Yellow, Yellow    Appearance Urine Clear Clear    Glucose Urine Negative Negative mg/dL    Bilirubin Urine Negative Negative    Ketones Urine Negative Negative mg/dL    Specific Gravity Urine <=1.005 1.005 - 1.030    Blood Urine Negative Negative    pH Urine 6.0 5.0 - 7.0    Protein Albumin Urine Negative Negative mg/dL    Urobilinogen Urine 0.2 0.2, 1.0 E.U./dL    Nitrite Urine Negative Negative    Leukocyte Esterase Urine Negative Negative        GEN-alert, appropriate, in no apparent distress   HEENT-eye exam looks normal bilaterally, normal appearing conjunctiva, exam limited by undilated pupils but looks symmetric.   CV-regular rate and rhythm   RESP-lungs clear to auscultation   EXTREM-no pitting edema of the ankles   Neurologic-cranial nerves II through XII are intact.  Gait is normal.  Strength is symmetric.        Signed Electronically by: Yassine Larsen MD

## 2025-03-12 LAB
ALBUMIN MFR UR ELPH: <6 MG/DL
CHOLEST SERPL-MCNC: 227 MG/DL
CREAT UR-MCNC: 27.2 MG/DL
CREAT UR-MCNC: 27.2 MG/DL
FASTING STATUS PATIENT QL REPORTED: NO
HDLC SERPL-MCNC: 70 MG/DL
LDLC SERPL CALC-MCNC: 135 MG/DL
MICROALBUMIN UR-MCNC: <12 MG/L
MICROALBUMIN/CREAT UR: NORMAL MG/G{CREAT}
NONHDLC SERPL-MCNC: 157 MG/DL
PROT/CREAT 24H UR: NORMAL MG/G{CREAT}
TRIGL SERPL-MCNC: 109 MG/DL

## 2025-03-13 LAB
ANNOTATION COMMENT IMP: ABNORMAL
METANEPHS SERPL-SCNC: 0.75 NMOL/L
NORMETANEPHRINE SERPL-SCNC: 1.57 NMOL/L

## 2025-03-20 DIAGNOSIS — R79.89 ELEVATED PLASMA METANEPHRINES: Primary | ICD-10-CM

## 2025-03-20 DIAGNOSIS — I1A.0 RESISTANT HYPERTENSION: ICD-10-CM

## 2025-04-16 ENCOUNTER — HOSPITAL ENCOUNTER (OUTPATIENT)
Dept: MRI IMAGING | Facility: HOSPITAL | Age: 70
Discharge: HOME | End: 2025-04-16
Attending: FAMILY MEDICINE
Payer: COMMERCIAL

## 2025-04-16 DIAGNOSIS — I1A.0 RESISTANT HYPERTENSION: ICD-10-CM

## 2025-04-16 DIAGNOSIS — R79.89 ELEVATED PLASMA METANEPHRINES: ICD-10-CM

## 2025-04-16 PROCEDURE — 74183 MRI ABD W/O CNTR FLWD CNTR: CPT

## 2025-04-16 PROCEDURE — A9585 GADOBUTROL INJECTION: HCPCS | Performed by: FAMILY MEDICINE

## 2025-04-16 PROCEDURE — 255N000002 HC RX 255 OP 636: Performed by: FAMILY MEDICINE

## 2025-04-16 PROCEDURE — T1013 SIGN LANG/ORAL INTERPRETER: HCPCS

## 2025-04-16 RX ORDER — GADOBUTROL 604.72 MG/ML
7 INJECTION INTRAVENOUS ONCE
Status: COMPLETED | OUTPATIENT
Start: 2025-04-16 | End: 2025-04-16

## 2025-04-16 RX ADMIN — GADOBUTROL 7 ML: 604.72 INJECTION INTRAVENOUS at 16:10

## 2025-05-12 ENCOUNTER — TELEPHONE (OUTPATIENT)
Dept: FAMILY MEDICINE | Facility: CLINIC | Age: 70
End: 2025-05-12
Payer: COMMERCIAL

## 2025-05-12 SDOH — HEALTH STABILITY: PHYSICAL HEALTH: ON AVERAGE, HOW MANY MINUTES DO YOU ENGAGE IN EXERCISE AT THIS LEVEL?: 0 MIN

## 2025-05-12 SDOH — HEALTH STABILITY: PHYSICAL HEALTH: ON AVERAGE, HOW MANY DAYS PER WEEK DO YOU ENGAGE IN MODERATE TO STRENUOUS EXERCISE (LIKE A BRISK WALK)?: 0 DAYS

## 2025-05-12 ASSESSMENT — SOCIAL DETERMINANTS OF HEALTH (SDOH)
HOW OFTEN DO YOU GET TOGETHER WITH FRIENDS OR RELATIVES?: NEVER
HOW OFTEN DO YOU GET TOGETHER WITH FRIENDS OR RELATIVES?: NEVER

## 2025-05-12 NOTE — TELEPHONE ENCOUNTER
Patient Quality Outreach    Patient is due for the following:   Hypertension -  BP check  Physical Annual Wellness Visit      Topic Date Due    Zoster (Shingles) Vaccine (2 of 2) 03/25/2021    COVID-19 Vaccine (3 - 2024-25 season) 09/01/2024       Action(s) Taken:   Patient was scheduled for AWV    Type of outreach:    Phone, spoke to patient/parent. Notified pt that he due for AWV, patient agreed to change appointment to AWV. Completed AWV questionnaires with patient.    Questions for provider review:    None         Sergey Baca, MA  Chart routed to None.

## 2025-05-14 ENCOUNTER — OFFICE VISIT (OUTPATIENT)
Dept: FAMILY MEDICINE | Facility: CLINIC | Age: 70
End: 2025-05-14
Payer: COMMERCIAL

## 2025-05-14 VITALS
DIASTOLIC BLOOD PRESSURE: 89 MMHG | HEIGHT: 64 IN | SYSTOLIC BLOOD PRESSURE: 177 MMHG | RESPIRATION RATE: 18 BRPM | BODY MASS INDEX: 25.39 KG/M2 | WEIGHT: 148.75 LBS | HEART RATE: 104 BPM | OXYGEN SATURATION: 99 % | TEMPERATURE: 98.1 F

## 2025-05-14 DIAGNOSIS — E78.5 HYPERLIPIDEMIA LDL GOAL <70: ICD-10-CM

## 2025-05-14 DIAGNOSIS — E11.29 TYPE 2 DIABETES MELLITUS WITH OTHER DIABETIC KIDNEY COMPLICATION, WITHOUT LONG-TERM CURRENT USE OF INSULIN (H): ICD-10-CM

## 2025-05-14 DIAGNOSIS — I1A.0 RESISTANT HYPERTENSION: ICD-10-CM

## 2025-05-14 DIAGNOSIS — Z23 NEED FOR VACCINATION: ICD-10-CM

## 2025-05-14 DIAGNOSIS — Z12.5 ENCOUNTER FOR SCREENING FOR MALIGNANT NEOPLASM OF PROSTATE: ICD-10-CM

## 2025-05-14 DIAGNOSIS — Z00.00 ENCOUNTER FOR MEDICARE ANNUAL WELLNESS EXAM: Primary | ICD-10-CM

## 2025-05-14 LAB
EST. AVERAGE GLUCOSE BLD GHB EST-MCNC: 143 MG/DL
HBA1C MFR BLD: 6.6 % (ref 0–5.6)

## 2025-05-14 PROCEDURE — 83036 HEMOGLOBIN GLYCOSYLATED A1C: CPT | Performed by: FAMILY MEDICINE

## 2025-05-14 PROCEDURE — 36415 COLL VENOUS BLD VENIPUNCTURE: CPT | Performed by: FAMILY MEDICINE

## 2025-05-14 PROCEDURE — 99213 OFFICE O/P EST LOW 20 MIN: CPT | Mod: 25 | Performed by: FAMILY MEDICINE

## 2025-05-14 PROCEDURE — G0103 PSA SCREENING: HCPCS | Performed by: FAMILY MEDICINE

## 2025-05-14 PROCEDURE — T1013 SIGN LANG/ORAL INTERPRETER: HCPCS | Performed by: INTERPRETER

## 2025-05-14 PROCEDURE — G0439 PPPS, SUBSEQ VISIT: HCPCS | Performed by: FAMILY MEDICINE

## 2025-05-14 RX ORDER — ASPIRIN 81 MG/1
81 TABLET ORAL DAILY
Qty: 90 TABLET | Refills: 3 | Status: SHIPPED | OUTPATIENT
Start: 2025-05-14

## 2025-05-14 RX ORDER — METFORMIN HYDROCHLORIDE 750 MG/1
750 TABLET, EXTENDED RELEASE ORAL 2 TIMES DAILY WITH MEALS
Qty: 180 TABLET | Refills: 3 | Status: SHIPPED | OUTPATIENT
Start: 2025-05-14

## 2025-05-14 SDOH — HEALTH STABILITY: PHYSICAL HEALTH: ON AVERAGE, HOW MANY MINUTES DO YOU ENGAGE IN EXERCISE AT THIS LEVEL?: 0 MIN

## 2025-05-14 SDOH — HEALTH STABILITY: PHYSICAL HEALTH: ON AVERAGE, HOW MANY DAYS PER WEEK DO YOU ENGAGE IN MODERATE TO STRENUOUS EXERCISE (LIKE A BRISK WALK)?: 0 DAYS

## 2025-05-14 ASSESSMENT — SOCIAL DETERMINANTS OF HEALTH (SDOH): HOW OFTEN DO YOU GET TOGETHER WITH FRIENDS OR RELATIVES?: ONCE A WEEK

## 2025-05-14 NOTE — PATIENT INSTRUCTIONS
Patient Education   You have been provided the Preventive Care Advice document.    Additional copies can be found here: [email or web address]  Preventive Care Advice   This is general advice given by our system to help you stay healthy. However, your care team may have specific advice just for you. Please talk to your care team about your preventive care needs.  Nutrition  Eat 5 or more servings of fruits and vegetables each day.  Try wheat bread, brown rice and whole grain pasta (instead of white bread, rice, and pasta).  Get enough calcium and vitamin D. Check the label on foods and aim for 100% of the RDA (recommended daily allowance).  Lifestyle  Exercise at least 150 minutes each week  (30 minutes a day, 5 days a week).  Do muscle strengthening activities 2 days a week. These help control your weight and prevent disease.  No smoking.  Wear sunscreen to prevent skin cancer.  Have a dental exam and cleaning every 6 months.  Yearly exams  See your health care team every year to talk about:  Any changes in your health.  Any medicines your care team has prescribed.  Preventive care, family planning, and ways to prevent chronic diseases.  Shots (vaccines)   HPV shots (up to age 26), if you've never had them before.  Hepatitis B shots (up to age 59), if you've never had them before.  COVID-19 shot: Get this shot when it's due.  Flu shot: Get a flu shot every year.  Tetanus shot: Get a tetanus shot every 10 years.  Pneumococcal, hepatitis A, and RSV shots: Ask your care team if you need these based on your risk.  Shingles shot (for age 50 and up)  General health tests  Diabetes screening:  Starting at age 35, Get screened for diabetes at least every 3 years.  If you are younger than age 35, ask your care team if you should be screened for diabetes.  Cholesterol test: At age 39, start having a cholesterol test every 5 years, or more often if advised.  Bone density scan (DEXA): At age 50, ask your care team if you  should have this scan for osteoporosis (brittle bones).  Hepatitis C: Get tested at least once in your life.  STIs (sexually transmitted infections)  Before age 24: Ask your care team if you should be screened for STIs.  After age 24: Get screened for STIs if you're at risk. You are at risk for STIs (including HIV) if:  You are sexually active with more than one person.  You don't use condoms every time.  You or a partner was diagnosed with a sexually transmitted infection.  If you are at risk for HIV, ask about PrEP medicine to prevent HIV.  Get tested for HIV at least once in your life, whether you are at risk for HIV or not.  Cancer screening tests  Cervical cancer screening: If you have a cervix, begin getting regular cervical cancer screening tests starting at age 21.  Breast cancer scan (mammogram): If you've ever had breasts, begin having regular mammograms starting at age 40. This is a scan to check for breast cancer.  Colon cancer screening: It is important to start screening for colon cancer at age 45.  Have a colonoscopy test every 10 years (or more often if you're at risk) Or, ask your provider about stool tests like a FIT test every year or Cologuard test every 3 years.  To learn more about your testing options, visit:   .  For help making a decision, visit:   https://bit.ly/de17213.  Prostate cancer screening test: If you have a prostate, ask your care team if a prostate cancer screening test (PSA) at age 55 is right for you.  Lung cancer screening: If you are a current or former smoker ages 50 to 80, ask your care team if ongoing lung cancer screenings are right for you.  For informational purposes only. Not to replace the advice of your health care provider. Copyright   2023 [employer]. All rights reserved. Clinically reviewed by the [facility]. [employer] 561137 - REV 01/24.  Learning About Activities of Daily Living  What are activities of daily living?      Activities of daily living (ADLs) are the basic self-care tasks you do every day. These include eating, bathing, dressing, and moving around.  As you age, and if you have health problems, you may find that it's harder to do some of these tasks. If so, your doctor can suggest ideas that may help.  To measure what kind of help you may need, your doctor will ask how well you are able to do ADLs. Let your doctor know if there are any tasks that you are having trouble doing. This is an important first step to getting help. And when you have the help you need, you can stay as independent as possible.  How will a doctor assess your ADLs?  Asking about ADLs is part of a routine health checkup your doctor will likely do as you age. Your health check might be done in a doctor's office, in your home, or at a hospital. The goal is to find out if you are having any problems that could make it hard to care for yourself or that make it unsafe for you to be on your own.  To measure your ADLs, your doctor will ask how hard it is for you to do routine tasks. Your doctor may also want to know if you have changed the way you do a task because of a health problem. Your doctor may watch how you:  Walk back and forth.  Keep your balance while you stand or walk.  Move from sitting to standing or from a bed to a chair.  Button or unbutton a shirt or sweater.  Remove and put on your shoes.  It's common to feel a little worried or anxious if you find you can't do all the things you used to be able to do. Talking with your doctor about ADLs is a way to make sure you're as safe as possible and able to care for yourself as well as you can. You may want to bring a caregiver, friend, or family member to your checkup. They can help you talk to your doctor.  Follow-up care is a key part of your treatment and safety. Be sure to make and go to all appointments, and call your doctor if you are having problems. It's also a good idea to know your test  results and keep a list of the medicines you take.  Current as of: October 24, 2024  Content Version: 14.4    5679-3600 [employer].   Care instructions adapted under license by your healthcare professional. If you have questions about a medical condition or this instruction, always ask your healthcare professional. [employer] disclaims any warranty or liability for your use of this information.    Preventing Falls: Care Instructions  Injuries and health problems such as trouble walking or poor eyesight can increase your risk of falling. So can some medicines. But there are things you can do to help prevent falls. You can exercise to get stronger. You can also arrange your home to make it safer.    Talk to your doctor about the medicines you take. Ask if any of them increase the risk of falls and whether they can be changed or stopped.   Try to exercise regularly. It can help improve your strength and balance. This can help lower your risk of falling.         Practice fall safety and prevention.   Wear low-heeled shoes that fit well and give your feet good support. Talk to your doctor if you have foot problems that make this hard.  Carry a cellphone or wear a medical alert device that you can use to call for help.  Use stepladders instead of chairs to reach high objects. Don't climb if you're at risk for falls. Ask for help, if needed.  Wear the correct eyeglasses, if you need them.        Make your home safer.   Remove rugs, cords, clutter, and furniture from walkways.  Keep your house well lit. Use night-lights in hallways and bathrooms.  Install and use sturdy handrails on stairways.  Wear nonskid footwear, even inside. Don't walk barefoot or in socks without shoes.        Be safe outside.   Use handrails, curb cuts, and ramps whenever possible.  Keep your hands free by using a shoulder bag or backpack.  Try to walk in well-lit areas. Watch out for uneven ground, changes in pavement, and  "debris.  Be careful in the winter. Walk on the grass or gravel when sidewalks are slippery. Use de-icer on steps and walkways. Add non-slip devices to shoes.    Put grab bars and nonskid mats in your shower or tub and near the toilet. Try to use a shower chair or bath bench when bathing.   Get into a tub or shower by putting in your weaker leg first. Get out with your strong side first. Have a phone or medical alert device in the bathroom with you.   Where can you learn more?  Go to https://www.[employer].net/patiented  Enter G117 in the search box to learn more about \"Preventing Falls: Care Instructions.\"  Current as of: July 31, 2024  Content Version: 14.4    0850-6356 [employer].   Care instructions adapted under license by your healthcare professional. If you have questions about a medical condition or this instruction, always ask your healthcare professional. [employer] disclaims any warranty or liability for your use of this information.    Hearing Loss: Care Instructions  Overview     Hearing loss is a sudden or slow decrease in how well you hear. It can range from slight to profound. Permanent hearing loss can occur with aging. It also can happen when you are exposed long-term to loud noise. Examples include listening to loud music, riding motorcycles, or being around other loud machines.  Hearing loss can affect your work and home life. It can make you feel lonely or depressed. You may feel that you have lost your independence. But hearing aids and other devices can help you hear better and feel connected to others.  Follow-up care is a key part of your treatment and safety. Be sure to make and go to all appointments, and call your doctor if you are having problems. It's also a good idea to know your test results and keep a list of the medicines you take.  How can you care for yourself at home?  Avoid loud noises whenever possible. This helps keep your hearing from getting " "worse.  Always wear hearing protection around loud noises.  Wear a hearing aid as directed.  A professional can help you pick a hearing aid that will work best for you.  You can also get hearing aids over the counter for mild to moderate hearing loss.  Have hearing tests as your doctor suggests. They can show whether your hearing has changed. Your hearing aid may need to be adjusted.  Use other devices as needed. These may include:  Telephone amplifiers and hearing aids that can connect to a television, stereo, radio, or microphone.  Devices that use lights or vibrations. These alert you to the doorbell, a ringing telephone, or a baby monitor.  Television closed-captioning. This shows the words at the bottom of the screen. Most new TVs can do this.  TTY (text telephone). This lets you type messages back and forth on the telephone instead of talking or listening. These devices are also called TDD. When messages are typed on the keyboard, they are sent over the phone line to a receiving TTY. The message is shown on a monitor.  Use text messaging, social media, and email if it is hard for you to communicate by telephone.  Try to learn a listening technique called speechreading. It is not lipreading. You pay attention to people's gestures, expressions, posture, and tone of voice. These clues can help you understand what a person is saying. Face the person you are talking to, and have them face you. Make sure the lighting is good. You need to see the other person's face clearly.  Think about counseling if you need help to adjust to your hearing loss.  When should you call for help?  Watch closely for changes in your health, and be sure to contact your doctor if:    You think your hearing is getting worse.     You have new symptoms, such as dizziness or nausea.   Where can you learn more?  Go to https://www.healthwise.net/patiented  Enter R798 in the search box to learn more about \"Hearing Loss: Care " "Instructions.\"  Current as of: October 27, 2024  Content Version: 14.4    5917-1245 [employer].   Care instructions adapted under license by your healthcare professional. If you have questions about a medical condition or this instruction, always ask your healthcare professional. [employer] disclaims any warranty or liability for your use of this information.       "

## 2025-05-14 NOTE — PROGRESS NOTES
"Preventive Care Visit  [facility] ENOCH Larsen MD, Family Medicine  May 14, 2025      Assessment & Plan     Encounter for Medicare annual wellness exam  Paper copy of honoring choices given to the patient and family with counseling done and the importance of getting this completed.  - PSA, screen    Encounter for screening for malignant neoplasm of prostate  - PSA, screen    Need for vaccination  Immunization update declined by the patient.    Resistant hypertension  Improved control compared to last visit but still not under good control.  This is with the addition of spironolactone which will be continued.  Reviewed the results of the patient's MRI with him-no evidence of pheochromocytoma.    Type 2 diabetes mellitus with other diabetic kidney complication, without long-term current use of insulin (H)  - metFORMIN (GLUCOPHAGE-XR) 750 MG 24 hr tablet; Take 1 tablet (750 mg) by mouth 2 times daily (with meals).  - HEMOGLOBIN A1C    Hyperlipidemia LDL goal <70  - aspirin 81 MG EC tablet; Take 1 tablet (81 mg) by mouth daily. ---Due for follow up in cardiology before any further refills---      BMI  Estimated body mass index is 25.71 kg/m  as calculated from the following:    Height as of this encounter: 1.62 m (5' 3.78\").    Weight as of this encounter: 67.5 kg (148 lb 12 oz).       Counseling  Appropriate preventive services were addressed with this patient via screening, questionnaire, or discussion as appropriate for fall prevention, nutrition, physical activity, Tobacco-use cessation, social engagement, weight loss and cognition.  Checklist reviewing preventive services available has been given to the patient.  Reviewed patient's diet, addressing concerns and/or questions.   The patient was instructed to see the dentist every 6 months.   Updated plan of care.  Patient reported difficulty with activities of daily living were addressed today.Patient reported safety concerns were addressed " today.The patient was provided with written information regarding signs of hearing loss.           Subjective   Pwo is a 70 year old, presenting for the following:  Wellness Visit (AWV Add On ), Diabetes, and Hypertension (Needs new BP machine)        5/14/2025     3:53 PM   Additional Questions   Roomed by Deyanira CARTAGENA   Accompanied by grANDDAUGHTER Ankit ZAMORA           History of Present Illness       Hypertension: He presents for follow up of hypertension.  He does not check blood pressure  regularly outside of the clinic. Outside blood pressures have been over 140/90. He follows a low salt diet.     He eats 2-3 servings of fruits and vegetables daily.He consumes 0 sweetened beverage(s) daily.He exercises with enough effort to increase his heart rate 9 or less minutes per day.  He exercises with enough effort to increase his heart rate 3 or less days per week.   He is taking medications regularly.         Advance Care Planning    Discussed advance care planning with patient; informed AVS has link to Honoring Choices.        5/14/2025   General Health   How would you rate your overall physical health? (!) FAIR   Feel stress (tense, anxious, or unable to sleep) Only a little   (!) STRESS CONCERN      5/14/2025   Nutrition   Diet: Regular (no restrictions)    Low salt    Low fat/cholesterol    Diabetic       Multiple values from one day are sorted in reverse-chronological order         5/14/2025   Exercise   Days per week of moderate/strenous exercise 0 days   Average minutes spent exercising at this level 0 min   (!) EXERCISE CONCERN      5/14/2025   Social Factors   Frequency of gathering with friends or relatives Once a week   Worry food won't last until get money to buy more No   Food not last or not have enough money for food? No   Do you have housing? (Housing is defined as stable permanent housing and does not include staying outside in a car, in a tent, in an abandoned building, in an overnight shelter, or  couch-surfing.) Yes   Are you worried about losing your housing? No   Lack of transportation? No   Unable to get utilities (heat,electricity)? No         2025   Fall Risk   Fallen 2 or more times in the past year? No    Trouble with walking or balance? Yes    Gait Speed Test Interpretation Greater than 5.01 seconds - ABNORMAL       Proxy-reported           2025   Activities of Daily Living- Home Safety   Needs help with the following daily activites Telephone use    Transportation    Shopping    Preparing meals    Housework    Bathing    Laundry    Medication administration    Money management   Safety concerns in the home No grab bars in the bathroom       Multiple values from one day are sorted in reverse-chronological order         2025   Dental   Dentist two times every year? (!) NO         2025   Hearing Screening   Hearing concerns? (!) I FEEL THAT PEOPLE ARE MUMBLING OR NOT SPEAKING CLEARLY.    (!) I NEED TO ASK PEOPLE TO SPEAK UP OR REPEAT THEMSELVES.       Multiple values from one day are sorted in reverse-chronological order         2025   Driving Risk Screening   Patient/family members have concerns about driving No         2025   General Alertness/Fatigue Screening   Have you been more tired than usual lately? No         2025   Urinary Incontinence Screening   Bothered by leaking urine in past 6 months No         Today's PHQ-2 Score:       2025     3:44 PM   PHQ-2 (  [employer])   PHQ-2 Score Incomplete           2025   Substance Use   Alcohol more than 3/day or more than 7/wk Not Applicable   Do you have a current opioid prescription? No   How severe/bad is pain from 1 to 10? 6/10   Do you use any other substances recreationally? No     Social History     Tobacco Use    Smoking status: Former     Current packs/day: 0.00     Types: Cigarettes     Quit date: 3/6/2016     Years since quittin.1     Passive exposure: Never    Smokeless tobacco: Never     Tobacco comments:     no passive exposure   Vaping Use    Vaping status: Never Used   Substance Use Topics    Alcohol use: No    Drug use: Never       ASCVD Risk   The 10-year ASCVD risk score (Tanner QUIROGA, et al., 2019) is: 54.3%    Values used to calculate the score:      Age: 70 years      Sex: Male      Is Non- : No      Diabetic: Yes      Tobacco smoker: No      Systolic Blood Pressure: 168 mmHg      Is BP treated: Yes      HDL Cholesterol: 70 mg/dL      Total Cholesterol: 227 mg/dL            Reviewed and updated as needed this visit by Provider                    Labs reviewed in EPIC  Current providers sharing in care for this patient include:  Patient Care Team:  Yassine Larsen MD as PCP - General (Family Medicine)  Yassine Larsen MD as Assigned PCP  Ebony Wooten MD (Family Medicine)  Matias Chavarria MD as Assigned Heart and Vascular Provider  Dian Cox MD as Assigned Nephrology Provider  Pascale Cesar, PharmD as Pharmacist (Pharmacist)  David Arreaga MD as Assigned Surgical Provider    The following health maintenance items are reviewed in Epic and correct as of today:  Health Maintenance   Topic Date Due    DIABETIC FOOT EXAM  Never done    ANNUAL REVIEW OF HM ORDERS  Never done    RSV VACCINE (1 - Risk 60-74 years 1-dose series) Never done    ZOSTER IMMUNIZATION (2 of 2) 03/25/2021    LUNG CANCER SCREENING  12/13/2021    URINE DRUG SCREEN  07/20/2023    COVID-19 Vaccine (3 - 2024-25 season) 09/01/2024    MEDICARE ANNUAL WELLNESS VISIT  01/10/2025    A1C  05/24/2025    COLORECTAL CANCER SCREENING  07/28/2025    EYE EXAM  01/08/2026    BMP  02/24/2026    HEMOGLOBIN  02/24/2026    LIPID  03/11/2026    MICROALBUMIN  03/11/2026    FALL RISK ASSESSMENT  05/14/2026    ADVANCE CARE PLANNING  01/10/2029    DTAP/TDAP/TD IMMUNIZATION (2 - Td or Tdap) 05/13/2029    HEPATITIS C SCREENING  Completed    PHQ-2 (once per calendar year)  Completed    INFLUENZA  "VACCINE  Completed    Pneumococcal Vaccine: 50+ Years  Completed    URINALYSIS  Completed    AORTIC ANEURYSM SCREENING (SYSTEM ASSIGNED)  Completed    HPV IMMUNIZATION  Aged Out    MENINGITIS IMMUNIZATION  Aged Out       Review of systems: No chest pain, no shortness of breath, no blood in the urine, no blood in the stool, no skin lesions that have been changing, remainder of review of systems is as above or baseline or negative.     Objective    Exam  BP (!) 168/85   Pulse 104   Temp 98.1  F (36.7  C) (Oral)   Resp 18   Ht 1.62 m (5' 3.78\")   Wt 67.5 kg (148 lb 12 oz)   SpO2 99%   BMI 25.71 kg/m     Estimated body mass index is 25.71 kg/m  as calculated from the following:    Height as of this encounter: 1.62 m (5' 3.78\").    Weight as of this encounter: 67.5 kg (148 lb 12 oz).    Physical Exam  Gen - alert, orientated, NAD  ENT - oropharynx clear, TMs clear  Neck - supple, no palpable mass or lymphadenopathy  CV - RRR, no murmur  Resp - lungs CTA  Ab - soft, nontender, no palpable mass or organomegaly  Extrem - warm, no edema  Neuro - CN II-XII intact  Skin - no rash, no atypical appearing lesions seen.           5/14/2025   Mini Cog   Clock Draw Score 0 Abnormal   3 Item Recall 3 objects recalled   Mini Cog Total Score 3              Signed Electronically by: Yassine Larsen MD    "

## 2025-05-15 LAB — PSA SERPL DL<=0.01 NG/ML-MCNC: 1.31 NG/ML (ref 0–6.5)

## (undated) DEVICE — CATH URETERAL DUAL LUMEN 10FRX54CM M0064051000

## (undated) DEVICE — GOWN XLG DISP 9545

## (undated) DEVICE — ENDO TROCAR FIRST ENTRY KII FIOS Z-THRD 12X100MM CTF73

## (undated) DEVICE — CONTAINER URINE SPEC 4OZ STRL 1053

## (undated) DEVICE — WIPES FOLEY CARE SURESTEP PROVON DFC100

## (undated) DEVICE — MAT FLOOR SURGICAL 40X38 0702140238

## (undated) DEVICE — TUBING SUCTION MEDI-VAC 1/4"X20' N620A

## (undated) DEVICE — PREP DYNA-HEX 4% CHG SCRUB 4OZ BOTTLE MDS098710

## (undated) DEVICE — GUIDEWIRE SENSOR DUAL FLEX STR 0.035"X150CM M0066703080

## (undated) DEVICE — TUBING INSUFFLATION W/FILTER CPC TO LUER 620-030-301

## (undated) DEVICE — ESU ENDO SCISSORS 5MM CVD 5DCS

## (undated) DEVICE — KIT ENDO FIRST STEP DISINFECTANT 200ML W/POUCH EP-4

## (undated) DEVICE — SOL NACL 0.9% INJ 1000ML BAG 2B1324X

## (undated) DEVICE — LINEN TOWEL PACK X30 5481

## (undated) DEVICE — PROTECTOR ARM ONE-STEP TRENDELENBURG 40418

## (undated) DEVICE — STPL POWERED ECHELON 45MM PSEE45A

## (undated) DEVICE — SOLUTION IRRIG 2B7127 .9NS 3000ML BAG

## (undated) DEVICE — ADH SKIN CLOSURE PREMIERPRO EXOFIN 1.0ML 3470

## (undated) DEVICE — DRAPE IOBAN INCISE 13X13" 6640EZ

## (undated) DEVICE — TUBING IRRIG TUR Y TYPE 96" LF 6543-01

## (undated) DEVICE — CATH URETERAL OPEN END 5FRX70CM M0064002010

## (undated) DEVICE — SU MONOCRYL 4-0 PS-2 18" UND Y496G

## (undated) DEVICE — NDL INSUFFLATION 13GA 120MM C2201

## (undated) DEVICE — SOL WATER IRRIG 1000ML BOTTLE 2F7114

## (undated) DEVICE — GLOVE PROTEXIS BLUE W/NEU-THERA 8.0  2D73EB80

## (undated) DEVICE — SU VICRYL 0 UR-6 27" J603H

## (undated) DEVICE — SOL WATER IRRIG 3000ML BAG 2B7117

## (undated) DEVICE — GLOVE PROTEXIS W/NEU-THERA 7.5  2D73TE75

## (undated) DEVICE — SUCTION MANIFOLD NEPTUNE 2 SYS 1 PORT 702-025-000

## (undated) DEVICE — SYSTEM CLEARIFY VISUALIZATION 21-345

## (undated) DEVICE — PREP CHLORAPREP 26ML TINTED ORANGE  260815

## (undated) DEVICE — SOL NACL 0.9% IRRIG 1000ML BOTTLE 2F7124

## (undated) DEVICE — SUCTION MANIFOLD NEPTUNE 2 SYS 4 PORT 0702-020-000

## (undated) DEVICE — LINEN TOWEL PACK X6 WHITE 5487

## (undated) DEVICE — ENDO TROCAR SLEEVE KII Z-THREADED 12X100MM CTS22

## (undated) DEVICE — TUBING SET THERMEDX UROLOGY SGL USE LL0006

## (undated) DEVICE — Device

## (undated) DEVICE — GOWN IMPERVIOUS BREATHABLE SMART XLG 89045

## (undated) DEVICE — DRAPE U SPLIT 74X120" 29440

## (undated) DEVICE — CONNECTOR URETERAL CATH 140000

## (undated) DEVICE — ENDO POUCH UNIVERSAL RETRIEVAL SYSTEM INZII 12/15MM CD004

## (undated) DEVICE — CLIP ENDO HEMO-LOC PURPLE LG 544240

## (undated) DEVICE — CUSTOM PACK CYSTO PREFERRED SOT5BCYHEA

## (undated) RX ORDER — FENTANYL CITRATE 50 UG/ML
INJECTION, SOLUTION INTRAMUSCULAR; INTRAVENOUS
Status: DISPENSED
Start: 2024-07-01

## (undated) RX ORDER — HYDRALAZINE HYDROCHLORIDE 20 MG/ML
INJECTION INTRAMUSCULAR; INTRAVENOUS
Status: DISPENSED
Start: 2021-08-24

## (undated) RX ORDER — LIDOCAINE HYDROCHLORIDE 20 MG/ML
INJECTION, SOLUTION EPIDURAL; INFILTRATION; INTRACAUDAL; PERINEURAL
Status: DISPENSED
Start: 2021-08-24

## (undated) RX ORDER — FENTANYL CITRATE 50 UG/ML
INJECTION, SOLUTION INTRAMUSCULAR; INTRAVENOUS
Status: DISPENSED
Start: 2021-08-24

## (undated) RX ORDER — CEFAZOLIN SODIUM 1 G/3ML
INJECTION, POWDER, FOR SOLUTION INTRAMUSCULAR; INTRAVENOUS
Status: DISPENSED
Start: 2024-07-01

## (undated) RX ORDER — CEFAZOLIN SODIUM 2 G/100ML
INJECTION, SOLUTION INTRAVENOUS
Status: DISPENSED
Start: 2021-08-24

## (undated) RX ORDER — HYDROMORPHONE HCL IN WATER/PF 6 MG/30 ML
PATIENT CONTROLLED ANALGESIA SYRINGE INTRAVENOUS
Status: DISPENSED
Start: 2021-08-24

## (undated) RX ORDER — SODIUM CHLORIDE 9 MG/ML
INJECTION, SOLUTION INTRAVENOUS
Status: DISPENSED
Start: 2021-08-24

## (undated) RX ORDER — BUPIVACAINE HYDROCHLORIDE 2.5 MG/ML
INJECTION, SOLUTION EPIDURAL; INFILTRATION; INTRACAUDAL
Status: DISPENSED
Start: 2021-08-24

## (undated) RX ORDER — LABETALOL HYDROCHLORIDE 5 MG/ML
INJECTION, SOLUTION INTRAVENOUS
Status: DISPENSED
Start: 2021-08-24